# Patient Record
Sex: MALE | Race: WHITE | NOT HISPANIC OR LATINO | Employment: OTHER | URBAN - METROPOLITAN AREA
[De-identification: names, ages, dates, MRNs, and addresses within clinical notes are randomized per-mention and may not be internally consistent; named-entity substitution may affect disease eponyms.]

---

## 2019-11-10 ENCOUNTER — HOSPITAL ENCOUNTER (EMERGENCY)
Facility: MEDICAL CENTER | Age: 84
End: 2019-11-10
Attending: EMERGENCY MEDICINE

## 2019-11-10 VITALS
SYSTOLIC BLOOD PRESSURE: 154 MMHG | WEIGHT: 153.66 LBS | TEMPERATURE: 97.5 F | OXYGEN SATURATION: 98 % | HEART RATE: 88 BPM | RESPIRATION RATE: 16 BRPM | DIASTOLIC BLOOD PRESSURE: 89 MMHG | HEIGHT: 66 IN | BODY MASS INDEX: 24.7 KG/M2

## 2019-11-10 DIAGNOSIS — H66.3X1 CHRONIC SUPPURATIVE OTITIS MEDIA OF RIGHT EAR, UNSPECIFIED OTITIS MEDIA LOCATION: ICD-10-CM

## 2019-11-10 DIAGNOSIS — H92.01 RIGHT EAR PAIN: ICD-10-CM

## 2019-11-10 PROCEDURE — 700102 HCHG RX REV CODE 250 W/ 637 OVERRIDE(OP): Performed by: EMERGENCY MEDICINE

## 2019-11-10 PROCEDURE — 99284 EMERGENCY DEPT VISIT MOD MDM: CPT

## 2019-11-10 PROCEDURE — A9270 NON-COVERED ITEM OR SERVICE: HCPCS | Performed by: EMERGENCY MEDICINE

## 2019-11-10 RX ORDER — IBUPROFEN 600 MG/1
600 TABLET ORAL EVERY 8 HOURS PRN
Qty: 30 TAB | Refills: 0 | Status: SHIPPED | OUTPATIENT
Start: 2019-11-10 | End: 2020-04-04

## 2019-11-10 RX ORDER — AMOXICILLIN AND CLAVULANATE POTASSIUM 875; 125 MG/1; MG/1
1 TABLET, FILM COATED ORAL 2 TIMES DAILY
Qty: 10 TAB | Refills: 0 | Status: SHIPPED | OUTPATIENT
Start: 2019-11-10 | End: 2019-11-15

## 2019-11-10 RX ORDER — HYDROCODONE BITARTRATE AND ACETAMINOPHEN 5; 325 MG/1; MG/1
1 TABLET ORAL ONCE
Status: COMPLETED | OUTPATIENT
Start: 2019-11-10 | End: 2019-11-10

## 2019-11-10 RX ORDER — AMOXICILLIN AND CLAVULANATE POTASSIUM 875; 125 MG/1; MG/1
1 TABLET, FILM COATED ORAL ONCE
Status: COMPLETED | OUTPATIENT
Start: 2019-11-10 | End: 2019-11-10

## 2019-11-10 RX ADMIN — HYDROCODONE BITARTRATE AND ACETAMINOPHEN 1 TABLET: 5; 325 TABLET ORAL at 06:04

## 2019-11-10 RX ADMIN — AMOXICILLIN AND CLAVULANATE POTASSIUM 1 TABLET: 875; 125 TABLET, FILM COATED ORAL at 06:04

## 2019-11-10 SDOH — HEALTH STABILITY: MENTAL HEALTH: HOW OFTEN DO YOU HAVE A DRINK CONTAINING ALCOHOL?: NEVER

## 2019-11-10 ASSESSMENT — LIFESTYLE VARIABLES
EVER HAD A DRINK FIRST THING IN THE MORNING TO STEADY YOUR NERVES TO GET RID OF A HANGOVER: NO
TOTAL SCORE: 0
DO YOU DRINK ALCOHOL: NO
TOTAL SCORE: 0
DOES PATIENT WANT TO STOP DRINKING: NO
HAVE PEOPLE ANNOYED YOU BY CRITICIZING YOUR DRINKING: NO
HAVE YOU EVER FELT YOU SHOULD CUT DOWN ON YOUR DRINKING: NO
EVER FELT BAD OR GUILTY ABOUT YOUR DRINKING: NO
TOTAL SCORE: 0
CONSUMPTION TOTAL: INCOMPLETE

## 2019-11-10 NOTE — ED PROVIDER NOTES
ED Provider Note    Scribed for Migel Navarro M.D. by Eldon Acevedo. 11/10/2019  5:45 AM    CHIEF COMPLAINT  Chief Complaint   Patient presents with   • Sore Throat     x3 weeks, says he has a recurrent throat pain issue that is usually resolved with ABX, no swelling or oral redness noted.        HPI  Emigdio Jiménez is a 83 y.o. male who presents for evaluation of right sided ear pain, right sided dental pain, and a sore throat, all of which onset three weeks ago. He states that this is a recurring problem and was previously able to improve his symptoms with antibiotics. He is also endorsing associated nausea due to pain, This problem has been recurring for the last few years, however he was in halfway when these recurrences first started and was unable to get this treated. He recently had a tooth extraction performed as well as he suspected that was the cause of his pain, however this did not improve his symptoms, and thus he has come here for evaluation. He denies any fevers or chills.    REVIEW OF SYSTEMS  Constitutional: Negative: Fever, chills  ENT: Positive: Right ear pain, sore throat, dental pain  GI: Positive: Nausea  See HPI for further details.     PAST MEDICAL HISTORY  Recurrent sore throat    SOCIAL HISTORY  Social History     Tobacco Use   • Smoking status: Never Smoker   • Smokeless tobacco: Never Used   Substance and Sexual Activity   • Alcohol use: Not Currently     Frequency: Never   • Drug use: Never   • Sexual activity: Not on file       SURGICAL HISTORY  patient denies any surgical history    CURRENT MEDICATIONS  Home Medications     Reviewed by Dc Teresa R.N. (Registered Nurse) on 11/10/19 at 0459  Med List Status: <None>   Medication Last Dose Status        Patient Gil Taking any Medications                       ALLERGIES  No Known Allergies    PHYSICAL EXAM  VITAL SIGNS: BP (!) 163/86   Pulse 89   Temp  "36.4 °C (97.5 °F) (Temporal)   Resp 18   Ht 1.676 m (5' 6\")   Wt 69.7 kg (153 lb 10.6 oz)   SpO2 97%   BMI 24.80 kg/m²  @WAGNER[[id number]::@   Pulse ox interpretation: I interpret this pulse ox as normal.  Genl: Resting comfortably, speaking clearly, appears in no acute distress  Head: NC/AT, Dullness with out light reflex behind right TM, no inflammation of auditory canal, no mastoid tenderness, no facial tenderness, multiple dental extractions, no papito-gingival changes, FROM  ENT: Mucous membranes moist, posterior pharynx clear, uvula midline, nares patent bilaterally  Eyes: Normal sclera, pupils equal round reactive to light  Neck: Supple, FROM, no LAD appreciated  Pulmonary: Lungs are clear to auscultation bilaterally  Chest: No TTP  CV:  RRR, no murmur appreciated, pulses 2+ in both upper and lower extremities,  Abdomen: soft, NT/ND; no rebound/guarding, no masses palpated,  Musculoskeletal: Pain free ROM of the neck. Moving upper and lower extremities and spontaneous in coordinated fashion  Neuro: A&Ox4 (person, place, time, situation), speech fluent, gait steady, no focal deficits appreciated  Skin: No rash or lesions.  No pallor or jaundice.  No cyanosis.  Warm and dry.    COURSE & MEDICAL DECISION MAKING  Pertinent Labs & Imaging studies reviewed. (See chart for details)    Differential diagnoses include but not limited to: Otitis media, TMJ, dental infection, peritonsillar abscess, cervical strain, atypical infection.    5:45 AM - Patient seen and examined at bedside. Patient will be treated with Augmentin 875-125 mg, Norco 5-325 mg. Discussed with the patient that on examination he has a right sided ear infection which is likely the cause of his symptoms. Given this he will be started on a regimen of antibiotics, and receive his first dose here. He understands and agrees to the plan of care.    Medical Decision Making:   Patient presents emergency room for symptoms as described above.  He reports a history " of recurrent right-sided ear infections and chronic eustachian tube dysfunction that has caused him intermittent pain and discomfort.  He is recently discharged from residential, is not established with a PCP in this area, and has the better part of 3 to 5 days of worsening pain and discomfort with intermittent chills and no recorded fevers.  Clinical exam demonstrated no abnormalities of the posterior oropharynx however he does have evidence of supportive otitis media on the right side.  While he is afebrile the clinical evidence indicates need for acute anti-diagnosis and he is given first dose of this here in the emergency department.  As he does not have insurance currently he is provided with a prescription for Augmentin along with coupon for this treatment at a local pharmacy.  He was given initial pain medications here in the emergency department and was counseled regarding the need for anti-inflammatories as scheduled to help improve eustachian tube drainage and to help with overall pain complaints.  He is given information regarding establishing a primary care doctor here in [facility] and strict return precautions should he develop fevers, chills, neck pain or difficulty swallowing or any altered mental status    HTN/IDDM FOLLOW UP:  The patient is referred to a primary physician for blood pressure management, diabetic screening, and for all other preventive health concerns    The patient will return for new or worsening symptoms and is stable at the time of discharge.    The patient is referred to a primary physician for blood pressure management, diabetic screening, and for all other preventative health concerns.    DISPOSITION:  Patient will be discharged home in stable condition.    FOLLOW UP:  No follow-up provider specified.    OUTPATIENT MEDICATIONS:  New Prescriptions    AMOXICILLIN-CLAVULANATE (AUGMENTIN) 875-125 MG TAB    Take 1 Tab by mouth 2 times a day for 5 days.    IBUPROFEN (MOTRIN) 600 MG TAB    Take 1  Tab by mouth every 8 hours as needed.       FINAL IMPRESSION  Visit Diagnoses     ICD-10-CM   1. Chronic suppurative otitis media of right ear, unspecified otitis media location H66.3X1   2. Right ear pain H92.01          Eldon DISLA (Scribe), am scribing for, and in the presence of, Migel Navarro M.D..    Electronically signed by: Eldon Acevedo (Scribe), 11/10/2019    Migel DISLA M.D. personally performed the services described in this documentation, as scribed by Eldon Acevedo in my presence, and it is both accurate and complete. E.    The note accurately reflects work and decisions made by me.  Migel Navarro  11/10/2019  8:11 AM

## 2019-11-10 NOTE — ED TRIAGE NOTES
"Emigdio Jiménez   83 y.o. male   Chief Complaint   Patient presents with   • Sore Throat     x3 weeks, says he has a recurrent throat pain issue that is usually resolved with ABX, no swelling or oral redness noted.       Pt amb to triage with steady gait for above complaint.      Pt is alert and oriented, speaking in full sentences, follows commands and responds appropriately to questions. NAD. Resp are even and unlabored.   Pt placed in lobby. Pt educated on triage process. Pt encouraged to alert staff for any changes.    BP (!) 163/86   Pulse 89   Temp 36.4 °C (97.5 °F) (Temporal)   Resp 18   Ht 1.676 m (5' 6\")   Wt 69.7 kg (153 lb 10.6 oz)   SpO2 97%   BMI 24.80 kg/m²     "

## 2019-11-10 NOTE — ED NOTES
Patient verbalized understanding of discharge instructions, provided with discharge paperwork, gait steady, ambulated independently to TIFFANIE ro.

## 2019-11-18 ENCOUNTER — HOSPITAL ENCOUNTER (OUTPATIENT)
Facility: MEDICAL CENTER | Age: 84
End: 2019-11-19
Attending: EMERGENCY MEDICINE | Admitting: INTERNAL MEDICINE

## 2019-11-18 ENCOUNTER — APPOINTMENT (OUTPATIENT)
Dept: RADIOLOGY | Facility: MEDICAL CENTER | Age: 84
End: 2019-11-18
Attending: EMERGENCY MEDICINE

## 2019-11-18 DIAGNOSIS — R07.89 OTHER CHEST PAIN: ICD-10-CM

## 2019-11-18 DIAGNOSIS — R94.31 ABNORMAL EKG: ICD-10-CM

## 2019-11-18 LAB
ALBUMIN SERPL BCP-MCNC: 4.1 G/DL (ref 3.2–4.9)
ALBUMIN/GLOB SERPL: 1.9 G/DL
ALP SERPL-CCNC: 77 U/L (ref 30–99)
ALT SERPL-CCNC: 11 U/L (ref 2–50)
ANION GAP SERPL CALC-SCNC: 7 MMOL/L (ref 0–11.9)
APTT PPP: 31.9 SEC (ref 24.7–36)
AST SERPL-CCNC: 15 U/L (ref 12–45)
BASOPHILS # BLD AUTO: 1.3 % (ref 0–1.8)
BASOPHILS # BLD: 0.07 K/UL (ref 0–0.12)
BILIRUB SERPL-MCNC: 0.4 MG/DL (ref 0.1–1.5)
BUN SERPL-MCNC: 20 MG/DL (ref 8–22)
CALCIUM SERPL-MCNC: 8.8 MG/DL (ref 8.5–10.5)
CHLORIDE SERPL-SCNC: 103 MMOL/L (ref 96–112)
CO2 SERPL-SCNC: 25 MMOL/L (ref 20–33)
CREAT SERPL-MCNC: 0.97 MG/DL (ref 0.5–1.4)
EKG IMPRESSION: NORMAL
EOSINOPHIL # BLD AUTO: 0.17 K/UL (ref 0–0.51)
EOSINOPHIL NFR BLD: 3.1 % (ref 0–6.9)
ERYTHROCYTE [DISTWIDTH] IN BLOOD BY AUTOMATED COUNT: 44.7 FL (ref 35.9–50)
GLOBULIN SER CALC-MCNC: 2.2 G/DL (ref 1.9–3.5)
GLUCOSE SERPL-MCNC: 93 MG/DL (ref 65–99)
HCT VFR BLD AUTO: 34.4 % (ref 42–52)
HGB BLD-MCNC: 10.9 G/DL (ref 14–18)
IMM GRANULOCYTES # BLD AUTO: 0.02 K/UL (ref 0–0.11)
IMM GRANULOCYTES NFR BLD AUTO: 0.4 % (ref 0–0.9)
INR PPP: 1 (ref 0.87–1.13)
LIPASE SERPL-CCNC: 22 U/L (ref 11–82)
LYMPHOCYTES # BLD AUTO: 1.73 K/UL (ref 1–4.8)
LYMPHOCYTES NFR BLD: 31.3 % (ref 22–41)
MCH RBC QN AUTO: 28.3 PG (ref 27–33)
MCHC RBC AUTO-ENTMCNC: 31.7 G/DL (ref 33.7–35.3)
MCV RBC AUTO: 89.4 FL (ref 81.4–97.8)
MONOCYTES # BLD AUTO: 0.59 K/UL (ref 0–0.85)
MONOCYTES NFR BLD AUTO: 10.7 % (ref 0–13.4)
NEUTROPHILS # BLD AUTO: 2.94 K/UL (ref 1.82–7.42)
NEUTROPHILS NFR BLD: 53.2 % (ref 44–72)
NRBC # BLD AUTO: 0 K/UL
NRBC BLD-RTO: 0 /100 WBC
PLATELET # BLD AUTO: 199 K/UL (ref 164–446)
PMV BLD AUTO: 11.7 FL (ref 9–12.9)
POTASSIUM SERPL-SCNC: 4.6 MMOL/L (ref 3.6–5.5)
PROT SERPL-MCNC: 6.3 G/DL (ref 6–8.2)
PROTHROMBIN TIME: 13.3 SEC (ref 12–14.6)
RBC # BLD AUTO: 3.85 M/UL (ref 4.7–6.1)
SODIUM SERPL-SCNC: 135 MMOL/L (ref 135–145)
TROPONIN T SERPL-MCNC: 20 NG/L (ref 6–19)
WBC # BLD AUTO: 5.5 K/UL (ref 4.8–10.8)

## 2019-11-18 PROCEDURE — 80053 COMPREHEN METABOLIC PANEL: CPT

## 2019-11-18 PROCEDURE — 99285 EMERGENCY DEPT VISIT HI MDM: CPT

## 2019-11-18 PROCEDURE — 99219 PR INITIAL OBSERVATION CARE,LEVL II: CPT | Performed by: INTERNAL MEDICINE

## 2019-11-18 PROCEDURE — 83690 ASSAY OF LIPASE: CPT

## 2019-11-18 PROCEDURE — 36415 COLL VENOUS BLD VENIPUNCTURE: CPT

## 2019-11-18 PROCEDURE — 71046 X-RAY EXAM CHEST 2 VIEWS: CPT

## 2019-11-18 PROCEDURE — 84484 ASSAY OF TROPONIN QUANT: CPT

## 2019-11-18 PROCEDURE — 85730 THROMBOPLASTIN TIME PARTIAL: CPT

## 2019-11-18 PROCEDURE — 700102 HCHG RX REV CODE 250 W/ 637 OVERRIDE(OP): Performed by: EMERGENCY MEDICINE

## 2019-11-18 PROCEDURE — G0378 HOSPITAL OBSERVATION PER HR: HCPCS

## 2019-11-18 PROCEDURE — 93005 ELECTROCARDIOGRAM TRACING: CPT | Performed by: EMERGENCY MEDICINE

## 2019-11-18 PROCEDURE — A9270 NON-COVERED ITEM OR SERVICE: HCPCS | Performed by: INTERNAL MEDICINE

## 2019-11-18 PROCEDURE — A9270 NON-COVERED ITEM OR SERVICE: HCPCS | Performed by: EMERGENCY MEDICINE

## 2019-11-18 PROCEDURE — 85610 PROTHROMBIN TIME: CPT

## 2019-11-18 PROCEDURE — 85025 COMPLETE CBC W/AUTO DIFF WBC: CPT

## 2019-11-18 PROCEDURE — 700102 HCHG RX REV CODE 250 W/ 637 OVERRIDE(OP): Performed by: INTERNAL MEDICINE

## 2019-11-18 RX ORDER — ONDANSETRON 2 MG/ML
4 INJECTION INTRAMUSCULAR; INTRAVENOUS EVERY 4 HOURS PRN
Status: DISCONTINUED | OUTPATIENT
Start: 2019-11-18 | End: 2019-11-19 | Stop reason: HOSPADM

## 2019-11-18 RX ORDER — AMOXICILLIN 250 MG
2 CAPSULE ORAL 2 TIMES DAILY
Status: DISCONTINUED | OUTPATIENT
Start: 2019-11-19 | End: 2019-11-19 | Stop reason: HOSPADM

## 2019-11-18 RX ORDER — REGADENOSON 0.08 MG/ML
0.4 INJECTION, SOLUTION INTRAVENOUS
Status: COMPLETED | OUTPATIENT
Start: 2019-11-18 | End: 2019-11-19

## 2019-11-18 RX ORDER — LEVOTHYROXINE SODIUM 0.15 MG/1
150 TABLET ORAL
COMMUNITY
End: 2019-12-21 | Stop reason: SDUPTHER

## 2019-11-18 RX ORDER — BISACODYL 10 MG
10 SUPPOSITORY, RECTAL RECTAL
Status: DISCONTINUED | OUTPATIENT
Start: 2019-11-18 | End: 2019-11-19 | Stop reason: HOSPADM

## 2019-11-18 RX ORDER — TAMSULOSIN HYDROCHLORIDE 0.4 MG/1
0.4 CAPSULE ORAL DAILY
Status: DISCONTINUED | OUTPATIENT
Start: 2019-11-19 | End: 2019-11-19 | Stop reason: HOSPADM

## 2019-11-18 RX ORDER — ASPIRIN 81 MG/1
324 TABLET, CHEWABLE ORAL DAILY
Status: DISCONTINUED | OUTPATIENT
Start: 2019-11-18 | End: 2019-11-19 | Stop reason: HOSPADM

## 2019-11-18 RX ORDER — ONDANSETRON 4 MG/1
4 TABLET, ORALLY DISINTEGRATING ORAL EVERY 4 HOURS PRN
Status: DISCONTINUED | OUTPATIENT
Start: 2019-11-18 | End: 2019-11-19 | Stop reason: HOSPADM

## 2019-11-18 RX ORDER — ASPIRIN 300 MG/1
300 SUPPOSITORY RECTAL DAILY
Status: DISCONTINUED | OUTPATIENT
Start: 2019-11-18 | End: 2019-11-19 | Stop reason: HOSPADM

## 2019-11-18 RX ORDER — BUTALBITAL, ACETAMINOPHEN AND CAFFEINE 50; 325; 40 MG/1; MG/1; MG/1
1 TABLET ORAL EVERY 6 HOURS PRN
Status: DISCONTINUED | OUTPATIENT
Start: 2019-11-18 | End: 2019-11-19 | Stop reason: HOSPADM

## 2019-11-18 RX ORDER — LEVOTHYROXINE SODIUM 0.07 MG/1
150 TABLET ORAL
Status: DISCONTINUED | OUTPATIENT
Start: 2019-11-19 | End: 2019-11-19 | Stop reason: HOSPADM

## 2019-11-18 RX ORDER — ASPIRIN 325 MG
325 TABLET ORAL DAILY
Status: DISCONTINUED | OUTPATIENT
Start: 2019-11-18 | End: 2019-11-19 | Stop reason: HOSPADM

## 2019-11-18 RX ORDER — AMINOPHYLLINE 25 MG/ML
100 INJECTION, SOLUTION INTRAVENOUS
Status: DISCONTINUED | OUTPATIENT
Start: 2019-11-18 | End: 2019-11-19 | Stop reason: HOSPADM

## 2019-11-18 RX ORDER — ENALAPRILAT 1.25 MG/ML
1.25 INJECTION INTRAVENOUS EVERY 6 HOURS PRN
Status: DISCONTINUED | OUTPATIENT
Start: 2019-11-18 | End: 2019-11-19 | Stop reason: HOSPADM

## 2019-11-18 RX ORDER — AMOXICILLIN AND CLAVULANATE POTASSIUM 875; 125 MG/1; MG/1
1 TABLET, FILM COATED ORAL 2 TIMES DAILY
Status: ON HOLD | COMMUNITY
End: 2019-11-19

## 2019-11-18 RX ORDER — IBUPROFEN 600 MG/1
600 TABLET ORAL EVERY 8 HOURS PRN
Status: DISCONTINUED | OUTPATIENT
Start: 2019-11-18 | End: 2019-11-19

## 2019-11-18 RX ORDER — ACETAMINOPHEN 325 MG/1
650 TABLET ORAL EVERY 6 HOURS PRN
Status: DISCONTINUED | OUTPATIENT
Start: 2019-11-18 | End: 2019-11-19 | Stop reason: HOSPADM

## 2019-11-18 RX ORDER — POLYETHYLENE GLYCOL 3350 17 G/17G
1 POWDER, FOR SOLUTION ORAL
Status: DISCONTINUED | OUTPATIENT
Start: 2019-11-18 | End: 2019-11-19 | Stop reason: HOSPADM

## 2019-11-18 RX ORDER — TAMSULOSIN HYDROCHLORIDE 0.4 MG/1
0.4 CAPSULE ORAL DAILY
COMMUNITY
End: 2019-12-21 | Stop reason: SDUPTHER

## 2019-11-18 RX ORDER — ASPIRIN 325 MG
325 TABLET ORAL ONCE
Status: COMPLETED | OUTPATIENT
Start: 2019-11-18 | End: 2019-11-18

## 2019-11-18 RX ADMIN — ACETAMINOPHEN 650 MG: 325 TABLET, FILM COATED ORAL at 21:52

## 2019-11-18 RX ADMIN — ASPIRIN 325 MG: 325 TABLET, FILM COATED ORAL at 20:09

## 2019-11-18 ASSESSMENT — ENCOUNTER SYMPTOMS
CHILLS: 0
SORE THROAT: 1
WEAKNESS: 0
FALLS: 0
MYALGIAS: 0
DEPRESSION: 0
DIZZINESS: 0
DIARRHEA: 0
STRIDOR: 0
COUGH: 0
VOMITING: 0
TINGLING: 0
FEVER: 0
LOSS OF CONSCIOUSNESS: 0
SPUTUM PRODUCTION: 0
HEADACHES: 1
SHORTNESS OF BREATH: 0
PALPITATIONS: 0
NAUSEA: 0
ABDOMINAL PAIN: 0
CONSTIPATION: 0

## 2019-11-18 ASSESSMENT — LIFESTYLE VARIABLES
DO YOU DRINK ALCOHOL: NO
TOTAL SCORE: 0
EVER_SMOKED: YES
HAVE YOU EVER FELT YOU SHOULD CUT DOWN ON YOUR DRINKING: NO
HAVE PEOPLE ANNOYED YOU BY CRITICIZING YOUR DRINKING: NO
HOW MANY TIMES IN THE PAST YEAR HAVE YOU HAD 5 OR MORE DRINKS IN A DAY: 0
ON A TYPICAL DAY WHEN YOU DRINK ALCOHOL HOW MANY DRINKS DO YOU HAVE: 0
EVER FELT BAD OR GUILTY ABOUT YOUR DRINKING: NO
CONSUMPTION TOTAL: NEGATIVE
AVERAGE NUMBER OF DAYS PER WEEK YOU HAVE A DRINK CONTAINING ALCOHOL: 0
ALCOHOL_USE: NO
EVER HAD A DRINK FIRST THING IN THE MORNING TO STEADY YOUR NERVES TO GET RID OF A HANGOVER: NO
TOTAL SCORE: 0
TOTAL SCORE: 0
DOES PATIENT WANT TO STOP DRINKING: NO

## 2019-11-18 ASSESSMENT — COPD QUESTIONNAIRES
DURING THE PAST 4 WEEKS HOW MUCH DID YOU FEEL SHORT OF BREATH: NONE/LITTLE OF THE TIME
DO YOU EVER COUGH UP ANY MUCUS OR PHLEGM?: NO/ONLY WITH OCCASIONAL COLDS OR INFECTIONS
COPD SCREENING SCORE: 2
IN THE PAST 12 MONTHS DO YOU DO LESS THAN YOU USED TO BECAUSE OF YOUR BREATHING PROBLEMS: DISAGREE/UNSURE
HAVE YOU SMOKED AT LEAST 100 CIGARETTES IN YOUR ENTIRE LIFE: NO/DON'T KNOW

## 2019-11-18 ASSESSMENT — PATIENT HEALTH QUESTIONNAIRE - PHQ9
2. FEELING DOWN, DEPRESSED, IRRITABLE, OR HOPELESS: NOT AT ALL
SUM OF ALL RESPONSES TO PHQ9 QUESTIONS 1 AND 2: 0
1. LITTLE INTEREST OR PLEASURE IN DOING THINGS: NOT AT ALL

## 2019-11-18 ASSESSMENT — COGNITIVE AND FUNCTIONAL STATUS - GENERAL
MOBILITY SCORE: 24
SUGGESTED CMS G CODE MODIFIER DAILY ACTIVITY: CH
DAILY ACTIVITIY SCORE: 24
SUGGESTED CMS G CODE MODIFIER MOBILITY: CH

## 2019-11-19 ENCOUNTER — PATIENT OUTREACH (OUTPATIENT)
Dept: HEALTH INFORMATION MANAGEMENT | Facility: OTHER | Age: 84
End: 2019-11-19

## 2019-11-19 ENCOUNTER — APPOINTMENT (OUTPATIENT)
Dept: RADIOLOGY | Facility: MEDICAL CENTER | Age: 84
End: 2019-11-19
Attending: INTERNAL MEDICINE

## 2019-11-19 VITALS
WEIGHT: 156.09 LBS | TEMPERATURE: 96.9 F | BODY MASS INDEX: 25.09 KG/M2 | OXYGEN SATURATION: 98 % | HEART RATE: 72 BPM | DIASTOLIC BLOOD PRESSURE: 77 MMHG | RESPIRATION RATE: 18 BRPM | SYSTOLIC BLOOD PRESSURE: 130 MMHG | HEIGHT: 66 IN

## 2019-11-19 PROBLEM — R07.9 CHEST PAIN: Status: RESOLVED | Noted: 2019-11-19 | Resolved: 2019-11-19

## 2019-11-19 PROBLEM — E03.9 HYPOTHYROIDISM: Status: ACTIVE | Noted: 2019-11-19

## 2019-11-19 PROBLEM — E87.1 HYPONATREMIA: Status: ACTIVE | Noted: 2019-11-19

## 2019-11-19 PROBLEM — H92.09 EAR ACHE: Status: ACTIVE | Noted: 2019-11-19

## 2019-11-19 PROBLEM — D64.9 NORMOCYTIC ANEMIA: Status: ACTIVE | Noted: 2019-11-19

## 2019-11-19 PROBLEM — R07.9 CHEST PAIN: Status: ACTIVE | Noted: 2019-11-19

## 2019-11-19 PROBLEM — R51.9 HEADACHE: Status: ACTIVE | Noted: 2019-11-19

## 2019-11-19 PROBLEM — R51.9 HEADACHE: Status: RESOLVED | Noted: 2019-11-19 | Resolved: 2019-11-19

## 2019-11-19 PROBLEM — N40.0 BPH (BENIGN PROSTATIC HYPERPLASIA): Status: ACTIVE | Noted: 2019-11-19

## 2019-11-19 LAB
ANION GAP SERPL CALC-SCNC: 4 MMOL/L (ref 0–11.9)
BUN SERPL-MCNC: 19 MG/DL (ref 8–22)
CALCIUM SERPL-MCNC: 8.3 MG/DL (ref 8.5–10.5)
CHLORIDE SERPL-SCNC: 103 MMOL/L (ref 96–112)
CO2 SERPL-SCNC: 26 MMOL/L (ref 20–33)
CREAT SERPL-MCNC: 1.03 MG/DL (ref 0.5–1.4)
EKG IMPRESSION: NORMAL
ERYTHROCYTE [DISTWIDTH] IN BLOOD BY AUTOMATED COUNT: 44.2 FL (ref 35.9–50)
GLUCOSE SERPL-MCNC: 97 MG/DL (ref 65–99)
HCT VFR BLD AUTO: 29.7 % (ref 42–52)
HGB BLD-MCNC: 9.7 G/DL (ref 14–18)
MCH RBC QN AUTO: 28.9 PG (ref 27–33)
MCHC RBC AUTO-ENTMCNC: 32.7 G/DL (ref 33.7–35.3)
MCV RBC AUTO: 88.4 FL (ref 81.4–97.8)
PLATELET # BLD AUTO: 171 K/UL (ref 164–446)
PMV BLD AUTO: 11.5 FL (ref 9–12.9)
POTASSIUM SERPL-SCNC: 4.4 MMOL/L (ref 3.6–5.5)
RBC # BLD AUTO: 3.36 M/UL (ref 4.7–6.1)
SODIUM SERPL-SCNC: 133 MMOL/L (ref 135–145)
TROPONIN T SERPL-MCNC: 20 NG/L (ref 6–19)
WBC # BLD AUTO: 5.5 K/UL (ref 4.8–10.8)

## 2019-11-19 PROCEDURE — 96374 THER/PROPH/DIAG INJ IV PUSH: CPT

## 2019-11-19 PROCEDURE — G0378 HOSPITAL OBSERVATION PER HR: HCPCS

## 2019-11-19 PROCEDURE — 36415 COLL VENOUS BLD VENIPUNCTURE: CPT

## 2019-11-19 PROCEDURE — A9502 TC99M TETROFOSMIN: HCPCS

## 2019-11-19 PROCEDURE — A9270 NON-COVERED ITEM OR SERVICE: HCPCS | Performed by: INTERNAL MEDICINE

## 2019-11-19 PROCEDURE — 80048 BASIC METABOLIC PNL TOTAL CA: CPT

## 2019-11-19 PROCEDURE — 700102 HCHG RX REV CODE 250 W/ 637 OVERRIDE(OP): Performed by: INTERNAL MEDICINE

## 2019-11-19 PROCEDURE — 99217 PR OBSERVATION CARE DISCHARGE: CPT | Performed by: INTERNAL MEDICINE

## 2019-11-19 PROCEDURE — 700111 HCHG RX REV CODE 636 W/ 250 OVERRIDE (IP)

## 2019-11-19 PROCEDURE — 96375 TX/PRO/DX INJ NEW DRUG ADDON: CPT

## 2019-11-19 PROCEDURE — 93010 ELECTROCARDIOGRAM REPORT: CPT | Performed by: INTERNAL MEDICINE

## 2019-11-19 PROCEDURE — 700111 HCHG RX REV CODE 636 W/ 250 OVERRIDE (IP): Performed by: INTERNAL MEDICINE

## 2019-11-19 PROCEDURE — 85027 COMPLETE CBC AUTOMATED: CPT

## 2019-11-19 PROCEDURE — 93005 ELECTROCARDIOGRAM TRACING: CPT | Performed by: INTERNAL MEDICINE

## 2019-11-19 PROCEDURE — 84484 ASSAY OF TROPONIN QUANT: CPT

## 2019-11-19 RX ORDER — FLUTICASONE PROPIONATE 50 MCG
2 SPRAY, SUSPENSION (ML) NASAL 2 TIMES DAILY
Status: DISCONTINUED | OUTPATIENT
Start: 2019-11-19 | End: 2019-11-19 | Stop reason: HOSPADM

## 2019-11-19 RX ORDER — REGADENOSON 0.08 MG/ML
INJECTION, SOLUTION INTRAVENOUS
Status: COMPLETED
Start: 2019-11-19 | End: 2019-11-19

## 2019-11-19 RX ORDER — FLUTICASONE PROPIONATE 50 MCG
2 SPRAY, SUSPENSION (ML) NASAL 2 TIMES DAILY
Qty: 16 G | Refills: 0 | Status: SHIPPED | OUTPATIENT
Start: 2019-11-19 | End: 2019-11-26

## 2019-11-19 RX ORDER — KETOROLAC TROMETHAMINE 30 MG/ML
15 INJECTION, SOLUTION INTRAMUSCULAR; INTRAVENOUS EVERY 6 HOURS PRN
Status: DISCONTINUED | OUTPATIENT
Start: 2019-11-19 | End: 2019-11-19 | Stop reason: HOSPADM

## 2019-11-19 RX ADMIN — TAMSULOSIN HYDROCHLORIDE 0.4 MG: 0.4 CAPSULE ORAL at 04:55

## 2019-11-19 RX ADMIN — LEVOTHYROXINE SODIUM 150 MCG: 75 TABLET ORAL at 04:55

## 2019-11-19 RX ADMIN — FLUTICASONE PROPIONATE 100 MCG: 50 SPRAY, METERED NASAL at 13:16

## 2019-11-19 RX ADMIN — REGADENOSON 0.4 MG: 0.08 INJECTION, SOLUTION INTRAVENOUS at 10:23

## 2019-11-19 RX ADMIN — ACETAMINOPHEN 650 MG: 325 TABLET, FILM COATED ORAL at 08:08

## 2019-11-19 RX ADMIN — SENNOSIDES AND DOCUSATE SODIUM 2 TABLET: 8.6; 5 TABLET ORAL at 04:55

## 2019-11-19 RX ADMIN — KETOROLAC TROMETHAMINE 15 MG: 30 INJECTION, SOLUTION INTRAMUSCULAR at 12:05

## 2019-11-19 NOTE — CARE PLAN
Problem: Communication  Goal: The ability to communicate needs accurately and effectively will improve  Outcome: PROGRESSING AS EXPECTED  Note:   Communication board updated. All pt questions answered. No further questions at this time. Pt encouraged to voice feelings and ask questions as they arise.      Problem: Safety  Goal: Will remain free from injury  Outcome: PROGRESSING AS EXPECTED  Note:   Bed locked and in lowest position. Treaded socks on. Call light and belongings within reach. Patient educated to call for assistance. Patient verbalized understanding. Hourly rounding in place.

## 2019-11-19 NOTE — PROGRESS NOTES
Pt discharged to home by self. Pt escorted down by LUMA [place]. Discharge teaching performed. Questions answered as needed. Discharge paperwork, prescriptions, and belongings with pt.

## 2019-11-19 NOTE — DISCHARGE INSTRUCTIONS
Discharge Instructions per Alice Hope M.D.    Your stress test of your heart was negative.    Continue using Flonase for your ear pain.    Return to ER if you developed chest pain that does not go away, fever, shortness of breath        Discharge Instructions    Discharged to home by taxi with self. Discharged via walking, hospital escort: Yes.  Special equipment needed: Not Applicable    Be sure to schedule a follow-up appointment with your primary care doctor or any specialists as instructed.     Discharge Plan:   Diet Plan: Discussed  Activity Level: Discussed  Confirmed Follow up Appointment: Patient to Call and Schedule Appointment  Confirmed Symptoms Management: Discussed  Medication Reconciliation Updated: Yes  Influenza Vaccine Indication: Not indicated: Previously immunized this influenza season and > 8 years of age    I understand that a diet low in cholesterol, fat, and sodium is recommended for good health. Unless I have been given specific instructions below for another diet, I accept this instruction as my diet prescription.   Other diet: cardiac    Special Instructions: None    · Is patient discharged on Warfarin / Coumadin?   No     Depression / Suicide Risk    As you are discharged from this [facility] facility, it is important to learn how to keep safe from harming yourself.    Recognize the warning signs:  · Abrupt changes in personality, positive or negative- including increase in energy   · Giving away possessions  · Change in eating patterns- significant weight changes-  positive or negative  · Change in sleeping patterns- unable to sleep or sleeping all the time   · Unwillingness or inability to communicate  · Depression  · Unusual sadness, discouragement and loneliness  · Talk of wanting to die  · Neglect of personal appearance   · Rebelliousness- reckless behavior  · Withdrawal from people/activities they love  · Confusion- inability to concentrate     If you or a loved one observes  any of these behaviors or has concerns about self-harm, here's what you can do:  · Talk about it- your feelings and reasons for harming yourself  · Remove any means that you might use to hurt yourself (examples: pills, rope, extension cords, firearm)  · Get professional help from the community (Mental Health, Substance Abuse, psychological counseling)  · Do not be alone:Call your Safe Contact- someone whom you trust who will be there for you.  · Call your local CRISIS HOTLINE 788-5042 or 150-058-3202  · Call your local Children's Mobile Crisis Response Team [place] (775) 953-3540 or www.trivago  · Call the toll free National Suicide Prevention Hotlines   · National Suicide Prevention Lifeline 634-086-USCS (4070)  · [employer] Network 800-SUICIDE (533-9628)    Chest Pain, Nonspecific  It is often hard to give a specific diagnosis for the cause of chest pain. There is always a chance that your pain could be related to something serious, like a heart attack or a blood clot in the lungs. You need to follow up with your caregiver for further evaluation. More lab tests or other studies such as X-rays, electrocardiography, stress testing, or cardiac imaging may be needed to find the cause of your pain.  Most of the time, nonspecific chest pain improves within 2 to 3 days with rest and mild pain medicine. For the next few days, avoid physical exertion or activities that bring on pain. Do not smoke. Avoid drinking alcohol. Call your caregiver for routine follow-up as advised.   SEEK IMMEDIATE MEDICAL CARE IF:  · You develop increased chest pain or pain that radiates to the arm, neck, jaw, back, or abdomen.   · You develop shortness of breath, increased coughing, or you start coughing up blood.   · You have severe back or abdominal pain, nausea, or vomiting.   · You develop severe weakness, fainting, fever, or chills.   Document Released: 12/18/2006 Document Revised: 03/11/2013 Document Reviewed:  06/06/2008  ExitCare® Patient Information ©2013 [employer].

## 2019-11-19 NOTE — CARE PLAN
Problem: Communication  Goal: The ability to communicate needs accurately and effectively will improve  Outcome: PROGRESSING AS EXPECTED  Intervention: [place] patient and significant other/support system to call light to alert staff of needs  Flowsheets (Taken 11/18/2019 2210)  Oriented to:: All of the Following : Location of Bathroom, Visiting Policy, Unit Routine, Call Light and Bedside Controls, Bedside Rail Policy, Smoking Policy, Rights and Responsibilities, Bedside Report, and Patient Education Notebook  Intervention: Educate patient and significant other/support system about the plan of care, procedures, treatments, medications and allow for questions  Note:   Pt educated on POC, including cardiac stress test in AM.     Problem: Infection  Goal: Will remain free from infection  Outcome: PROGRESSING AS EXPECTED  Intervention: Implement standard precautions and perform hand washing before and after patient contact  Note:   Standard precautions and hand washing performed before and after pt contact

## 2019-11-19 NOTE — ED NOTES
Report given to receiving RN. All questions answered. All belongings with pt. Pt transferred via gurney, monitored with RN.

## 2019-11-19 NOTE — H&P
Hospital Medicine History & Physical Note    Date of Service  11/18/2019    Primary Care Physician  None     Consultants  None    Code Status  Full    Chief Complaint  Headache    History of Presenting Illness  83 y.o. male who presented 11/18/2019 with headache and ear pain.  He states he just got out of longterm, had an earache and a been placed on Augmentin.  He has had multiple normal exams.  He states he currently does have a headache which is located on the right side, around his ear and goes to his neck.  He also complains of a mild sore throat.  While in the emergency department, he was noted to have some EKG changes.  Because of this, I did ask him about chest pain.  He does state he has had chest pain for a couple of years.  He describes it as a burning sensation, worse with exertion, resolves with rest.  He states it substernal and mild.  I did discuss the case including labs and imaging with the ER physician.    Review of Systems  Review of Systems   Constitutional: Negative for chills, fever and malaise/fatigue.   HENT: Positive for ear pain and sore throat. Negative for congestion.    Respiratory: Negative for cough, sputum production, shortness of breath and stridor.    Cardiovascular: Positive for chest pain. Negative for palpitations and leg swelling.   Gastrointestinal: Negative for abdominal pain, constipation, diarrhea, nausea and vomiting.   Genitourinary: Negative for dysuria and urgency.   Musculoskeletal: Negative for falls and myalgias.   Neurological: Positive for headaches. Negative for dizziness, tingling, loss of consciousness and weakness.   Psychiatric/Behavioral: Negative for depression and suicidal ideas.   All other systems reviewed and are negative.      Past Medical History  Hypothyroidism, BPH    Surgical History  None    Family History  Coronary artery disease    Social History   reports that he has never smoked. He has never used smokeless tobacco. He reports previous alcohol  use. He reports that he does not use drugs.    Allergies  No Known Allergies    Medications  Prior to Admission Medications   Prescriptions Last Dose Informant Patient Reported? Taking?   amoxicillin-clavulanate (AUGMENTIN) 875-125 MG Tab complete at 11/15/19  Yes Yes   Sig: Take 1 Tab by mouth 2 times a day. Completed 5 days on 11/15/19   ibuprofen (MOTRIN) 600 MG Tab unknown at Unknown time  No No   Sig: Take 1 Tab by mouth every 8 hours as needed.   levothyroxine (SYNTHROID) 150 MCG Tab 11/17/2019 at Unknown time  Yes Yes   Sig: Take 150 mcg by mouth Every morning on an empty stomach.   tamsulosin (FLOMAX) 0.4 MG capsule 11/17/2019 at Unknown time  Yes Yes   Sig: Take 0.4 mg by mouth every day.      Facility-Administered Medications: None       Physical Exam  Temp:  [36.8 °C (98.2 °F)] 36.8 °C (98.2 °F)  Pulse:  [64-73] 64  Resp:  [14] 14  BP: (160-163)/(69-98) 163/69  SpO2:  [98 %-99 %] 99 %    Physical Exam  Vitals signs and nursing note reviewed.   Constitutional:       General: He is not in acute distress.     Appearance: He is well-developed. He is not ill-appearing, toxic-appearing or diaphoretic.   HENT:      Head: Normocephalic and atraumatic.      Right Ear: External ear normal.      Left Ear: External ear normal.      Nose: Nose normal. No congestion or rhinorrhea.      Mouth/Throat:      Mouth: Mucous membranes are moist.      Pharynx: No oropharyngeal exudate.   Eyes:      General: No scleral icterus.        Right eye: No discharge.         Left eye: No discharge.      Extraocular Movements: Extraocular movements intact.   Neck:      Musculoskeletal: Normal range of motion and neck supple. No edema or erythema.      Trachea: No tracheal deviation.   Cardiovascular:      Rate and Rhythm: Normal rate and regular rhythm.      Heart sounds: No murmur. No friction rub. No gallop.    Pulmonary:      Effort: Pulmonary effort is normal. No respiratory distress.      Breath sounds: Normal breath sounds. No  stridor. No wheezing or rales.   Chest:      Chest wall: No tenderness.   Abdominal:      General: Bowel sounds are normal. There is no distension.      Palpations: Abdomen is soft.      Tenderness: There is no tenderness. There is no guarding or rebound.   Musculoskeletal: Normal range of motion.         General: No tenderness.      Right lower leg: No edema.      Left lower leg: No edema.   Lymphadenopathy:      Cervical: No cervical adenopathy.   Skin:     General: Skin is warm and dry.      Coloration: Skin is not jaundiced.      Findings: No erythema or rash.   Neurological:      General: No focal deficit present.      Mental Status: He is alert and oriented to person, place, and time.      Cranial Nerves: No cranial nerve deficit.   Psychiatric:         Mood and Affect: Mood normal.         Behavior: Behavior normal.         Thought Content: Thought content normal.         Judgment: Judgment normal.         Laboratory:  Recent Labs     11/18/19  1837   WBC 5.5   RBC 3.85*   HEMOGLOBIN 10.9*   HEMATOCRIT 34.4*   MCV 89.4   MCH 28.3   MCHC 31.7*   RDW 44.7   PLATELETCT 199   MPV 11.7     Recent Labs     11/18/19  1837   SODIUM 135   POTASSIUM 4.6   CHLORIDE 103   CO2 25   GLUCOSE 93   BUN 20   CREATININE 0.97   CALCIUM 8.8     Recent Labs     11/18/19  1837   ALTSGPT 11   ASTSGOT 15   ALKPHOSPHAT 77   TBILIRUBIN 0.4   LIPASE 22   GLUCOSE 93     Recent Labs     11/18/19  1837   APTT 31.9   INR 1.00     No results for input(s): NTPROBNP in the last 72 hours.      Recent Labs     11/18/19  1837   TROPONINT 20*       Urinalysis:    No results found     Imaging:  DX-CHEST-2 VIEWS   Final Result      1.  Nonspecific mild interstitial prominence which could represent chronic interstitial changes/fibrosis versus nonvascular congestion.   2.  Atherosclerosis.   3.  No evidence of pneumonia.      NM-CARDIAC STRESS TEST    (Results Pending)         Assessment/Plan:  I anticipate this patient is appropriate for observation  status at this time.    * Headache- (present on admission)  Assessment & Plan  -Ongoing for approximately 1 week, has not improved with Augmentin  -Exam has been negative for infection  -Symptomatic care with Fioricet    Chest pain- (present on admission)  Assessment & Plan  -Worse with exertion, improves with rest  -I did personally review his EKG, noted sinus rhythm as well T wave changes  -Trend troponins, repeat EKG and monitor patient on telemetry  -If no worsening, obtain a stress test  -Symptomatic care    Normocytic anemia- (present on admission)  Assessment & Plan  -No sign of gross bleeding  -Repeat CBC in the morning    BPH (benign prostatic hyperplasia)- (present on admission)  Assessment & Plan  -Continue home Flomax    Hypothyroidism- (present on admission)  Assessment & Plan  -Continue home Synthroid at 150 mcg daily  -No recent TSH, patient does not appear hypo-or hyperthyroid      VTE prophylaxis: Lovenox

## 2019-11-19 NOTE — DIETARY
Nutrition Services - Poor po and/or weight loss reported on admission. Malnutrition Screening Tool score <2. Nutrition assessment not indicated at this time. RD will monitor per department guidelines. Please consult RD for nutrition concerns.

## 2019-11-19 NOTE — PROGRESS NOTES
Assumed care at 0700, bedside report received from night shift RN. Pt is SR on the telemetry monitor. Patient is AO x 4 and is resting in bed with even respirations. Initial assessment completed and orders reviewed. POC addressed with patient. Call light within reach and hourly rounding in place. No further questions at this time. Patient medicated per mar for ear pain.

## 2019-11-19 NOTE — ED PROVIDER NOTES
ED Provider Note    CHIEF COMPLAINT  Chief Complaint   Patient presents with   • Ear Pain     right ear pain recently dx with OM but continues to have pain and would like to be evaluated        HPI  Emigdio Jiménez is a 83 y.o. male who presents complaining of continued right ear pain after a diagnosis of otitis media on the 10th.  Patient states he was placed on Augmentin for 5 days but is still having pain.  He states that the pain is rating down his throat and into his lungs.  He is worried he has pneumonia.  He states [facility] and is no primary care physician.  He states he wishes to be admitted to the hospital for few days to make sure that he is medically okay.  He denies any chest pains.  He does have occasional shortness of breath and pain with swallowing.  He has not had any neck stiffness fevers or chills no headache or vision changes.  He denies any history of asthma or lung problems.  He has no history of heart attack or stroke.  He states he has had some tooth pain as well as right ear pain as well as pain down the right side of his neck.  He denies any abdominal pain or diarrhea.  He has not had dizziness or lightheadedness.    REVIEW OF SYSTEMS  HEENT:  congestion right sided sore throat ositive right-sided earache  EYES: no discharge redness or vision changes  CARDIAC: Positive anterior chest discomfort  PULMONARY: no dyspnea, cough or congestion   GI: no vomiting diarrhea or abdominal pain   : no dysuria, back pain or hematuria   Neuro: no weakness, numbness aphasia or headache  Musculoskeletal: no swelling deformity or pain no joint swelling  Endocrine: no fevers, sweating, weight loss   SKIN: no rash, erythema or contusions     See history of present illness all other systems are negative      PAST MEDICAL HISTORY  No past medical history on file.    FAMILY HISTORY  History reviewed. No pertinent family history.    SOCIAL HISTORY  Social History     Socioeconomic History   •  Marital status:      Spouse name: Not on file   • Number of children: Not on file   • Years of education: Not on file   • Highest education level: Not on file   Occupational History   • Not on file   Social Needs   • Financial resource strain: Not on file   • Food insecurity:     Worry: Not on file     Inability: Not on file   • Transportation needs:     Medical: Not on file     Non-medical: Not on file   Tobacco Use   • Smoking status: Never Smoker   • Smokeless tobacco: Never Used   Substance and Sexual Activity   • Alcohol use: Not Currently     Frequency: Never   • Drug use: Never   • Sexual activity: Not on file   Lifestyle   • Physical activity:     Days per week: Not on file     Minutes per session: Not on file   • Stress: Not on file   Relationships   • Social connections:     Talks on phone: Not on file     Gets together: Not on file     Attends Muslim service: Not on file     Active member of club or organization: Not on file     Attends meetings of clubs or organizations: Not on file     Relationship status: Not on file   • Intimate partner violence:     Fear of current or ex partner: Not on file     Emotionally abused: Not on file     Physically abused: Not on file     Forced sexual activity: Not on file   Other Topics Concern   • Not on file   Social History Narrative   • Not on file       SURGICAL HISTORY  No past surgical history on file.    CURRENT MEDICATIONS  Home Medications     Reviewed by Briseyda Alex (Pharmacy Tech) on 11/18/19 at 1924  Med List Status: Complete   Medication Last Dose Status   amoxicillin-clavulanate (AUGMENTIN) 875-125 MG Tab complete Active   ibuprofen (MOTRIN) 600 MG Tab unknown Active   levothyroxine (SYNTHROID) 150 MCG Tab 11/17/2019 Active   tamsulosin (FLOMAX) 0.4 MG capsule 11/17/2019 Active                ALLERGIES  No Known Allergies    PHYSICAL EXAM  VITAL SIGNS: BP (!) 163/69   Pulse 64   Temp 36.8 °C (98.2 °F) (Temporal)   Resp 14   Ht 1.676 m  "(5' 6\")   Wt 72 kg (158 lb 11.7 oz)   SpO2 99%   BMI 25.62 kg/m²       Constitutional: Well developed, Well nourished, No acute distress, Non-toxic appearance.   HENT: Normocephalic, Atraumatic, Bilateral external ears normal, Oropharynx moist, No oral exudates, Nose normal.  TMs are both clear without erythema bulging or loss of landmarks pharynx is pink without exudate.  Eyes: PERRLA, EOMI, Conjunctiva normal, No discharge.   Neck: Normal range of motion, No tenderness, Supple, No stridor.   Cardiovascular: Regular rate and rhythm no murmurs rubs or gallops  Thorax & Lungs: Clear to auscultation bilaterally no wheezes rales or rhonchi  Abdomen: Bowel sounds normal, Soft, No tenderness, No masses, No pulsatile masses.   Skin: Warm, Dry, No erythema, No rash.   Back: No tenderness, No CVA tenderness.   Extremities: Intact distal pulses, No tenderness, No cyanosis, No clubbing.  Negative edema, negative cording negative Homans sign  Neurologic: Alert & oriented x 3, Normal motor function, Normal sensory function, No focal deficits noted.         RADIOLOGY/PROCEDURES  DX-CHEST-2 VIEWS   Final Result      1.  Nonspecific mild interstitial prominence which could represent chronic interstitial changes/fibrosis versus nonvascular congestion.   2.  Atherosclerosis.   3.  No evidence of pneumonia.            COURSE & MEDICAL DECISION MAKING  Pertinent Labs & Imaging studies reviewed. (See chart for details)  Differential diagnosis: Sean media, sinusitis, anginal equivalent,    Results for orders placed or performed during the hospital encounter of 11/18/19   CBC with Differential   Result Value Ref Range    WBC 5.5 4.8 - 10.8 K/uL    RBC 3.85 (L) 4.70 - 6.10 M/uL    Hemoglobin 10.9 (L) 14.0 - 18.0 g/dL    Hematocrit 34.4 (L) 42.0 - 52.0 %    MCV 89.4 81.4 - 97.8 fL    MCH 28.3 27.0 - 33.0 pg    MCHC 31.7 (L) 33.7 - 35.3 g/dL    RDW 44.7 35.9 - 50.0 fL    Platelet Count 199 164 - 446 K/uL    MPV 11.7 9.0 - 12.9 fL    " Neutrophils-Polys 53.20 44.00 - 72.00 %    Lymphocytes 31.30 22.00 - 41.00 %    Monocytes 10.70 0.00 - 13.40 %    Eosinophils 3.10 0.00 - 6.90 %    Basophils 1.30 0.00 - 1.80 %    Immature Granulocytes 0.40 0.00 - 0.90 %    Nucleated RBC 0.00 /100 WBC    Neutrophils (Absolute) 2.94 1.82 - 7.42 K/uL    Lymphs (Absolute) 1.73 1.00 - 4.80 K/uL    Monos (Absolute) 0.59 0.00 - 0.85 K/uL    Eos (Absolute) 0.17 0.00 - 0.51 K/uL    Baso (Absolute) 0.07 0.00 - 0.12 K/uL    Immature Granulocytes (abs) 0.02 0.00 - 0.11 K/uL    NRBC (Absolute) 0.00 K/uL   Complete Metabolic Panel (CMP)   Result Value Ref Range    Sodium 135 135 - 145 mmol/L    Potassium 4.6 3.6 - 5.5 mmol/L    Chloride 103 96 - 112 mmol/L    Co2 25 20 - 33 mmol/L    Anion Gap 7.0 0.0 - 11.9    Glucose 93 65 - 99 mg/dL    Bun 20 8 - 22 mg/dL    Creatinine 0.97 0.50 - 1.40 mg/dL    Calcium 8.8 8.5 - 10.5 mg/dL    AST(SGOT) 15 12 - 45 U/L    ALT(SGPT) 11 2 - 50 U/L    Alkaline Phosphatase 77 30 - 99 U/L    Total Bilirubin 0.4 0.1 - 1.5 mg/dL    Albumin 4.1 3.2 - 4.9 g/dL    Total Protein 6.3 6.0 - 8.2 g/dL    Globulin 2.2 1.9 - 3.5 g/dL    A-G Ratio 1.9 g/dL   Troponin STAT   Result Value Ref Range    Troponin T 20 (H) 6 - 19 ng/L   APTT   Result Value Ref Range    APTT 31.9 24.7 - 36.0 sec   Prothrombin Time (PT/INR)   Result Value Ref Range    PT 13.3 12.0 - 14.6 sec    INR 1.00 0.87 - 1.13   Lipase   Result Value Ref Range    Lipase 22 11 - 82 U/L   ESTIMATED GFR   Result Value Ref Range    GFR If African American >60 >60 mL/min/1.73 m 2    GFR If Non African American >60 >60 mL/min/1.73 m 2   EKG   Result Value Ref Range    Report       [facility] Emergency Dept.    Test Date:  2019  Pt Name:    OLAMIDE DAI                Department: ER  MRN:        [id number]                      Room:        09  Gender:     Male                         Technician: [id number]  :        [id number]                   Requested By:ANANYA ASHER  Order #:     [id number]                    Reading MD: ANANYA ASHER MD    Measurements  Intervals                                Axis  Rate:       66                           P:          26  AL:         180                          QRS:        9  QRSD:       64                           T:          -8  QT:         424  QTc:        445    Interpretive Statements  SINUS RHYTHM  MULTIPLE ATRIAL PREMATURE COMPLEXES  PROBABLE INFERIOR INFARCT, AGE INDETERMINATE  BASELINE WANDER IN LEAD(S) II,aVR  No previous ECG available for comparison  Electronically Signed On 11- 18:48:09 PST by ANAYNA ASHER MD          Because the patient is complaining of ear and jaw pain but has no evidence of infection on exam I did order an EKG.  The results are shown above but it was abnormal with inferior ST changes concerning for ischemia versus old infarct.  An IV was started and labs were drawn.  The patient was sent over from [facility] to room 9 to receive a cardiac work-up and will be admitted to the hospital overnight for chest pain rule out.    7:38 PM I spoke with Dr. Montgomery and he will admit the patient to the hospital for chest pain rule out work-up.  Patient understands that he needs to stay.      FINAL IMPRESSION  1. Abnormal EKG     2. Other chest pain             Electronically signed by: Ananya Asher, 11/18/2019 6:15 PM

## 2019-11-19 NOTE — ASSESSMENT & PLAN NOTE
-Worse with exertion, improves with rest  -I did personally review his EKG, noted sinus rhythm as well T wave changes  -Trend troponins, repeat EKG and monitor patient on telemetry  -If no worsening, obtain a stress test  -Symptomatic care

## 2019-11-19 NOTE — ASSESSMENT & PLAN NOTE
-Continue home Synthroid at 150 mcg daily  -No recent TSH, patient does not appear hypo-or hyperthyroid

## 2019-11-19 NOTE — ED NOTES
Received report from Mitch MAZARIEGOS. Refusing BP cuff. Requesting 800mg of ibuprofen and dinner.

## 2019-11-19 NOTE — PROGRESS NOTES
Rapid team up to floor due to ST elevation in new EKG.  Rapid team expresses no concerns at this time. Pt asymptomatic.

## 2019-11-19 NOTE — ED TRIAGE NOTES
Pt to triage .  Chief Complaint   Patient presents with   • Ear Pain     right ear pain recently dx with OM but continues to have pain and would like to be evaluated

## 2019-11-19 NOTE — PROGRESS NOTES
Pt up to floor with all belongings. Pt placed on tele box. Monitor room notified. Pt complaining of mild pain in face/ear. Tylenol given.

## 2019-11-19 NOTE — ASSESSMENT & PLAN NOTE
-Mild, patient does not appear fluid overloaded or dehydrated  -No IV fluids needed at this time  -Repeat BMP in the morning

## 2019-11-19 NOTE — PROGRESS NOTES
· 2 RN skin check completed with DELILAH Morrow  · Devices in place: N/A  · Confirmed pressure ulcers found on N/A  · New potential pressure ulcers noted on N/A  · The following interventions in place: pillows for support     Bilateral elbows: dry, blanching   Sacrum: discolored, blanching   Bilateral heels: dry cracking, flaking

## 2019-11-19 NOTE — ASSESSMENT & PLAN NOTE
-Ongoing for approximately 1 week, has not improved with Augmentin  -Exam has been negative for infection  -Symptomatic care with Fioricet

## 2019-11-20 NOTE — DISCHARGE SUMMARY
Discharge Summary    CHIEF COMPLAINT ON ADMISSION  Chief Complaint   Patient presents with   • Ear Pain     right ear pain recently dx with OM but continues to have pain and would like to be evaluated        Reason for Admission  FOLLOW UP     Admission Date  11/18/2019    CODE STATUS  Prior    HPI & HOSPITAL COURSE  This is a 83 y.o. male here with PMH BPH, hypothyroidism who recently left [facility] and had presented to the ED with earache. He was prescribed augmentin but continued to have earache and headache and returned to the ED. Patient also mention he's had intermittent chest pain for a long time. EKG showed abnormal ST segment changes and he was admitted to telemetry. He had serial troponins that were borderline elevated in indeterminant range. CXR showed nonspecific interstitial changes. Nuclear stress test was negative for infarct. Patient did not have any chest pain while hospitalized. His earache and headache were given supportive treatment and improved with toradol and flonase and continue similar treatment on discharge.       Therefore, he is discharged in good and stable condition to home with close outpatient follow-up.      Discharge Date  11/19/2019    FOLLOW UP ITEMS POST DISCHARGE  F/u with PCP 1-2 weeks to ensure improvement of earache and furhter chest pain management    DISCHARGE DIAGNOSES  Principal Problem (Resolved):    Headache POA: Yes  Active Problems:    Hypothyroidism POA: Yes    BPH (benign prostatic hyperplasia) POA: Yes    Normocytic anemia POA: Yes    Ear ache POA: Yes  Resolved Problems:    Chest pain POA: Yes      FOLLOW UP  No future appointments.  [address] [address] [phone]    Please walk-in or call to schedule your hospital follow up and to establish with a Primary Care Physician. Thank you.       MEDICATIONS ON DISCHARGE     Medication List      START taking these medications      Instructions   fluticasone 50 MCG/ACT  nasal spray  Commonly known as:  FLONASE   Spray 2 Sprays in nose 2 times a day for 7 days.  Dose:  2 Spray        CONTINUE taking these medications      Instructions   ibuprofen 600 MG Tabs  Commonly known as:  MOTRIN   Take 1 Tab by mouth every 8 hours as needed.  Dose:  600 mg     levothyroxine 150 MCG Tabs  Commonly known as:  SYNTHROID   Take 150 mcg by mouth Every morning on an empty stomach.  Dose:  150 mcg     tamsulosin 0.4 MG capsule  Commonly known as:  FLOMAX   Take 0.4 mg by mouth every day.  Dose:  0.4 mg        STOP taking these medications    amoxicillin-clavulanate 875-125 MG Tabs  Commonly known as:  AUGMENTIN            Allergies  No Known Allergies    DIET  No orders of the defined types were placed in this encounter.      ACTIVITY  As tolerated.  Weight bearing as tolerated    CONSULTATIONS  None    PROCEDURES  NM-CARDIAC STRESS TEST   Final Result      DX-CHEST-2 VIEWS   Final Result      1.  Nonspecific mild interstitial prominence which could represent chronic interstitial changes/fibrosis versus nonvascular congestion.   2.  Atherosclerosis.   3.  No evidence of pneumonia.            LABORATORY  Lab Results   Component Value Date    SODIUM 133 (L) 11/19/2019    POTASSIUM 4.4 11/19/2019    CHLORIDE 103 11/19/2019    CO2 26 11/19/2019    GLUCOSE 97 11/19/2019    BUN 19 11/19/2019    CREATININE 1.03 11/19/2019        Lab Results   Component Value Date    WBC 5.5 11/19/2019    HEMOGLOBIN 9.7 (L) 11/19/2019    HEMATOCRIT 29.7 (L) 11/19/2019    PLATELETCT 171 11/19/2019        Total time of the discharge process exceeds 32 minutes.

## 2019-12-21 ENCOUNTER — HOSPITAL ENCOUNTER (EMERGENCY)
Facility: MEDICAL CENTER | Age: 84
End: 2019-12-21
Attending: EMERGENCY MEDICINE

## 2019-12-21 ENCOUNTER — APPOINTMENT (OUTPATIENT)
Dept: RADIOLOGY | Facility: MEDICAL CENTER | Age: 84
End: 2019-12-21
Attending: EMERGENCY MEDICINE

## 2019-12-21 VITALS
WEIGHT: 165.34 LBS | OXYGEN SATURATION: 100 % | RESPIRATION RATE: 18 BRPM | HEART RATE: 72 BPM | SYSTOLIC BLOOD PRESSURE: 163 MMHG | DIASTOLIC BLOOD PRESSURE: 68 MMHG | HEIGHT: 66 IN | BODY MASS INDEX: 26.57 KG/M2 | TEMPERATURE: 97.5 F

## 2019-12-21 DIAGNOSIS — E87.1 HYPONATREMIA: ICD-10-CM

## 2019-12-21 DIAGNOSIS — E03.9 HYPOTHYROIDISM, UNSPECIFIED TYPE: ICD-10-CM

## 2019-12-21 DIAGNOSIS — I10 HYPERTENSION, UNSPECIFIED TYPE: ICD-10-CM

## 2019-12-21 LAB
ALBUMIN SERPL BCP-MCNC: 4 G/DL (ref 3.2–4.9)
ALBUMIN/GLOB SERPL: 1.6 G/DL
ALP SERPL-CCNC: 71 U/L (ref 30–99)
ALT SERPL-CCNC: 11 U/L (ref 2–50)
ANION GAP SERPL CALC-SCNC: 3 MMOL/L (ref 0–11.9)
AST SERPL-CCNC: 14 U/L (ref 12–45)
BASOPHILS # BLD AUTO: 2 % (ref 0–1.8)
BASOPHILS # BLD: 0.08 K/UL (ref 0–0.12)
BILIRUB SERPL-MCNC: 0.4 MG/DL (ref 0.1–1.5)
BUN SERPL-MCNC: 13 MG/DL (ref 8–22)
CALCIUM SERPL-MCNC: 9.1 MG/DL (ref 8.5–10.5)
CHLORIDE SERPL-SCNC: 100 MMOL/L (ref 96–112)
CO2 SERPL-SCNC: 26 MMOL/L (ref 20–33)
CREAT SERPL-MCNC: 0.97 MG/DL (ref 0.5–1.4)
EOSINOPHIL # BLD AUTO: 0.1 K/UL (ref 0–0.51)
EOSINOPHIL NFR BLD: 2.5 % (ref 0–6.9)
ERYTHROCYTE [DISTWIDTH] IN BLOOD BY AUTOMATED COUNT: 42.4 FL (ref 35.9–50)
GLOBULIN SER CALC-MCNC: 2.5 G/DL (ref 1.9–3.5)
GLUCOSE SERPL-MCNC: 98 MG/DL (ref 65–99)
HCT VFR BLD AUTO: 35.9 % (ref 42–52)
HGB BLD-MCNC: 11.3 G/DL (ref 14–18)
IMM GRANULOCYTES # BLD AUTO: 0.01 K/UL (ref 0–0.11)
IMM GRANULOCYTES NFR BLD AUTO: 0.3 % (ref 0–0.9)
LYMPHOCYTES # BLD AUTO: 1.54 K/UL (ref 1–4.8)
LYMPHOCYTES NFR BLD: 38.6 % (ref 22–41)
MCH RBC QN AUTO: 26.5 PG (ref 27–33)
MCHC RBC AUTO-ENTMCNC: 31.5 G/DL (ref 33.7–35.3)
MCV RBC AUTO: 84.3 FL (ref 81.4–97.8)
MONOCYTES # BLD AUTO: 0.53 K/UL (ref 0–0.85)
MONOCYTES NFR BLD AUTO: 13.3 % (ref 0–13.4)
NEUTROPHILS # BLD AUTO: 1.73 K/UL (ref 1.82–7.42)
NEUTROPHILS NFR BLD: 43.3 % (ref 44–72)
NRBC # BLD AUTO: 0 K/UL
NRBC BLD-RTO: 0 /100 WBC
PLATELET # BLD AUTO: 192 K/UL (ref 164–446)
PMV BLD AUTO: 11.4 FL (ref 9–12.9)
POTASSIUM SERPL-SCNC: 4.5 MMOL/L (ref 3.6–5.5)
PROT SERPL-MCNC: 6.5 G/DL (ref 6–8.2)
RBC # BLD AUTO: 4.26 M/UL (ref 4.7–6.1)
SODIUM SERPL-SCNC: 129 MMOL/L (ref 135–145)
TSH SERPL DL<=0.005 MIU/L-ACNC: 12.88 UIU/ML (ref 0.38–5.33)
WBC # BLD AUTO: 4 K/UL (ref 4.8–10.8)

## 2019-12-21 PROCEDURE — 85025 COMPLETE CBC W/AUTO DIFF WBC: CPT

## 2019-12-21 PROCEDURE — 700102 HCHG RX REV CODE 250 W/ 637 OVERRIDE(OP): Performed by: EMERGENCY MEDICINE

## 2019-12-21 PROCEDURE — 70450 CT HEAD/BRAIN W/O DYE: CPT

## 2019-12-21 PROCEDURE — 99284 EMERGENCY DEPT VISIT MOD MDM: CPT

## 2019-12-21 PROCEDURE — A9270 NON-COVERED ITEM OR SERVICE: HCPCS | Performed by: EMERGENCY MEDICINE

## 2019-12-21 PROCEDURE — 84443 ASSAY THYROID STIM HORMONE: CPT

## 2019-12-21 PROCEDURE — 80053 COMPREHEN METABOLIC PANEL: CPT

## 2019-12-21 PROCEDURE — 36415 COLL VENOUS BLD VENIPUNCTURE: CPT

## 2019-12-21 RX ORDER — IBUPROFEN 600 MG/1
600 TABLET ORAL ONCE
Status: COMPLETED | OUTPATIENT
Start: 2019-12-21 | End: 2019-12-21

## 2019-12-21 RX ORDER — LEVOTHYROXINE SODIUM 0.15 MG/1
150 TABLET ORAL
Qty: 30 TAB | Refills: 0 | Status: SHIPPED | OUTPATIENT
Start: 2019-12-21 | End: 2020-01-20

## 2019-12-21 RX ORDER — TAMSULOSIN HYDROCHLORIDE 0.4 MG/1
0.4 CAPSULE ORAL DAILY
Qty: 30 CAP | Refills: 0 | Status: SHIPPED | OUTPATIENT
Start: 2019-12-21 | End: 2020-01-20

## 2019-12-21 RX ORDER — LISINOPRIL 20 MG/1
20 TABLET ORAL DAILY
Qty: 30 TAB | Refills: 0 | Status: SHIPPED | OUTPATIENT
Start: 2019-12-21 | End: 2020-02-08 | Stop reason: SDUPTHER

## 2019-12-21 RX ADMIN — IBUPROFEN 600 MG: 600 TABLET ORAL at 11:30

## 2019-12-21 NOTE — ED NOTES
Pt reeducated pt to not take Motrin for pain and to take acetaminophen as directed and not to exceed the recommended daily dose. Pt verbalized understanding and was walked out of ED in stable condition.

## 2019-12-21 NOTE — DISCHARGE INSTRUCTIONS
You need to make sure you are taking your medications as prescribed.  Make sure you are eating a healthy regular diet.  Return here if you experience new or worsening symptoms.

## 2019-12-21 NOTE — ED TRIAGE NOTES
Chief Complaint   Patient presents with   • Hypertension     Pt reports to have [facility] and has no hypertension medication. pt denies dizziness/lightheadedness/headache, but states he just does not feel right.    Explained to pt triage process, made pt aware to tell this RN/staff of any changes/concerns, pt verbalized understanding of process and instructions given. Pt to TIFFANIE ro.

## 2019-12-21 NOTE — ED PROVIDER NOTES
ED Provider Note    CHIEF COMPLAINT  Chief Complaint   Patient presents with   • Hypertension     Pt reports to have been [facility] ago and has no hypertension medication. pt denies dizziness/lightheadedness/headache, but states he just does not feel right.        HPI  Emigdio Jiménez is a 84 y.o. male who presents to the emergency department stating that he was just [facility] 2 months ago and he is running out of his medications.  The patient says that he was given prescriptions for Flomax and a blood pressure medicine that he does not remember the name of and he took the blood pressure medicine when he felt like he needed it and he never followed up with the primary care doctor to get any refills.  In addition he says that he only has a few Synthroid tablets left, he initially told me he had been taking his medicines as prescribed but it turns out he is also only taking his Synthroid occasionally when he feels like he needs to.  Today he comes in concerned about his blood pressure and says that he has a mild headache he has had daily headaches every day for 2 or 3 weeks now.  He does not recognize any specific exacerbating relieving factors or precipitating events    REVIEW OF SYSTEMS no fever no chest pain no hemoptysis or shortness of breath.  All other systems negative    PAST MEDICAL HISTORY  No past medical history on file.    FAMILY HISTORY  No family history on file.    SOCIAL HISTORY  Social History     Socioeconomic History   • Marital status:      Spouse name: Not on file   • Number of children: Not on file   • Years of education: Not on file   • Highest education level: Not on file   Occupational History   • Not on file   Social Needs   • Financial resource strain: Not on file   • Food insecurity:     Worry: Not on file     Inability: Not on file   • Transportation needs:     Medical: Not on file     Non-medical: Not on file   Tobacco Use   • Smoking status: Never  "Smoker   • Smokeless tobacco: Never Used   Substance and Sexual Activity   • Alcohol use: Not Currently     Frequency: Never   • Drug use: Never   • Sexual activity: Not on file   Lifestyle   • Physical activity:     Days per week: Not on file     Minutes per session: Not on file   • Stress: Not on file   Relationships   • Social connections:     Talks on phone: Not on file     Gets together: Not on file     Attends Druze service: Not on file     Active member of club or organization: Not on file     Attends meetings of clubs or organizations: Not on file     Relationship status: Not on file   • Intimate partner violence:     Fear of current or ex partner: Not on file     Emotionally abused: Not on file     Physically abused: Not on file     Forced sexual activity: Not on file   Other Topics Concern   • Not on file   Social History Narrative   • Not on file       SURGICAL HISTORY  No past surgical history on file.    CURRENT MEDICATIONS  Home Medications    **Home medications have not yet been reviewed for this encounter**         ALLERGIES  No Known Allergies    PHYSICAL EXAM  VITAL SIGNS: BP (!) 163/68   Pulse 72   Temp 36.4 °C (97.5 °F) (Temporal) Comment: Simultaneous filing. User may not have seen previous data. Comment (Src): Simultaneous filing. User may not have seen previous data.  Resp 18   Ht 1.676 m (5' 6\")   Wt 75 kg (165 lb 5.5 oz)   SpO2 100%   BMI 26.69 kg/m²    Oxygen saturation is interpreted as adequate  Constitutional: Awake verbal talkative but extremely short of breath and he does not use hearing aids  HENT: No sign of acute trauma to the head mucous membranes are moist  Eyes: No erythema discharge or jaundice  Neck: Trachea midline no JVD  Cardiovascular: Regular rate and rhythm  Lungs: Clear and equal bilaterally with no apparent difficulty breathing  Abdomen/Back: Soft nondistended nontender no rebound guarding or peritoneal findings  Skin: Warm and dry  Musculoskeletal: No leg " edema or calf tenderness  Neurologic: Awake verbal ambulatory without difficulty    Laboratory  CBC shows white blood cell count of 4.0 hemoglobin is adequate at 11.3 basic metabolic panel shows a slightly low sodium of 129, TSH is elevated at 12.888 indicating hypothyroidism.    Radiology  CT-HEAD W/O   Final Result      No acute intracranial findings.           MEDICAL DECISION MAKING and DISPOSITION  In the emergency department the patient clinically looks well he is minimally symptomatic with his only actual symptomatic complaint being of daily headaches for 2 weeks.  I have reviewed all the findings with the patient and I have written prescriptions for his regular dose of Synthroid and I have encouraged him to take the medication as prescribed on the bottle.  I written a refill prescription for Flomax and I have written a prescription for lisinopril 20 mg daily.  I have also strongly advised the patient he needs to go to the hospitals clinic and establish a primary care doctor for further refills and further evaluation and treatment in the future.  The patient is discharging good stable condition and he appears to understand his discharge instructions    IMPRESSION  1.  Hypertension  2.  Hypothyroidism  3.  Hyponatremia  4.  Noncompliance with medication therapy         Electronically signed by: Alfonso Bishop, 12/21/2019 2:01 PM

## 2020-02-08 ENCOUNTER — HOSPITAL ENCOUNTER (EMERGENCY)
Facility: MEDICAL CENTER | Age: 85
End: 2020-02-08
Attending: EMERGENCY MEDICINE

## 2020-02-08 VITALS
TEMPERATURE: 97.9 F | BODY MASS INDEX: 26.22 KG/M2 | WEIGHT: 163.14 LBS | HEIGHT: 66 IN | RESPIRATION RATE: 16 BRPM | SYSTOLIC BLOOD PRESSURE: 172 MMHG | HEART RATE: 65 BPM | DIASTOLIC BLOOD PRESSURE: 89 MMHG | OXYGEN SATURATION: 96 %

## 2020-02-08 DIAGNOSIS — J06.9 ACUTE URI: ICD-10-CM

## 2020-02-08 DIAGNOSIS — Z76.0 MEDICATION REFILL: ICD-10-CM

## 2020-02-08 PROCEDURE — A9270 NON-COVERED ITEM OR SERVICE: HCPCS | Performed by: EMERGENCY MEDICINE

## 2020-02-08 PROCEDURE — 700102 HCHG RX REV CODE 250 W/ 637 OVERRIDE(OP): Performed by: EMERGENCY MEDICINE

## 2020-02-08 PROCEDURE — 99284 EMERGENCY DEPT VISIT MOD MDM: CPT

## 2020-02-08 RX ORDER — LEVOTHYROXINE SODIUM 0.07 MG/1
75 TABLET ORAL
Qty: 30 TAB | Refills: 0 | Status: SHIPPED | OUTPATIENT
Start: 2020-02-08 | End: 2020-03-14 | Stop reason: SDUPTHER

## 2020-02-08 RX ORDER — BUTALBITAL, ACETAMINOPHEN AND CAFFEINE 50; 325; 40 MG/1; MG/1; MG/1
1 TABLET ORAL EVERY 4 HOURS PRN
Qty: 10 TAB | Refills: 0 | Status: SHIPPED | OUTPATIENT
Start: 2020-02-08 | End: 2020-02-11

## 2020-02-08 RX ORDER — LISINOPRIL 20 MG/1
20 TABLET ORAL DAILY
Qty: 30 TAB | Refills: 0 | Status: SHIPPED | OUTPATIENT
Start: 2020-02-08 | End: 2020-03-14 | Stop reason: SDUPTHER

## 2020-02-08 RX ORDER — BUTALBITAL, ACETAMINOPHEN AND CAFFEINE 50; 325; 40 MG/1; MG/1; MG/1
1 TABLET ORAL ONCE
Status: COMPLETED | OUTPATIENT
Start: 2020-02-08 | End: 2020-02-08

## 2020-02-08 RX ADMIN — BUTALBITAL, ACETAMINOPHEN, AND CAFFEINE 1 TABLET: 50; 325; 40 TABLET ORAL at 10:12

## 2020-02-08 SDOH — HEALTH STABILITY: MENTAL HEALTH: HOW OFTEN DO YOU HAVE A DRINK CONTAINING ALCOHOL?: 2-3 TIMES A WEEK

## 2020-02-08 NOTE — ED PROVIDER NOTES
"ED Provider Note    ED Provider Note    Primary care provider: Pcp Pt States None  Means of arrival: Walk-in  History obtained from: Patient    CHIEF COMPLAINT  Chief Complaint   Patient presents with   • Cough     x 10 days with congestion and mucus   • Congestion   • Medication Refill     Seen at 9:36 AM.   HPI  Emigdio Jiménez is a 84 y.o. male who presents to the Emergency Department with 10 days of a cough that is not worsening and not improving.  He notes the cough is worse at night.  Denies any fevers or chills or difficulty breathing.  He is also asking to get a refill of his Synthroid.  The patient notes a moderate frontal headache from coughing.  He would like something for his headache as well.  He denies any neck pain, chest pain, nausea, vomiting, numbness or weakness.  No history of impaired immunity.  He does not smoke cigarettes.    REVIEW OF SYSTEMS  See HPI,   Remainder of ROS negative.     PAST MEDICAL HISTORY  Hypothyroidism    SURGICAL HISTORY  patient denies any surgical history    SOCIAL HISTORY  Social History     Tobacco Use   • Smoking status: Never Smoker   • Smokeless tobacco: Never Used   Substance Use Topics   • Alcohol use: Not Currently     Frequency: 2-3 times a week   • Drug use: Never      Social History     Substance and Sexual Activity   Drug Use Never       FAMILY HISTORY  History reviewed. No pertinent family history.    CURRENT MEDICATIONS  Reviewed.  See Encounter Summary.     ALLERGIES  No Known Allergies    PHYSICAL EXAM  VITAL SIGNS: BP (!) 172/89   Pulse 65   Temp 36.6 °C (97.9 °F) (Temporal)   Resp 16   Ht 1.676 m (5' 6\")   Wt 74 kg (163 lb 2.3 oz)   SpO2 96%   BMI 26.33 kg/m²   Constitutional: Awake, alert in no apparent distress.  Wet sounding cough.  HENT: Normocephalic, Bilateral external ears normal. Nose normal.   Eyes: Conjunctiva normal, non-icteric, EOMI.    Thorax & Lungs: Easy unlabored respirations, Clear to ascultation bilaterally.  Initially coarse " respirations, cleared with coughing.  Cardiovascular: Regular rate, Regular rhythm, No murmurs, rubs or gallops.  Abdomen:  Soft, nontender, nondistended, normal active bowel sounds.   :    Skin: Visualized skin is  Dry, No erythema, No rash.   Musculoskeletal:   No cyanosis, clubbing or edema.  Neurologic: Alert, Grossly non-focal.   Psychiatric: Normal affect, Normal mood  Lymphatic:  No cervical LAD      RADIOLOGY  No orders to display         COURSE & MEDICAL DECISION MAKING  Pertinent Labs & Imaging studies reviewed. (See chart for details)        9:36 AM - Medical record reviewed, patient seen and examined at bedside.    Decision Making:  This is a 84 y.o. year old male who presents with a cough the past 7 to 10 days.  The patient specific requesting antibiotics.  He has clear breath sounds, afebrile, oxygenation 96 200% on room air.  The duration of the cough is still consistent with a URI I do not feel that antibiotics are indicated.  Supportive care is recommended, I did refill his antihypertensives and Synthroid medication.    Discharge Medications:  Discharge Medication List as of 2/8/2020 10:09 AM      START taking these medications    Details   acetaminophen/caffeine/butalbital 325-40-50 mg (FIORICET) -40 MG Tab Take 1 Tab by mouth every four hours as needed for Headache for up to 3 days., Disp-10 Tab, R-0, Print Rx Paper             The patient was discharged home (see d/c instructions) and parent was told to return immediately for any signs or symptoms listed, or any worsening at all.  The patient's parent verbally agreed to the discharge precautions and follow-up plan which is documented in EPIC.    The patient's blood pressure is elevated today. >120/80. I have referred them to primary care for follow up.       FINAL IMPRESSION  1. Acute URI    2. Medication refill

## 2020-02-08 NOTE — ED TRIAGE NOTES
.  Chief Complaint   Patient presents with   • Cough     x 10 days with congestion and mucus   • Congestion   • Medication Refill     Ambulated to triage with c/o cough congestion, no fever. Mask applied. Pt also reports no pcp or insurance until July. Has since ran out of blood pressure and thyroid medication.

## 2020-03-14 ENCOUNTER — HOSPITAL ENCOUNTER (EMERGENCY)
Facility: MEDICAL CENTER | Age: 85
End: 2020-03-14
Attending: EMERGENCY MEDICINE

## 2020-03-14 ENCOUNTER — APPOINTMENT (OUTPATIENT)
Dept: RADIOLOGY | Facility: MEDICAL CENTER | Age: 85
End: 2020-03-14
Attending: EMERGENCY MEDICINE

## 2020-03-14 VITALS
OXYGEN SATURATION: 97 % | WEIGHT: 160 LBS | HEART RATE: 78 BPM | RESPIRATION RATE: 18 BRPM | BODY MASS INDEX: 25.11 KG/M2 | TEMPERATURE: 97.4 F | SYSTOLIC BLOOD PRESSURE: 170 MMHG | HEIGHT: 67 IN | DIASTOLIC BLOOD PRESSURE: 120 MMHG

## 2020-03-14 DIAGNOSIS — R05.9 COUGH: ICD-10-CM

## 2020-03-14 DIAGNOSIS — Z76.0 MEDICATION REFILL: ICD-10-CM

## 2020-03-14 DIAGNOSIS — I10 ESSENTIAL HYPERTENSION: ICD-10-CM

## 2020-03-14 DIAGNOSIS — R09.89 CHEST CONGESTION: ICD-10-CM

## 2020-03-14 PROCEDURE — A9270 NON-COVERED ITEM OR SERVICE: HCPCS | Performed by: EMERGENCY MEDICINE

## 2020-03-14 PROCEDURE — 700102 HCHG RX REV CODE 250 W/ 637 OVERRIDE(OP): Performed by: EMERGENCY MEDICINE

## 2020-03-14 PROCEDURE — 99284 EMERGENCY DEPT VISIT MOD MDM: CPT

## 2020-03-14 PROCEDURE — 71045 X-RAY EXAM CHEST 1 VIEW: CPT

## 2020-03-14 RX ORDER — LISINOPRIL 20 MG/1
20 TABLET ORAL DAILY
Qty: 30 TAB | Refills: 3 | Status: ON HOLD | OUTPATIENT
Start: 2020-03-14 | End: 2020-04-06

## 2020-03-14 RX ORDER — ALBUTEROL SULFATE 90 UG/1
2 AEROSOL, METERED RESPIRATORY (INHALATION) EVERY 4 HOURS PRN
Qty: 1 INHALER | Refills: 0 | Status: SHIPPED | OUTPATIENT
Start: 2020-03-14 | End: 2021-08-17

## 2020-03-14 RX ORDER — ALBUTEROL SULFATE 90 UG/1
2 AEROSOL, METERED RESPIRATORY (INHALATION) ONCE
Status: COMPLETED | OUTPATIENT
Start: 2020-03-14 | End: 2020-03-14

## 2020-03-14 RX ORDER — LEVOTHYROXINE SODIUM 0.07 MG/1
75 TABLET ORAL
Qty: 30 TAB | Refills: 3 | Status: SHIPPED | OUTPATIENT
Start: 2020-03-14 | End: 2021-08-17

## 2020-03-14 RX ADMIN — ALBUTEROL SULFATE 2 PUFF: 90 AEROSOL, METERED RESPIRATORY (INHALATION) at 11:27

## 2020-03-14 ASSESSMENT — FIBROSIS 4 INDEX: FIB4 SCORE: 1.82

## 2020-03-14 ASSESSMENT — LIFESTYLE VARIABLES: DO YOU DRINK ALCOHOL: NO

## 2020-03-14 NOTE — ED PROVIDER NOTES
ED Provider Note    Scribed for Neo Valle M.D. by Shanelle Mcfarland. 3/14/2020, 11:07 AM.    Primary care provider: Pcp Pt States None  Means of arrival: Personal  History obtained from: Patient  History limited by: None    CHIEF COMPLAINT  Chief Complaint   Patient presents with   • Cough     1 months clear sputum   • Congestion     head anc chest congestion   • Nausea   • Chills       HPI  Emigdio Jiménez is a 84 y.o. male who presents to the Emergency Department complaining of continued cough congestion nausea chills.  The patient has a longstanding history of hypertension hypothyroidism.  The patient was seen on 8 February diagnosed with an upper respiratory infection.  The patient does not have a primary care physician or other means for primary care so he stated that he over the last week he started having some increasing cough.  Patient has no COVID-19 risk factors.  Patient denies any overt fevers chills does describe increasing cough describes muscle aches.  The patient presents emerge department for evaluation.    REVIEW OF SYSTEMS  See HPI above otherwise all other systems are negative    PAST MEDICAL HISTORY       SURGICAL HISTORY  patient denies any surgical history    SOCIAL HISTORY  Social History     Tobacco Use   • Smoking status: Never Smoker   • Smokeless tobacco: Never Used   Substance Use Topics   • Alcohol use: Not Currently     Frequency: 2-3 times a week   • Drug use: Never      Social History     Substance and Sexual Activity   Drug Use Never       FAMILY HISTORY  No family history on file.    CURRENT MEDICATIONS  Home Medications     Reviewed by Kina Stewart R.N. (Registered Nurse) on 03/14/20 at 1032  Med List Status: Complete   Medication Last Dose Status   ibuprofen (MOTRIN) 600 MG Tab 3/13/2020 Active   levothyroxine (SYNTHROID) 75 MCG Tab 3/13/2020 Active   lisinopril (PRINIVIL) 20 MG Tab 3/14/2020 Active                ALLERGIES  No Known Allergies    PHYSICAL EXAM  VITAL  "SIGNS: BP (!) 170/120   Pulse 78   Temp 36.3 °C (97.4 °F) (Temporal)   Resp 18   Ht 1.702 m (5' 7\")   Wt 72.6 kg (160 lb)   SpO2 97%   BMI 25.06 kg/m²     Constitutional: Well developed, Well nourished, No acute distress, Non-toxic appearance.   HENT: Normocephalic, Atraumatic, Bilateral external ears normal.  Eyes: conjunctiva is normal. There are no signs of exudate.  Neck: Supple.   Cardiovascular: Regular rate and rhythm without murmurs gallops or rubs.   Thorax & Lungs: No respiratory distress. Breathing comfortably.  Rhonchi in the right base, there are no wheezes no rales. Chest wall is nontender.  Abdomen: Soft, nontender nondistended.  Skin: Warm, Dry, No erythema,   Back: No tenderness, No CVA tenderness.  Musculoskeletal: Good range of motion in all major joints. No tenderness to palpation or major deformities noted. Intact distal pulses, no clubbing, no cyanosis, no edema,   Neurologic: Alert & oriented x 3, Normal motor function, Normal sensory function, No focal deficits noted.   Psychiatric: Affect normal, Judgment normal, Mood normal.     LABS    All labs reviewed by me.    RADIOLOGY  DX-CHEST-PORTABLE (1 VIEW)   Final Result      No acute cardiac or pulmonary abnormalities are identified.        The radiologist's interpretation of all radiological studies have been reviewed by me.    COURSE & MEDICAL DECISION MAKING  Nursing notes, VS, PMSFHx reviewed in chart.    11:07 AM - Patient seen and examined at bedside.symptoms.     Decision Making:  Patient was seen and evaluated.  That he does have some mild rhonchi at the right base.  Based on symptomatology and slightly worsening symptoms as well as his advanced age and risk factors of smoking I will start the patient on Zithromax.  The patient did request a refill of his medications I will give him a prescription for his ACE inhibitor as well as his thyroid.  Patient states that his insurance will kick in until June so we will give him 3 refills " of this.  I recommend that he follow-up with his primary care physician for further outpatient treatment care return as needed.  Patient has no contact or risk factors for COVID-19 exposure     The patient will return for new or worsening symptoms and is stable at the time of discharge.    The patient is referred to a primary physician for blood pressure management, diabetic screening, and for all other preventative health concerns.        DISPOSITION:  Patient will be discharged home in stable condition.    FOLLOW UP:  [address] [id number] [phone]        [address] [address]  [phone]          OUTPATIENT MEDICATIONS:  Discharge Medication List as of 3/14/2020 11:54 AM      START taking these medications    Details   albuterol 108 (90 Base) MCG/ACT Aero Soln inhalation aerosol Inhale 2 Puffs by mouth every four hours as needed for Shortness of Breath., Disp-1 Inhaler, R-0, Print Rx Paper               FINAL IMPRESSION  1. Cough    2. Chest congestion    3. Essential hypertension    4. Medication refill          Shanelle DISLA (Irina), am scribing for, and in the presence of, Neo Valle M.D..    Electronically signed by: Shanelle May), 3/14/2020    INeo M.D. personally performed the services described in this documentation, as scribed by Shanelle Mcfarland in my presence, and it is both accurate and complete.    The note accurately reflects work and decisions made by me.  Neo Valle M.D.  3/14/2020  2:28 PM

## 2020-03-14 NOTE — ED TRIAGE NOTES
.  Chief Complaint   Patient presents with   • Cough     1 months clear sputum   • Congestion     head anc chest congestion   • Nausea   • Chills     .Mask applied to patient prior to triage. This RN in ppe prior to encounter.   Pt denies international travel or contact with anyone tested positive for covid 19.     Pt c/o cold symptoms x 1 month. Not resolving pt found to be hypertensive on arrival took lisinopril this am as prescribe.

## 2020-03-14 NOTE — ED NOTES
Pt discharged home daughter to come and . Pt understnads to follow up with [facility]. Pt given prescription x 3 with indication.

## 2020-04-03 ENCOUNTER — APPOINTMENT (OUTPATIENT)
Dept: RADIOLOGY | Facility: MEDICAL CENTER | Age: 85
End: 2020-04-03
Attending: STUDENT IN AN ORGANIZED HEALTH CARE EDUCATION/TRAINING PROGRAM
Payer: MEDICARE

## 2020-04-03 ENCOUNTER — HOSPITAL ENCOUNTER (EMERGENCY)
Facility: MEDICAL CENTER | Age: 85
End: 2020-04-03
Attending: EMERGENCY MEDICINE
Payer: MEDICARE

## 2020-04-03 VITALS
HEART RATE: 100 BPM | RESPIRATION RATE: 17 BRPM | DIASTOLIC BLOOD PRESSURE: 88 MMHG | SYSTOLIC BLOOD PRESSURE: 164 MMHG | WEIGHT: 160 LBS | TEMPERATURE: 98.5 F | BODY MASS INDEX: 25.11 KG/M2 | HEIGHT: 67 IN | OXYGEN SATURATION: 80 %

## 2020-04-03 DIAGNOSIS — R07.89 OTHER CHEST PAIN: ICD-10-CM

## 2020-04-03 DIAGNOSIS — I10 ESSENTIAL HYPERTENSION: ICD-10-CM

## 2020-04-03 LAB
ALBUMIN SERPL BCP-MCNC: 4.6 G/DL (ref 3.2–4.9)
ALBUMIN/GLOB SERPL: 1.6 G/DL
ALP SERPL-CCNC: 74 U/L (ref 30–99)
ALT SERPL-CCNC: 13 U/L (ref 2–50)
ANION GAP SERPL CALC-SCNC: 12 MMOL/L (ref 7–16)
AST SERPL-CCNC: 20 U/L (ref 12–45)
BASOPHILS # BLD AUTO: 1.3 % (ref 0–1.8)
BASOPHILS # BLD: 0.06 K/UL (ref 0–0.12)
BILIRUB SERPL-MCNC: 0.4 MG/DL (ref 0.1–1.5)
BUN SERPL-MCNC: 13 MG/DL (ref 8–22)
CALCIUM SERPL-MCNC: 9.2 MG/DL (ref 8.5–10.5)
CHLORIDE SERPL-SCNC: 94 MMOL/L (ref 96–112)
CO2 SERPL-SCNC: 23 MMOL/L (ref 20–33)
CREAT SERPL-MCNC: 1.04 MG/DL (ref 0.5–1.4)
EKG IMPRESSION: NORMAL
EOSINOPHIL # BLD AUTO: 0.17 K/UL (ref 0–0.51)
EOSINOPHIL NFR BLD: 3.6 % (ref 0–6.9)
ERYTHROCYTE [DISTWIDTH] IN BLOOD BY AUTOMATED COUNT: 45.9 FL (ref 35.9–50)
GLOBULIN SER CALC-MCNC: 2.8 G/DL (ref 1.9–3.5)
GLUCOSE SERPL-MCNC: 91 MG/DL (ref 65–99)
HCT VFR BLD AUTO: 39.7 % (ref 42–52)
HGB BLD-MCNC: 12.8 G/DL (ref 14–18)
IMM GRANULOCYTES # BLD AUTO: 0.02 K/UL (ref 0–0.11)
IMM GRANULOCYTES NFR BLD AUTO: 0.4 % (ref 0–0.9)
LYMPHOCYTES # BLD AUTO: 1.61 K/UL (ref 1–4.8)
LYMPHOCYTES NFR BLD: 33.8 % (ref 22–41)
MCH RBC QN AUTO: 25.1 PG (ref 27–33)
MCHC RBC AUTO-ENTMCNC: 32.2 G/DL (ref 33.7–35.3)
MCV RBC AUTO: 77.8 FL (ref 81.4–97.8)
MONOCYTES # BLD AUTO: 0.61 K/UL (ref 0–0.85)
MONOCYTES NFR BLD AUTO: 12.8 % (ref 0–13.4)
NEUTROPHILS # BLD AUTO: 2.3 K/UL (ref 1.82–7.42)
NEUTROPHILS NFR BLD: 48.1 % (ref 44–72)
NRBC # BLD AUTO: 0 K/UL
NRBC BLD-RTO: 0 /100 WBC
PLATELET # BLD AUTO: 229 K/UL (ref 164–446)
PMV BLD AUTO: 10.9 FL (ref 9–12.9)
POTASSIUM SERPL-SCNC: 4.8 MMOL/L (ref 3.6–5.5)
PROT SERPL-MCNC: 7.4 G/DL (ref 6–8.2)
RBC # BLD AUTO: 5.1 M/UL (ref 4.7–6.1)
SODIUM SERPL-SCNC: 129 MMOL/L (ref 135–145)
TROPONIN T SERPL-MCNC: 22 NG/L (ref 6–19)
TSH SERPL DL<=0.005 MIU/L-ACNC: 2.63 UIU/ML (ref 0.38–5.33)
WBC # BLD AUTO: 4.8 K/UL (ref 4.8–10.8)

## 2020-04-03 PROCEDURE — 700102 HCHG RX REV CODE 250 W/ 637 OVERRIDE(OP): Performed by: EMERGENCY MEDICINE

## 2020-04-03 PROCEDURE — 84443 ASSAY THYROID STIM HORMONE: CPT

## 2020-04-03 PROCEDURE — 99284 EMERGENCY DEPT VISIT MOD MDM: CPT

## 2020-04-03 PROCEDURE — 80053 COMPREHEN METABOLIC PANEL: CPT

## 2020-04-03 PROCEDURE — 84484 ASSAY OF TROPONIN QUANT: CPT

## 2020-04-03 PROCEDURE — 71045 X-RAY EXAM CHEST 1 VIEW: CPT

## 2020-04-03 PROCEDURE — 85025 COMPLETE CBC W/AUTO DIFF WBC: CPT

## 2020-04-03 PROCEDURE — 93005 ELECTROCARDIOGRAM TRACING: CPT | Performed by: EMERGENCY MEDICINE

## 2020-04-03 PROCEDURE — A9270 NON-COVERED ITEM OR SERVICE: HCPCS | Performed by: EMERGENCY MEDICINE

## 2020-04-03 RX ORDER — AMLODIPINE BESYLATE 10 MG/1
10 TABLET ORAL DAILY
Qty: 30 TAB | Refills: 0 | Status: SHIPPED | OUTPATIENT
Start: 2020-04-03 | End: 2021-08-17

## 2020-04-03 RX ORDER — ASPIRIN 81 MG/1
324 TABLET, CHEWABLE ORAL ONCE
Status: COMPLETED | OUTPATIENT
Start: 2020-04-03 | End: 2020-04-03

## 2020-04-03 RX ADMIN — ASPIRIN 81 MG 324 MG: 81 TABLET ORAL at 13:06

## 2020-04-03 ASSESSMENT — FIBROSIS 4 INDEX: FIB4 SCORE: 2.01

## 2020-04-03 NOTE — ED TRIAGE NOTES
Pt arrived from home for cp. Sob, cough x 2 months after relocating to [place] from [place]. Pt ambulates well to room,

## 2020-04-03 NOTE — ED NOTES
Patient left AMA. Paperwork signed by patient and physician. Patient aware to return for any further issues.

## 2020-04-03 NOTE — ED NOTES
Pt up to restroom, into gown. States he does not want to be triaged, explained that he must be in order to be seen by an ERP, pt now understands process and agrees to triage

## 2020-04-03 NOTE — ED PROVIDER NOTES
ED Provider Note    CHIEF COMPLAINT  Chief Complaint   Patient presents with   • Shortness of Breath   • Chest Pain   • Cough       HPI  Emigdio Jiménez is a 84 y.o. male who presents to emergency room today with complaints of chest pain, shortness of breath and cough.  Patient has a chest pain over the last 24 hours pain is described as pressure in the anterior chest wall he has a cardiac heart score of 5 risk factors of family history, hypertension.  Pain is worse with exertion and better at rest.  No fever chills denies any known Covid contacts or travel outside the area within the last several weeks.    REVIEW OF SYSTEMS  See HPI for further details. All other systems are negative.     PAST MEDICAL HISTORY  No past medical history on file.    FAMILY HISTORY  [unfilled]    SOCIAL HISTORY  Social History     Socioeconomic History   • Marital status:      Spouse name: Not on file   • Number of children: Not on file   • Years of education: Not on file   • Highest education level: Not on file   Occupational History   • Not on file   Social Needs   • Financial resource strain: Not on file   • Food insecurity     Worry: Not on file     Inability: Not on file   • Transportation needs     Medical: Not on file     Non-medical: Not on file   Tobacco Use   • Smoking status: Never Smoker   • Smokeless tobacco: Never Used   Substance and Sexual Activity   • Alcohol use: Not Currently     Frequency: 2-3 times a week   • Drug use: Never   • Sexual activity: Not on file   Lifestyle   • Physical activity     Days per week: Not on file     Minutes per session: Not on file   • Stress: Not on file   Relationships   • Social connections     Talks on phone: Not on file     Gets together: Not on file     Attends Ten[employer] service: Not on file     Active member of club or organization: Not on file     Attends meetings of clubs or organizations: Not on file     Relationship status: Not on file   • Intimate partner violence     Fear of  "current or ex partner: Not on file     Emotionally abused: Not on file     Physically abused: Not on file     Forced sexual activity: Not on file   Other Topics Concern   • Not on file   Social History Narrative   • Not on file       SURGICAL HISTORY  No past surgical history on file.    CURRENT MEDICATIONS  Home Medications     Reviewed by Robinson Devi R.N. (Registered Nurse) on 04/03/20 at 1100  Med List Status: <None>   Medication Last Dose Status   albuterol 108 (90 Base) MCG/ACT Aero Soln inhalation aerosol  Active   ibuprofen (MOTRIN) 600 MG Tab  Active   levothyroxine (SYNTHROID) 75 MCG Tab  Active   lisinopril (PRINIVIL) 20 MG Tab  Active                ALLERGIES  No Known Allergies    PHYSICAL EXAM  VITAL SIGNS: BP (!) 164/88   Pulse 100   Temp 36.9 °C (98.5 °F) (Temporal)   Resp 17   Ht 1.702 m (5' 7\")   Wt 72.6 kg (160 lb)   SpO2 (!) 80%   BMI 25.06 kg/m²       Constitutional: Well developed, Well nourished, No acute distress, Non-toxic appearance.   HENT: Normocephalic, Atraumatic, Bilateral external ears normal, Oropharynx moist, No oral exudates, Nose normal.   Eyes: PERRLA, EOMI, Conjunctiva normal, No discharge.   Neck: Normal range of motion, No tenderness, Supple, No stridor.   Lymphatic: No lymphadenopathy noted.   Cardiovascular: Normal heart rate, Normal rhythm, No murmurs, No rubs, No gallops.   Thorax & Lungs: Normal breath sounds, No respiratory distress, No wheezing, No chest tenderness.   Abdomen: Bowel sounds normal, Soft, No tenderness, No masses, No pulsatile masses.   Skin: Warm, Dry, No erythema, No rash.   Back: No tenderness, No CVA tenderness.   Extremities: Intact distal pulses, No edema, No tenderness, No cyanosis, No clubbing.   Musculoskeletal: Good range of motion in all major joints. No tenderness to palpation or major deformities noted.   Neurologic: Alert & oriented x 3, Normal motor function, Normal sensory function, No focal deficits noted.   Psychiatric: Affect " normal, Judgment normal, Mood normal.     EKG  Normal sinus rhythm 90 bpm there is ST elevation anteriorly which presents as repolarization, there is no widening of the QRS complex, poor R wave progression, MT interval's are normal, as compared to previous EKG performed 11/2019 there is no interval change.    RADIOLOGY/PROCEDURES  DX-CHEST-LIMITED (1 VIEW)   Final Result      No evidence of acute cardiopulmonary process.            COURSE & MEDICAL DECISION MAKING  Pertinent Labs & Imaging studies reviewed. (See chart for details)  X-ray shows no acute process troponin normal.  Patient does have a heart score of 5 discussed admitting patient to hospital for further testing patient declined he understands the risks involved which include but not limited to loss of life, loss of limb, deterioration of his health, nondiagnosis.  He was told if for any reason at any time he may return the hospital if he changes his mind he has alternative to follow-up given [facility].  He verbalizes understand and is capable of making decision this decision and under the influence of no alcohol or drugs.    FINAL IMPRESSION  1.  Acute chest pain  2.  Hypertension  3.         Electronically signed by: Erik Olson D.O., 4/3/2020 4:17 PM

## 2020-04-04 ENCOUNTER — APPOINTMENT (OUTPATIENT)
Dept: CARDIOLOGY | Facility: MEDICAL CENTER | Age: 85
End: 2020-04-04
Attending: HOSPITALIST

## 2020-04-04 ENCOUNTER — APPOINTMENT (OUTPATIENT)
Dept: RADIOLOGY | Facility: MEDICAL CENTER | Age: 85
End: 2020-04-04
Attending: EMERGENCY MEDICINE
Payer: MEDICARE

## 2020-04-04 ENCOUNTER — HOSPITAL ENCOUNTER (OUTPATIENT)
Facility: MEDICAL CENTER | Age: 85
End: 2020-04-06
Attending: EMERGENCY MEDICINE | Admitting: HOSPITALIST

## 2020-04-04 DIAGNOSIS — R07.9 CHEST PAIN, UNSPECIFIED TYPE: ICD-10-CM

## 2020-04-04 DIAGNOSIS — R79.89 ELEVATED TROPONIN: ICD-10-CM

## 2020-04-04 PROBLEM — E78.5 DYSLIPIDEMIA: Status: ACTIVE | Noted: 2020-04-04

## 2020-04-04 PROBLEM — R05.9 COUGH: Status: ACTIVE | Noted: 2020-04-04

## 2020-04-04 PROBLEM — I10 ESSENTIAL HYPERTENSION: Status: ACTIVE | Noted: 2020-04-04

## 2020-04-04 PROBLEM — R06.00 DYSPNEA: Status: ACTIVE | Noted: 2020-04-04

## 2020-04-04 LAB
ALBUMIN SERPL BCP-MCNC: 4.3 G/DL (ref 3.2–4.9)
ALBUMIN/GLOB SERPL: 1.6 G/DL
ALP SERPL-CCNC: 67 U/L (ref 30–99)
ALT SERPL-CCNC: 11 U/L (ref 2–50)
AMPHET UR QL SCN: NEGATIVE
ANION GAP SERPL CALC-SCNC: 13 MMOL/L (ref 7–16)
AST SERPL-CCNC: 14 U/L (ref 12–45)
BARBITURATES UR QL SCN: NEGATIVE
BASOPHILS # BLD AUTO: 1.2 % (ref 0–1.8)
BASOPHILS # BLD: 0.06 K/UL (ref 0–0.12)
BENZODIAZ UR QL SCN: NEGATIVE
BILIRUB SERPL-MCNC: 0.5 MG/DL (ref 0.1–1.5)
BUN SERPL-MCNC: 16 MG/DL (ref 8–22)
BZE UR QL SCN: NEGATIVE
CALCIUM SERPL-MCNC: 9.1 MG/DL (ref 8.5–10.5)
CANNABINOIDS UR QL SCN: NEGATIVE
CHLORIDE SERPL-SCNC: 95 MMOL/L (ref 96–112)
CO2 SERPL-SCNC: 20 MMOL/L (ref 20–33)
COVID ORDER STATUS COVID19: YES
CREAT SERPL-MCNC: 1.29 MG/DL (ref 0.5–1.4)
D DIMER PPP IA.FEU-MCNC: 0.37 UG/ML (FEU) (ref 0–0.5)
EKG IMPRESSION: NORMAL
EOSINOPHIL # BLD AUTO: 0.14 K/UL (ref 0–0.51)
EOSINOPHIL NFR BLD: 2.9 % (ref 0–6.9)
ERYTHROCYTE [DISTWIDTH] IN BLOOD BY AUTOMATED COUNT: 46.5 FL (ref 35.9–50)
FLUAV RNA SPEC QL NAA+PROBE: NEGATIVE
FLUBV RNA SPEC QL NAA+PROBE: NEGATIVE
GLOBULIN SER CALC-MCNC: 2.7 G/DL (ref 1.9–3.5)
GLUCOSE SERPL-MCNC: 132 MG/DL (ref 65–99)
HCT VFR BLD AUTO: 38.7 % (ref 42–52)
HGB BLD-MCNC: 12.3 G/DL (ref 14–18)
IMM GRANULOCYTES # BLD AUTO: 0.01 K/UL (ref 0–0.11)
IMM GRANULOCYTES NFR BLD AUTO: 0.2 % (ref 0–0.9)
LV EJECT FRACT  99904: 70
LV EJECT FRACT MOD 4C 99902: 77.37
LYMPHOCYTES # BLD AUTO: 1.41 K/UL (ref 1–4.8)
LYMPHOCYTES NFR BLD: 29.3 % (ref 22–41)
MCH RBC QN AUTO: 25.2 PG (ref 27–33)
MCHC RBC AUTO-ENTMCNC: 31.8 G/DL (ref 33.7–35.3)
MCV RBC AUTO: 79.3 FL (ref 81.4–97.8)
METHADONE UR QL SCN: NEGATIVE
MONOCYTES # BLD AUTO: 0.45 K/UL (ref 0–0.85)
MONOCYTES NFR BLD AUTO: 9.4 % (ref 0–13.4)
NEUTROPHILS # BLD AUTO: 2.74 K/UL (ref 1.82–7.42)
NEUTROPHILS NFR BLD: 57 % (ref 44–72)
NRBC # BLD AUTO: 0 K/UL
NRBC BLD-RTO: 0 /100 WBC
NT-PROBNP SERPL IA-MCNC: 103 PG/ML (ref 0–125)
OPIATES UR QL SCN: NEGATIVE
OXYCODONE UR QL SCN: NEGATIVE
PCP UR QL SCN: NEGATIVE
PLATELET # BLD AUTO: 222 K/UL (ref 164–446)
PMV BLD AUTO: 10.6 FL (ref 9–12.9)
POTASSIUM SERPL-SCNC: 4.6 MMOL/L (ref 3.6–5.5)
PROPOXYPH UR QL SCN: NEGATIVE
PROT SERPL-MCNC: 7 G/DL (ref 6–8.2)
RBC # BLD AUTO: 4.88 M/UL (ref 4.7–6.1)
SODIUM SERPL-SCNC: 128 MMOL/L (ref 135–145)
TROPONIN T SERPL-MCNC: 28 NG/L (ref 6–19)
TROPONIN T SERPL-MCNC: 32 NG/L (ref 6–19)
WBC # BLD AUTO: 4.8 K/UL (ref 4.8–10.8)

## 2020-04-04 PROCEDURE — 87502 INFLUENZA DNA AMP PROBE: CPT

## 2020-04-04 PROCEDURE — 36415 COLL VENOUS BLD VENIPUNCTURE: CPT

## 2020-04-04 PROCEDURE — 93306 TTE W/DOPPLER COMPLETE: CPT

## 2020-04-04 PROCEDURE — 93005 ELECTROCARDIOGRAM TRACING: CPT

## 2020-04-04 PROCEDURE — A9270 NON-COVERED ITEM OR SERVICE: HCPCS | Performed by: HOSPITALIST

## 2020-04-04 PROCEDURE — 99220 PR INITIAL OBSERVATION CARE,LEVL III: CPT | Performed by: HOSPITALIST

## 2020-04-04 PROCEDURE — 84484 ASSAY OF TROPONIN QUANT: CPT

## 2020-04-04 PROCEDURE — 82728 ASSAY OF FERRITIN: CPT

## 2020-04-04 PROCEDURE — 700102 HCHG RX REV CODE 250 W/ 637 OVERRIDE(OP): Performed by: HOSPITALIST

## 2020-04-04 PROCEDURE — 93306 TTE W/DOPPLER COMPLETE: CPT | Mod: 26 | Performed by: INTERNAL MEDICINE

## 2020-04-04 PROCEDURE — 71045 X-RAY EXAM CHEST 1 VIEW: CPT

## 2020-04-04 PROCEDURE — 99285 EMERGENCY DEPT VISIT HI MDM: CPT

## 2020-04-04 PROCEDURE — 700111 HCHG RX REV CODE 636 W/ 250 OVERRIDE (IP): Performed by: HOSPITALIST

## 2020-04-04 PROCEDURE — 85025 COMPLETE CBC W/AUTO DIFF WBC: CPT

## 2020-04-04 PROCEDURE — A9270 NON-COVERED ITEM OR SERVICE: HCPCS | Performed by: EMERGENCY MEDICINE

## 2020-04-04 PROCEDURE — 83880 ASSAY OF NATRIURETIC PEPTIDE: CPT

## 2020-04-04 PROCEDURE — 80053 COMPREHEN METABOLIC PANEL: CPT

## 2020-04-04 PROCEDURE — 80307 DRUG TEST PRSMV CHEM ANLYZR: CPT

## 2020-04-04 PROCEDURE — 93005 ELECTROCARDIOGRAM TRACING: CPT | Performed by: HOSPITALIST

## 2020-04-04 PROCEDURE — 700102 HCHG RX REV CODE 250 W/ 637 OVERRIDE(OP): Performed by: EMERGENCY MEDICINE

## 2020-04-04 PROCEDURE — G0378 HOSPITAL OBSERVATION PER HR: HCPCS

## 2020-04-04 PROCEDURE — 96372 THER/PROPH/DIAG INJ SC/IM: CPT

## 2020-04-04 PROCEDURE — 93005 ELECTROCARDIOGRAM TRACING: CPT | Performed by: EMERGENCY MEDICINE

## 2020-04-04 PROCEDURE — 85379 FIBRIN DEGRADATION QUANT: CPT

## 2020-04-04 PROCEDURE — 700117 HCHG RX CONTRAST REV CODE 255: Performed by: HOSPITALIST

## 2020-04-04 RX ORDER — ASPIRIN 81 MG/1
324 TABLET, CHEWABLE ORAL DAILY
Status: DISCONTINUED | OUTPATIENT
Start: 2020-04-05 | End: 2020-04-05

## 2020-04-04 RX ORDER — AMOXICILLIN 250 MG
2 CAPSULE ORAL 2 TIMES DAILY
Status: DISCONTINUED | OUTPATIENT
Start: 2020-04-04 | End: 2020-04-06 | Stop reason: HOSPADM

## 2020-04-04 RX ORDER — BISACODYL 10 MG
10 SUPPOSITORY, RECTAL RECTAL
Status: DISCONTINUED | OUTPATIENT
Start: 2020-04-04 | End: 2020-04-06 | Stop reason: HOSPADM

## 2020-04-04 RX ORDER — HYDROMORPHONE HYDROCHLORIDE 1 MG/ML
0.25 INJECTION, SOLUTION INTRAMUSCULAR; INTRAVENOUS; SUBCUTANEOUS EVERY 6 HOURS PRN
Status: DISCONTINUED | OUTPATIENT
Start: 2020-04-04 | End: 2020-04-06 | Stop reason: HOSPADM

## 2020-04-04 RX ORDER — ONDANSETRON 4 MG/1
4 TABLET, ORALLY DISINTEGRATING ORAL EVERY 4 HOURS PRN
Status: DISCONTINUED | OUTPATIENT
Start: 2020-04-04 | End: 2020-04-06 | Stop reason: HOSPADM

## 2020-04-04 RX ORDER — ONDANSETRON 2 MG/ML
4 INJECTION INTRAMUSCULAR; INTRAVENOUS EVERY 6 HOURS PRN
Status: DISCONTINUED | OUTPATIENT
Start: 2020-04-04 | End: 2020-04-04

## 2020-04-04 RX ORDER — ALPRAZOLAM 0.25 MG/1
0.25 TABLET ORAL 4 TIMES DAILY PRN
Status: DISCONTINUED | OUTPATIENT
Start: 2020-04-04 | End: 2020-04-06 | Stop reason: HOSPADM

## 2020-04-04 RX ORDER — ASPIRIN 325 MG
325 TABLET ORAL DAILY
Status: DISCONTINUED | OUTPATIENT
Start: 2020-04-05 | End: 2020-04-05

## 2020-04-04 RX ORDER — ACETAMINOPHEN 325 MG/1
650 TABLET ORAL EVERY 6 HOURS PRN
Status: DISCONTINUED | OUTPATIENT
Start: 2020-04-04 | End: 2020-04-06 | Stop reason: HOSPADM

## 2020-04-04 RX ORDER — ASPIRIN 300 MG/1
300 SUPPOSITORY RECTAL DAILY
Status: DISCONTINUED | OUTPATIENT
Start: 2020-04-05 | End: 2020-04-05

## 2020-04-04 RX ORDER — OXYCODONE HYDROCHLORIDE 5 MG/1
5 TABLET ORAL EVERY 6 HOURS PRN
Status: DISCONTINUED | OUTPATIENT
Start: 2020-04-04 | End: 2020-04-06 | Stop reason: HOSPADM

## 2020-04-04 RX ORDER — ONDANSETRON 2 MG/ML
4 INJECTION INTRAMUSCULAR; INTRAVENOUS EVERY 4 HOURS PRN
Status: DISCONTINUED | OUTPATIENT
Start: 2020-04-04 | End: 2020-04-06 | Stop reason: HOSPADM

## 2020-04-04 RX ORDER — AMLODIPINE BESYLATE 10 MG/1
10 TABLET ORAL DAILY
Status: DISCONTINUED | OUTPATIENT
Start: 2020-04-05 | End: 2020-04-06 | Stop reason: HOSPADM

## 2020-04-04 RX ORDER — LISINOPRIL 20 MG/1
20 TABLET ORAL DAILY
Status: DISCONTINUED | OUTPATIENT
Start: 2020-04-05 | End: 2020-04-05

## 2020-04-04 RX ORDER — ATORVASTATIN CALCIUM 40 MG/1
40 TABLET, FILM COATED ORAL EVERY EVENING
Status: DISCONTINUED | OUTPATIENT
Start: 2020-04-04 | End: 2020-04-05

## 2020-04-04 RX ORDER — AMOXICILLIN AND CLAVULANATE POTASSIUM 875; 125 MG/1; MG/1
1 TABLET, FILM COATED ORAL EVERY 12 HOURS
Status: DISCONTINUED | OUTPATIENT
Start: 2020-04-04 | End: 2020-04-06 | Stop reason: HOSPADM

## 2020-04-04 RX ORDER — ALBUTEROL SULFATE 90 UG/1
2 AEROSOL, METERED RESPIRATORY (INHALATION) EVERY 4 HOURS PRN
Status: DISCONTINUED | OUTPATIENT
Start: 2020-04-04 | End: 2020-04-04

## 2020-04-04 RX ORDER — OXYCODONE HYDROCHLORIDE 5 MG/1
2.5 TABLET ORAL EVERY 6 HOURS PRN
Status: DISCONTINUED | OUTPATIENT
Start: 2020-04-04 | End: 2020-04-06 | Stop reason: HOSPADM

## 2020-04-04 RX ORDER — LEVOTHYROXINE SODIUM 0.07 MG/1
75 TABLET ORAL
Status: DISCONTINUED | OUTPATIENT
Start: 2020-04-04 | End: 2020-04-06 | Stop reason: HOSPADM

## 2020-04-04 RX ORDER — GUAIFENESIN 600 MG/1
600 TABLET, EXTENDED RELEASE ORAL 2 TIMES DAILY PRN
Status: DISCONTINUED | OUTPATIENT
Start: 2020-04-04 | End: 2020-04-06 | Stop reason: HOSPADM

## 2020-04-04 RX ORDER — ONDANSETRON 4 MG/1
4 TABLET, ORALLY DISINTEGRATING ORAL EVERY 6 HOURS PRN
Status: DISCONTINUED | OUTPATIENT
Start: 2020-04-04 | End: 2020-04-04

## 2020-04-04 RX ORDER — POLYETHYLENE GLYCOL 3350 17 G/17G
1 POWDER, FOR SOLUTION ORAL
Status: DISCONTINUED | OUTPATIENT
Start: 2020-04-04 | End: 2020-04-06 | Stop reason: HOSPADM

## 2020-04-04 RX ORDER — ASPIRIN 81 MG/1
324 TABLET, CHEWABLE ORAL ONCE
Status: COMPLETED | OUTPATIENT
Start: 2020-04-04 | End: 2020-04-04

## 2020-04-04 RX ADMIN — ACETAMINOPHEN 650 MG: 325 TABLET, FILM COATED ORAL at 22:31

## 2020-04-04 RX ADMIN — ATORVASTATIN CALCIUM 40 MG: 40 TABLET, FILM COATED ORAL at 18:35

## 2020-04-04 RX ADMIN — SENNOSIDES AND DOCUSATE SODIUM 2 TABLET: 8.6; 5 TABLET ORAL at 18:36

## 2020-04-04 RX ADMIN — ASPIRIN 81 MG 324 MG: 81 TABLET ORAL at 12:00

## 2020-04-04 RX ADMIN — ENOXAPARIN SODIUM 40 MG: 100 INJECTION SUBCUTANEOUS at 15:31

## 2020-04-04 RX ADMIN — LEVOTHYROXINE SODIUM 75 MCG: 75 TABLET ORAL at 15:31

## 2020-04-04 RX ADMIN — AMOXICILLIN AND CLAVULANATE POTASSIUM 1 TABLET: 875; 125 TABLET, FILM COATED ORAL at 18:35

## 2020-04-04 RX ADMIN — NITROGLYCERIN 0.5 INCH: 20 OINTMENT TOPICAL at 12:00

## 2020-04-04 RX ADMIN — HUMAN ALBUMIN MICROSPHERES AND PERFLUTREN 3 ML: 10; .22 INJECTION, SOLUTION INTRAVENOUS at 15:42

## 2020-04-04 RX ADMIN — GUAIFENESIN 600 MG: 600 TABLET, EXTENDED RELEASE ORAL at 18:36

## 2020-04-04 ASSESSMENT — LIFESTYLE VARIABLES
CONSUMPTION TOTAL: NEGATIVE
TOTAL SCORE: 0
AVERAGE NUMBER OF DAYS PER WEEK YOU HAVE A DRINK CONTAINING ALCOHOL: 3
EVER FELT BAD OR GUILTY ABOUT YOUR DRINKING: NO
EVER_SMOKED: YES
HAVE PEOPLE ANNOYED YOU BY CRITICIZING YOUR DRINKING: NO
EVER HAD A DRINK FIRST THING IN THE MORNING TO STEADY YOUR NERVES TO GET RID OF A HANGOVER: NO
HOW MANY TIMES IN THE PAST YEAR HAVE YOU HAD 5 OR MORE DRINKS IN A DAY: 0
ON A TYPICAL DAY WHEN YOU DRINK ALCOHOL HOW MANY DRINKS DO YOU HAVE: 1
DO YOU DRINK ALCOHOL: YES
TOTAL SCORE: 0
TOTAL SCORE: 0
HAVE YOU EVER FELT YOU SHOULD CUT DOWN ON YOUR DRINKING: NO

## 2020-04-04 ASSESSMENT — ENCOUNTER SYMPTOMS
PALPITATIONS: 0
NEUROLOGICAL NEGATIVE: 1
CHILLS: 0
FEVER: 0
MUSCULOSKELETAL NEGATIVE: 1
GASTROINTESTINAL NEGATIVE: 1
NERVOUS/ANXIOUS: 1
COUGH: 1
EYES NEGATIVE: 1
SHORTNESS OF BREATH: 1
SPUTUM PRODUCTION: 1

## 2020-04-04 ASSESSMENT — COPD QUESTIONNAIRES
COPD SCREENING SCORE: 6
DURING THE PAST 4 WEEKS HOW MUCH DID YOU FEEL SHORT OF BREATH: SOME OF THE TIME
DO YOU EVER COUGH UP ANY MUCUS OR PHLEGM?: NO/ONLY WITH OCCASIONAL COLDS OR INFECTIONS
HAVE YOU SMOKED AT LEAST 100 CIGARETTES IN YOUR ENTIRE LIFE: YES

## 2020-04-04 ASSESSMENT — PATIENT HEALTH QUESTIONNAIRE - PHQ9
SUM OF ALL RESPONSES TO PHQ9 QUESTIONS 1 AND 2: 0
2. FEELING DOWN, DEPRESSED, IRRITABLE, OR HOPELESS: NOT AT ALL
1. LITTLE INTEREST OR PLEASURE IN DOING THINGS: NOT AT ALL

## 2020-04-04 ASSESSMENT — FIBROSIS 4 INDEX
FIB4 SCORE: 1.58
FIB4 SCORE: 2.01

## 2020-04-04 NOTE — ED PROVIDER NOTES
"ED Provider Note    Scribed for True Pena M.D. by Eldon Acevedo. 4/4/2020, 11:45 AM.    Primary care provider: Pcp Pt States None  Means of arrival: Walk In  History obtained from: Patient  History limited by: None    CHIEF COMPLAINT  Chief Complaint   Patient presents with   • Chest Pain   • Shortness of Breath       HPI  Emigdio Jiménez is a 84 y.o. male with a history of hypertension and dyslipidemia, who presents to the Emergency Department for evaluation of chest pain and shortness of breath. Patient was seen here yesterday for these same symptoms with plans to be admitted, however he reports having a \"claustrophobic fit\" and requested to be discharged. He has returned at this time as he continues to complain of chest pain described as though someone is \"standing on my chest\". This pain is said to have been present for the last month, is described as a dull pain, and is exacerbated with walking around and physical exertion. He also reports having shortness of breath with this pain and notes that he has been coughing a lot for the last few weeks with associated sputum production. He denies any leg swelling, nausea, vomiting, diarrhea, or abdominal pain. He denies any history of any heart attacks and has never had a blood clot. He is a former smoker of 40 years. Patient denies any recent travel or COVID-19 contacts.    Heart score is calculated at 5 by myself.    PPE Note: I personally donned full PPE for all patient encounters during this visit, including being clean-shaven with an N95 respirator mask, gloves, gown, and goggles.     Scribe remained outside the patient's room and did not have any contact with the patient for the duration of patient encounter.     REVIEW OF SYSTEMS  Pertinent positives include chest pain, shortness of breath, cough, sputum production. Pertinent negatives include no leg swelling, nausea, vomiting, diarrhea, abdominal pain.  All other systems reviewed and negative.    PAST MEDICAL " HISTORY  Hypertension, Dyslipidemia    SURGICAL HISTORY  patient denies any surgical history    SOCIAL HISTORY  Social History     Tobacco Use   • Smoking status: Never Smoker   • Smokeless tobacco: Never Used   Substance Use Topics   • Alcohol use: Not Currently     Frequency: 2-3 times a week   • Drug use: Never      Social History     Substance and Sexual Activity   Drug Use Never       FAMILY HISTORY  Family history reviewed. Family history not pertinent.      CURRENT MEDICATIONS  No current facility-administered medications on file prior to encounter.      Current Outpatient Medications on File Prior to Encounter   Medication Sig Dispense Refill   • amLODIPine (NORVASC) 10 MG Tab Take 1 Tab by mouth every day. 30 Tab 0   • lisinopril (PRINIVIL) 20 MG Tab Take 1 Tab by mouth every day. 30 Tab 3   • levothyroxine (SYNTHROID) 75 MCG Tab Take 1 Tab by mouth Every morning on an empty stomach. 30 Tab 3   • albuterol 108 (90 Base) MCG/ACT Aero Soln inhalation aerosol Inhale 2 Puffs by mouth every four hours as needed for Shortness of Breath. 1 Inhaler 0   • ibuprofen (MOTRIN) 600 MG Tab Take 1 Tab by mouth every 8 hours as needed. 30 Tab 0       ALLERGIES  No Known Allergies    PHYSICAL EXAM  VITAL SIGNS: /58   Pulse 88   Temp 36.3 °C (97.3 °F) (Temporal)   Resp 16   Wt 76.6 kg (168 lb 14 oz)   SpO2 96%   BMI 26.45 kg/m²     Constitutional: Well developed, Well nourished, No acute distress, Non-toxic appearance.   HENT: Normocephalic, Atraumatic, TMs normal, mucous membranes moist, no erythema, exudates, swelling, or masses, nares patent  Eyes: nonicteric  Neck: Supple, no meningismus  Lymphatic: No lymphadenopathy noted.   Cardiovascular: Regular rate and rhythm, no gallops rubs or murmurs  Lungs: Clear bilaterally   Abdomen: Bowel sounds normal, Soft, No tenderness  Skin: Warm, Dry, no rash  Back: No tenderness, No CVA tenderness.   Genitalia: Deferred  Rectal: Deferred  Extremities: No edema, radial  pulses 2+  Neurologic: Alert, appropriate, follows commands, moving all extremities, normal speech   Psychiatric: Affect normal    DIAGNOSTIC STUDIES / PROCEDURES    LABS  Results for orders placed or performed during the hospital encounter of 04/04/20   CBC WITH DIFFERENTIAL   Result Value Ref Range    WBC 4.8 4.8 - 10.8 K/uL    RBC 4.88 4.70 - 6.10 M/uL    Hemoglobin 12.3 (L) 14.0 - 18.0 g/dL    Hematocrit 38.7 (L) 42.0 - 52.0 %    MCV 79.3 (L) 81.4 - 97.8 fL    MCH 25.2 (L) 27.0 - 33.0 pg    MCHC 31.8 (L) 33.7 - 35.3 g/dL    RDW 46.5 35.9 - 50.0 fL    Platelet Count 222 164 - 446 K/uL    MPV 10.6 9.0 - 12.9 fL    Neutrophils-Polys 57.00 44.00 - 72.00 %    Lymphocytes 29.30 22.00 - 41.00 %    Monocytes 9.40 0.00 - 13.40 %    Eosinophils 2.90 0.00 - 6.90 %    Basophils 1.20 0.00 - 1.80 %    Immature Granulocytes 0.20 0.00 - 0.90 %    Nucleated RBC 0.00 /100 WBC    Neutrophils (Absolute) 2.74 1.82 - 7.42 K/uL    Lymphs (Absolute) 1.41 1.00 - 4.80 K/uL    Monos (Absolute) 0.45 0.00 - 0.85 K/uL    Eos (Absolute) 0.14 0.00 - 0.51 K/uL    Baso (Absolute) 0.06 0.00 - 0.12 K/uL    Immature Granulocytes (abs) 0.01 0.00 - 0.11 K/uL    NRBC (Absolute) 0.00 K/uL   COMP METABOLIC PANEL   Result Value Ref Range    Sodium 128 (L) 135 - 145 mmol/L    Potassium 4.6 3.6 - 5.5 mmol/L    Chloride 95 (L) 96 - 112 mmol/L    Co2 20 20 - 33 mmol/L    Anion Gap 13.0 7.0 - 16.0    Glucose 132 (H) 65 - 99 mg/dL    Bun 16 8 - 22 mg/dL    Creatinine 1.29 0.50 - 1.40 mg/dL    Calcium 9.1 8.5 - 10.5 mg/dL    AST(SGOT) 14 12 - 45 U/L    ALT(SGPT) 11 2 - 50 U/L    Alkaline Phosphatase 67 30 - 99 U/L    Total Bilirubin 0.5 0.1 - 1.5 mg/dL    Albumin 4.3 3.2 - 4.9 g/dL    Total Protein 7.0 6.0 - 8.2 g/dL    Globulin 2.7 1.9 - 3.5 g/dL    A-G Ratio 1.6 g/dL   TROPONIN   Result Value Ref Range    Troponin T 32 (H) 6 - 19 ng/L   ESTIMATED GFR   Result Value Ref Range    GFR If African American >60 >60 mL/min/1.73 m 2    GFR If Non  53  (A) >60 mL/min/1.73 m 2   EKG   Result Value Ref Range    Report       [facility] Emergency Dept.    Test Date:  2020  Pt Name:    OLAMIDE DAI                Department: ER  MRN:        [id number]                      Room:  Gender:     Male                         Technician: [id number]  :        [id number]                   Requested By:ER TRIAGE PROTOCOL  Order #:    [id number]                    Reading MD:    Measurements  Intervals                                Axis  Rate:       79                           P:          29  AZ:         178                          QRS:        8  QRSD:       67                           T:          90  QT:         357  QTc:        410    Interpretive Statements  Sinus rhythm  Minimal ST elevation, anterior leads  Compared to ECG 2020 11:14:27  No significant changes       All labs reviewed by me.    EKG  Obtained at 11:36 AM  Normal Sinus rhythm   Rate 79  Axis normal   Intervals normal   T wave inversion in aVL which is an old finding  Trace ST elevation anterior and laterally  No reciprocal changes   Impression: Does not meet STEMI criteria     RADIOLOGY  DX-CHEST-PORTABLE (1 VIEW)   Final Result      1.  Minimal ill-defined right lower lobe likely due to atelectasis.        The radiologist's interpretation of all radiological studies have been reviewed by me.    COURSE & MEDICAL DECISION MAKING  Nursing notes, VS, PMSFHx reviewed in chart.     11:45 AM Patient seen and examined at bedside, patients blood pressure is 129/58. Ordered for DX-Chest 1 view, CBC with differential, CMP, Troponin, EKG to evaluate. Discussed with the patient that I am going to re-check his blood work, chest X-Ray and EKG, and he will likely be admitted to the hospital. He understands and agrees.    12:40 PM- I spoke with Dr. Hipolito Sahu, Hospitalist, who agrees to evaluate the patient for hospitalization.    Decision Making:  This is a 84 y.o. year old male who presents  with exertional chest pain.  The patient was here yesterday and had a minimal elevation of his troponin.  The plan was for admission but the patient signed out AGAINST MEDICAL ADVICE.  He returns as he wants to be admitted.  Patient here today does report a what appears to be subacute cough and there is some ill-defined opacity in the right lower lobe which is likely atelectasis.  I will send a COVID-19 panel.  However the patient symptoms today suggest more of a cardiac cause in terms of his pressure that is exertional in combination with his borderline EKG abnormalities and troponin of 32.  Patient does not appear to have risk factors for pulmonary embolism.  I do not strongly suspect dissection given the historical description of symptoms as well as his normal neurovascular exam.  The patient will be admitted for further work-up and treatment of possible acute coronary syndrome.    DISPOSITION:  Admitted to the hospitalist in stable condition.    FINAL IMPRESSION  1. Elevated troponin    2. Chest pain, unspecified type          I, Eldon Acevedo (Scribe), am scribing for, and in the presence of, True Pena M.D..    Electronically signed by: Eldon Acevedo (Scribe), 4/4/2020    ITrue M.D. personally performed the services described in this documentation, as scribed by Eldon Acevedo in my presence, and it is both accurate and complete. C.    The note accurately reflects work and decisions made by me.  True Pena M.D.  4/4/2020  12:32 PM

## 2020-04-04 NOTE — ED TRIAGE NOTES
Pt reporting he was just here yesterday and there ERP wanted to admit the pt. Pt stating he left AMA but has decided he does want to admitted today. Pt now complaining of chest pain and SOB worsening.

## 2020-04-04 NOTE — ASSESSMENT & PLAN NOTE
With some pulmonary complaints of cough and chest x-ray infiltrate  At this time will treat as possible pneumonia, the patient will be also reevaluated for COVID-19

## 2020-04-04 NOTE — H&P
Hospital Medicine History & Physical Note    Date of Service  4/4/2020    Primary Care Physician  Pcp Pt States None    Consultants  None    Code Status  Full code    Chief Complaint  Chest pain, cough, dyspnea    History of Presenting Illness  84 y.o. male who presented 4/4/2020 with repeat visits to the emergency room with cough, chest pain, he was here yesterday and left ER AGAINST MEDICAL ADVICE because of a alleged panic attack.  The patient returns today with ongoing cough, chest pain in the upper lung areas more so on the left, shortness of breath.  The patient was in the emergency room mid-March and was treated with antibiotics for presumed bronchitis/early pneumonia.  The patient has history of hypertension, dyslipidemia, hypothyroidism, he on evaluation today he has a slight ill-defined infiltrate on his chest x-ray in the right lower lobe, he does not have significant leukocytosis or lymphopenia, his EKG shows some subtle ST changes in the lateral leads he did have a negative stress test in November 2019, his troponin is in the indeterminate range.  The patient states that he has some difficulty with medication prescription coverage because he is for some reason not eligible for Medicaid, he did relocated recently per his report from [place], was previously incarcerated.  He now lives with his daughter locally.  He denies pertinent COVID-19 risk factors other than cough and fever, no travel out of state he states.  The patient's chest pain is located over his left chest, more so in the back, he is not describing any radiation or associated symptoms, he does have some dyspnea on exertion    Review of Systems  Review of Systems   Constitutional: Positive for malaise/fatigue. Negative for chills and fever.   HENT: Negative.    Eyes: Negative.    Respiratory: Positive for cough, sputum production and shortness of breath.    Cardiovascular: Positive for chest pain. Negative for palpitations.    Gastrointestinal: Negative.    Genitourinary: Negative.    Musculoskeletal: Negative.    Skin: Negative.    Neurological: Negative.    Endo/Heme/Allergies: Negative.    Psychiatric/Behavioral: The patient is nervous/anxious.    All other systems reviewed and are negative.      Past Medical History  Hypothyroidism, hypertension, dyslipidemia,    Surgical History   has no past surgical history on file.   He denies prior surgical history  Family History  family history is not on file.   He denies pertinent family history  Social History   reports that he has never smoked. He has never used smokeless tobacco. He reports previous alcohol use. He reports that he does not use drugs.  He has a history of tobacco use, quit many years ago  Allergies  No Known Allergies    Medications  Prior to Admission Medications   Prescriptions Last Dose Informant Patient Reported? Taking?   albuterol 108 (90 Base) MCG/ACT Aero Soln inhalation aerosol   No No   Sig: Inhale 2 Puffs by mouth every four hours as needed for Shortness of Breath.   amLODIPine (NORVASC) 10 MG Tab   No No   Sig: Take 1 Tab by mouth every day.   ibuprofen (MOTRIN) 600 MG Tab   No No   Sig: Take 1 Tab by mouth every 8 hours as needed.   levothyroxine (SYNTHROID) 75 MCG Tab   No No   Sig: Take 1 Tab by mouth Every morning on an empty stomach.   lisinopril (PRINIVIL) 20 MG Tab   No No   Sig: Take 1 Tab by mouth every day.      Facility-Administered Medications: None       Physical Exam  Temp:  [36.3 °C (97.3 °F)] 36.3 °C (97.3 °F)  Pulse:  [] 90  Resp:  [10-18] 14  BP: ()/() 111/54  SpO2:  [80 %-99 %] 96 %    Physical Exam  Vitals signs and nursing note reviewed.   Constitutional:       Appearance: He is well-developed.   HENT:      Head: Normocephalic.   Eyes:      Pupils: Pupils are equal, round, and reactive to light.   Neck:      Musculoskeletal: Normal range of motion.   Cardiovascular:      Rate and Rhythm: Normal rate and regular rhythm.       Heart sounds: Normal heart sounds.   Pulmonary:      Effort: Pulmonary effort is normal.      Breath sounds: Rhonchi present.   Abdominal:      General: Bowel sounds are normal.      Palpations: Abdomen is soft.   Genitourinary:     Penis: Normal.       Rectum: Normal.   Musculoskeletal: Normal range of motion.   Skin:     General: Skin is warm.   Neurological:      Mental Status: He is alert and oriented to person, place, and time.         Laboratory:  Recent Labs     04/03/20  1108 04/04/20  1152   WBC 4.8 4.8   RBC 5.10 4.88   HEMOGLOBIN 12.8* 12.3*   HEMATOCRIT 39.7* 38.7*   MCV 77.8* 79.3*   MCH 25.1* 25.2*   MCHC 32.2* 31.8*   RDW 45.9 46.5   PLATELETCT 229 222   MPV 10.9 10.6     Recent Labs     04/03/20  1108 04/04/20  1152   SODIUM 129* 128*   POTASSIUM 4.8 4.6   CHLORIDE 94* 95*   CO2 23 20   GLUCOSE 91 132*   BUN 13 16   CREATININE 1.04 1.29   CALCIUM 9.2 9.1     Recent Labs     04/03/20  1108 04/04/20  1152   ALTSGPT 13 11   ASTSGOT 20 14   ALKPHOSPHAT 74 67   TBILIRUBIN 0.4 0.5   GLUCOSE 91 132*         No results for input(s): NTPROBNP in the last 72 hours.      Recent Labs     04/03/20  1108 04/04/20  1152   TROPONINT 22* 32*       Urinalysis:    No results found     Imaging:  DX-CHEST-PORTABLE (1 VIEW)   Final Result      1.  Minimal ill-defined right lower lobe likely due to atelectasis.      EC-ECHOCARDIOGRAM COMPLETE W/O CONT    (Results Pending)         Assessment/Plan:  I anticipate this patient is appropriate for observation status at this time.    Cough  Assessment & Plan  Productive, had a history of azithromycin treatment in mid March  Rule out COVID-19, empiric antibiotics,    Dyspnea  Assessment & Plan  With some pulmonary complaints of cough and chest x-ray infiltrate  At this time will treat as possible pneumonia, the patient will be also reevaluated for COVID-19      Chest pain- (present on admission)  Assessment & Plan  This appears to be to some degree at least exertional, the  patient with a history of negative stress test in 11 of 2019, this time we will get additional troponin level and echocardiogram, consideration would be to have a different modality of stress testing if he has further concern  The certainly could be also pleuritic  Telemonitoring, symptomatic care for now, EKG is not showing any definitive ST changes    Dyslipidemia  Assessment & Plan  Statin    Essential hypertension  Assessment & Plan  Currently controlled, continue his medication regimen as is    BPH (benign prostatic hyperplasia)- (present on admission)  Assessment & Plan  History of, monitor    Hypothyroidism- (present on admission)  Assessment & Plan  History of, continue medication regimen    Plan  Patient presents with a combination of chest pain, cough, sputum production  Patient will be evaluated for possible cardiac ischemia, rule out COVID-19  See orders  Patient is medically complex  VTE prophylaxis: Lovenox

## 2020-04-04 NOTE — ED NOTES
Isolations precautions initiated and iso cart placed outside room. Swab collected and sent to lab.

## 2020-04-04 NOTE — ED NOTES
Pharmacy Medication Reconciliation      Medication reconciliation updated and complete per pt home pharmacy  Allergies have been verified  No oral ABX within the last 14 days  [facility]

## 2020-04-05 PROBLEM — J40 BRONCHITIS: Status: ACTIVE | Noted: 2020-04-04

## 2020-04-05 PROBLEM — I35.0 NONRHEUMATIC AORTIC VALVE STENOSIS: Status: ACTIVE | Noted: 2020-04-05

## 2020-04-05 LAB
ALBUMIN SERPL BCP-MCNC: 4 G/DL (ref 3.2–4.9)
ALBUMIN/GLOB SERPL: 1.7 G/DL
ALP SERPL-CCNC: 62 U/L (ref 30–99)
ALT SERPL-CCNC: 11 U/L (ref 2–50)
ANION GAP SERPL CALC-SCNC: 11 MMOL/L (ref 7–16)
AST SERPL-CCNC: 14 U/L (ref 12–45)
BASOPHILS # BLD AUTO: 1.5 % (ref 0–1.8)
BASOPHILS # BLD: 0.08 K/UL (ref 0–0.12)
BILIRUB SERPL-MCNC: 0.4 MG/DL (ref 0.1–1.5)
BUN SERPL-MCNC: 15 MG/DL (ref 8–22)
CALCIUM SERPL-MCNC: 8.9 MG/DL (ref 8.5–10.5)
CHLORIDE SERPL-SCNC: 98 MMOL/L (ref 96–112)
CHOLEST SERPL-MCNC: 189 MG/DL (ref 100–199)
CO2 SERPL-SCNC: 20 MMOL/L (ref 20–33)
CREAT SERPL-MCNC: 1 MG/DL (ref 0.5–1.4)
CRP SERPL HS-MCNC: 0.07 MG/DL (ref 0–0.75)
EKG IMPRESSION: NORMAL
EOSINOPHIL # BLD AUTO: 0.22 K/UL (ref 0–0.51)
EOSINOPHIL NFR BLD: 4.2 % (ref 0–6.9)
ERYTHROCYTE [DISTWIDTH] IN BLOOD BY AUTOMATED COUNT: 45.5 FL (ref 35.9–50)
FERRITIN SERPL-MCNC: 13 NG/ML (ref 22–322)
GLOBULIN SER CALC-MCNC: 2.4 G/DL (ref 1.9–3.5)
GLUCOSE SERPL-MCNC: 99 MG/DL (ref 65–99)
HCT VFR BLD AUTO: 35.7 % (ref 42–52)
HDLC SERPL-MCNC: 60 MG/DL
HGB BLD-MCNC: 11.6 G/DL (ref 14–18)
IMM GRANULOCYTES # BLD AUTO: 0.01 K/UL (ref 0–0.11)
IMM GRANULOCYTES NFR BLD AUTO: 0.2 % (ref 0–0.9)
LDH SERPL L TO P-CCNC: 90 U/L (ref 107–266)
LDLC SERPL CALC-MCNC: 115 MG/DL
LYMPHOCYTES # BLD AUTO: 1.7 K/UL (ref 1–4.8)
LYMPHOCYTES NFR BLD: 32.2 % (ref 22–41)
MAGNESIUM SERPL-MCNC: 2.1 MG/DL (ref 1.5–2.5)
MCH RBC QN AUTO: 25.4 PG (ref 27–33)
MCHC RBC AUTO-ENTMCNC: 32.5 G/DL (ref 33.7–35.3)
MCV RBC AUTO: 78.1 FL (ref 81.4–97.8)
MONOCYTES # BLD AUTO: 0.6 K/UL (ref 0–0.85)
MONOCYTES NFR BLD AUTO: 11.4 % (ref 0–13.4)
NEUTROPHILS # BLD AUTO: 2.67 K/UL (ref 1.82–7.42)
NEUTROPHILS NFR BLD: 50.5 % (ref 44–72)
NRBC # BLD AUTO: 0 K/UL
NRBC BLD-RTO: 0 /100 WBC
PLATELET # BLD AUTO: 191 K/UL (ref 164–446)
PMV BLD AUTO: 11.1 FL (ref 9–12.9)
POTASSIUM SERPL-SCNC: 4.7 MMOL/L (ref 3.6–5.5)
PROCALCITONIN SERPL-MCNC: <0.05 NG/ML
PROT SERPL-MCNC: 6.4 G/DL (ref 6–8.2)
RBC # BLD AUTO: 4.57 M/UL (ref 4.7–6.1)
SODIUM SERPL-SCNC: 129 MMOL/L (ref 135–145)
TRIGL SERPL-MCNC: 69 MG/DL (ref 0–149)
WBC # BLD AUTO: 5.3 K/UL (ref 4.8–10.8)

## 2020-04-05 PROCEDURE — 86140 C-REACTIVE PROTEIN: CPT

## 2020-04-05 PROCEDURE — 93010 ELECTROCARDIOGRAM REPORT: CPT | Performed by: INTERNAL MEDICINE

## 2020-04-05 PROCEDURE — 700102 HCHG RX REV CODE 250 W/ 637 OVERRIDE(OP): Performed by: HOSPITALIST

## 2020-04-05 PROCEDURE — 700111 HCHG RX REV CODE 636 W/ 250 OVERRIDE (IP): Performed by: HOSPITALIST

## 2020-04-05 PROCEDURE — G0378 HOSPITAL OBSERVATION PER HR: HCPCS

## 2020-04-05 PROCEDURE — 96372 THER/PROPH/DIAG INJ SC/IM: CPT

## 2020-04-05 PROCEDURE — 83735 ASSAY OF MAGNESIUM: CPT

## 2020-04-05 PROCEDURE — 80061 LIPID PANEL: CPT

## 2020-04-05 PROCEDURE — 84145 PROCALCITONIN (PCT): CPT

## 2020-04-05 PROCEDURE — 85025 COMPLETE CBC W/AUTO DIFF WBC: CPT

## 2020-04-05 PROCEDURE — 80053 COMPREHEN METABOLIC PANEL: CPT

## 2020-04-05 PROCEDURE — 83615 LACTATE (LD) (LDH) ENZYME: CPT

## 2020-04-05 PROCEDURE — 99226 PR SUBSEQUENT OBSERVATION CARE,LEVEL III: CPT | Performed by: FAMILY MEDICINE

## 2020-04-05 PROCEDURE — A9270 NON-COVERED ITEM OR SERVICE: HCPCS | Performed by: HOSPITALIST

## 2020-04-05 RX ORDER — HYDRALAZINE HYDROCHLORIDE 20 MG/ML
10 INJECTION INTRAMUSCULAR; INTRAVENOUS EVERY 6 HOURS PRN
Status: DISCONTINUED | OUTPATIENT
Start: 2020-04-05 | End: 2020-04-06 | Stop reason: HOSPADM

## 2020-04-05 RX ADMIN — LISINOPRIL 20 MG: 20 TABLET ORAL at 06:10

## 2020-04-05 RX ADMIN — AMLODIPINE BESYLATE 10 MG: 10 TABLET ORAL at 06:10

## 2020-04-05 RX ADMIN — AMOXICILLIN AND CLAVULANATE POTASSIUM 1 TABLET: 875; 125 TABLET, FILM COATED ORAL at 17:32

## 2020-04-05 RX ADMIN — AMOXICILLIN AND CLAVULANATE POTASSIUM 1 TABLET: 875; 125 TABLET, FILM COATED ORAL at 06:13

## 2020-04-05 RX ADMIN — ASPIRIN 81 MG 324 MG: 81 TABLET ORAL at 06:10

## 2020-04-05 RX ADMIN — SENNOSIDES AND DOCUSATE SODIUM 2 TABLET: 8.6; 5 TABLET ORAL at 17:32

## 2020-04-05 RX ADMIN — LEVOTHYROXINE SODIUM 75 MCG: 75 TABLET ORAL at 06:10

## 2020-04-05 RX ADMIN — ENOXAPARIN SODIUM 40 MG: 100 INJECTION SUBCUTANEOUS at 06:12

## 2020-04-05 ASSESSMENT — ENCOUNTER SYMPTOMS
HEADACHES: 0
HEARTBURN: 0
NAUSEA: 0
DIAPHORESIS: 0
WEAKNESS: 0
NERVOUS/ANXIOUS: 0
SENSORY CHANGE: 0
VOMITING: 0
BACK PAIN: 0
NECK PAIN: 0
MYALGIAS: 0
SPEECH CHANGE: 0
SORE THROAT: 1
FLANK PAIN: 0
WHEEZING: 0
BLURRED VISION: 0
FOCAL WEAKNESS: 0
CHILLS: 0
COUGH: 0
FEVER: 0
DIZZINESS: 0
DIARRHEA: 0
ABDOMINAL PAIN: 0
PALPITATIONS: 0
SHORTNESS OF BREATH: 0

## 2020-04-05 ASSESSMENT — PATIENT HEALTH QUESTIONNAIRE - PHQ9
2. FEELING DOWN, DEPRESSED, IRRITABLE, OR HOPELESS: NOT AT ALL
1. LITTLE INTEREST OR PLEASURE IN DOING THINGS: NOT AT ALL
SUM OF ALL RESPONSES TO PHQ9 QUESTIONS 1 AND 2: 0

## 2020-04-05 NOTE — PROGRESS NOTES
[facility] Medicine Daily Progress Note    Date of Service  4/5/2020    Chief Complaint  84 y.o. male admitted 4/4/2020 with Chest pain.    Hospital Course  Admitted with Chest pain, Bronchitis, noted to have Severe AS.    Interval Problem Update  Chest pain - none currently  AS - severe, noted on Echo  Bronchitis - less cough, COVID-19 pending  HTN - sbp 110-150     Consultants/Specialty  None    Code Status  Full    Disposition  TBD    Review of Systems  Review of Systems   Constitutional: Positive for malaise/fatigue. Negative for chills, diaphoresis and fever.   HENT: Positive for congestion and sore throat. Negative for hearing loss.    Eyes: Negative for blurred vision.   Respiratory: Negative for cough, shortness of breath and wheezing.    Cardiovascular: Negative for chest pain, palpitations and leg swelling.   Gastrointestinal: Negative for abdominal pain, diarrhea, heartburn, nausea and vomiting.   Genitourinary: Negative for dysuria, flank pain and hematuria.   Musculoskeletal: Negative for back pain, joint pain, myalgias and neck pain.   Skin: Negative for rash.   Neurological: Negative for dizziness, sensory change, speech change, focal weakness, weakness and headaches.   Psychiatric/Behavioral: The patient is not nervous/anxious.         Physical Exam  Temp:  [36.3 °C (97.3 °F)-36.8 °C (98.3 °F)] 36.4 °C (97.6 °F)  Pulse:  [68-96] 72  Resp:  [10-35] 18  BP: ()/(54-76) 148/67  SpO2:  [96 %-100 %] 99 %    Physical Exam  Vitals signs and nursing note reviewed.   HENT:      Head: Normocephalic and atraumatic.      Nose: No congestion.      Mouth/Throat:      Mouth: Mucous membranes are moist.   Eyes:      Conjunctiva/sclera: Conjunctivae normal.      Pupils: Pupils are equal, round, and reactive to light.   Neck:      Musculoskeletal: No muscular tenderness.   Cardiovascular:      Rate and Rhythm: Normal rate and regular rhythm.      Heart sounds: Murmur present.   Pulmonary:      Effort: Pulmonary  effort is normal.      Comments: rhonchi  Abdominal:      General: Bowel sounds are normal. There is no distension.      Palpations: Abdomen is soft.      Tenderness: There is no abdominal tenderness. There is no guarding or rebound.   Musculoskeletal:      Right lower leg: No edema.      Left lower leg: No edema.   Lymphadenopathy:      Cervical: No cervical adenopathy.   Skin:     General: Skin is warm and dry.   Neurological:      General: No focal deficit present.      Mental Status: He is alert and oriented to person, place, and time.      Cranial Nerves: No cranial nerve deficit.         Fluids  No intake or output data in the 24 hours ending 04/05/20 1053    Laboratory  Recent Labs     04/03/20  1108 04/04/20  1152 04/05/20  0230   WBC 4.8 4.8 5.3   RBC 5.10 4.88 4.57*   HEMOGLOBIN 12.8* 12.3* 11.6*   HEMATOCRIT 39.7* 38.7* 35.7*   MCV 77.8* 79.3* 78.1*   MCH 25.1* 25.2* 25.4*   MCHC 32.2* 31.8* 32.5*   RDW 45.9 46.5 45.5   PLATELETCT 229 222 191   MPV 10.9 10.6 11.1     Recent Labs     04/03/20  1108 04/04/20  1152 04/05/20  0230   SODIUM 129* 128* 129*   POTASSIUM 4.8 4.6 4.7   CHLORIDE 94* 95* 98   CO2 23 20 20   GLUCOSE 91 132* 99   BUN 13 16 15   CREATININE 1.04 1.29 1.00   CALCIUM 9.2 9.1 8.9             Recent Labs     04/05/20  0230   TRIGLYCERIDE 69   HDL 60   *       Imaging  EC-ECHOCARDIOGRAM COMPLETE W/ CONT   Final Result      EC-ECHOCARDIOGRAM COMPLETE W/O CONT         DX-CHEST-PORTABLE (1 VIEW)   Final Result      1.  Minimal ill-defined right lower lobe likely due to atelectasis.           Assessment/Plan  * Nonrheumatic aortic valve stenosis- (present on admission)  Assessment & Plan  Watch fluid status  Will need Cardiology follow up    Bronchitis- (present on admission)  Assessment & Plan  Augmentin  COVID-19 rule out  Follow CBC, CMP, LDH, CRP, Ferritin    Essential hypertension- (present on admission)  Assessment & Plan  Norvasc  Hold Lisinopril  Start IV Hydralazine as need with  parameters    Chest pain- (present on admission)  Assessment & Plan  secondary to Severe AS?    Hypothyroidism- (present on admission)  Assessment & Plan  Synthroid         VTE prophylaxis: Lovenox

## 2020-04-05 NOTE — CARE PLAN
Problem: Safety  Goal: Free from accidental injury  Outcome: PROGRESSING AS EXPECTED     Problem: Knowledge Deficit  Goal: Patient/Significant other demonstrates understanding of disease process, treatment plan, medications and discharge instructions  Outcome: PROGRESSING AS EXPECTED     Problem: Psychosocial needs  Goal: Anxiety reduction  Outcome: PROGRESSING AS EXPECTED

## 2020-04-05 NOTE — PROGRESS NOTES
Pt brought from ED,on arrival pt a&ox4,no c/o pain,no sob noticed,pt having cough,on tele with SR,no c/o chest pain now.POC explained.

## 2020-04-06 ENCOUNTER — PATIENT OUTREACH (OUTPATIENT)
Dept: HEALTH INFORMATION MANAGEMENT | Facility: OTHER | Age: 85
End: 2020-04-06

## 2020-04-06 VITALS
RESPIRATION RATE: 18 BRPM | OXYGEN SATURATION: 97 % | HEIGHT: 66 IN | TEMPERATURE: 96.8 F | HEART RATE: 74 BPM | SYSTOLIC BLOOD PRESSURE: 142 MMHG | BODY MASS INDEX: 26.82 KG/M2 | DIASTOLIC BLOOD PRESSURE: 74 MMHG | WEIGHT: 166.89 LBS

## 2020-04-06 LAB
ALBUMIN SERPL BCP-MCNC: 4.1 G/DL (ref 3.2–4.9)
ALBUMIN/GLOB SERPL: 1.6 G/DL
ALP SERPL-CCNC: 63 U/L (ref 30–99)
ALT SERPL-CCNC: 12 U/L (ref 2–50)
ANION GAP SERPL CALC-SCNC: 11 MMOL/L (ref 7–16)
AST SERPL-CCNC: 13 U/L (ref 12–45)
BASOPHILS # BLD AUTO: 1.2 % (ref 0–1.8)
BASOPHILS # BLD: 0.07 K/UL (ref 0–0.12)
BILIRUB SERPL-MCNC: 0.3 MG/DL (ref 0.1–1.5)
BUN SERPL-MCNC: 13 MG/DL (ref 8–22)
CALCIUM SERPL-MCNC: 8.9 MG/DL (ref 8.5–10.5)
CHLORIDE SERPL-SCNC: 97 MMOL/L (ref 96–112)
CO2 SERPL-SCNC: 22 MMOL/L (ref 20–33)
CREAT SERPL-MCNC: 0.96 MG/DL (ref 0.5–1.4)
CRP SERPL HS-MCNC: 0.07 MG/DL (ref 0–0.75)
EOSINOPHIL # BLD AUTO: 0.19 K/UL (ref 0–0.51)
EOSINOPHIL NFR BLD: 3.3 % (ref 0–6.9)
ERYTHROCYTE [DISTWIDTH] IN BLOOD BY AUTOMATED COUNT: 46.2 FL (ref 35.9–50)
FERRITIN SERPL-MCNC: 14.8 NG/ML (ref 22–322)
GLOBULIN SER CALC-MCNC: 2.6 G/DL (ref 1.9–3.5)
GLUCOSE SERPL-MCNC: 105 MG/DL (ref 65–99)
HCT VFR BLD AUTO: 39.4 % (ref 42–52)
HGB BLD-MCNC: 12.4 G/DL (ref 14–18)
IMM GRANULOCYTES # BLD AUTO: 0.02 K/UL (ref 0–0.11)
IMM GRANULOCYTES NFR BLD AUTO: 0.3 % (ref 0–0.9)
LDH SERPL L TO P-CCNC: 86 U/L (ref 107–266)
LYMPHOCYTES # BLD AUTO: 1.53 K/UL (ref 1–4.8)
LYMPHOCYTES NFR BLD: 26.3 % (ref 22–41)
MCH RBC QN AUTO: 25 PG (ref 27–33)
MCHC RBC AUTO-ENTMCNC: 31.5 G/DL (ref 33.7–35.3)
MCV RBC AUTO: 79.4 FL (ref 81.4–97.8)
MONOCYTES # BLD AUTO: 0.67 K/UL (ref 0–0.85)
MONOCYTES NFR BLD AUTO: 11.5 % (ref 0–13.4)
NEUTROPHILS # BLD AUTO: 3.33 K/UL (ref 1.82–7.42)
NEUTROPHILS NFR BLD: 57.4 % (ref 44–72)
NRBC # BLD AUTO: 0 K/UL
NRBC BLD-RTO: 0 /100 WBC
PLATELET # BLD AUTO: 216 K/UL (ref 164–446)
PMV BLD AUTO: 11.1 FL (ref 9–12.9)
POTASSIUM SERPL-SCNC: 4.4 MMOL/L (ref 3.6–5.5)
PROT SERPL-MCNC: 6.7 G/DL (ref 6–8.2)
RBC # BLD AUTO: 4.96 M/UL (ref 4.7–6.1)
SARS-COV-2 RNA RESP QL NAA+PROBE: NOT DETECTED
SODIUM SERPL-SCNC: 130 MMOL/L (ref 135–145)
SPECIMEN SOURCE: NORMAL
WBC # BLD AUTO: 5.8 K/UL (ref 4.8–10.8)

## 2020-04-06 PROCEDURE — 86140 C-REACTIVE PROTEIN: CPT

## 2020-04-06 PROCEDURE — 80053 COMPREHEN METABOLIC PANEL: CPT

## 2020-04-06 PROCEDURE — 99217 PR OBSERVATION CARE DISCHARGE: CPT | Performed by: FAMILY MEDICINE

## 2020-04-06 PROCEDURE — G0378 HOSPITAL OBSERVATION PER HR: HCPCS

## 2020-04-06 PROCEDURE — 700102 HCHG RX REV CODE 250 W/ 637 OVERRIDE(OP): Performed by: FAMILY MEDICINE

## 2020-04-06 PROCEDURE — 700111 HCHG RX REV CODE 636 W/ 250 OVERRIDE (IP): Performed by: HOSPITALIST

## 2020-04-06 PROCEDURE — 82728 ASSAY OF FERRITIN: CPT

## 2020-04-06 PROCEDURE — 85025 COMPLETE CBC W/AUTO DIFF WBC: CPT

## 2020-04-06 PROCEDURE — A9270 NON-COVERED ITEM OR SERVICE: HCPCS | Performed by: HOSPITALIST

## 2020-04-06 PROCEDURE — A9270 NON-COVERED ITEM OR SERVICE: HCPCS | Performed by: FAMILY MEDICINE

## 2020-04-06 PROCEDURE — 83615 LACTATE (LD) (LDH) ENZYME: CPT

## 2020-04-06 PROCEDURE — 700102 HCHG RX REV CODE 250 W/ 637 OVERRIDE(OP): Performed by: HOSPITALIST

## 2020-04-06 PROCEDURE — 96372 THER/PROPH/DIAG INJ SC/IM: CPT

## 2020-04-06 RX ORDER — AMOXICILLIN AND CLAVULANATE POTASSIUM 875; 125 MG/1; MG/1
1 TABLET, FILM COATED ORAL EVERY 12 HOURS
Qty: 6 TAB | Refills: 0 | Status: SHIPPED | OUTPATIENT
Start: 2020-04-06 | End: 2020-04-09

## 2020-04-06 RX ADMIN — AMLODIPINE BESYLATE 10 MG: 10 TABLET ORAL at 06:23

## 2020-04-06 RX ADMIN — AMOXICILLIN AND CLAVULANATE POTASSIUM 1 TABLET: 875; 125 TABLET, FILM COATED ORAL at 06:23

## 2020-04-06 RX ADMIN — ENOXAPARIN SODIUM 40 MG: 100 INJECTION SUBCUTANEOUS at 06:23

## 2020-04-06 RX ADMIN — ONDANSETRON 4 MG: 2 INJECTION INTRAMUSCULAR; INTRAVENOUS at 02:25

## 2020-04-06 RX ADMIN — LEVOTHYROXINE SODIUM 75 MCG: 75 TABLET ORAL at 06:23

## 2020-04-06 ASSESSMENT — PATIENT HEALTH QUESTIONNAIRE - PHQ9
8. MOVING OR SPEAKING SO SLOWLY THAT OTHER PEOPLE COULD HAVE NOTICED. OR THE OPPOSITE, BEING SO FIGETY OR RESTLESS THAT YOU HAVE BEEN MOVING AROUND A LOT MORE THAN USUAL: NOT AT ALL
6. FEELING BAD ABOUT YOURSELF - OR THAT YOU ARE A FAILURE OR HAVE LET YOURSELF OR YOUR FAMILY DOWN: NOT AL ALL
3. TROUBLE FALLING OR STAYING ASLEEP OR SLEEPING TOO MUCH: NOT AT ALL
9. THOUGHTS THAT YOU WOULD BE BETTER OFF DEAD, OR OF HURTING YOURSELF: NOT AT ALL
7. TROUBLE CONCENTRATING ON THINGS, SUCH AS READING THE NEWSPAPER OR WATCHING TELEVISION: NOT AT ALL
SUM OF ALL RESPONSES TO PHQ9 QUESTIONS 1 AND 2: 1
5. POOR APPETITE OR OVEREATING: NOT AT ALL
SUM OF ALL RESPONSES TO PHQ QUESTIONS 1-9: 1
4. FEELING TIRED OR HAVING LITTLE ENERGY: NOT AT ALL
1. LITTLE INTEREST OR PLEASURE IN DOING THINGS: NOT AT ALL
2. FEELING DOWN, DEPRESSED, IRRITABLE, OR HOPELESS: SEVERAL DAYS

## 2020-04-06 NOTE — DISCHARGE INSTRUCTIONS
Discharge Instructions    Discharged to home by car with self. Discharged via walking, hospital escort: Refused.  Special equipment needed: Not Applicable    Be sure to schedule a follow-up appointment with your primary care doctor or any specialists as instructed.     Discharge Plan:   Diet Plan: Discussed  Activity Level: Discussed  Confirmed Follow up Appointment: Appointment Scheduled  Confirmed Symptoms Management: Discussed  Medication Reconciliation Updated: Yes  Influenza Vaccine Indication: Patient Refuses    I understand that a diet low in cholesterol, fat, and sodium is recommended for good health. Unless I have been given specific instructions below for another diet, I accept this instruction as my diet prescription.   Other diet: Heart healthy     Special Instructions: None    · Is patient discharged on Warfarin / Coumadin?   no    Depression / Suicide Risk    As you are discharged from this [facility] facility, it is important to learn how to keep safe from harming yourself.    Recognize the warning signs:  · Abrupt changes in personality, positive or negative- including increase in energy   · Giving away possessions  · Change in eating patterns- significant weight changes-  positive or negative  · Change in sleeping patterns- unable to sleep or sleeping all the time   · Unwillingness or inability to communicate  · Depression  · Unusual sadness, discouragement and loneliness  · Talk of wanting to die  · Neglect of personal appearance   · Rebelliousness- reckless behavior  · Withdrawal from people/activities they love  · Confusion- inability to concentrate     If you or a loved one observes any of these behaviors or has concerns about self-harm, here's what you can do:  · Talk about it- your feelings and reasons for harming yourself  · Remove any means that you might use to hurt yourself (examples: pills, rope, extension cords, firearm)  · Get professional help from the community (Mental Health,  Substance Abuse, psychological counseling)  · Do not be alone:Call your Safe Contact- someone whom you trust who will be there for you.  · Call your local CRISIS HOTLINE 401-5457 or 158-277-8117  · Call your local Children's Mobile Crisis Response Team [place] (433) 956-4369 or www.KAL  · Call the toll free National Suicide Prevention Hotlines   · National Suicide Prevention Lifeline 903-819-VYTO (5437)  · National Hope Line Network 800-SUICIDE (120-4476)      Self-Isolating at Home  If it is determined that you do not need to be hospitalized and can be isolated at home please follow the follow the prevention steps below as based on CDC guidelines.  Stay home except to get medical care  People who are mildly ill with unconfirmed COVID-19 or have any other infectious respiratory illness are able to isolate at home during their illness. You should restrict activities outside your home, except for getting medical care. Do not go to work, school, or public areas. Avoid using public transportation, ride-sharing, or taxis.  Call ahead before visiting your doctor  If you have a medical appointment, call the healthcare provider and tell them that you have or may have unconfirmed COVID-19 or another possibly contagious respiratory illness. This will help the healthcare provider’s office take steps to keep other people from getting infected or exposed.  Separate yourself from other people and animals in your home  As much as possible, you should stay in a specific room and away from other people in your home. Also, you should use a separate bathroom, if available.  You should restrict contact with pets and other animals while you are sick, just like you would around other people. When possible, have another member of your household care for your animals while you are sick. If you must care for your pet or be around animals while you are sick, wash your hands before and after you interact with pets.  Wear a  facemask  You should wear a facemask when you are around other people (e.g., sharing a room or vehicle) or pets and before you enter a healthcare provider’s office. If you are not able to wear a facemask (for example, because it causes trouble breathing), then people who live with you should not stay in the same room with you, or they should wear a facemask if they enter your room.  Cover your coughs and sneezes  Cover your mouth and nose with a tissue when you cough or sneeze. Throw used tissues in a lined trash can. Immediately wash your hands with soap and water for at least 20 seconds or, if soap and water are not available, clean your hands with an alcohol-based hand  that contains at least 60% alcohol.  Clean your hands often  Wash your hands often with soap and water for at least 20 seconds, especially after blowing your nose, coughing, or sneezing; going to the bathroom; and before eating or preparing food. If soap and water are not readily available, use an alcohol-based hand  with at least 60% alcohol, covering all surfaces of your hands and rubbing them together until they feel dry.  Soap and water are the best option if hands are visibly dirty. Avoid touching your eyes, nose, and mouth with unwashed hands.  Avoid sharing personal household items  You should not share dishes, drinking glasses, cups, eating utensils, towels, or bedding with other people or pets in your home. After using these items, they should be washed thoroughly with soap and water.  Clean all “high-touch” surfaces everyday  High touch surfaces include counters, tabletops, doorknobs, bathroom fixtures, toilets, phones, keyboards, tablets, and bedside tables. Also, clean any surfaces that may have blood, stool, or body fluids on them. Use a household cleaning spray or wipe, according to the label instructions. Labels contain instructions for safe and effective use of the cleaning product including precautions you should  take when applying the product, such as wearing gloves and making sure you have good ventilation during use of the product.  Monitor your symptoms  Seek prompt medical attention if your illness is worsening (e.g., difficulty breathing). Before seeking care, call your healthcare provider and tell them that you have, or are being evaluated for, unconfirmed COVID-19 or another infectious respiratory illness. Put on a facemask before you enter the facility. These steps will help the healthcare provider’s office to keep other people in the office or waiting room from getting infected or exposed. Ask your healthcare provider to call the local or Pending sale to [facility] health department. Persons who are placed under active monitoring or facilitated self-monitoring should follow instructions provided by their local health department or occupational health professionals, as appropriate. When working with your local health department check their available hours.  If you have a medical emergency and need to call 911, notify the dispatch personnel that you have, or are being evaluated for unconfirmed COVID-19 or another infectious respiratory illness. If possible, put on a facemask before emergency medical services arrive.  Discontinuing home isolation  Patients with unconfirmed COVID-19 or other infectious respiratory illnesses should remain under home isolation precautions until the risk of secondary transmission to others is thought to be low. In general that means 72 hours after fever resolves without the use of fever reducing medications, AND symptoms have improved AND at least 7 days since symptoms first appeared. If you have questions or concerns consult your healthcare providers or your local health department.  Per CDC guidelines, you are not required to provide a healthcare provider’s note to validate your illness or to return to work, as healthcare provider offices and medical facilities may be extremely busy and not able to provide  such documentation in a timely way.

## 2020-04-06 NOTE — DISCHARGE SUMMARY
Discharge Summary    CHIEF COMPLAINT ON ADMISSION  Chief Complaint   Patient presents with   • Chest Pain   • Shortness of Breath       Reason for Admission  Other     Admission Date  4/4/2020    CODE STATUS  Full Code    HPI & HOSPITAL COURSE  This is a 84 y.o. male here with chest pain.  He had a stress test done on 11/2019 which was negative.  On physical examination he appeared to have a loud murmur.  Echocardiogram showed severe aortic stenosis.  His blood pressure medications were adjusted.  He was ruled out for COVID-19 due to symptoms of bronchitis. He responded well to treatment.  COVID-19 remains pending.  He has not required any oxygen supplementation, he also has no symptoms of fever chills or shortness of breath. He is advised Home Isolation until notification of a negative test OR call from [facility] reporting positive result. If negative stay home until you no longer have a fever and symptoms have improved. If positive follow directives from [facility].    Therefore, he is discharged in good and stable condition to home with close outpatient follow-up.    The patient recovered much more quickly than anticipated on admission.    Discharge Date  4/6/20     FOLLOW UP ITEMS POST DISCHARGE  [facility]    DISCHARGE DIAGNOSES  Principal Problem:    Nonrheumatic aortic valve stenosis POA: Yes  Active Problems:    Bronchitis POA: Yes    Chest pain POA: Yes    Essential hypertension POA: Yes    Hypothyroidism POA: Yes  Resolved Problems:    * No resolved hospital problems. *      FOLLOW UP  Future Appointments   Date Time Provider Department Center   4/10/2020  9:15 AM Valentina Tristan M.D. [facility] None     [facility]  [address] [place] [address]  [phone]  Schedule an appointment as soon as possible for a visit  Please call to schedule a hospital follow up with your provider. Thank you!       MEDICATIONS ON DISCHARGE     Medication List      START taking these medications       Instructions   amoxicillin-clavulanate 875-125 MG Tabs  Commonly known as:  AUGMENTIN   Take 1 Tab by mouth every 12 hours for 3 days.  Dose:  1 Tab        CONTINUE taking these medications      Instructions   albuterol 108 (90 Base) MCG/ACT Aers inhalation aerosol   Inhale 2 Puffs by mouth every four hours as needed for Shortness of Breath.  Dose:  2 Puff     amLODIPine 10 MG Tabs  Commonly known as:  NORVASC   Take 1 Tab by mouth every day.  Dose:  10 mg     levothyroxine 75 MCG Tabs  Commonly known as:  SYNTHROID   Take 1 Tab by mouth Every morning on an empty stomach.  Dose:  75 mcg        STOP taking these medications    lisinopril 20 MG Tabs  Commonly known as:  PRINIVIL            Allergies  No Known Allergies    DIET  Orders Placed This Encounter   Procedures   • Diet Order Regular     Standing Status:   Standing     Number of Occurrences:   1     Order Specific Question:   Diet:     Answer:   Regular [1]       ACTIVITY  As tolerated.  Weight bearing as tolerated    CONSULTATIONS  None    PROCEDURES  None    LABORATORY  Lab Results   Component Value Date    SODIUM 130 (L) 04/06/2020    POTASSIUM 4.4 04/06/2020    CHLORIDE 97 04/06/2020    CO2 22 04/06/2020    GLUCOSE 105 (H) 04/06/2020    BUN 13 04/06/2020    CREATININE 0.96 04/06/2020        Lab Results   Component Value Date    WBC 5.8 04/06/2020    HEMOGLOBIN 12.4 (L) 04/06/2020    HEMATOCRIT 39.4 (L) 04/06/2020    PLATELETCT 216 04/06/2020        Total time of the discharge process exceeds 30 minutes.

## 2020-04-06 NOTE — PROGRESS NOTES
Patient assessed in the pm. No pain complaints. Alert and oriented. BP slightly elevated. No symptoms. PRN hydralyzine if > 170

## 2020-04-06 NOTE — PROGRESS NOTES
Discharging patient home. Verbalized understanding of discharge instructions, follow up appointments, and home care. All questions answered.  Belongings with patient at time of discharge.  Vitals signs WNL. Pt given discharge information. Discharge assessment completed. Pt educated on self isolation

## 2020-04-08 ENCOUNTER — TELEPHONE (OUTPATIENT)
Dept: CARDIOLOGY | Facility: MEDICAL CENTER | Age: 85
End: 2020-04-08

## 2020-04-08 NOTE — TELEPHONE ENCOUNTER
Left message with patient's wifes (patient was not around) in regards to records including most recent lab results, ekg, any cardiac testing or if patient has been treated by a cardiologist before.Wife will have patient call back.

## 2020-04-21 ENCOUNTER — HOSPITAL ENCOUNTER (EMERGENCY)
Facility: MEDICAL CENTER | Age: 85
End: 2020-04-21
Attending: EMERGENCY MEDICINE
Payer: MEDICARE

## 2020-04-21 VITALS
HEART RATE: 89 BPM | RESPIRATION RATE: 18 BRPM | BODY MASS INDEX: 27.42 KG/M2 | HEIGHT: 66 IN | DIASTOLIC BLOOD PRESSURE: 99 MMHG | WEIGHT: 170.64 LBS | OXYGEN SATURATION: 98 % | TEMPERATURE: 97.5 F | SYSTOLIC BLOOD PRESSURE: 150 MMHG

## 2020-04-21 DIAGNOSIS — L29.9 PRURITUS: Primary | ICD-10-CM

## 2020-04-21 PROCEDURE — 700102 HCHG RX REV CODE 250 W/ 637 OVERRIDE(OP): Performed by: EMERGENCY MEDICINE

## 2020-04-21 PROCEDURE — 99284 EMERGENCY DEPT VISIT MOD MDM: CPT

## 2020-04-21 PROCEDURE — A9270 NON-COVERED ITEM OR SERVICE: HCPCS | Performed by: EMERGENCY MEDICINE

## 2020-04-21 RX ORDER — HYDROXYZINE HYDROCHLORIDE 10 MG/1
10 TABLET, FILM COATED ORAL ONCE
Status: COMPLETED | OUTPATIENT
Start: 2020-04-21 | End: 2020-04-21

## 2020-04-21 RX ORDER — HYDROXYZINE HYDROCHLORIDE 25 MG/1
12.5 TABLET, FILM COATED ORAL 2 TIMES DAILY PRN
Qty: 10 TAB | Refills: 0 | Status: SHIPPED | OUTPATIENT
Start: 2020-04-21 | End: 2021-08-17

## 2020-04-21 RX ADMIN — HYDROXYZINE HYDROCHLORIDE 10 MG: 10 TABLET, FILM COATED ORAL at 05:12

## 2020-04-21 ASSESSMENT — FIBROSIS 4 INDEX: FIB4 SCORE: 1.44

## 2020-04-21 NOTE — DISCHARGE INSTRUCTIONS
Follow-up with primary care this week for reevaluation, to establish care, for medication management and close blood pressure monitoring.  Follow-up with dermatology as scheduled next month for reevaluation and continued work-up if itching persist.    Continue home medications as previously indicated.  Hydroxyzine, half a tablet, twice daily as needed for itching.    Return to the emergency department for intractable itching, rash, redness, weeping, fever or other new concerns.

## 2020-04-21 NOTE — ED PROVIDER NOTES
"ED Provider Note    CHIEF COMPLAINT  Chief Complaint   Patient presents with   • Rash     PT reports rash x 4 weeks and not improved with otc meds.       HPI  Emigdio Jiménez is a 84 y.o. male who presents to the emergency department through triage for itching.  Patient describes near total body itching for 4 weeks.  Constant, intractable despite OTC allergy medications and topical creams.  Specific names of these medications cannot be provided.  Patient denies history of the same.  Denies rash, just itching. denies new medications, supplements, foods or fluids.  Denies new living situation.  Denies similar symptoms in household contacts.  Denies recent travel.    Denies fever, facial oral swelling, difficulty breathing or wheezing.  Denies change in skin color.     REVIEW OF SYSTEMS  See HPI for further details. All other systems are negative.     PAST MEDICAL HISTORY   Hypertension    SOCIAL HISTORY  Social History     Tobacco Use   • Smoking status: Never Smoker   • Smokeless tobacco: Never Used   Substance and Sexual Activity   • Alcohol use: Not Currently     Frequency: 2-3 times a week   • Drug use: Never   • Sexual activity: Not on file       SURGICAL HISTORY  patient denies any surgical history    CURRENT MEDICATIONS  Home Medications     Reviewed by Saad Cardozo R.N. (Registered Nurse) on 04/21/20 at 0357  Med List Status: <None>   Medication Last Dose Status   albuterol 108 (90 Base) MCG/ACT Aero Soln inhalation aerosol  Active   amLODIPine (NORVASC) 10 MG Tab  Active   levothyroxine (SYNTHROID) 75 MCG Tab  Active                ALLERGIES  No Known Allergies    PHYSICAL EXAM  VITAL SIGNS: /117 Comment: Hx of high BP; forgot to take meds this AM  Pulse 89   Temp 36.4 °C (97.5 °F) (Temporal)   Resp 18   Ht 1.676 m (5' 6\")   Wt 77.4 kg (170 lb 10.2 oz)   SpO2 98%   BMI 27.54 kg/m²   Pulse ox interpretation: I interpret this pulse ox as normal.  Constitutional: Alert in no apparent " distress.  HENT: Normocephalic, atraumatic. Bilateral external ears normal, Nose normal. Moist mucous membranes.    Eyes: Pupils are equal and reactive, Conjunctiva normal.   Neck: Normal range of motion  Lymphatic: No lymphadenopathy noted.   Cardiovascular: Regular rate and rhythm, no murmurs. Distal pulses intact.    Thorax & Lungs: Normal breath sounds.  No wheezing/rales/ronchi. No increased work of breathing  Skin: Warm, Dry.  No rash, flaking or cellulitis.  No excoriations.  Musculoskeletal: Good range of motion in all major joints.   Neurologic: Alert and orient x4.  Ambulates independently.  Psychiatric: Odd affect.  Mood normal.  Cooperative.    COURSE & MEDICAL DECISION MAKING  ED evaluation for pruritus is unrevealing.  There is no evidence for rash.  No cellulitis.  No eczema.  No evidence for scabies or bedbugs, no history for the same or similar household contacts..  Denies any new medications or exposures.    Lab work considered for blood count, liver, renal function, thyroid however patient was at this facility earlier this month with complete blood panel, normal.  No indication for further evaluation at this time.  Patient will be treated with low-dose Atarax as he describes intractable symptoms, keeping him awake, otherwise unrelieved with other OTC medications.  He states he does have an appointment with a dermatologist in 1 month, will try to follow-up with primary care as well.    Patient is stable for discharge at this time, anticipatory guidance provided, small quantity and low-dose Atarax twice daily as needed for pruritus, close follow-up is encouraged, and strict ED return instructions have been detailed. Patient is agreeable to the disposition and plan.    Patient's blood pressure was elevated in the emergency department, and has been referred to primary care for close monitoring.    FINAL IMPRESSION  (L29.9) Pruritus  (primary encounter diagnosis)      Electronically signed by: Zaida  KATHLEEN Purdy D.O., 4/21/2020 4:46 AM      This dictation was created using voice recognition software. The accuracy of the dictation is limited to the abilities of the software. I expect there may be some errors of grammar and possibly content. The nursing notes were reviewed and certain aspects of this information were incorporated into this note.

## 2020-04-21 NOTE — ED TRIAGE NOTES
"Chief Complaint   Patient presents with   • Rash     PT reports rash x 4 weeks and not improved with otc meds.     Blood Pressure 158/117 Comment: Hx of high BP; forgot to take meds this AM  Pulse 89   Temperature 36.4 °C (97.5 °F) (Temporal)   Respiration 18   Height 1.676 m (5' 6\")   Weight 77.4 kg (170 lb 10.2 oz)   Oxygen Saturation 98%   Body Mass Index 27.54 kg/m²     "

## 2020-04-21 NOTE — ED NOTES
Discharge paperwork and prescription instruction provided. Pt verbalized understanding, dressed self, then ambulated out of ER with steady gait, alert and oriented.

## 2020-08-15 NOTE — PROGRESS NOTES
REFERRING PHYSICIAN: Ugo Ojeda MD.         CONSULTING PHYSICIAN: Emmanuel Crane DO.    CHIEF COMPLAINT: Shortness of breath, increasing fatigue    HISTORY OF PRESENT ILLNESS: The patient is a 84 y.o. male with history significant for HTN and hypothyroidism.  He recently moved to [place] from [place] to be closer to his daughter.  He presented to [facility] ER in April for worsening shortness of breath and chest pain.  MPI was negative and no elevated troponins.  He had a loud sytolic murmur appreciated and echocardiogram showed severe AS.  He was referred to Cardiology.  He was somewhat lost to follow up until recently.  He recently got health insurance, which influence the length between diagnosis and referral.  He states that his exercise capacity has decreased and he is more fatigued over the past few months.        PAST MEDICAL HISTORY:   Past Medical History:   Diagnosis Date   • Heart murmur    • Hypertension        PAST SURGICAL HISTORY:   Past Surgical History:   Procedure Laterality Date   • HERNIA REPAIR         FAMILY HISTORY:   Family History   Problem Relation Age of Onset   • Hyperlipidemia Mother    • Heart Disease Mother         SOCIAL HISTORY:   Social History     Socioeconomic History   • Marital status:      Spouse name: Not on file   • Number of children: Not on file   • Years of education: Not on file   • Highest education level: Not on file   Occupational History   • Not on file   Social Needs   • Financial resource strain: Not on file   • Food insecurity     Worry: Not on file     Inability: Not on file   • Transportation needs     Medical: Not on file     Non-medical: Not on file   Tobacco Use   • Smoking status: Former Smoker     Packs/day: 0.00   • Smokeless tobacco: Never Used   Substance and Sexual Activity   • Alcohol use: Not Currently     Frequency: 2-3 times a week   • Drug use: Never   • Sexual activity: Not on file   Lifestyle   • Physical activity     Days per week: Not  on file     Minutes per session: Not on file   • Stress: Not on file   Relationships   • Social connections     Talks on phone: Not on file     Gets together: Not on file     Attends Hinduism service: Not on file     Active member of club or organization: Not on file     Attends meetings of clubs or organizations: Not on file     Relationship status: Not on file   • Intimate partner violence     Fear of current or ex partner: Not on file     Emotionally abused: Not on file     Physically abused: Not on file     Forced sexual activity: Not on file   Other Topics Concern   • Not on file   Social History Narrative   • Not on file       ALLERGIES:   No Known Allergies     CURRENT MEDICATIONS:     Current Outpatient Medications:   •  traZODone (DESYREL) 50 MG Tab, Take 50 mg by mouth., Disp: , Rfl:   •  hydrOXYzine HCl (ATARAX) 25 MG Tab, Take 0.5 Tabs by mouth 2 times a day as needed for Itching., Disp: 10 Tab, Rfl: 0  •  amLODIPine (NORVASC) 10 MG Tab, Take 1 Tab by mouth every day., Disp: 30 Tab, Rfl: 0  •  levothyroxine (SYNTHROID) 75 MCG Tab, Take 1 Tab by mouth Every morning on an empty stomach., Disp: 30 Tab, Rfl: 3  •  albuterol 108 (90 Base) MCG/ACT Aero Soln inhalation aerosol, Inhale 2 Puffs by mouth every four hours as needed for Shortness of Breath., Disp: 1 Inhaler, Rfl: 0     LABS REVIEWED:  Lab Results   Component Value Date/Time    SODIUM 130 (L) 04/06/2020 02:34 AM    POTASSIUM 4.4 04/06/2020 02:34 AM    CHLORIDE 97 04/06/2020 02:34 AM    CO2 22 04/06/2020 02:34 AM    GLUCOSE 105 (H) 04/06/2020 02:34 AM    BUN 13 04/06/2020 02:34 AM    CREATININE 0.96 04/06/2020 02:34 AM      Lab Results   Component Value Date/Time    PROTHROMBTM 13.3 11/18/2019 06:37 PM    INR 1.00 11/18/2019 06:37 PM      Lab Results   Component Value Date/Time    WBC 5.8 04/06/2020 02:34 AM    RBC 4.96 04/06/2020 02:34 AM    HEMOGLOBIN 12.4 (L) 04/06/2020 02:34 AM    HEMATOCRIT 39.4 (L) 04/06/2020 02:34 AM    MCV 79.4 (L) 04/06/2020  02:34 AM    MCH 25.0 (L) 04/06/2020 02:34 AM    MCHC 31.5 (L) 04/06/2020 02:34 AM    RDW 46.2 04/06/2020 02:34 AM    PLATELETCT 216 04/06/2020 02:34 AM    MPV 11.1 04/06/2020 02:34 AM    NEUTSPOLYS 57.40 04/06/2020 02:34 AM    LYMPHOCYTES 26.30 04/06/2020 02:34 AM    MONOCYTES 11.50 04/06/2020 02:34 AM    EOSINOPHILS 3.30 04/06/2020 02:34 AM    BASOPHILS 1.20 04/06/2020 02:34 AM        IMAGING REVIEWED AND INTERPRETED:    [place] [facility] [place] prior study is available for comparison.   Contrast study.  Left ventricular ejection fraction is visually estimated to be 70%.  Calcified aortic valve leaflets.  Severe aortic stenosis: Vmax is 4.2  m/s, MG 43 mmHg.  Normal estimated RAP, unable to estimate RVSP.  Ascending aorta not well visualized.    ANGIOGRAM: none available.     REVIEW OF SYSTEMS:   Review of Systems   Constitutional: Positive for malaise/fatigue. Negative for chills, diaphoresis, fever and weight loss.   HENT: Positive for hearing loss. Negative for congestion, ear pain, nosebleeds, sore throat and tinnitus.    Eyes: Positive for blurred vision. Negative for double vision, pain and discharge.   Respiratory: Positive for cough and shortness of breath. Negative for hemoptysis, sputum production, wheezing and stridor.    Cardiovascular: Positive for chest pain. Negative for palpitations, orthopnea and leg swelling.   Gastrointestinal: Negative for abdominal pain, constipation, heartburn, melena, nausea and vomiting.   Genitourinary: Negative for dysuria, flank pain and hematuria.   Musculoskeletal: Positive for joint pain. Negative for myalgias and neck pain.   Skin: Negative for itching and rash.   Neurological: Negative for dizziness, speech change, focal weakness, seizures, weakness and headaches.   Endo/Heme/Allergies: Negative for environmental allergies and polydipsia. Does not bruise/bleed easily.   Psychiatric/Behavioral: Negative for depression, hallucinations, substance abuse and  "suicidal ideas.       PHYSICAL EXAMINATION:   Physical Exam   Constitutional: He is oriented to person, place, and time and well-developed, well-nourished, and in no distress. No distress.   HENT:   Head: Normocephalic and atraumatic.   Nose: Nose normal.   Mouth/Throat: Oropharynx is clear and moist.   Eyes: Pupils are equal, round, and reactive to light. Conjunctivae and EOM are normal.   Neck: Normal range of motion. Neck supple. No JVD present. No tracheal deviation present.   Cardiovascular: Normal rate and regular rhythm.   Murmur heard.  Pulmonary/Chest: Effort normal. No stridor. No respiratory distress.   Diminished in the bases bilaterally.   Abdominal: Soft. Bowel sounds are normal. He exhibits no distension. There is no abdominal tenderness.   Musculoskeletal: Normal range of motion.         General: Edema present. No tenderness.   Neurological: He is alert and oriented to person, place, and time. Gait normal. Coordination normal. GCS score is 15.   Skin: Skin is warm and dry.   Psychiatric: Mood, memory and affect normal.     CONSTITUTIONAL:  /86 (BP Location: Right arm, Patient Position: Sitting, BP Cuff Size: Adult)   Pulse 63   Temp 36.8 °C (98.2 °F) (Temporal)   Ht 1.676 m (5' 6\")   Wt 72.6 kg (160 lb)   SpO2 96%         IMPRESSION:  Severe symptomatic aortic stenosis, hypertension, hypothyroidism, bronchitis, presbycusis      PLAN:  I recommend continuing the work-up for transcatheter aortic valve replacement.  He is scheduled for coronary angiogram tomorrow.  Once we have those results, will be able to determine if he needs surgical intervention versus transcatheter intervention.  I discussed that in detail with the patient.  Given his age, and comorbidities, I believe he would be appropriate for transcatheter aortic valve replacement.  We will await his results of his angiogram.  The procedure, its risks, benefits, potential complications and alternative treatments were discussed with " the patient in detail including the risks should he decide not to undergo my recommended treatment. All of his questions were answered to his satisfaction and he is willing to proceed with the operation. The risks include death, stroke,  infection: to include a rare bacterial infection related to the use of the heart/lung machine, papito-operative myocardial infarction, dysrhythmias, diaphragmatic paralysis, chest wall paresthesia, tracheostomy, kidney or other organ failure, possible return to the operating room for bleeding, bleeding requiring transfusion with its attendant risks including AIDS or hepatitis, dehiscence of surgical incisions, respiratory complications including the need for prolonged ventilator support, Protamine or other drug reaction, peripheral neuropathy, loss of limb, and miscount of surgical items. The operative mortality risk is approximately 3%. The STS mortality risk score is 1.8% and the morbidity and mortality risk score is 10%. The scores were discussed with patient.    Sincerely,       Emmanuel Crane DO.

## 2020-08-18 ENCOUNTER — HOSPITAL ENCOUNTER (OUTPATIENT)
Dept: HOSPITAL 8 - RAD | Age: 85
Discharge: HOME | End: 2020-08-18
Attending: INTERNAL MEDICINE
Payer: MEDICARE

## 2020-08-18 DIAGNOSIS — R91.1: ICD-10-CM

## 2020-08-18 DIAGNOSIS — I70.0: ICD-10-CM

## 2020-08-18 DIAGNOSIS — I65.23: Primary | ICD-10-CM

## 2020-08-18 DIAGNOSIS — K40.90: ICD-10-CM

## 2020-08-18 DIAGNOSIS — I25.10: ICD-10-CM

## 2020-08-18 DIAGNOSIS — Z01.810: ICD-10-CM

## 2020-08-18 DIAGNOSIS — I35.0: ICD-10-CM

## 2020-08-18 LAB — CREAT SERPL-MCNC: 1.16 MG/DL (ref 0.7–1.3)

## 2020-08-18 PROCEDURE — 94729 DIFFUSING CAPACITY: CPT

## 2020-08-18 PROCEDURE — 93880 EXTRACRANIAL BILAT STUDY: CPT

## 2020-08-18 PROCEDURE — 36415 COLL VENOUS BLD VENIPUNCTURE: CPT

## 2020-08-18 PROCEDURE — 94726 PLETHYSMOGRAPHY LUNG VOLUMES: CPT

## 2020-08-18 PROCEDURE — 74174 CTA ABD&PLVS W/CONTRAST: CPT

## 2020-08-18 PROCEDURE — 82565 ASSAY OF CREATININE: CPT

## 2020-08-18 PROCEDURE — 71275 CT ANGIOGRAPHY CHEST: CPT

## 2020-08-18 PROCEDURE — 94010 BREATHING CAPACITY TEST: CPT

## 2020-08-19 ENCOUNTER — OFFICE VISIT (OUTPATIENT)
Dept: CARDIOTHORACIC SURGERY | Facility: MEDICAL CENTER | Age: 85
End: 2020-08-19
Payer: MEDICARE

## 2020-08-19 VITALS
HEIGHT: 66 IN | TEMPERATURE: 98.2 F | SYSTOLIC BLOOD PRESSURE: 138 MMHG | HEART RATE: 63 BPM | BODY MASS INDEX: 25.71 KG/M2 | OXYGEN SATURATION: 96 % | DIASTOLIC BLOOD PRESSURE: 86 MMHG | WEIGHT: 160 LBS

## 2020-08-19 DIAGNOSIS — I35.0 SEVERE AORTIC STENOSIS: ICD-10-CM

## 2020-08-19 PROCEDURE — 99205 OFFICE O/P NEW HI 60 MIN: CPT | Performed by: THORACIC SURGERY (CARDIOTHORACIC VASCULAR SURGERY)

## 2020-08-19 RX ORDER — TRAZODONE HYDROCHLORIDE 50 MG/1
50 TABLET ORAL
COMMUNITY
Start: 2020-08-05 | End: 2021-03-12

## 2020-08-19 ASSESSMENT — ENCOUNTER SYMPTOMS
COUGH: 1
SEIZURES: 0
HEARTBURN: 0
PALPITATIONS: 0
EYE DISCHARGE: 0
DOUBLE VISION: 0
POLYDIPSIA: 0
DIAPHORESIS: 0
WEIGHT LOSS: 0
DEPRESSION: 0
FLANK PAIN: 0
HEADACHES: 0
WHEEZING: 0
ORTHOPNEA: 0
DIZZINESS: 0
BRUISES/BLEEDS EASILY: 0
BLURRED VISION: 1
ABDOMINAL PAIN: 0
NECK PAIN: 0
SORE THROAT: 0
MYALGIAS: 0
SPUTUM PRODUCTION: 0
SPEECH CHANGE: 0
WEAKNESS: 0
HALLUCINATIONS: 0
CHILLS: 0
NAUSEA: 0
EYE PAIN: 0
FEVER: 0
HEMOPTYSIS: 0
STRIDOR: 0
SHORTNESS OF BREATH: 1
CONSTIPATION: 0
VOMITING: 0
FOCAL WEAKNESS: 0

## 2020-08-19 ASSESSMENT — FIBROSIS 4 INDEX: FIB4 SCORE: 1.44

## 2020-08-19 ASSESSMENT — LIFESTYLE VARIABLES: SUBSTANCE_ABUSE: 0

## 2020-08-20 ENCOUNTER — HOSPITAL ENCOUNTER (OUTPATIENT)
Dept: HOSPITAL 8 - CACL | Age: 85
Discharge: HOME | End: 2020-08-20
Attending: INTERNAL MEDICINE
Payer: MEDICARE

## 2020-08-20 VITALS — WEIGHT: 160.32 LBS | HEIGHT: 66 IN | BODY MASS INDEX: 25.77 KG/M2

## 2020-08-20 VITALS — DIASTOLIC BLOOD PRESSURE: 97 MMHG | SYSTOLIC BLOOD PRESSURE: 189 MMHG

## 2020-08-20 DIAGNOSIS — F12.10: ICD-10-CM

## 2020-08-20 DIAGNOSIS — Z79.890: ICD-10-CM

## 2020-08-20 DIAGNOSIS — Z83.42: ICD-10-CM

## 2020-08-20 DIAGNOSIS — I35.0: Primary | ICD-10-CM

## 2020-08-20 DIAGNOSIS — Z79.899: ICD-10-CM

## 2020-08-20 DIAGNOSIS — Z98.890: ICD-10-CM

## 2020-08-20 DIAGNOSIS — Z82.49: ICD-10-CM

## 2020-08-20 LAB
ANION GAP SERPL CALC-SCNC: 6 MMOL/L (ref 5–15)
BASOPHILS # BLD AUTO: 0.02 X10^3/UL (ref 0–0.1)
BASOPHILS NFR BLD AUTO: 1 % (ref 0–1)
CALCIUM SERPL-MCNC: 8.8 MG/DL (ref 8.5–10.1)
CHLORIDE SERPL-SCNC: 105 MMOL/L (ref 98–107)
CREAT SERPL-MCNC: 1.01 MG/DL (ref 0.7–1.3)
EOSINOPHIL # BLD AUTO: 0.17 X10^3/UL (ref 0–0.4)
EOSINOPHIL NFR BLD AUTO: 5 % (ref 1–7)
ERYTHROCYTE [DISTWIDTH] IN BLOOD BY AUTOMATED COUNT: 15.4 % (ref 9.4–14.8)
LYMPHOCYTES # BLD AUTO: 0.94 X10^3/UL (ref 1–3.4)
LYMPHOCYTES NFR BLD AUTO: 25 % (ref 22–44)
MCH RBC QN AUTO: 28 PG (ref 27.5–34.5)
MCHC RBC AUTO-ENTMCNC: 32.1 G/DL (ref 33.2–36.2)
MCV RBC AUTO: 87 FL (ref 81–97)
MD: NO
MONOCYTES # BLD AUTO: 0.42 X10^3/UL (ref 0.2–0.8)
MONOCYTES NFR BLD AUTO: 11 % (ref 2–9)
NEUTROPHILS # BLD AUTO: 2.23 X10^3/UL (ref 1.8–6.8)
NEUTROPHILS NFR BLD AUTO: 59 % (ref 42–75)
PLATELET # BLD AUTO: 164 X10^3/UL (ref 130–400)
PMV BLD AUTO: 9.5 FL (ref 7.4–10.4)
RBC # BLD AUTO: 4.57 X10^6/UL (ref 4.38–5.82)

## 2020-08-20 PROCEDURE — C1769 GUIDE WIRE: HCPCS

## 2020-08-20 PROCEDURE — 93454 CORONARY ARTERY ANGIO S&I: CPT

## 2020-08-20 PROCEDURE — C1894 INTRO/SHEATH, NON-LASER: HCPCS

## 2020-08-20 PROCEDURE — 99156 MOD SED OTH PHYS/QHP 5/>YRS: CPT

## 2020-08-20 PROCEDURE — 85025 COMPLETE CBC W/AUTO DIFF WBC: CPT

## 2020-08-20 PROCEDURE — 36415 COLL VENOUS BLD VENIPUNCTURE: CPT

## 2020-08-20 PROCEDURE — 80048 BASIC METABOLIC PNL TOTAL CA: CPT

## 2020-09-07 ENCOUNTER — HOSPITAL ENCOUNTER (OUTPATIENT)
Dept: HOSPITAL 8 - ED | Age: 85
Setting detail: OBSERVATION
LOS: 1 days | Discharge: HOME | End: 2020-09-08
Attending: HOSPITALIST | Admitting: HOSPITALIST
Payer: MEDICARE

## 2020-09-07 VITALS — DIASTOLIC BLOOD PRESSURE: 89 MMHG | SYSTOLIC BLOOD PRESSURE: 149 MMHG

## 2020-09-07 VITALS — HEIGHT: 66 IN | BODY MASS INDEX: 26.43 KG/M2 | WEIGHT: 164.46 LBS

## 2020-09-07 VITALS — DIASTOLIC BLOOD PRESSURE: 72 MMHG | SYSTOLIC BLOOD PRESSURE: 147 MMHG

## 2020-09-07 DIAGNOSIS — H91.90: ICD-10-CM

## 2020-09-07 DIAGNOSIS — Z87.891: ICD-10-CM

## 2020-09-07 DIAGNOSIS — I35.0: ICD-10-CM

## 2020-09-07 DIAGNOSIS — I50.30: ICD-10-CM

## 2020-09-07 DIAGNOSIS — E03.9: ICD-10-CM

## 2020-09-07 DIAGNOSIS — F12.90: ICD-10-CM

## 2020-09-07 DIAGNOSIS — M54.12: ICD-10-CM

## 2020-09-07 DIAGNOSIS — I16.0: ICD-10-CM

## 2020-09-07 DIAGNOSIS — Z79.82: ICD-10-CM

## 2020-09-07 DIAGNOSIS — M25.78: ICD-10-CM

## 2020-09-07 DIAGNOSIS — Z79.899: ICD-10-CM

## 2020-09-07 DIAGNOSIS — I25.10: ICD-10-CM

## 2020-09-07 DIAGNOSIS — R91.1: ICD-10-CM

## 2020-09-07 DIAGNOSIS — Z95.2: ICD-10-CM

## 2020-09-07 DIAGNOSIS — N40.0: ICD-10-CM

## 2020-09-07 DIAGNOSIS — M79.601: Primary | ICD-10-CM

## 2020-09-07 LAB
ALBUMIN SERPL-MCNC: 3.7 G/DL (ref 3.4–5)
ALP SERPL-CCNC: 70 U/L (ref 45–117)
ALT SERPL-CCNC: 18 U/L (ref 12–78)
ANION GAP SERPL CALC-SCNC: 8 MMOL/L (ref 5–15)
BASOPHILS # BLD AUTO: 0.03 X10^3/UL (ref 0–0.1)
BASOPHILS NFR BLD AUTO: 1 % (ref 0–1)
BILIRUB SERPL-MCNC: 0.5 MG/DL (ref 0.2–1)
CALCIUM SERPL-MCNC: 9 MG/DL (ref 8.5–10.1)
CHLORIDE SERPL-SCNC: 107 MMOL/L (ref 98–107)
CREAT SERPL-MCNC: 1.02 MG/DL (ref 0.7–1.3)
EOSINOPHIL # BLD AUTO: 0.08 X10^3/UL (ref 0–0.4)
EOSINOPHIL NFR BLD AUTO: 2 % (ref 1–7)
ERYTHROCYTE [DISTWIDTH] IN BLOOD BY AUTOMATED COUNT: 15.3 % (ref 9.4–14.8)
LYMPHOCYTES # BLD AUTO: 0.95 X10^3/UL (ref 1–3.4)
LYMPHOCYTES NFR BLD AUTO: 19 % (ref 22–44)
MCH RBC QN AUTO: 28.3 PG (ref 27.5–34.5)
MCHC RBC AUTO-ENTMCNC: 32.4 G/DL (ref 33.2–36.2)
MCV RBC AUTO: 87.3 FL (ref 81–97)
MD: NO
MONOCYTES # BLD AUTO: 0.55 X10^3/UL (ref 0.2–0.8)
MONOCYTES NFR BLD AUTO: 11 % (ref 2–9)
NEUTROPHILS # BLD AUTO: 3.53 X10^3/UL (ref 1.8–6.8)
NEUTROPHILS NFR BLD AUTO: 69 % (ref 42–75)
PLATELET # BLD AUTO: 192 X10^3/UL (ref 130–400)
PMV BLD AUTO: 9.4 FL (ref 7.4–10.4)
PROT SERPL-MCNC: 7.1 G/DL (ref 6.4–8.2)
RBC # BLD AUTO: 4.4 X10^6/UL (ref 4.38–5.82)
T4 FREE SERPL-MCNC: 2.06 NG/DL (ref 0.76–1.46)
TROPONIN I SERPL-MCNC: < 0.015 NG/ML (ref 0–0.04)

## 2020-09-07 PROCEDURE — 99285 EMERGENCY DEPT VISIT HI MDM: CPT

## 2020-09-07 PROCEDURE — 84484 ASSAY OF TROPONIN QUANT: CPT

## 2020-09-07 PROCEDURE — 71275 CT ANGIOGRAPHY CHEST: CPT

## 2020-09-07 PROCEDURE — 93306 TTE W/DOPPLER COMPLETE: CPT

## 2020-09-07 PROCEDURE — 93005 ELECTROCARDIOGRAM TRACING: CPT

## 2020-09-07 PROCEDURE — 84443 ASSAY THYROID STIM HORMONE: CPT

## 2020-09-07 PROCEDURE — 96375 TX/PRO/DX INJ NEW DRUG ADDON: CPT

## 2020-09-07 PROCEDURE — A9575 INJ GADOTERATE MEGLUMI 0.1ML: HCPCS

## 2020-09-07 PROCEDURE — 85025 COMPLETE CBC W/AUTO DIFF WBC: CPT

## 2020-09-07 PROCEDURE — 96376 TX/PRO/DX INJ SAME DRUG ADON: CPT

## 2020-09-07 PROCEDURE — 96374 THER/PROPH/DIAG INJ IV PUSH: CPT

## 2020-09-07 PROCEDURE — 84481 FREE ASSAY (FT-3): CPT

## 2020-09-07 PROCEDURE — 96361 HYDRATE IV INFUSION ADD-ON: CPT

## 2020-09-07 PROCEDURE — 93931 UPPER EXTREMITY STUDY: CPT

## 2020-09-07 PROCEDURE — 36415 COLL VENOUS BLD VENIPUNCTURE: CPT

## 2020-09-07 PROCEDURE — 80053 COMPREHEN METABOLIC PANEL: CPT

## 2020-09-07 PROCEDURE — 93971 EXTREMITY STUDY: CPT

## 2020-09-07 PROCEDURE — G0378 HOSPITAL OBSERVATION PER HR: HCPCS

## 2020-09-07 PROCEDURE — 72156 MRI NECK SPINE W/O & W/DYE: CPT

## 2020-09-07 PROCEDURE — 71045 X-RAY EXAM CHEST 1 VIEW: CPT

## 2020-09-07 PROCEDURE — 84439 ASSAY OF FREE THYROXINE: CPT

## 2020-09-07 PROCEDURE — 85379 FIBRIN DEGRADATION QUANT: CPT

## 2020-09-07 RX ADMIN — HEPARIN SODIUM SCH UNITS: 5000 INJECTION, SOLUTION INTRAVENOUS; SUBCUTANEOUS at 17:36

## 2020-09-07 RX ADMIN — MORPHINE SULFATE PRN MG: 4 INJECTION INTRAVENOUS at 11:18

## 2020-09-07 RX ADMIN — HEPARIN SODIUM SCH UNITS: 5000 INJECTION, SOLUTION INTRAVENOUS; SUBCUTANEOUS at 23:30

## 2020-09-07 RX ADMIN — SODIUM CHLORIDE SCH MLS/HR: 0.9 INJECTION, SOLUTION INTRAVENOUS at 17:36

## 2020-09-07 RX ADMIN — MORPHINE SULFATE PRN MG: 4 INJECTION INTRAVENOUS at 13:26

## 2020-09-07 NOTE — NUR
CHARGE RN NOTE: CODE CARDIAC CANCELLED PER EDMD MORGAN, CARDIOLOGY PAGE REMAINS 
ACTIVE PER MD INSTRUCTIONS.

## 2020-09-08 VITALS — DIASTOLIC BLOOD PRESSURE: 90 MMHG | SYSTOLIC BLOOD PRESSURE: 156 MMHG

## 2020-09-08 VITALS — DIASTOLIC BLOOD PRESSURE: 82 MMHG | SYSTOLIC BLOOD PRESSURE: 183 MMHG

## 2020-09-08 VITALS — SYSTOLIC BLOOD PRESSURE: 183 MMHG | DIASTOLIC BLOOD PRESSURE: 82 MMHG

## 2020-09-08 LAB
ALBUMIN SERPL-MCNC: 3.1 G/DL (ref 3.4–5)
ALP SERPL-CCNC: 56 U/L (ref 45–117)
ALT SERPL-CCNC: 16 U/L (ref 12–78)
ANION GAP SERPL CALC-SCNC: 4 MMOL/L (ref 5–15)
BASOPHILS # BLD AUTO: 0.04 X10^3/UL (ref 0–0.1)
BASOPHILS NFR BLD AUTO: 1 % (ref 0–1)
BILIRUB SERPL-MCNC: 0.4 MG/DL (ref 0.2–1)
CALCIUM SERPL-MCNC: 8.8 MG/DL (ref 8.5–10.1)
CHLORIDE SERPL-SCNC: 107 MMOL/L (ref 98–107)
CREAT SERPL-MCNC: 1.1 MG/DL (ref 0.7–1.3)
EOSINOPHIL # BLD AUTO: 0.16 X10^3/UL (ref 0–0.4)
EOSINOPHIL NFR BLD AUTO: 4 % (ref 1–7)
ERYTHROCYTE [DISTWIDTH] IN BLOOD BY AUTOMATED COUNT: 15 % (ref 9.4–14.8)
LYMPHOCYTES # BLD AUTO: 0.97 X10^3/UL (ref 1–3.4)
LYMPHOCYTES NFR BLD AUTO: 23 % (ref 22–44)
MCH RBC QN AUTO: 28.5 PG (ref 27.5–34.5)
MCHC RBC AUTO-ENTMCNC: 32.8 G/DL (ref 33.2–36.2)
MCV RBC AUTO: 87 FL (ref 81–97)
MD: NO
MONOCYTES # BLD AUTO: 0.57 X10^3/UL (ref 0.2–0.8)
MONOCYTES NFR BLD AUTO: 14 % (ref 2–9)
NEUTROPHILS # BLD AUTO: 2.45 X10^3/UL (ref 1.8–6.8)
NEUTROPHILS NFR BLD AUTO: 59 % (ref 42–75)
PLATELET # BLD AUTO: 169 X10^3/UL (ref 130–400)
PMV BLD AUTO: 9.5 FL (ref 7.4–10.4)
PROT SERPL-MCNC: 6 G/DL (ref 6.4–8.2)
RBC # BLD AUTO: 4.06 X10^6/UL (ref 4.38–5.82)

## 2020-09-08 RX ADMIN — HEPARIN SODIUM SCH UNITS: 5000 INJECTION, SOLUTION INTRAVENOUS; SUBCUTANEOUS at 15:20

## 2020-09-08 RX ADMIN — SODIUM CHLORIDE SCH MLS/HR: 0.9 INJECTION, SOLUTION INTRAVENOUS at 08:41

## 2020-09-08 RX ADMIN — OXYCODONE HYDROCHLORIDE AND ACETAMINOPHEN PRN TAB: 5; 325 TABLET ORAL at 08:50

## 2020-09-08 RX ADMIN — OXYCODONE HYDROCHLORIDE AND ACETAMINOPHEN PRN TAB: 5; 325 TABLET ORAL at 00:47

## 2020-09-08 RX ADMIN — HEPARIN SODIUM SCH UNITS: 5000 INJECTION, SOLUTION INTRAVENOUS; SUBCUTANEOUS at 07:12

## 2020-09-23 ENCOUNTER — HOSPITAL ENCOUNTER (OUTPATIENT)
Dept: HOSPITAL 8 - CVU | Age: 85
Discharge: HOME | End: 2020-09-23
Attending: INTERNAL MEDICINE
Payer: MEDICARE

## 2020-09-23 DIAGNOSIS — I65.29: ICD-10-CM

## 2020-09-23 DIAGNOSIS — R06.02: ICD-10-CM

## 2020-09-23 DIAGNOSIS — I34.8: Primary | ICD-10-CM

## 2020-09-23 PROCEDURE — 93306 TTE W/DOPPLER COMPLETE: CPT

## 2021-01-14 DIAGNOSIS — Z23 NEED FOR VACCINATION: ICD-10-CM

## 2021-03-12 ENCOUNTER — APPOINTMENT (OUTPATIENT)
Dept: CARDIOLOGY | Facility: MEDICAL CENTER | Age: 86
End: 2021-03-12
Attending: INTERNAL MEDICINE
Payer: COMMERCIAL

## 2021-03-12 ENCOUNTER — APPOINTMENT (OUTPATIENT)
Dept: RADIOLOGY | Facility: MEDICAL CENTER | Age: 86
End: 2021-03-12
Attending: EMERGENCY MEDICINE
Payer: COMMERCIAL

## 2021-03-12 ENCOUNTER — HOSPITAL ENCOUNTER (OUTPATIENT)
Facility: MEDICAL CENTER | Age: 86
End: 2021-03-13
Attending: EMERGENCY MEDICINE | Admitting: INTERNAL MEDICINE
Payer: COMMERCIAL

## 2021-03-12 PROBLEM — E87.1 HYPONATREMIA: Status: ACTIVE | Noted: 2021-03-12

## 2021-03-12 PROBLEM — Z95.2 HISTORY OF TRANSCATHETER AORTIC VALVE REPLACEMENT (TAVR): Status: ACTIVE | Noted: 2021-03-12

## 2021-03-12 PROBLEM — R55 SYNCOPE: Status: ACTIVE | Noted: 2021-03-12

## 2021-03-12 LAB
ALBUMIN SERPL BCP-MCNC: 3.6 G/DL (ref 3.2–4.9)
ALBUMIN/GLOB SERPL: 1.5 G/DL
ALP SERPL-CCNC: 65 U/L (ref 30–99)
ALT SERPL-CCNC: 10 U/L (ref 2–50)
ANION GAP SERPL CALC-SCNC: 13 MMOL/L (ref 7–16)
APTT PPP: 28.8 SEC (ref 24.7–36)
AST SERPL-CCNC: 18 U/L (ref 12–45)
BASOPHILS # BLD AUTO: 1.3 % (ref 0–1.8)
BASOPHILS # BLD: 0.06 K/UL (ref 0–0.12)
BILIRUB SERPL-MCNC: 0.3 MG/DL (ref 0.1–1.5)
BUN SERPL-MCNC: 19 MG/DL (ref 8–22)
CALCIUM SERPL-MCNC: 8.8 MG/DL (ref 8.5–10.5)
CHLORIDE SERPL-SCNC: 97 MMOL/L (ref 96–112)
CO2 SERPL-SCNC: 20 MMOL/L (ref 20–33)
CREAT SERPL-MCNC: 1.09 MG/DL (ref 0.5–1.4)
EKG IMPRESSION: NORMAL
EOSINOPHIL # BLD AUTO: 0.17 K/UL (ref 0–0.51)
EOSINOPHIL NFR BLD: 3.7 % (ref 0–6.9)
ERYTHROCYTE [DISTWIDTH] IN BLOOD BY AUTOMATED COUNT: 45.1 FL (ref 35.9–50)
ETHANOL BLD-MCNC: <10.1 MG/DL (ref 0–10)
GLOBULIN SER CALC-MCNC: 2.4 G/DL (ref 1.9–3.5)
GLUCOSE SERPL-MCNC: 120 MG/DL (ref 65–99)
HCT VFR BLD AUTO: 36.9 % (ref 42–52)
HGB BLD-MCNC: 12.1 G/DL (ref 14–18)
IMM GRANULOCYTES # BLD AUTO: 0.03 K/UL (ref 0–0.11)
IMM GRANULOCYTES NFR BLD AUTO: 0.6 % (ref 0–0.9)
INR PPP: 1.02 (ref 0.87–1.13)
LYMPHOCYTES # BLD AUTO: 0.91 K/UL (ref 1–4.8)
LYMPHOCYTES NFR BLD: 19.7 % (ref 22–41)
MCH RBC QN AUTO: 28.7 PG (ref 27–33)
MCHC RBC AUTO-ENTMCNC: 32.8 G/DL (ref 33.7–35.3)
MCV RBC AUTO: 87.4 FL (ref 81.4–97.8)
MONOCYTES # BLD AUTO: 0.52 K/UL (ref 0–0.85)
MONOCYTES NFR BLD AUTO: 11.3 % (ref 0–13.4)
NEUTROPHILS # BLD AUTO: 2.93 K/UL (ref 1.82–7.42)
NEUTROPHILS NFR BLD: 63.4 % (ref 44–72)
NRBC # BLD AUTO: 0 K/UL
NRBC BLD-RTO: 0 /100 WBC
PLATELET # BLD AUTO: 152 K/UL (ref 164–446)
PMV BLD AUTO: 11.8 FL (ref 9–12.9)
POTASSIUM SERPL-SCNC: 5 MMOL/L (ref 3.6–5.5)
PROT SERPL-MCNC: 6 G/DL (ref 6–8.2)
PROTHROMBIN TIME: 13.7 SEC (ref 12–14.6)
RBC # BLD AUTO: 4.22 M/UL (ref 4.7–6.1)
SARS-COV-2 RNA RESP QL NAA+PROBE: NOTDETECTED
SODIUM SERPL-SCNC: 130 MMOL/L (ref 135–145)
SPECIMEN SOURCE: NORMAL
TROPONIN T SERPL-MCNC: 18 NG/L (ref 6–19)
WBC # BLD AUTO: 4.6 K/UL (ref 4.8–10.8)

## 2021-03-12 PROCEDURE — 84484 ASSAY OF TROPONIN QUANT: CPT

## 2021-03-12 PROCEDURE — 85730 THROMBOPLASTIN TIME PARTIAL: CPT

## 2021-03-12 PROCEDURE — 99220 PR INITIAL OBSERVATION CARE,LEVL III: CPT | Performed by: INTERNAL MEDICINE

## 2021-03-12 PROCEDURE — 84443 ASSAY THYROID STIM HORMONE: CPT

## 2021-03-12 PROCEDURE — 82077 ASSAY SPEC XCP UR&BREATH IA: CPT

## 2021-03-12 PROCEDURE — 85610 PROTHROMBIN TIME: CPT

## 2021-03-12 PROCEDURE — 85025 COMPLETE CBC W/AUTO DIFF WBC: CPT

## 2021-03-12 PROCEDURE — 93005 ELECTROCARDIOGRAM TRACING: CPT

## 2021-03-12 PROCEDURE — 36415 COLL VENOUS BLD VENIPUNCTURE: CPT

## 2021-03-12 PROCEDURE — 71045 X-RAY EXAM CHEST 1 VIEW: CPT

## 2021-03-12 PROCEDURE — G0378 HOSPITAL OBSERVATION PER HR: HCPCS

## 2021-03-12 PROCEDURE — 99285 EMERGENCY DEPT VISIT HI MDM: CPT

## 2021-03-12 PROCEDURE — 93306 TTE W/DOPPLER COMPLETE: CPT

## 2021-03-12 PROCEDURE — 93005 ELECTROCARDIOGRAM TRACING: CPT | Performed by: EMERGENCY MEDICINE

## 2021-03-12 PROCEDURE — U0003 INFECTIOUS AGENT DETECTION BY NUCLEIC ACID (DNA OR RNA); SEVERE ACUTE RESPIRATORY SYNDROME CORONAVIRUS 2 (SARS-COV-2) (CORONAVIRUS DISEASE [COVID-19]), AMPLIFIED PROBE TECHNIQUE, MAKING USE OF HIGH THROUGHPUT TECHNOLOGIES AS DESCRIBED BY CMS-2020-01-R: HCPCS

## 2021-03-12 PROCEDURE — 93017 CV STRESS TEST TRACING ONLY: CPT | Performed by: INTERNAL MEDICINE

## 2021-03-12 PROCEDURE — U0005 INFEC AGEN DETEC AMPLI PROBE: HCPCS

## 2021-03-12 PROCEDURE — 700105 HCHG RX REV CODE 258: Performed by: INTERNAL MEDICINE

## 2021-03-12 PROCEDURE — 80053 COMPREHEN METABOLIC PANEL: CPT

## 2021-03-12 RX ORDER — POLYETHYLENE GLYCOL 3350 17 G/17G
1 POWDER, FOR SOLUTION ORAL
Status: DISCONTINUED | OUTPATIENT
Start: 2021-03-12 | End: 2021-03-13 | Stop reason: HOSPADM

## 2021-03-12 RX ORDER — LEVOTHYROXINE SODIUM 0.15 MG/1
150 TABLET ORAL
Status: ON HOLD | COMMUNITY
End: 2022-01-27

## 2021-03-12 RX ORDER — AMLODIPINE BESYLATE 5 MG/1
5 TABLET ORAL DAILY
Status: SHIPPED | COMMUNITY
End: 2022-01-18

## 2021-03-12 RX ORDER — ONDANSETRON 2 MG/ML
4 INJECTION INTRAMUSCULAR; INTRAVENOUS EVERY 4 HOURS PRN
Status: DISCONTINUED | OUTPATIENT
Start: 2021-03-12 | End: 2021-03-13 | Stop reason: HOSPADM

## 2021-03-12 RX ORDER — LEVOTHYROXINE SODIUM 0.07 MG/1
75 TABLET ORAL
Status: DISCONTINUED | OUTPATIENT
Start: 2021-03-13 | End: 2021-03-13 | Stop reason: HOSPADM

## 2021-03-12 RX ORDER — TAMSULOSIN HYDROCHLORIDE 0.4 MG/1
0.8 CAPSULE ORAL DAILY
COMMUNITY

## 2021-03-12 RX ORDER — ALBUTEROL SULFATE 90 UG/1
2 AEROSOL, METERED RESPIRATORY (INHALATION) EVERY 4 HOURS PRN
Status: DISCONTINUED | OUTPATIENT
Start: 2021-03-12 | End: 2021-03-12

## 2021-03-12 RX ORDER — SODIUM CHLORIDE, SODIUM LACTATE, POTASSIUM CHLORIDE, CALCIUM CHLORIDE 600; 310; 30; 20 MG/100ML; MG/100ML; MG/100ML; MG/100ML
1000 INJECTION, SOLUTION INTRAVENOUS CONTINUOUS
Status: DISCONTINUED | OUTPATIENT
Start: 2021-03-12 | End: 2021-03-13 | Stop reason: HOSPADM

## 2021-03-12 RX ORDER — HYDROXYZINE HYDROCHLORIDE 25 MG/1
12.5 TABLET, FILM COATED ORAL 2 TIMES DAILY PRN
Status: DISCONTINUED | OUTPATIENT
Start: 2021-03-12 | End: 2021-03-12

## 2021-03-12 RX ORDER — ONDANSETRON 4 MG/1
4 TABLET, ORALLY DISINTEGRATING ORAL EVERY 4 HOURS PRN
Status: DISCONTINUED | OUTPATIENT
Start: 2021-03-12 | End: 2021-03-13 | Stop reason: HOSPADM

## 2021-03-12 RX ORDER — AMLODIPINE BESYLATE 5 MG/1
5 TABLET ORAL DAILY
Status: DISCONTINUED | OUTPATIENT
Start: 2021-03-13 | End: 2021-03-13 | Stop reason: HOSPADM

## 2021-03-12 RX ORDER — BISACODYL 10 MG
10 SUPPOSITORY, RECTAL RECTAL
Status: DISCONTINUED | OUTPATIENT
Start: 2021-03-12 | End: 2021-03-13 | Stop reason: HOSPADM

## 2021-03-12 RX ORDER — AMOXICILLIN 250 MG
2 CAPSULE ORAL 2 TIMES DAILY
Status: DISCONTINUED | OUTPATIENT
Start: 2021-03-13 | End: 2021-03-13 | Stop reason: HOSPADM

## 2021-03-12 RX ORDER — LISINOPRIL 20 MG/1
20 TABLET ORAL DAILY
COMMUNITY

## 2021-03-12 RX ORDER — DOXEPIN 3 MG/1
TABLET, FILM COATED ORAL
COMMUNITY
End: 2021-03-12

## 2021-03-12 RX ORDER — CITALOPRAM 20 MG/1
TABLET ORAL
COMMUNITY
End: 2021-03-12

## 2021-03-12 RX ORDER — FINASTERIDE 5 MG/1
5 TABLET, FILM COATED ORAL DAILY
Status: SHIPPED | COMMUNITY
End: 2022-01-18

## 2021-03-12 RX ORDER — LABETALOL HYDROCHLORIDE 5 MG/ML
10 INJECTION, SOLUTION INTRAVENOUS EVERY 4 HOURS PRN
Status: DISCONTINUED | OUTPATIENT
Start: 2021-03-12 | End: 2021-03-13 | Stop reason: HOSPADM

## 2021-03-12 RX ORDER — SILDENAFIL CITRATE 20 MG/1
TABLET ORAL
COMMUNITY
End: 2021-03-12

## 2021-03-12 RX ORDER — TRAZODONE HYDROCHLORIDE 50 MG/1
50 TABLET ORAL
Status: DISCONTINUED | OUTPATIENT
Start: 2021-03-12 | End: 2021-03-12

## 2021-03-12 RX ORDER — ACETAMINOPHEN 325 MG/1
650 TABLET ORAL EVERY 6 HOURS PRN
Status: DISCONTINUED | OUTPATIENT
Start: 2021-03-12 | End: 2021-03-13 | Stop reason: HOSPADM

## 2021-03-12 RX ADMIN — SODIUM CHLORIDE, POTASSIUM CHLORIDE, SODIUM LACTATE AND CALCIUM CHLORIDE 1000 ML: 600; 310; 30; 20 INJECTION, SOLUTION INTRAVENOUS at 20:22

## 2021-03-12 ASSESSMENT — FIBROSIS 4 INDEX
FIB4 SCORE: 3.15
FIB4 SCORE: 1.46

## 2021-03-12 NOTE — ED PROVIDER NOTES
"ED Provider Note    CHIEF COMPLAINT  Chief Complaint   Patient presents with   • Syncope     Pt was at [facility] when he had a \"syncopal event\". Pt was found on the floor vomiting upon EMS arrival. Pt states he remembers feeling \"euphoric and dizzy\". Pt then tried to leave AMA from [facility] and patient ended up being positive for orthostatics. Per [facility] staff, pt was on his first drink (vodka tonic) and didn't finish it. Pt denies drinking everyday.        HPI  Emigdio Jiménez is a 85 y.o. male who presents for evaluation of a syncopal event.  The patient apparently was at a local [facility].  He had just recently went on a jog went to a [facility] and had a vodka tonic.  Barely finished his drink and then found himself on the floor.  He apparently underwent a syncopal event.  There is no reported head injury.  Paramedics found the patient initially slightly confused but then he was alert and oriented.  Patient has a notable history of cardiopulmonary disease.  He was diagnosed with severe aortic stenosis and apparently underwent TAVR approximately 6 months ago at [facility] with Dr. Morales.  The patient denies any strokelike symptoms such as headache numbness weakness tingling to the arms legs or face.  He is not on any blood thinners.  He reports this was only 1 alcohol drink and he does not drink regularly.  No report of headache head injury neck pain or injury to the upper or lower extremities chest abdomen or pelvis    REVIEW OF SYSTEMS  See HPI for further details.  No numbness tingling weakness fevers chills all other systems are negative.     PAST MEDICAL HISTORY  Past Medical History:   Diagnosis Date   • Heart murmur    • Hypertension        FAMILY HISTORY  Noncontributory    SOCIAL HISTORY  Social History     Socioeconomic History   • Marital status:      Spouse name: Not on file   • Number of children: Not on file   • Years of education: Not on file   • Highest education level: Not on file "   Occupational History   • Not on file   Tobacco Use   • Smoking status: Former Smoker     Packs/day: 0.00   • Smokeless tobacco: Never Used   Substance and Sexual Activity   • Alcohol use: Not Currently   • Drug use: Never   • Sexual activity: Not on file   Other Topics Concern   • Not on file   Social History Narrative   • Not on file     Social Determinants of Health     Financial Resource Strain:    • Difficulty of Paying Living Expenses:    Food Insecurity:    • Worried About Running Out of Food in the Last Year:    • Ran Out of Food in the Last Year:    Transportation Needs:    • Lack of Transportation (Medical):    • Lack of Transportation (Non-Medical):    Physical Activity:    • Days of Exercise per Week:    • Minutes of Exercise per Session:    Stress:    • Feeling of Stress :    Social Connections:    • Frequency of Communication with Friends and Family:    • Frequency of Social Gatherings with Friends and Family:    • Attends Buddhist Services:    • Active Member of Clubs or Organizations:    • Attends Club or Organization Meetings:    • Marital Status:    Intimate Partner Violence:    • Fear of Current or Ex-Partner:    • Emotionally Abused:    • Physically Abused:    • Sexually Abused:      Denies IV drugs  SURGICAL HISTORY  Past Surgical History:   Procedure Laterality Date   • HERNIA REPAIR       TAVR  CURRENT MEDICATIONS  No current facility-administered medications for this encounter.    Current Outpatient Medications:   •  traZODone (DESYREL) 50 MG Tab, Take 50 mg by mouth., Disp: , Rfl:   •  hydrOXYzine HCl (ATARAX) 25 MG Tab, Take 0.5 Tabs by mouth 2 times a day as needed for Itching., Disp: 10 Tab, Rfl: 0  •  amLODIPine (NORVASC) 10 MG Tab, Take 1 Tab by mouth every day., Disp: 30 Tab, Rfl: 0  •  levothyroxine (SYNTHROID) 75 MCG Tab, Take 1 Tab by mouth Every morning on an empty stomach., Disp: 30 Tab, Rfl: 3  •  albuterol 108 (90 Base) MCG/ACT Aero Soln inhalation aerosol, Inhale 2 Puffs by  "mouth every four hours as needed for Shortness of Breath., Disp: 1 Inhaler, Rfl: 0    ALLERGIES  No Known Allergies    PHYSICAL EXAM  VITAL SIGNS: /65   Pulse 69   Temp 36.6 °C (97.8 °F) (Temporal)   Resp 15   Ht 1.676 m (5' 6\")   Wt 74.8 kg (165 lb)   SpO2 97%   BMI 26.63 kg/m²       Constitutional: Well developed, Well nourished, No acute distress, Non-toxic appearance.   HENT: Normocephalic, Atraumatic, Bilateral external ears normal, Oropharynx moist, No oral exudates, Nose normal.   Eyes: PERRLA, EOMI, Conjunctiva normal, No discharge.   Neck: Normal range of motion, No midline bony tenderness, Supple, No stridor.   Cardiovascular: Normal heart rate, Normal rhythm, No murmurs, No rubs, No gallops.   Thorax & Lungs: Normal breath sounds, No respiratory distress, No wheezing, No chest tenderness.   Abdomen: Bowel sounds normal, Soft, No tenderness, No masses, No pulsatile masses.   Skin: Warm, Dry, No erythema, No rash.   Back: No bony tenderness, No CVA tenderness.   Extremities: Intact distal pulses, No edema, No tenderness, No cyanosis, No clubbing.   Neurologic: Alert & oriented x 3, Normal motor function, Normal sensory function, No focal deficits noted.   Psychiatric: Anxious    COURSE & MEDICAL DECISION MAKING  Pertinent Labs & Imaging studies reviewed. (See chart for details)  DX-CHEST-PORTABLE (1 VIEW)   Final Result      Hypoinflation without other evidence for acute cardiopulmonary disease.        Results for orders placed or performed during the hospital encounter of 03/12/21   CBC WITH DIFFERENTIAL   Result Value Ref Range    WBC 4.6 (L) 4.8 - 10.8 K/uL    RBC 4.22 (L) 4.70 - 6.10 M/uL    Hemoglobin 12.1 (L) 14.0 - 18.0 g/dL    Hematocrit 36.9 (L) 42.0 - 52.0 %    MCV 87.4 81.4 - 97.8 fL    MCH 28.7 27.0 - 33.0 pg    MCHC 32.8 (L) 33.7 - 35.3 g/dL    RDW 45.1 35.9 - 50.0 fL    Platelet Count 152 (L) 164 - 446 K/uL    MPV 11.8 9.0 - 12.9 fL    Neutrophils-Polys 63.40 44.00 - 72.00 %    " Lymphocytes 19.70 (L) 22.00 - 41.00 %    Monocytes 11.30 0.00 - 13.40 %    Eosinophils 3.70 0.00 - 6.90 %    Basophils 1.30 0.00 - 1.80 %    Immature Granulocytes 0.60 0.00 - 0.90 %    Nucleated RBC 0.00 /100 WBC    Neutrophils (Absolute) 2.93 1.82 - 7.42 K/uL    Lymphs (Absolute) 0.91 (L) 1.00 - 4.80 K/uL    Monos (Absolute) 0.52 0.00 - 0.85 K/uL    Eos (Absolute) 0.17 0.00 - 0.51 K/uL    Baso (Absolute) 0.06 0.00 - 0.12 K/uL    Immature Granulocytes (abs) 0.03 0.00 - 0.11 K/uL    NRBC (Absolute) 0.00 K/uL   Comp Metabolic Panel   Result Value Ref Range    Sodium 130 (L) 135 - 145 mmol/L    Potassium 5.0 3.6 - 5.5 mmol/L    Chloride 97 96 - 112 mmol/L    Co2 20 20 - 33 mmol/L    Anion Gap 13.0 7.0 - 16.0    Glucose 120 (H) 65 - 99 mg/dL    Bun 19 8 - 22 mg/dL    Creatinine 1.09 0.50 - 1.40 mg/dL    Calcium 8.8 8.5 - 10.5 mg/dL    AST(SGOT) 18 12 - 45 U/L    ALT(SGPT) 10 2 - 50 U/L    Alkaline Phosphatase 65 30 - 99 U/L    Total Bilirubin 0.3 0.1 - 1.5 mg/dL    Albumin 3.6 3.2 - 4.9 g/dL    Total Protein 6.0 6.0 - 8.2 g/dL    Globulin 2.4 1.9 - 3.5 g/dL    A-G Ratio 1.5 g/dL   TROPONIN   Result Value Ref Range    Troponin T 18 6 - 19 ng/L   Prothrombin Time   Result Value Ref Range    PT 13.7 12.0 - 14.6 sec    INR 1.02 0.87 - 1.13   APTT   Result Value Ref Range    APTT 28.8 24.7 - 36.0 sec   DIAGNOSTIC ALCOHOL   Result Value Ref Range    Diagnostic Alcohol <10.1 0.0 - 10.0 mg/dL   ESTIMATED GFR   Result Value Ref Range    GFR If African American >60 >60 mL/min/1.73 m 2    GFR If Non African American >60 >60 mL/min/1.73 m 2   EKG (NOW)   Result Value Ref Range    Report       [facility] Emergency Dept.    Test Date:  2021  Pt Name:    OLAMIDE DAI                Department: ER  MRN:        [id number]                      Room:       [facility]  Gender:     Male                         Technician: [id number]  :        [id number]                   Requested By:ER TRIAGE PROTOCOL  Order #:     [id number]                    Reading MD:    Measurements  Intervals                                Axis  Rate:       68                           P:          -6  CO:         184                          QRS:        25  QRSD:       68                           T:          60  QT:         390  QTc:        415    Interpretive Statements  Sinus rhythm  Abnormal R-wave progression, early transition  Borderline ST elevation, lateral leads  Compared to ECG 04/04/2020 17:57:49  Early repolarization no longer present  ST (T wave) deviation still present        EKG interpretation by me rate 68 poor R wave progression borderline ST changes in the lateral leads no classic or acute ST segment elevation to suggest STEMI    An IV is established.  The patient presents here after what is likely a syncopal event.  Given his age and underlying heart disease including severe aortic stenosis status post TAVR the possibility that this is cardiogenic is significantly high.  He did not have any evidence of ischemia on his EKG or an elevated troponin.  I have recommended admission for telemetry, echocardiogram and cardiac consultation as needed    FINAL IMPRESSION  1.  Syncope  2.  History of aortic stenosis      Electronically signed by: Robinson Cabral M.D., 3/12/2021 2:46 PM

## 2021-03-12 NOTE — ED TRIAGE NOTES
"Chief Complaint   Patient presents with   • Syncope     Pt was at [facility] when he had a \"syncopal event\". Pt was found on the floor vomiting upon EMS arrival. Pt states he remembers feeling \"euphoric and dizzy\". Pt then tried to leave AMA from [facility] and patient ended up being positive for orthostatics. Per [facility] staff, pt was on his first drink (vodka tonic) and didn't finish it. Pt denies drinking everyday.      /57   Pulse 67   Temp 36.6 °C (97.8 °F) (Temporal)   Resp 18   Ht 1.676 m (5' 6\")   Wt 74.8 kg (165 lb)   SpO2 96%   BMI 26.63 kg/m²     Patient was BIB EMS for the above complaint. EKG complete upon arrival. Patient placed on monitor and chart up for ERP.   "

## 2021-03-13 ENCOUNTER — APPOINTMENT (OUTPATIENT)
Dept: RADIOLOGY | Facility: MEDICAL CENTER | Age: 86
End: 2021-03-13
Attending: INTERNAL MEDICINE
Payer: COMMERCIAL

## 2021-03-13 VITALS
OXYGEN SATURATION: 97 % | TEMPERATURE: 98.1 F | HEIGHT: 66 IN | WEIGHT: 166.45 LBS | BODY MASS INDEX: 26.75 KG/M2 | RESPIRATION RATE: 18 BRPM | DIASTOLIC BLOOD PRESSURE: 72 MMHG | HEART RATE: 81 BPM | SYSTOLIC BLOOD PRESSURE: 155 MMHG

## 2021-03-13 LAB
ANION GAP SERPL CALC-SCNC: 6 MMOL/L (ref 7–16)
BASOPHILS # BLD AUTO: 0.8 % (ref 0–1.8)
BASOPHILS # BLD: 0.05 K/UL (ref 0–0.12)
BUN SERPL-MCNC: 18 MG/DL (ref 8–22)
CALCIUM SERPL-MCNC: 8.8 MG/DL (ref 8.5–10.5)
CHLORIDE SERPL-SCNC: 102 MMOL/L (ref 96–112)
CO2 SERPL-SCNC: 23 MMOL/L (ref 20–33)
CREAT SERPL-MCNC: 1.09 MG/DL (ref 0.5–1.4)
D DIMER PPP IA.FEU-MCNC: 0.85 UG/ML (FEU) (ref 0–0.5)
EOSINOPHIL # BLD AUTO: 0.2 K/UL (ref 0–0.51)
EOSINOPHIL NFR BLD: 3.3 % (ref 0–6.9)
ERYTHROCYTE [DISTWIDTH] IN BLOOD BY AUTOMATED COUNT: 45.3 FL (ref 35.9–50)
GLUCOSE SERPL-MCNC: 89 MG/DL (ref 65–99)
HCT VFR BLD AUTO: 35.9 % (ref 42–52)
HGB BLD-MCNC: 11.7 G/DL (ref 14–18)
IMM GRANULOCYTES # BLD AUTO: 0.03 K/UL (ref 0–0.11)
IMM GRANULOCYTES NFR BLD AUTO: 0.5 % (ref 0–0.9)
LV EJECT FRACT  99904: 75
LV EJECT FRACT MOD 2C 99903: 66.01
LV EJECT FRACT MOD 4C 99902: 72.11
LV EJECT FRACT MOD BP 99901: 68.59
LYMPHOCYTES # BLD AUTO: 1.32 K/UL (ref 1–4.8)
LYMPHOCYTES NFR BLD: 22 % (ref 22–41)
MAGNESIUM SERPL-MCNC: 1.8 MG/DL (ref 1.5–2.5)
MAGNESIUM SERPL-MCNC: 1.9 MG/DL (ref 1.5–2.5)
MCH RBC QN AUTO: 28.5 PG (ref 27–33)
MCHC RBC AUTO-ENTMCNC: 32.6 G/DL (ref 33.7–35.3)
MCV RBC AUTO: 87.3 FL (ref 81.4–97.8)
MONOCYTES # BLD AUTO: 0.74 K/UL (ref 0–0.85)
MONOCYTES NFR BLD AUTO: 12.4 % (ref 0–13.4)
NEUTROPHILS # BLD AUTO: 3.65 K/UL (ref 1.82–7.42)
NEUTROPHILS NFR BLD: 61 % (ref 44–72)
NRBC # BLD AUTO: 0 K/UL
NRBC BLD-RTO: 0 /100 WBC
PLATELET # BLD AUTO: 149 K/UL (ref 164–446)
PMV BLD AUTO: 11.3 FL (ref 9–12.9)
POTASSIUM SERPL-SCNC: 4.8 MMOL/L (ref 3.6–5.5)
RBC # BLD AUTO: 4.11 M/UL (ref 4.7–6.1)
SODIUM SERPL-SCNC: 131 MMOL/L (ref 135–145)
TROPONIN T SERPL-MCNC: 23 NG/L (ref 6–19)
TROPONIN T SERPL-MCNC: 24 NG/L (ref 6–19)
TSH SERPL DL<=0.005 MIU/L-ACNC: 0.01 UIU/ML (ref 0.38–5.33)
WBC # BLD AUTO: 6 K/UL (ref 4.8–10.8)

## 2021-03-13 PROCEDURE — A9270 NON-COVERED ITEM OR SERVICE: HCPCS | Performed by: STUDENT IN AN ORGANIZED HEALTH CARE EDUCATION/TRAINING PROGRAM

## 2021-03-13 PROCEDURE — 85025 COMPLETE CBC W/AUTO DIFF WBC: CPT

## 2021-03-13 PROCEDURE — 85379 FIBRIN DEGRADATION QUANT: CPT

## 2021-03-13 PROCEDURE — 80048 BASIC METABOLIC PNL TOTAL CA: CPT

## 2021-03-13 PROCEDURE — 700102 HCHG RX REV CODE 250 W/ 637 OVERRIDE(OP): Performed by: STUDENT IN AN ORGANIZED HEALTH CARE EDUCATION/TRAINING PROGRAM

## 2021-03-13 PROCEDURE — 93306 TTE W/DOPPLER COMPLETE: CPT | Mod: 26 | Performed by: INTERNAL MEDICINE

## 2021-03-13 PROCEDURE — 84484 ASSAY OF TROPONIN QUANT: CPT

## 2021-03-13 PROCEDURE — G0378 HOSPITAL OBSERVATION PER HR: HCPCS

## 2021-03-13 PROCEDURE — A9270 NON-COVERED ITEM OR SERVICE: HCPCS | Performed by: INTERNAL MEDICINE

## 2021-03-13 PROCEDURE — 83735 ASSAY OF MAGNESIUM: CPT

## 2021-03-13 PROCEDURE — 700102 HCHG RX REV CODE 250 W/ 637 OVERRIDE(OP): Performed by: INTERNAL MEDICINE

## 2021-03-13 PROCEDURE — 93880 EXTRACRANIAL BILAT STUDY: CPT | Mod: 26 | Performed by: INTERNAL MEDICINE

## 2021-03-13 PROCEDURE — 93018 CV STRESS TEST I&R ONLY: CPT | Performed by: INTERNAL MEDICINE

## 2021-03-13 PROCEDURE — 36415 COLL VENOUS BLD VENIPUNCTURE: CPT

## 2021-03-13 PROCEDURE — 93880 EXTRACRANIAL BILAT STUDY: CPT

## 2021-03-13 PROCEDURE — 99217 PR OBSERVATION CARE DISCHARGE: CPT | Performed by: INTERNAL MEDICINE

## 2021-03-13 RX ORDER — FINASTERIDE 5 MG/1
5 TABLET, FILM COATED ORAL DAILY
Status: DISCONTINUED | OUTPATIENT
Start: 2021-03-13 | End: 2021-03-13 | Stop reason: HOSPADM

## 2021-03-13 RX ORDER — LISINOPRIL 20 MG/1
20 TABLET ORAL DAILY
Status: DISCONTINUED | OUTPATIENT
Start: 2021-03-13 | End: 2021-03-13 | Stop reason: HOSPADM

## 2021-03-13 RX ORDER — CHOLECALCIFEROL (VITAMIN D3) 125 MCG
5 CAPSULE ORAL NIGHTLY
Status: DISCONTINUED | OUTPATIENT
Start: 2021-03-13 | End: 2021-03-13 | Stop reason: HOSPADM

## 2021-03-13 RX ADMIN — FINASTERIDE 5 MG: 5 TABLET, FILM COATED ORAL at 10:10

## 2021-03-13 RX ADMIN — AMLODIPINE BESYLATE 5 MG: 5 TABLET ORAL at 05:47

## 2021-03-13 RX ADMIN — LISINOPRIL 20 MG: 20 TABLET ORAL at 10:10

## 2021-03-13 RX ADMIN — LEVOTHYROXINE SODIUM 75 MCG: 0.07 TABLET ORAL at 05:47

## 2021-03-13 RX ADMIN — Medication 5 MG: at 01:45

## 2021-03-13 NOTE — DISCHARGE PLANNING
"Met with pt who is independent with ADLs and IADLs. Lives with daughter in a one level apartment . Pt said he is \"a runner\" and this CM observed pt ambulating with a steady gait. He is [place] but he said that he will be picking up his hearing aids on Tuesday. Pt has no dc concerns.       Care Transition Team Assessment    Information Source  Orientation : Oriented x 4  Information Given By: Patient  Informant's Name: Emigdio  Who is responsible for making decisions for patient? : Patient    Readmission Evaluation  Is this a readmission?: No    Elopement Risk  Legal Hold: No    Interdisciplinary Discharge Planning  Does Admitting Nurse Feel This Could be a Complex Discharge?: No  Primary Care Physician: Dr. Tammy Hawthorne  Lives with - Patient's Self Care Capacity: Adult Children  Support Systems: Children  Housing / Facility: 1 Story Apartment / Condo  Do You Take your Prescribed Medications Regularly: Yes([facility] [address])  Able to Return to Previous ADL's: Yes  Mobility Issues: No  Prior Services: None, Home-Independent  Patient Prefers to be Discharged to:: Home  Assistance Needed: Yes  Durable Medical Equipment: Not Applicable    Discharge Preparedness  What is your plan after discharge?: Home with help  What are your discharge supports?: Child  Prior Functional Level: Ambulatory, Drives Self, Independent with Activities of Daily Living, Independent with Medication Management  Difficulity with ADLs: None  Difficulity with IADLs: None    Functional Assesment  Prior Functional Level: Ambulatory, Drives Self, Independent with Activities of Daily Living, Independent with Medication Management    Finances  Financial Barriers to Discharge: No  Prescription Coverage: Yes    Vision / Hearing Impairment  Vision Impairment : No  Hearing Impairment : Yes  Hearing Impairment: Both Ears  Does Pt Need Special Equipment for the Hearing Impaired?: Yes-But Does not Need for Facility to Arrange Equipment(pt said he will be picking " up his hearing aide. )    Advance Directive  Advance Directive?: None    Domestic Abuse  Have you ever been the victim of abuse or violence?: No    Discharge Risks or Barriers  Discharge risks or barriers?: No    Anticipated Discharge Information  Discharge Disposition: Still a Patient (30)

## 2021-03-13 NOTE — ED NOTES
Med rec complete per interview with pt at bedside and phone call with pt home pharmacy. Pt was unsure of some of the medications he is currently taking but states that he takes them all in the morning. Clarified medicaitons with home pharmacy. Allergies reviewed. Pt denies abx use in the past 14 days.

## 2021-03-13 NOTE — DISCHARGE INSTRUCTIONS
Syncope    Syncope    Syncope refers to a condition in which a person temporarily loses consciousness. Syncope may also be called fainting or passing out. It is caused by a sudden decrease in blood flow to the brain. Even though most causes of syncope are not dangerous, syncope can be a sign of a serious medical problem. Your health care provider may do tests to find the reason why you are having syncope.  Signs that you may be about to faint include:  · Feeling dizzy or light-headed.  · Feeling nauseous.  · Seeing all white or all black in your field of vision.  · Having cold, clammy skin.  If you faint, get medical help right away. Call your local emergency services (911 in the [place] Do not drive yourself to the hospital.  Follow these instructions at home:  Pay attention to any changes in your symptoms. Take these actions to stay safe and to help relieve your symptoms:  Lifestyle  · Do not drive, use machinery, or play sports until your health care provider says it is okay.  · Do not drink alcohol.  · Do not use any products that contain nicotine or tobacco, such as cigarettes and e-cigarettes. If you need help quitting, ask your health care provider.  · Drink enough fluid to keep your urine pale yellow.  General instructions  · Take over-the-counter and prescription medicines only as told by your health care provider.  · If you are taking blood pressure or heart medicine, get up slowly and take several minutes to sit and then stand. This can reduce dizziness or light-headedness.  · Have someone stay with you until you feel stable.  · If you start to feel like you might faint, lie down right away and raise (elevate) your feet above the level of your heart. Breathe deeply and steadily. Wait until all the symptoms have passed.  · Keep all follow-up visits as told by your health care provider. This is important.  Get help right away if you:  · Have a severe headache.  · Faint once or repeatedly.  · Have pain in  your chest, abdomen, or back.  · Have a very fast or irregular heartbeat (palpitations).  · Have pain when you breathe.  · Are bleeding from your mouth or rectum, or you have black or tarry stool.  · Have a seizure.  · Are confused.  · Have trouble walking.  · Have severe weakness.  · Have vision problems.  These symptoms may represent a serious problem that is an emergency. Do not wait to see if your symptoms will go away. Get medical help right away. Call your local emergency services (911 in the [place] Do not drive yourself to the hospital.  Summary  · Syncope refers to a condition in which a person temporarily loses consciousness. It is caused by a sudden decrease in blood flow to the brain.  · Signs that you may be about to faint include dizziness, feeling light-headed, feeling nauseous, sudden vision changes, or cold, clammy skin.  · Although most causes of syncope are not dangerous, syncope can be a sign of a serious medical problem. If you faint, get medical help right away.  This information is not intended to replace advice given to you by your health care provider. Make sure you discuss any questions you have with your health care provider.  Document Released: 12/18/2006 Document Revised: 11/30/2018 Document Reviewed: 11/26/2018  [employer] Patient Education © 2020 [employer]  Discharge Instructions    Discharged to home by car with self. Discharged via walking, hospital escort: Yes.  Special equipment needed: Not Applicable    Be sure to schedule a follow-up appointment with your primary care doctor or any specialists as instructed.     Discharge Plan:   Activity Level: Discussed  Confirmed Follow up Appointment: Patient to Call and Schedule Appointment  Confirmed Symptoms Management: Discussed  Medication Reconciliation Updated: Yes  Influenza Vaccine Indication: Not indicated: Previously immunized this influenza season and > 8 years of age    I understand that a diet low in cholesterol, fat, and  sodium is recommended for good health. Unless I have been given specific instructions below for another diet, I accept this instruction as my diet prescription.   Other diet:     Special Instructions: None    · Is patient discharged on Warfarin / Coumadin?   No     Depression / Suicide Risk    As you are discharged from this [facility] facility, it is important to learn how to keep safe from harming yourself.    Recognize the warning signs:  · Abrupt changes in personality, positive or negative- including increase in energy   · Giving away possessions  · Change in eating patterns- significant weight changes-  positive or negative  · Change in sleeping patterns- unable to sleep or sleeping all the time   · Unwillingness or inability to communicate  · Depression  · Unusual sadness, discouragement and loneliness  · Talk of wanting to die  · Neglect of personal appearance   · Rebelliousness- reckless behavior  · Withdrawal from people/activities they love  · Confusion- inability to concentrate     If you or a loved one observes any of these behaviors or has concerns about self-harm, here's what you can do:  · Talk about it- your feelings and reasons for harming yourself  · Remove any means that you might use to hurt yourself (examples: pills, rope, extension cords, firearm)  · Get professional help from the community (Mental Health, Substance Abuse, psychological counseling)  · Do not be alone:Call your Safe Contact- someone whom you trust who will be there for you.  · Call your local CRISIS HOTLINE 246-2989 or 180-564-8216  · Call your local Children's Mobile Crisis Response Team [place] (888) 504-0360 or www.[employer]  · Call the toll free National Suicide Prevention Hotlines   · National Suicide Prevention Lifeline 391-200-RMNB (2539)  · National Hope Line Network 800-SUICIDE (372-7771)

## 2021-03-13 NOTE — DISCHARGE SUMMARY
"Discharge Summary    CHIEF COMPLAINT ON ADMISSION  Chief Complaint   Patient presents with   • Syncope     Pt was at [facility] when he had a \"syncopal event\". Pt was found on the floor vomiting upon EMS arrival. Pt states he remembers feeling \"euphoric and dizzy\". [facility] and patient ended up being positive for orthostatics. Per [facility] staff, pt was on his first drink (vodka tonic) and didn't finish it. Pt denies drinking everyday.        Reason for Admission  EMS     Admission Date  3/12/2021    CODE STATUS  Full Code    HPI & HOSPITAL COURSE  This is a 85 y.o. male here with syncope.  Please see the dictated H&P for complete details.  In short the patient drinks alcohol fairly regularly but is vague re: the volume and frequency who recently passed out at a [email or web address] bar.  There was an episode of vomiting.  He had an exhaustive work-up including echocardiogram, carotid ultrasound, EKG, lab work, treadmill stress test, chest x-ray, COVID-19 swab, all of which were negative for acute pathology.  His TSH was slightly low here and this may need to be repeated as an outpatient, with thyroid dose reduced if appropriate.  His pulse has been normal.  He had no arrhythmias on telemetry.  His blood pressures been more or less around goal.  He has mild hyponatremia 131. This has been stable since 2019. Orthostatics are normal. He is independent with ambulation around the unit.    He has a follow-up appointment in 2 weeks with an outpatient APRN per his report. Therefore, he is discharged in good and stable condition to home with close outpatient follow-up.    The patient recovered much more quickly than anticipated on admission.    Discharge Date  3/13/2021    FOLLOW UP ITEMS POST DISCHARGE  Routine PCP follow-up    DISCHARGE DIAGNOSES  Principal Problem:    Syncope POA: Yes  Active Problems:    History of transcatheter aortic valve replacement (TAVR) POA: Yes    Hyponatremia POA: Yes    " Hypothyroidism POA: Yes    Essential hypertension POA: Yes  Resolved Problems:    * No resolved hospital problems. *      FOLLOW UP  No follow-up provider specified.    MEDICATIONS ON DISCHARGE     Medication List      Continue taking these medications      Instructions   albuterol 108 (90 Base) MCG/ACT Aers inhalation aerosol   Inhale 2 Puffs by mouth every four hours as needed for Shortness of Breath.  Dose: 2 Puff     * amLODIPine 5 MG Tabs  Commonly known as: NORVASC   Take 5 mg by mouth every day.  Dose: 5 mg     * amLODIPine 10 MG Tabs  Commonly known as: NORVASC   Take 1 Tab by mouth every day.  Dose: 10 mg     finasteride 5 MG Tabs  Commonly known as: PROSCAR   Take 5 mg by mouth every day.  Dose: 5 mg     hydrOXYzine HCl 25 MG Tabs  Commonly known as: ATARAX   Take 0.5 Tabs by mouth 2 times a day as needed for Itching.  Dose: 12.5 mg     * levothyroxine 175 MCG Tabs  Commonly known as: SYNTHROID   Take 175 mcg by mouth Every morning on an empty stomach.  Dose: 175 mcg     * levothyroxine 75 MCG Tabs  Commonly known as: SYNTHROID   Take 1 Tab by mouth Every morning on an empty stomach.  Dose: 75 mcg     lisinopril 20 MG Tabs  Commonly known as: PRINIVIL   Take 20 mg by mouth every day.  Dose: 20 mg     tamsulosin 0.4 MG capsule  Commonly known as: FLOMAX   Take 0.4 mg by mouth every day.  Dose: 0.4 mg      * This list has 4 medication(s) that are the same as other medications prescribed for you. Read the directions carefully, and ask your doctor or other care provider to review them with you.         Allergies  No Known Allergies    DIET  Orders Placed This Encounter   Procedures   • Diet Order Diet: Cardiac; Miscellaneous modifications: (optional): No Decaf, No Caffeine(for test)     Standing Status:   Standing     Number of Occurrences:   1     Order Specific Question:   Diet:     Answer:   Cardiac [6]     Order Specific Question:   Miscellaneous modifications: (optional)     Answer:   No Decaf, No  Caffeine(for test) [11]       ACTIVITY  As tolerated.  Weight bearing as tolerated    LABORATORY  Lab Results   Component Value Date    SODIUM 131 (L) 03/13/2021    POTASSIUM 4.8 03/13/2021    CHLORIDE 102 03/13/2021    CO2 23 03/13/2021    GLUCOSE 89 03/13/2021    BUN 18 03/13/2021    CREATININE 1.09 03/13/2021        Lab Results   Component Value Date    WBC 6.0 03/13/2021    HEMOGLOBIN 11.7 (L) 03/13/2021    HEMATOCRIT 35.9 (L) 03/13/2021    PLATELETCT 149 (L) 03/13/2021        Total time of the discharge process exceeds 40 minutes.

## 2021-03-13 NOTE — ASSESSMENT & PLAN NOTE
-Suspect this is due to hypovolemia, or vasovagal physiology as patient just ran and then drank alcohol immediately after.  However, he does have recent TAVR which will need to be checked.  - I will further work him up for syncope with echocardiogram to evaluate his TAVR, and carotid ultrasound.  Check d-dimer, and if positive proceed to rule out PE.  Check urine drug screen.    - Monitor on telemetry to rule out any other arrhythmias.  Will trend troponins. If all work-up negative, he may need an event monitor or ziopatch placed for extended monitoring.  - I will start him on IV fluid rehydration with LR at 100 cc/hr x 1L. Check orthostatic vitals.

## 2021-03-13 NOTE — ASSESSMENT & PLAN NOTE
-Resume home Norvasc.  Monitor blood pressure trend closely with as needed IV labetalol for significant hypertension.

## 2021-03-13 NOTE — H&P
"Hospital Medicine History & Physical Note    Date of Service  3/12/2021    Primary Care Physician  Pcp Pt States None    Consultants  none    Code Status  Full Code    Chief Complaint  Chief Complaint   Patient presents with   • Syncope     Pt was at [facility] when he had a \"syncopal event\". Pt was found on the floor vomiting upon EMS arrival. Pt states he remembers feeling \"euphoric and dizzy\". Pt then tried to leave AMA from [facility] and patient ended up being positive for orthostatics. Per [facility] staff, pt was on his first drink (vodka tonic) and didn't finish it. Pt denies drinking everyday.        History of Presenting Illness  85 y.o. male with history of hypertension, hypothyroidism, history of severe aortic stenosis status post TAVR 6 months ago at [place], who was doing well until this morning after running about a quarter of a mile, went to a local [facility] and drank 1 vodka tonic at the bar.  He did not felt slightly lightheaded, and then suddenly found himself on the floor and apparently syncopized.  He was out for about 30 seconds, and when he woke up he realized that he was vomiting on the floor.  He denied any preceding symptoms such as chest pain, palpitations, visual field changes, focal weakness or numbness.  He had no incontinence, and there were no reports of seizure activities.  Other than that, he had no other complaints such as fevers, chills, abdominal pain, bowel movement changes, dysuria.  He was then brought to the ED.    ED course:  The patient was initially evaluated.  Vital signs were stable.  Initial blood work-up was unremarkable.  He had no leukocytosis.  Sodium was 130 which is chronic and within his baseline.  Blood alcohol level is less than 10.  Troponin was 18.  Chest x-ray with (personally viewed) showed hyperinflation with no infiltrates or consolidation.  EKG (personally reviewed) showed normal sinus rhythm, with nonspecific ST-T wave changes.  Patient was subsequently " admitted to the hospitalist service for further syncope work-up.    Review of Systems  ROS     Pertinent positives/negatives as mentioned above.     A complete review of systems was personally done by me. All other systems were negative.       Past Medical History   has a past medical history of Heart murmur and Hypertension.    Surgical History   has a past surgical history that includes hernia repair.     Family History  family history includes Heart Disease in his mother; Hyperlipidemia in his mother.     Social History   reports that he has quit smoking. He smoked 0.00 packs per day. He has never used smokeless tobacco. He reports previous alcohol use. He reports that he does not use drugs.    Allergies  No Known Allergies    Medications  Prior to Admission Medications   Prescriptions Last Dose Informant Patient Reported? Taking?   Doxepin HCl 3 MG Tab   Yes No   Sig: doxepin 3 mg tablet   albuterol 108 (90 Base) MCG/ACT Aero Soln inhalation aerosol  Patient's Home Pharmacy No No   Sig: Inhale 2 Puffs by mouth every four hours as needed for Shortness of Breath.   amLODIPine (NORVASC) 10 MG Tab  Patient's Home Pharmacy No No   Sig: Take 1 Tab by mouth every day.   citalopram (CELEXA) 20 MG Tab   Yes No   Sig: citalopram 20 mg tablet   TAKE 1 TABLET BY MOUTH ONCE DAILY   finasteride (PROSCAR) 5 MG Tab   Yes No   Sig: finasteride 5 mg tablet   TAKE 1 TABLET BY MOUTH ONCE DAILY FOR 90 DAYS   hydrOXYzine HCl (ATARAX) 25 MG Tab   No No   Sig: Take 0.5 Tabs by mouth 2 times a day as needed for Itching.   levothyroxine (SYNTHROID) 75 MCG Tab  Patient's Home Pharmacy No No   Sig: Take 1 Tab by mouth Every morning on an empty stomach.   lisinopril (PRINIVIL) 20 MG Tab   Yes No   Sig: lisinopril 20 mg tablet   TAKE 1 TABLET BY MOUTH ONCE DAILY   sildenafil (REVATIO) 20 MG tablet   Yes No   Sig: sildenafil (pulmonary hypertension) 20 mg tablet   TAKE 1 TABLET BY MOUTH ONCE DAILY AS NEEDED   tamsulosin (FLOMAX) 0.4 MG  capsule   Yes No   Sig: tamsulosin 0.4 mg capsule   TAKE 1 CAPSULE BY MOUTH ONCE DAILY 30 MINUTES FOLLOWING THE SAME MEAL EACH DAY   traZODone (DESYREL) 50 MG Tab   Yes No   Sig: Take 50 mg by mouth.      Facility-Administered Medications: None       Physical Exam  Temp:  [36.6 °C (97.8 °F)] 36.6 °C (97.8 °F)  Pulse:  [62-69] 69  Resp:  [13-18] 13  BP: (107-141)/(53-65) 141/63  SpO2:  [94 %-99 %] 99 %    Physical Exam  Vitals reviewed.   Constitutional:       General: He is not in acute distress.     Appearance: Normal appearance. He is not toxic-appearing or diaphoretic.   HENT:      Head: Normocephalic and atraumatic.      Right Ear: External ear normal.      Left Ear: External ear normal.      Mouth/Throat:      Mouth: Mucous membranes are dry.      Pharynx: No oropharyngeal exudate.   Eyes:      General: No scleral icterus.     Extraocular Movements: Extraocular movements intact.      Conjunctiva/sclera: Conjunctivae normal.      Pupils: Pupils are equal, round, and reactive to light.   Cardiovascular:      Rate and Rhythm: Normal rate and regular rhythm.      Heart sounds: Normal heart sounds. No murmur. No gallop.    Pulmonary:      Effort: Pulmonary effort is normal. No respiratory distress.      Breath sounds: Normal breath sounds. No stridor. No wheezing, rhonchi or rales.   Chest:      Chest wall: No tenderness.   Abdominal:      General: Bowel sounds are normal. There is no distension.      Palpations: Abdomen is soft. There is no mass.      Tenderness: There is no abdominal tenderness. There is no guarding or rebound.   Musculoskeletal:         General: No swelling. Normal range of motion.      Cervical back: Normal range of motion and neck supple.      Right lower leg: No edema.      Left lower leg: No edema.   Lymphadenopathy:      Cervical: No cervical adenopathy.   Skin:     General: Skin is warm and dry.      Coloration: Skin is not jaundiced.      Findings: No rash.   Neurological:      General: No  focal deficit present.      Mental Status: He is alert and oriented to person, place, and time.      Cranial Nerves: No cranial nerve deficit.   Psychiatric:         Mood and Affect: Mood normal.         Behavior: Behavior normal.         Thought Content: Thought content normal.         Judgment: Judgment normal.         Laboratory:  Recent Labs     03/12/21  1411   WBC 4.6*   RBC 4.22*   HEMOGLOBIN 12.1*   HEMATOCRIT 36.9*   MCV 87.4   MCH 28.7   MCHC 32.8*   RDW 45.1   PLATELETCT 152*   MPV 11.8     Recent Labs     03/12/21  1411   SODIUM 130*   POTASSIUM 5.0   CHLORIDE 97   CO2 20   GLUCOSE 120*   BUN 19   CREATININE 1.09   CALCIUM 8.8     Recent Labs     03/12/21  1411   ALTSGPT 10   ASTSGOT 18   ALKPHOSPHAT 65   TBILIRUBIN 0.3   GLUCOSE 120*     Recent Labs     03/12/21  1411   APTT 28.8   INR 1.02     No results for input(s): NTPROBNP in the last 72 hours.      Recent Labs     03/12/21  1411   TROPONINT 18       Imaging:  DX-CHEST-PORTABLE (1 VIEW)   Final Result      Hypoinflation without other evidence for acute cardiopulmonary disease.      US-CAROTID DOPPLER BILAT    (Results Pending)   EC-ECHOCARDIOGRAM COMPLETE W/O CONT    (Results Pending)   Cardiac Stress Test Treadmill Only    (Results Pending)         Assessment/Plan:  I anticipate this patient is appropriate for observation status at this time.    * Syncope- (present on admission)  Assessment & Plan  -Suspect this is due to hypovolemia, or vasovagal physiology as patient just ran and then drank alcohol immediately after.  However, he does have recent TAVR which will need to be checked.  - I will further work him up for syncope with echocardiogram to evaluate his TAVR, and carotid ultrasound.  Check d-dimer, and if positive proceed to rule out PE.  Check urine drug screen.    - Monitor on telemetry to rule out any other arrhythmias.  Will trend troponins. If all work-up negative, he may need an event monitor or ziopatch placed for extended  monitoring.  - I will start him on IV fluid rehydration with LR at 100 cc/hr x 1L. Check orthostatic vitals.     History of transcatheter aortic valve replacement (TAVR)- (present on admission)  Assessment & Plan  -Check echocardiogram to evaluate his TAVR.    Essential hypertension- (present on admission)  Assessment & Plan  -Resume home Norvasc.  Monitor blood pressure trend closely with as needed IV labetalol for significant hypertension.    Hypothyroidism- (present on admission)  Assessment & Plan  -Resume Synthroid.  Check TSH.        DVT prophylaxis: SCD

## 2021-06-28 ENCOUNTER — HOSPITAL ENCOUNTER (EMERGENCY)
Facility: MEDICAL CENTER | Age: 86
End: 2021-06-28
Attending: EMERGENCY MEDICINE
Payer: COMMERCIAL

## 2021-06-28 VITALS
RESPIRATION RATE: 16 BRPM | DIASTOLIC BLOOD PRESSURE: 90 MMHG | OXYGEN SATURATION: 98 % | WEIGHT: 169.53 LBS | HEART RATE: 80 BPM | TEMPERATURE: 98 F | SYSTOLIC BLOOD PRESSURE: 140 MMHG | BODY MASS INDEX: 27.36 KG/M2

## 2021-06-28 DIAGNOSIS — K04.7 DENTAL INFECTION: ICD-10-CM

## 2021-06-28 PROCEDURE — A9270 NON-COVERED ITEM OR SERVICE: HCPCS | Performed by: EMERGENCY MEDICINE

## 2021-06-28 PROCEDURE — 99283 EMERGENCY DEPT VISIT LOW MDM: CPT

## 2021-06-28 PROCEDURE — 700102 HCHG RX REV CODE 250 W/ 637 OVERRIDE(OP): Performed by: EMERGENCY MEDICINE

## 2021-06-28 RX ORDER — CLINDAMYCIN HYDROCHLORIDE 300 MG/1
300 CAPSULE ORAL 4 TIMES DAILY
Qty: 28 CAPSULE | Refills: 0 | Status: SHIPPED | OUTPATIENT
Start: 2021-06-28 | End: 2021-07-05

## 2021-06-28 RX ORDER — OXYCODONE HYDROCHLORIDE AND ACETAMINOPHEN 5; 325 MG/1; MG/1
1 TABLET ORAL EVERY 6 HOURS PRN
Qty: 10 TABLET | Refills: 0 | Status: SHIPPED | OUTPATIENT
Start: 2021-06-28 | End: 2021-07-02

## 2021-06-28 RX ORDER — OXYCODONE HYDROCHLORIDE AND ACETAMINOPHEN 5; 325 MG/1; MG/1
1 TABLET ORAL ONCE
Status: COMPLETED | OUTPATIENT
Start: 2021-06-28 | End: 2021-06-28

## 2021-06-28 RX ADMIN — OXYCODONE HYDROCHLORIDE AND ACETAMINOPHEN 1 TABLET: 5; 325 TABLET ORAL at 19:00

## 2021-06-28 ASSESSMENT — FIBROSIS 4 INDEX: FIB4 SCORE: 3.21

## 2021-06-29 NOTE — ED NOTES
Pt discharged, reviewed all discharge instructions including medications and follow up, pt verbalizes understanding, and denies questions. Electronic prescription discussed with pt. Narcotic risks discussed with pt, consent signed pt walking home.  Escorted to lobby.

## 2021-06-29 NOTE — ED PROVIDER NOTES
ED Provider Note    CHIEF COMPLAINT  Chief Complaint   Patient presents with   • Dental Pain       HPI  Emigdio Jiménez is a 85 y.o. male who presents for evaluation of dental pain.  Patient presents with 2 days progressive pain to his left lower dentition area.  The patient denies: Fever, chills, URI symptoms, cardiorespiratory symptoms, gastrointestinal symptoms, difficulty opening or closing his mouth or swallowing.  No other acute symptomatology or complaints.    REVIEW OF SYSTEMS  See HPI for further details. All other systems negative.    PAST MEDICAL HISTORY  Past Medical History:   Diagnosis Date   • Heart murmur    • Hypertension    Hypothyroidism  BPH  COPD    FAMILY HISTORY  Family History   Problem Relation Age of Onset   • Hyperlipidemia Mother    • Heart Disease Mother        SOCIAL HISTORY  Resides locally;    SURGICAL HISTORY  Past Surgical History:   Procedure Laterality Date   • HERNIA REPAIR         CURRENT MEDICATIONS  Home Medications     Reviewed by Javier Beaver R.N. (Registered Nurse) on 06/28/21 at 1750  Med List Status: Partial   Medication Last Dose Status   albuterol 108 (90 Base) MCG/ACT Aero Soln inhalation aerosol  Active   amLODIPine (NORVASC) 10 MG Tab  Active   amLODIPine (NORVASC) 5 MG Tab  Active   finasteride (PROSCAR) 5 MG Tab  Active   hydrOXYzine HCl (ATARAX) 25 MG Tab  Active   levothyroxine (SYNTHROID) 175 MCG Tab  Active   levothyroxine (SYNTHROID) 75 MCG Tab  Active   lisinopril (PRINIVIL) 20 MG Tab  Active   tamsulosin (FLOMAX) 0.4 MG capsule  Active                ALLERGIES  No Known Allergies    PHYSICAL EXAM  VITAL SIGNS: /96   Pulse 95   Temp 36.9 °C (98.4 °F) (Temporal)   Resp 14   Wt 76.9 kg (169 lb 8.5 oz)   SpO2 98%   BMI 27.36 kg/m²    Constitutional: 85-year-old male, awake, oriented x3, phonation is normal  HENT: Normocephalic, Atraumatic, Nares:Clear, Oropharynx: moist, well hydrated, posterior pharynx:clear; patient is missing most of his teeth.   The patient has some Teeth centrally over the lower central incisors, tooth #22 shows some surrounding gingival edema and tenderness but no fluctuant areas identified; I placed a needle and aspirated with no pus identified; no sublingual edema identified; no trismus; no facial swelling identified;  Eyes: PERRL, EOMI, Conjunctiva normal, No discharge.   Neck: Normal range of motion, No tenderness, Supple, No stridor.   Lymphatic: No lymphadenopathy noted.   Cardiovascular: Regular rate and rhythm without mumurs, gallups, rubs   Thorax & Lungs: Normal Equal breath sounds, No respiratory distress, No wheezing, no stridor, no rales. No chest tenderness.   Abdomen: Soft, nontender, nondistended, no organomegaly, positive bowel sounds normal in quality. No guarding or rebound.  Skin: Good skin turgor, pink, warm, dry. No rashes, petechiae, purpura. Normal capillary refill.   Extremities: Intact distal pulses, No edema, No tenderness, No cyanosis,  Vascular: Pulses are 2+, symmetric in the upper and lower extremities.  Neurologic: Alert & oriented x 3,  No gross focal deficits noted.   Psychiatric: Affect normal, Judgment normal, Mood normal.     COURSE & MEDICAL DECISION MAKING  Pertinent Labs & Imaging studies reviewed. (See chart for details)  Discussion: At this time, the patient presents for evaluation of dental pain.  Patient has findings consistent with dental infection but no clear abscess that I can drain at this time.  In addition, he has no evidence of sublingular infection or deep space infection.  Patient indicates he has an appointment on Thursday with a dentist for definitive treatment.  The patient will be placed on oral antibiotics along with limited oral analgesics.  I discussed the findings and treatment plan with the patient.  He indicates he is comfortable with this explanation disposition.    FINAL IMPRESSION  1. Dental infection         PLAN  1.  Appropriate discharge instructions given  2.   Clindamycin 300 mg every 6 hours #28  3.  Percocet 5 mg #10;  databank reviewed; informed consent obtained;  4.  Follow-up with dental services for further evaluation    Electronically signed by: Guy G Gansert, M.D., 6/28/2021 6:28 PM

## 2021-06-29 NOTE — ED TRIAGE NOTES
84 y/o male ambulatory to triage with c/o left sided lower dental pain. Pt states the pain began 2 days ago, he was unable to sleep last night due to the pain, using Oragel and excedrin at home without relief of the pain.

## 2021-08-16 ENCOUNTER — HOSPITAL ENCOUNTER (EMERGENCY)
Facility: MEDICAL CENTER | Age: 86
End: 2021-08-16
Payer: COMMERCIAL

## 2021-08-16 VITALS
HEIGHT: 66 IN | OXYGEN SATURATION: 95 % | TEMPERATURE: 97 F | HEART RATE: 75 BPM | RESPIRATION RATE: 20 BRPM | SYSTOLIC BLOOD PRESSURE: 142 MMHG | BODY MASS INDEX: 28.52 KG/M2 | WEIGHT: 177.47 LBS | DIASTOLIC BLOOD PRESSURE: 97 MMHG

## 2021-08-16 PROCEDURE — 302449 STATCHG TRIAGE ONLY (STATISTIC)

## 2021-08-16 ASSESSMENT — FIBROSIS 4 INDEX: FIB4 SCORE: 3.21

## 2021-08-17 ENCOUNTER — APPOINTMENT (OUTPATIENT)
Dept: RADIOLOGY | Facility: MEDICAL CENTER | Age: 86
DRG: 641 | End: 2021-08-17
Attending: EMERGENCY MEDICINE
Payer: COMMERCIAL

## 2021-08-17 ENCOUNTER — HOSPITAL ENCOUNTER (INPATIENT)
Facility: MEDICAL CENTER | Age: 86
LOS: 2 days | DRG: 641 | End: 2021-08-19
Attending: EMERGENCY MEDICINE | Admitting: STUDENT IN AN ORGANIZED HEALTH CARE EDUCATION/TRAINING PROGRAM
Payer: COMMERCIAL

## 2021-08-17 DIAGNOSIS — E87.1 HYPONATREMIA: ICD-10-CM

## 2021-08-17 DIAGNOSIS — R55 SYNCOPE, UNSPECIFIED SYNCOPE TYPE: ICD-10-CM

## 2021-08-17 DIAGNOSIS — R51.9 NONINTRACTABLE HEADACHE, UNSPECIFIED CHRONICITY PATTERN, UNSPECIFIED HEADACHE TYPE: ICD-10-CM

## 2021-08-17 PROBLEM — E03.8 OTHER SPECIFIED HYPOTHYROIDISM: Status: ACTIVE | Noted: 2019-11-19

## 2021-08-17 LAB
ALBUMIN SERPL BCP-MCNC: 4.4 G/DL (ref 3.2–4.9)
ALBUMIN/GLOB SERPL: 1.6 G/DL
ALP SERPL-CCNC: 82 U/L (ref 30–99)
ALT SERPL-CCNC: 21 U/L (ref 2–50)
ANION GAP SERPL CALC-SCNC: 12 MMOL/L (ref 7–16)
APPEARANCE UR: CLEAR
AST SERPL-CCNC: 22 U/L (ref 12–45)
BASOPHILS # BLD AUTO: 0.9 % (ref 0–1.8)
BASOPHILS # BLD: 0.06 K/UL (ref 0–0.12)
BILIRUB SERPL-MCNC: 0.5 MG/DL (ref 0.1–1.5)
BILIRUB UR QL STRIP.AUTO: NEGATIVE
BUN SERPL-MCNC: 11 MG/DL (ref 8–22)
CALCIUM SERPL-MCNC: 8.9 MG/DL (ref 8.5–10.5)
CHLORIDE SERPL-SCNC: 87 MMOL/L (ref 96–112)
CO2 SERPL-SCNC: 21 MMOL/L (ref 20–33)
COLOR UR: YELLOW
CREAT SERPL-MCNC: 1.2 MG/DL (ref 0.5–1.4)
CREAT UR-MCNC: 48.17 MG/DL
EKG IMPRESSION: NORMAL
EOSINOPHIL # BLD AUTO: 0.09 K/UL (ref 0–0.51)
EOSINOPHIL NFR BLD: 1.4 % (ref 0–6.9)
ERYTHROCYTE [DISTWIDTH] IN BLOOD BY AUTOMATED COUNT: 42.2 FL (ref 35.9–50)
GLOBULIN SER CALC-MCNC: 2.7 G/DL (ref 1.9–3.5)
GLUCOSE SERPL-MCNC: 93 MG/DL (ref 65–99)
GLUCOSE UR STRIP.AUTO-MCNC: NEGATIVE MG/DL
HCT VFR BLD AUTO: 42.6 % (ref 42–52)
HGB BLD-MCNC: 14.5 G/DL (ref 14–18)
IMM GRANULOCYTES # BLD AUTO: 0.03 K/UL (ref 0–0.11)
IMM GRANULOCYTES NFR BLD AUTO: 0.5 % (ref 0–0.9)
KETONES UR STRIP.AUTO-MCNC: NEGATIVE MG/DL
LEUKOCYTE ESTERASE UR QL STRIP.AUTO: NEGATIVE
LYMPHOCYTES # BLD AUTO: 1.44 K/UL (ref 1–4.8)
LYMPHOCYTES NFR BLD: 21.8 % (ref 22–41)
MCH RBC QN AUTO: 29.7 PG (ref 27–33)
MCHC RBC AUTO-ENTMCNC: 34 G/DL (ref 33.7–35.3)
MCV RBC AUTO: 87.1 FL (ref 81.4–97.8)
MICRO URNS: NORMAL
MONOCYTES # BLD AUTO: 0.67 K/UL (ref 0–0.85)
MONOCYTES NFR BLD AUTO: 10.1 % (ref 0–13.4)
NEUTROPHILS # BLD AUTO: 4.33 K/UL (ref 1.82–7.42)
NEUTROPHILS NFR BLD: 65.3 % (ref 44–72)
NITRITE UR QL STRIP.AUTO: NEGATIVE
NRBC # BLD AUTO: 0 K/UL
NRBC BLD-RTO: 0 /100 WBC
OSMOLALITY SERPL: 255 MOSM/KG H2O (ref 278–298)
PH UR STRIP.AUTO: 7 [PH] (ref 5–8)
PLATELET # BLD AUTO: 210 K/UL (ref 164–446)
PMV BLD AUTO: 11.1 FL (ref 9–12.9)
POTASSIUM SERPL-SCNC: 4.7 MMOL/L (ref 3.6–5.5)
PROT SERPL-MCNC: 7.1 G/DL (ref 6–8.2)
PROT UR QL STRIP: NEGATIVE MG/DL
RBC # BLD AUTO: 4.89 M/UL (ref 4.7–6.1)
RBC UR QL AUTO: NEGATIVE
SODIUM SERPL-SCNC: 120 MMOL/L (ref 135–145)
SODIUM UR-SCNC: 40 MMOL/L
SP GR UR STRIP.AUTO: 1.01
TROPONIN T SERPL-MCNC: 18 NG/L (ref 6–19)
UROBILINOGEN UR STRIP.AUTO-MCNC: 0.2 MG/DL
WBC # BLD AUTO: 6.6 K/UL (ref 4.8–10.8)

## 2021-08-17 PROCEDURE — 700105 HCHG RX REV CODE 258: Performed by: STUDENT IN AN ORGANIZED HEALTH CARE EDUCATION/TRAINING PROGRAM

## 2021-08-17 PROCEDURE — 96374 THER/PROPH/DIAG INJ IV PUSH: CPT

## 2021-08-17 PROCEDURE — 99285 EMERGENCY DEPT VISIT HI MDM: CPT

## 2021-08-17 PROCEDURE — 99222 1ST HOSP IP/OBS MODERATE 55: CPT | Performed by: STUDENT IN AN ORGANIZED HEALTH CARE EDUCATION/TRAINING PROGRAM

## 2021-08-17 PROCEDURE — A9270 NON-COVERED ITEM OR SERVICE: HCPCS | Performed by: STUDENT IN AN ORGANIZED HEALTH CARE EDUCATION/TRAINING PROGRAM

## 2021-08-17 PROCEDURE — 84300 ASSAY OF URINE SODIUM: CPT

## 2021-08-17 PROCEDURE — 83930 ASSAY OF BLOOD OSMOLALITY: CPT

## 2021-08-17 PROCEDURE — 82570 ASSAY OF URINE CREATININE: CPT

## 2021-08-17 PROCEDURE — 81003 URINALYSIS AUTO W/O SCOPE: CPT

## 2021-08-17 PROCEDURE — 70450 CT HEAD/BRAIN W/O DYE: CPT | Mod: ME

## 2021-08-17 PROCEDURE — 93005 ELECTROCARDIOGRAM TRACING: CPT

## 2021-08-17 PROCEDURE — 700102 HCHG RX REV CODE 250 W/ 637 OVERRIDE(OP): Performed by: STUDENT IN AN ORGANIZED HEALTH CARE EDUCATION/TRAINING PROGRAM

## 2021-08-17 PROCEDURE — 770006 HCHG ROOM/CARE - MED/SURG/GYN SEMI*

## 2021-08-17 PROCEDURE — 84484 ASSAY OF TROPONIN QUANT: CPT

## 2021-08-17 PROCEDURE — 85025 COMPLETE CBC W/AUTO DIFF WBC: CPT

## 2021-08-17 PROCEDURE — 93005 ELECTROCARDIOGRAM TRACING: CPT | Performed by: EMERGENCY MEDICINE

## 2021-08-17 PROCEDURE — 80053 COMPREHEN METABOLIC PANEL: CPT

## 2021-08-17 PROCEDURE — 700111 HCHG RX REV CODE 636 W/ 250 OVERRIDE (IP): Performed by: EMERGENCY MEDICINE

## 2021-08-17 RX ORDER — ONDANSETRON 2 MG/ML
4 INJECTION INTRAMUSCULAR; INTRAVENOUS EVERY 4 HOURS PRN
Status: DISCONTINUED | OUTPATIENT
Start: 2021-08-17 | End: 2021-08-19 | Stop reason: HOSPADM

## 2021-08-17 RX ORDER — AMOXICILLIN 250 MG
2 CAPSULE ORAL 2 TIMES DAILY
Status: DISCONTINUED | OUTPATIENT
Start: 2021-08-18 | End: 2021-08-19 | Stop reason: HOSPADM

## 2021-08-17 RX ORDER — SODIUM CHLORIDE 9 MG/ML
INJECTION, SOLUTION INTRAVENOUS CONTINUOUS
Status: DISCONTINUED | OUTPATIENT
Start: 2021-08-17 | End: 2021-08-19

## 2021-08-17 RX ORDER — LISINOPRIL 20 MG/1
20 TABLET ORAL DAILY
Status: DISCONTINUED | OUTPATIENT
Start: 2021-08-18 | End: 2021-08-19 | Stop reason: HOSPADM

## 2021-08-17 RX ORDER — ONDANSETRON 2 MG/ML
4 INJECTION INTRAMUSCULAR; INTRAVENOUS ONCE
Status: COMPLETED | OUTPATIENT
Start: 2021-08-17 | End: 2021-08-17

## 2021-08-17 RX ORDER — LABETALOL HYDROCHLORIDE 5 MG/ML
10 INJECTION, SOLUTION INTRAVENOUS EVERY 4 HOURS PRN
Status: DISCONTINUED | OUTPATIENT
Start: 2021-08-17 | End: 2021-08-19 | Stop reason: HOSPADM

## 2021-08-17 RX ORDER — LORAZEPAM 1 MG/1
1 TABLET ORAL ONCE
Status: COMPLETED | OUTPATIENT
Start: 2021-08-17 | End: 2021-08-17

## 2021-08-17 RX ORDER — FINASTERIDE 5 MG/1
5 TABLET, FILM COATED ORAL DAILY
Status: DISCONTINUED | OUTPATIENT
Start: 2021-08-18 | End: 2021-08-19 | Stop reason: HOSPADM

## 2021-08-17 RX ORDER — ACETAMINOPHEN 500 MG
1000 TABLET ORAL ONCE
Status: ACTIVE | OUTPATIENT
Start: 2021-08-17 | End: 2021-08-18

## 2021-08-17 RX ORDER — POLYETHYLENE GLYCOL 3350 17 G/17G
1 POWDER, FOR SOLUTION ORAL
Status: DISCONTINUED | OUTPATIENT
Start: 2021-08-17 | End: 2021-08-19 | Stop reason: HOSPADM

## 2021-08-17 RX ORDER — ACETAMINOPHEN 325 MG/1
650 TABLET ORAL EVERY 6 HOURS PRN
Status: DISCONTINUED | OUTPATIENT
Start: 2021-08-17 | End: 2021-08-19 | Stop reason: HOSPADM

## 2021-08-17 RX ORDER — BISACODYL 10 MG
10 SUPPOSITORY, RECTAL RECTAL
Status: DISCONTINUED | OUTPATIENT
Start: 2021-08-17 | End: 2021-08-19 | Stop reason: HOSPADM

## 2021-08-17 RX ORDER — TAMSULOSIN HYDROCHLORIDE 0.4 MG/1
0.4 CAPSULE ORAL DAILY
Status: DISCONTINUED | OUTPATIENT
Start: 2021-08-18 | End: 2021-08-19 | Stop reason: HOSPADM

## 2021-08-17 RX ORDER — SODIUM CHLORIDE 9 MG/ML
500 INJECTION, SOLUTION INTRAVENOUS ONCE
Status: COMPLETED | OUTPATIENT
Start: 2021-08-17 | End: 2021-08-17

## 2021-08-17 RX ORDER — ENALAPRILAT 1.25 MG/ML
1.25 INJECTION INTRAVENOUS EVERY 6 HOURS PRN
Status: DISCONTINUED | OUTPATIENT
Start: 2021-08-17 | End: 2021-08-19 | Stop reason: HOSPADM

## 2021-08-17 RX ORDER — AMLODIPINE BESYLATE 10 MG/1
5 TABLET ORAL DAILY
Status: DISCONTINUED | OUTPATIENT
Start: 2021-08-18 | End: 2021-08-19 | Stop reason: HOSPADM

## 2021-08-17 RX ORDER — ONDANSETRON 4 MG/1
4 TABLET, ORALLY DISINTEGRATING ORAL EVERY 4 HOURS PRN
Status: DISCONTINUED | OUTPATIENT
Start: 2021-08-17 | End: 2021-08-19 | Stop reason: HOSPADM

## 2021-08-17 RX ADMIN — SODIUM CHLORIDE: 9 INJECTION, SOLUTION INTRAVENOUS at 23:05

## 2021-08-17 RX ADMIN — ONDANSETRON 4 MG: 2 INJECTION INTRAMUSCULAR; INTRAVENOUS at 21:08

## 2021-08-17 RX ADMIN — LORAZEPAM 1 MG: 1 TABLET ORAL at 23:04

## 2021-08-17 RX ADMIN — SODIUM CHLORIDE 500 ML: 9 INJECTION, SOLUTION INTRAVENOUS at 22:27

## 2021-08-17 ASSESSMENT — ENCOUNTER SYMPTOMS
SHORTNESS OF BREATH: 0
DIZZINESS: 0
BLOOD IN STOOL: 0
FEVER: 0
ABDOMINAL PAIN: 0
CONSTIPATION: 0
EYE PAIN: 0
SORE THROAT: 0
WEIGHT LOSS: 0
VOMITING: 0
WHEEZING: 0
MYALGIAS: 0
HEMOPTYSIS: 0
LOSS OF CONSCIOUSNESS: 0
BLURRED VISION: 0
PALPITATIONS: 0
DIARRHEA: 1
WEAKNESS: 0
HEADACHES: 1
COUGH: 0
CHILLS: 0
NAUSEA: 1
ORTHOPNEA: 0

## 2021-08-17 ASSESSMENT — PAIN DESCRIPTION - PAIN TYPE: TYPE: ACUTE PAIN

## 2021-08-17 ASSESSMENT — FIBROSIS 4 INDEX: FIB4 SCORE: 3.21

## 2021-08-17 NOTE — ED TRIAGE NOTES
"Chief Complaint   Patient presents with   • Food Stuck In Throat     reports he had some cheez-its a few days ago and it's stuck in his throat.      Pt to triage for above. Reports hx of same but it's been 2 years.   Speaking in full sentences. NAD noted. VSS.    Pt returned to [facility]. Educated on triage process and to inform staff of any changes.     /97   Pulse 75   Temp 36.1 °C (97 °F) (Temporal)   Resp 20   Ht 1.676 m (5' 6\")   Wt 80.5 kg (177 lb 7.5 oz)   SpO2 95%   BMI 28.64 kg/m²     "

## 2021-08-18 LAB
ANION GAP SERPL CALC-SCNC: 10 MMOL/L (ref 7–16)
ANION GAP SERPL CALC-SCNC: 7 MMOL/L (ref 7–16)
ANION GAP SERPL CALC-SCNC: 9 MMOL/L (ref 7–16)
BASOPHILS # BLD AUTO: 0.7 % (ref 0–1.8)
BASOPHILS # BLD: 0.05 K/UL (ref 0–0.12)
BUN SERPL-MCNC: 10 MG/DL (ref 8–22)
BUN SERPL-MCNC: 12 MG/DL (ref 8–22)
BUN SERPL-MCNC: 8 MG/DL (ref 8–22)
CALCIUM SERPL-MCNC: 8.3 MG/DL (ref 8.5–10.5)
CALCIUM SERPL-MCNC: 8.4 MG/DL (ref 8.5–10.5)
CALCIUM SERPL-MCNC: 8.5 MG/DL (ref 8.5–10.5)
CHLORIDE SERPL-SCNC: 94 MMOL/L (ref 96–112)
CHLORIDE SERPL-SCNC: 96 MMOL/L (ref 96–112)
CHLORIDE SERPL-SCNC: 98 MMOL/L (ref 96–112)
CO2 SERPL-SCNC: 21 MMOL/L (ref 20–33)
CO2 SERPL-SCNC: 21 MMOL/L (ref 20–33)
CO2 SERPL-SCNC: 22 MMOL/L (ref 20–33)
CREAT SERPL-MCNC: 1.1 MG/DL (ref 0.5–1.4)
CREAT SERPL-MCNC: 1.12 MG/DL (ref 0.5–1.4)
CREAT SERPL-MCNC: 1.15 MG/DL (ref 0.5–1.4)
EOSINOPHIL # BLD AUTO: 0.1 K/UL (ref 0–0.51)
EOSINOPHIL NFR BLD: 1.4 % (ref 0–6.9)
ERYTHROCYTE [DISTWIDTH] IN BLOOD BY AUTOMATED COUNT: 41.9 FL (ref 35.9–50)
GLUCOSE SERPL-MCNC: 80 MG/DL (ref 65–99)
GLUCOSE SERPL-MCNC: 87 MG/DL (ref 65–99)
GLUCOSE SERPL-MCNC: 98 MG/DL (ref 65–99)
HCT VFR BLD AUTO: 36.1 % (ref 42–52)
HGB BLD-MCNC: 12.6 G/DL (ref 14–18)
IMM GRANULOCYTES # BLD AUTO: 0.04 K/UL (ref 0–0.11)
IMM GRANULOCYTES NFR BLD AUTO: 0.6 % (ref 0–0.9)
LYMPHOCYTES # BLD AUTO: 1.09 K/UL (ref 1–4.8)
LYMPHOCYTES NFR BLD: 15.4 % (ref 22–41)
MAGNESIUM SERPL-MCNC: 2 MG/DL (ref 1.5–2.5)
MCH RBC QN AUTO: 30.1 PG (ref 27–33)
MCHC RBC AUTO-ENTMCNC: 34.9 G/DL (ref 33.7–35.3)
MCV RBC AUTO: 86.4 FL (ref 81.4–97.8)
MONOCYTES # BLD AUTO: 0.68 K/UL (ref 0–0.85)
MONOCYTES NFR BLD AUTO: 9.6 % (ref 0–13.4)
NEUTROPHILS # BLD AUTO: 5.12 K/UL (ref 1.82–7.42)
NEUTROPHILS NFR BLD: 72.3 % (ref 44–72)
NRBC # BLD AUTO: 0 K/UL
NRBC BLD-RTO: 0 /100 WBC
OSMOLALITY UR: 304 MOSM/KG H2O (ref 300–900)
PLATELET # BLD AUTO: 162 K/UL (ref 164–446)
PMV BLD AUTO: 10.8 FL (ref 9–12.9)
POTASSIUM SERPL-SCNC: 4.8 MMOL/L (ref 3.6–5.5)
POTASSIUM SERPL-SCNC: 4.9 MMOL/L (ref 3.6–5.5)
POTASSIUM SERPL-SCNC: 4.9 MMOL/L (ref 3.6–5.5)
RBC # BLD AUTO: 4.18 M/UL (ref 4.7–6.1)
SODIUM SERPL-SCNC: 125 MMOL/L (ref 135–145)
SODIUM SERPL-SCNC: 125 MMOL/L (ref 135–145)
SODIUM SERPL-SCNC: 128 MMOL/L (ref 135–145)
WBC # BLD AUTO: 7.1 K/UL (ref 4.8–10.8)

## 2021-08-18 PROCEDURE — 83935 ASSAY OF URINE OSMOLALITY: CPT

## 2021-08-18 PROCEDURE — 85025 COMPLETE CBC W/AUTO DIFF WBC: CPT

## 2021-08-18 PROCEDURE — A9270 NON-COVERED ITEM OR SERVICE: HCPCS | Performed by: STUDENT IN AN ORGANIZED HEALTH CARE EDUCATION/TRAINING PROGRAM

## 2021-08-18 PROCEDURE — 99232 SBSQ HOSP IP/OBS MODERATE 35: CPT | Performed by: INTERNAL MEDICINE

## 2021-08-18 PROCEDURE — 80048 BASIC METABOLIC PNL TOTAL CA: CPT | Mod: 91

## 2021-08-18 PROCEDURE — 770006 HCHG ROOM/CARE - MED/SURG/GYN SEMI*

## 2021-08-18 PROCEDURE — 700105 HCHG RX REV CODE 258: Performed by: STUDENT IN AN ORGANIZED HEALTH CARE EDUCATION/TRAINING PROGRAM

## 2021-08-18 PROCEDURE — 83735 ASSAY OF MAGNESIUM: CPT

## 2021-08-18 PROCEDURE — 700102 HCHG RX REV CODE 250 W/ 637 OVERRIDE(OP): Performed by: STUDENT IN AN ORGANIZED HEALTH CARE EDUCATION/TRAINING PROGRAM

## 2021-08-18 PROCEDURE — 36415 COLL VENOUS BLD VENIPUNCTURE: CPT

## 2021-08-18 RX ADMIN — AMLODIPINE BESYLATE 5 MG: 10 TABLET ORAL at 05:05

## 2021-08-18 RX ADMIN — FINASTERIDE 5 MG: 5 TABLET, FILM COATED ORAL at 06:41

## 2021-08-18 RX ADMIN — ACETAMINOPHEN 650 MG: 325 TABLET, FILM COATED ORAL at 21:51

## 2021-08-18 RX ADMIN — TAMSULOSIN HYDROCHLORIDE 0.4 MG: 0.4 CAPSULE ORAL at 06:41

## 2021-08-18 RX ADMIN — DOCUSATE SODIUM 50 MG AND SENNOSIDES 8.6 MG 2 TABLET: 8.6; 5 TABLET, FILM COATED ORAL at 19:03

## 2021-08-18 RX ADMIN — ACETAMINOPHEN 650 MG: 325 TABLET, FILM COATED ORAL at 05:05

## 2021-08-18 RX ADMIN — LISINOPRIL 20 MG: 20 TABLET ORAL at 05:06

## 2021-08-18 RX ADMIN — SODIUM CHLORIDE: 9 INJECTION, SOLUTION INTRAVENOUS at 06:03

## 2021-08-18 RX ADMIN — SODIUM CHLORIDE: 9 INJECTION, SOLUTION INTRAVENOUS at 19:05

## 2021-08-18 RX ADMIN — LEVOTHYROXINE SODIUM 175 MCG: 0.17 TABLET ORAL at 06:41

## 2021-08-18 ASSESSMENT — ENCOUNTER SYMPTOMS
DIARRHEA: 1
MYALGIAS: 0
LOSS OF CONSCIOUSNESS: 0
CHILLS: 0
FEVER: 0
CONSTIPATION: 0
ABDOMINAL PAIN: 0
EYE PAIN: 0
WEIGHT LOSS: 0
SORE THROAT: 0
DIZZINESS: 0
SHORTNESS OF BREATH: 0
WEAKNESS: 0
BLOOD IN STOOL: 0
ORTHOPNEA: 0
NAUSEA: 1
PALPITATIONS: 0
BLURRED VISION: 0
COUGH: 0
HEADACHES: 1
WHEEZING: 0
HEMOPTYSIS: 0
VOMITING: 0

## 2021-08-18 ASSESSMENT — LIFESTYLE VARIABLES
AVERAGE NUMBER OF DAYS PER WEEK YOU HAVE A DRINK CONTAINING ALCOHOL: 0
CONSUMPTION TOTAL: NEGATIVE
TOTAL SCORE: 0
ALCOHOL_USE: NO
HAVE YOU EVER FELT YOU SHOULD CUT DOWN ON YOUR DRINKING: NO
EVER HAD A DRINK FIRST THING IN THE MORNING TO STEADY YOUR NERVES TO GET RID OF A HANGOVER: NO
TOTAL SCORE: 0
EVER FELT BAD OR GUILTY ABOUT YOUR DRINKING: NO
TOTAL SCORE: 0
HOW MANY TIMES IN THE PAST YEAR HAVE YOU HAD 5 OR MORE DRINKS IN A DAY: 0
HAVE PEOPLE ANNOYED YOU BY CRITICIZING YOUR DRINKING: NO
ON A TYPICAL DAY WHEN YOU DRINK ALCOHOL HOW MANY DRINKS DO YOU HAVE: 0

## 2021-08-18 ASSESSMENT — COGNITIVE AND FUNCTIONAL STATUS - GENERAL
SUGGESTED CMS G CODE MODIFIER DAILY ACTIVITY: CJ
SUGGESTED CMS G CODE MODIFIER MOBILITY: CJ
DRESSING REGULAR LOWER BODY CLOTHING: A LITTLE
CLIMB 3 TO 5 STEPS WITH RAILING: A LITTLE
MOBILITY SCORE: 21
DAILY ACTIVITIY SCORE: 22
WALKING IN HOSPITAL ROOM: A LITTLE
HELP NEEDED FOR BATHING: A LITTLE
STANDING UP FROM CHAIR USING ARMS: A LITTLE

## 2021-08-18 ASSESSMENT — PATIENT HEALTH QUESTIONNAIRE - PHQ9
SUM OF ALL RESPONSES TO PHQ9 QUESTIONS 1 AND 2: 0
1. LITTLE INTEREST OR PLEASURE IN DOING THINGS: NOT AT ALL
2. FEELING DOWN, DEPRESSED, IRRITABLE, OR HOPELESS: NOT AT ALL

## 2021-08-18 ASSESSMENT — PAIN DESCRIPTION - PAIN TYPE: TYPE: ACUTE PAIN

## 2021-08-18 NOTE — ED NOTES
Attending Hospitalist is Dr KATELIN Mendoza starting at 0700. Please contact this physician for orders, updates or questions today.

## 2021-08-18 NOTE — ASSESSMENT & PLAN NOTE
Na 120  Preivously noted to have hyponatremia, but not normally this low  Reports taking laxatives x2 weeks for a cleansing regimen  - NS 500cc bolus ->100cc/hr  - f/u with repeat BMP q6h   - Correction goal 6-8 in 24hrs  - F/u with Serum Osm, Urine Osm, Urine Na

## 2021-08-18 NOTE — PROGRESS NOTES
[facility] Medicine Daily Progress Note    Date of Service  8/18/2021    Chief Complaint  Emigdio Jiménez is a 85 y.o. male admitted 8/17/2021 with headache, nausea.    Hospital Course  This is a 85 y.o. male with PMH of asthma and known heart murmur.  Who presented 8/17/2021 with complaints of headache and difficulty sleeping with mild nausea.  Patient reports that he is Covid vaccine yesterday and is unsure if his symptoms may be related.  However, he was reporting issues of insomnia for the past 2 days as well as headaches and nausea which may have started prior to his vaccine.    Here in ER he was found to have hyponatremia and was admitted for further treatment.      Interval Problem Update  Patient seen and examined, still complaining of some HA. On IVF for his hyponatremia checking q6 hr bmp.      Consultants/Specialty  None     Code Status  Full Code    Disposition  Patient is not medically cleared.   Anticipate discharge to tbd.  I have placed the appropriate orders for post-discharge needs.    Review of Systems  Review of Systems   Constitutional: Positive for malaise/fatigue. Negative for chills, fever and weight loss.   HENT: Negative for ear discharge, ear pain, hearing loss and sore throat.    Eyes: Negative for blurred vision and pain.   Respiratory: Negative for cough, hemoptysis, shortness of breath and wheezing.    Cardiovascular: Negative for chest pain, palpitations, orthopnea and leg swelling.   Gastrointestinal: Positive for diarrhea and nausea. Negative for abdominal pain, blood in stool, constipation and vomiting.   Genitourinary: Negative for dysuria and hematuria.   Musculoskeletal: Negative for joint pain and myalgias.   Skin: Negative for rash.   Neurological: Positive for headaches. Negative for dizziness, loss of consciousness and weakness.   All other systems reviewed and are negative.       Physical Exam  Temp:  [36.2 °C (97.1 °F)-36.8 °C (98.3 °F)] 36.2 °C (97.1 °F)  Pulse:  []  72  Resp:  [12-20] 18  BP: (104-188)/() 104/58  SpO2:  [92 %-97 %] 96 %    Physical Exam  Vitals and nursing note reviewed.   Constitutional:       General: He is not in acute distress.     Appearance: Normal appearance.   HENT:      Mouth/Throat:      Mouth: Mucous membranes are moist.   Eyes:      General: No scleral icterus.        Right eye: No discharge.         Left eye: No discharge.      Extraocular Movements: Extraocular movements intact.   Cardiovascular:      Rate and Rhythm: Normal rate and regular rhythm.      Heart sounds: Murmur heard.   No gallop.    Pulmonary:      Effort: Pulmonary effort is normal.      Breath sounds: Normal breath sounds. No wheezing, rhonchi or rales.   Abdominal:      General: Abdomen is flat. Bowel sounds are normal. There is no distension.      Palpations: Abdomen is soft.      Tenderness: There is no abdominal tenderness. There is no guarding.   Musculoskeletal:         General: No deformity. Normal range of motion.      Right lower leg: No edema.      Left lower leg: No edema.   Neurological:      General: No focal deficit present.      Mental Status: He is alert and oriented to person, place, and time.         Fluids    Intake/Output Summary (Last 24 hours) at 8/18/2021 1350  Last data filed at 8/17/2021 2307  Gross per 24 hour   Intake 500 ml   Output --   Net 500 ml       Laboratory  Recent Labs     08/17/21 2002 08/18/21  0605   WBC 6.6 7.1   RBC 4.89 4.18*   HEMOGLOBIN 14.5 12.6*   HEMATOCRIT 42.6 36.1*   MCV 87.1 86.4   MCH 29.7 30.1   MCHC 34.0 34.9   RDW 42.2 41.9   PLATELETCT 210 162*   MPV 11.1 10.8     Recent Labs     08/17/21 2002 08/18/21  0501 08/18/21  0955   SODIUM 120* 125* 125*   POTASSIUM 4.7 4.9 4.8   CHLORIDE 87* 94* 96   CO2 21 21 22   GLUCOSE 93 87 80   BUN 11 10 8   CREATININE 1.20 1.10 1.15   CALCIUM 8.9 8.3* 8.4*                   Imaging  CT-HEAD W/O   Final Result         1. No acute intracranial abnormality. No evidence of acute  intracranial hemorrhage or mass lesion.                    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 In review of yesterday's note (8/17/2021), there are no changes except as documented above.

## 2021-08-18 NOTE — H&P
Hospital Medicine History & Physical Note    Date of Service  8/17/2021    Primary Care Physician  Pcp Pt States None    Consultants  none    Specialist Names: none    Code Status  Full Code    Chief Complaint  Chief Complaint   Patient presents with   • Syncope     dizziness   • Head Ache     unable to sleep       History of Presenting Illness  Emigdio Jiménez is a 85 y.o. male with PMH of asthma and known heart murmur.  Who presented 8/17/2021 with complaints of headache and difficulty sleeping with mild nausea.  Patient reports that he is Covid vaccine yesterday and is unsure if his symptoms may be related.  However, he was reporting issues of insomnia for the past 2 days as well as headaches and nausea which may have started prior to his vaccine.  Describes as a frontal pressure sensation that is over the past headaches.  He has nausea without vomiting.  He also reports difficulty sleeping.  Patient is otherwise denying any further complaints.  On questioning, patient was found to have hyponatremia on today's lab work.  He reports that he did have history of hyponatremia in the past but he was never told there was an issue.  On questioning, patient does report he has been having issues of soft stool, but reports he has been taking laxatives for the past 2 weeks as he has been on a cleansing regimen.  He does report adequate free water intake and he does not endorse any change in appetite or decreased urinary output.    ED: Vitals unremarkable.  CBC unremarkable, , CL 87, troponin XVIII, UA unremarkable for bacterial infection.  CT head unremarkable.  EKG without ischemia.  Patient was given Zofran 4 mg.    I discussed the plan of care with patient.    Review of Systems  Review of Systems   Constitutional: Positive for malaise/fatigue. Negative for chills, fever and weight loss.   HENT: Negative for hearing loss and sore throat.    Eyes: Negative for blurred vision and pain.   Respiratory: Negative for cough,  hemoptysis, shortness of breath and wheezing.    Cardiovascular: Negative for chest pain, palpitations, orthopnea and leg swelling.   Gastrointestinal: Positive for diarrhea and nausea. Negative for abdominal pain, blood in stool, constipation and vomiting.   Genitourinary: Negative for dysuria and hematuria.   Musculoskeletal: Negative for joint pain and myalgias.   Skin: Negative for rash.   Neurological: Positive for headaches. Negative for dizziness, loss of consciousness and weakness.       Past Medical History   has a past medical history of Heart murmur and Hypertension.    Surgical History   has a past surgical history that includes hernia repair.     Family History  family history includes Heart Disease in his mother; Hyperlipidemia in his mother.   Family history reviewed with patient. There is no family history that is pertinent to the chief complaint.     Social History   reports that he has quit smoking. He smoked 0.00 packs per day. He has never used smokeless tobacco. He reports previous alcohol use. He reports that he does not use drugs.    Allergies  No Known Allergies    Medications  Prior to Admission Medications   Prescriptions Last Dose Informant Patient Reported? Taking?   albuterol 108 (90 Base) MCG/ACT Aero Soln inhalation aerosol  Patient's Home Pharmacy No No   Sig: Inhale 2 Puffs by mouth every four hours as needed for Shortness of Breath.   Patient not taking: Reported on 3/12/2021   amLODIPine (NORVASC) 10 MG Tab  Patient's Home Pharmacy No No   Sig: Take 1 Tab by mouth every day.   Patient not taking: Reported on 3/12/2021   amLODIPine (NORVASC) 5 MG Tab  Patient's Home Pharmacy Yes No   Sig: Take 5 mg by mouth every day.   finasteride (PROSCAR) 5 MG Tab  Patient's Home Pharmacy Yes No   Sig: Take 5 mg by mouth every day.   hydrOXYzine HCl (ATARAX) 25 MG Tab  Patient's Home Pharmacy No No   Sig: Take 0.5 Tabs by mouth 2 times a day as needed for Itching.   Patient not taking: Reported  on 3/12/2021   levothyroxine (SYNTHROID) 175 MCG Tab  Patient's Home Pharmacy Yes No   Sig: Take 175 mcg by mouth Every morning on an empty stomach.   levothyroxine (SYNTHROID) 75 MCG Tab  Patient's Home Pharmacy No No   Sig: Take 1 Tab by mouth Every morning on an empty stomach.   Patient not taking: Reported on 3/12/2021   lisinopril (PRINIVIL) 20 MG Tab  Patient's Home Pharmacy Yes No   Sig: Take 20 mg by mouth every day.   tamsulosin (FLOMAX) 0.4 MG capsule  Patient's Home Pharmacy Yes No   Sig: Take 0.4 mg by mouth every day.      Facility-Administered Medications: None       Physical Exam  Temp:  [36.8 °C (98.3 °F)] 36.8 °C (98.3 °F)  Pulse:  [73] 73  Resp:  [19] 19  BP: (146)/(86) 146/86  SpO2:  [97 %] 97 %    Physical Exam  Vitals and nursing note reviewed.   Constitutional:       General: He is not in acute distress.     Appearance: Normal appearance.   HENT:      Mouth/Throat:      Mouth: Mucous membranes are moist.   Eyes:      Extraocular Movements: Extraocular movements intact.   Cardiovascular:      Rate and Rhythm: Normal rate and regular rhythm.      Heart sounds: Murmur heard.   No gallop.    Pulmonary:      Effort: Pulmonary effort is normal.      Breath sounds: Normal breath sounds. No wheezing, rhonchi or rales.   Abdominal:      General: Abdomen is flat. Bowel sounds are normal. There is no distension.      Palpations: Abdomen is soft.      Tenderness: There is no abdominal tenderness.   Musculoskeletal:         General: No deformity. Normal range of motion.      Right lower leg: No edema.      Left lower leg: No edema.   Neurological:      General: No focal deficit present.      Mental Status: He is alert and oriented to person, place, and time.         Laboratory:  Recent Labs     08/17/21 2002   WBC 6.6   RBC 4.89   HEMOGLOBIN 14.5   HEMATOCRIT 42.6   MCV 87.1   MCH 29.7   MCHC 34.0   RDW 42.2   PLATELETCT 210   MPV 11.1     Recent Labs     08/17/21 2002   SODIUM 120*   POTASSIUM 4.7    CHLORIDE 87*   CO2 21   GLUCOSE 93   BUN 11   CREATININE 1.20   CALCIUM 8.9     Recent Labs     08/17/21 2002   ALTSGPT 21   ASTSGOT 22   ALKPHOSPHAT 82   TBILIRUBIN 0.5   GLUCOSE 93         No results for input(s): NTPROBNP in the last 72 hours.      Recent Labs     08/17/21 2002   TROPONINT 18       Imaging:  CT-HEAD W/O   Final Result         1. No acute intracranial abnormality. No evidence of acute intracranial hemorrhage or mass lesion.                   EKG:  I have personally reviewed the images and compared with prior images.    Assessment/Plan:  I anticipate this patient will require at least two midnights for appropriate medical management, necessitating inpatient admission.    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

## 2021-08-18 NOTE — ED PROVIDER NOTES
ED Provider Note    ER PROVIDER NOTE      CHIEF COMPLAINT  Chief Complaint   Patient presents with   • Syncope     dizziness   • Head Ache     unable to sleep       HPI  Emigdio Jiménez is a 85 y.o. male who presents to the emergency department complaining of headache and some difficulty sleeping as well as nausea.  Patient reports he had a Covid vaccination yesterday, but was having trouble sleeping the night before with the headache and nausea  He describes it is a frontal pressure sensation, similar to past headaches.  He has had no  Vomiting and reports he eats quite well with fruits and vegetables.  No fevers or chills.  No visual symptoms or focal weakness numbness or tingling.  He reports that then last night he also had difficulty sleeping as well.  Reports no chest pain or shortness of breath, he did feel lightheaded and thinks he may have passed out earlier today but is not sure.  He reports no leg pain or swelling.  No recent travel.    REVIEW OF SYSTEMS  Pertinent positives include headache, difficulty sleeping pertinent negatives include no fever or chest pain. See HPI for details. All other systems reviewed and are negative.    PAST MEDICAL HISTORY   has a past medical history of Heart murmur and Hypertension.    SURGICAL HISTORY   has a past surgical history that includes hernia repair.    FAMILY HISTORY  Family History   Problem Relation Age of Onset   • Hyperlipidemia Mother    • Heart Disease Mother        SOCIAL HISTORY  Social History     Socioeconomic History   • Marital status:      Spouse name: Not on file   • Number of children: Not on file   • Years of education: Not on file   • Highest education level: Not on file   Occupational History   • Not on file   Tobacco Use   • Smoking status: Former Smoker     Packs/day: 0.00   • Smokeless tobacco: Never Used   Vaping Use   • Vaping Use: Never used   Substance and Sexual Activity   • Alcohol use: Not Currently   • Drug use: Never   • Sexual  "activity: Not on file   Other Topics Concern   • Not on file   Social History Narrative   • Not on file     Social Determinants of Health     Financial Resource Strain:    • Difficulty of Paying Living Expenses:    Food Insecurity:    • Worried About Running Out of Food in the Last Year:    • Ran Out of Food in the Last Year:    Transportation Needs:    • Lack of Transportation (Medical):    • Lack of Transportation (Non-Medical):    Physical Activity:    • Days of Exercise per Week:    • Minutes of Exercise per Session:    Stress:    • Feeling of Stress :    Social Connections:    • Frequency of Communication with Friends and Family:    • Frequency of Social Gatherings with Friends and Family:    • Attends Congregation Services:    • Active Member of Clubs or Organizations:    • Attends Club or Organization Meetings:    • Marital Status:    Intimate Partner Violence:    • Fear of Current or Ex-Partner:    • Emotionally Abused:    • Physically Abused:    • Sexually Abused:       Social History     Substance and Sexual Activity   Drug Use Never       CURRENT MEDICATIONS  Home Medications     Reviewed by Lenka Mancilla R.N. (Registered Nurse) on 08/17/21 at 1808  Med List Status: Partial   Medication Last Dose Status   albuterol 108 (90 Base) MCG/ACT Aero Soln inhalation aerosol  Active   amLODIPine (NORVASC) 10 MG Tab  Active   amLODIPine (NORVASC) 5 MG Tab  Active   finasteride (PROSCAR) 5 MG Tab  Active   hydrOXYzine HCl (ATARAX) 25 MG Tab  Active   levothyroxine (SYNTHROID) 175 MCG Tab  Active   levothyroxine (SYNTHROID) 75 MCG Tab  Active   lisinopril (PRINIVIL) 20 MG Tab  Active   tamsulosin (FLOMAX) 0.4 MG capsule  Active                ALLERGIES  No Known Allergies    PHYSICAL EXAM  VITAL SIGNS: /86   Pulse 73   Temp 36.8 °C (98.3 °F) (Temporal)   Resp 19   Ht 1.676 m (5' 6\")   Wt 80.1 kg (176 lb 9.4 oz)   SpO2 97%   BMI 28.50 kg/m²   Pulse ox interpretation: I interpret this pulse ox as " normal.    Constitutional: Alert in no apparent distress.  HENT: No signs of trauma, Bilateral external ears normal, Nose normal.   Eyes: Pupils are equal and reactive, Conjunctiva normal, Non-icteric.   Neck: Normal range of motion, No tenderness, Supple, No stridor.   Lymphatic: No lymphadenopathy noted.   Cardiovascular: Regular rate and rhythm, no murmurs.   Thorax & Lungs: Normal breath sounds, No respiratory distress, No wheezing, No chest tenderness.   Abdomen: Bowel sounds normal, Soft, No tenderness, No masses, No pulsatile masses. No peritoneal signs.  Skin: Warm, Dry, No erythema, No rash.   Back: No bony tenderness, No CVA tenderness.   Extremities: Intact distal pulses, No edema, No tenderness, No cyanosis, Negative Carlitos's sign.  Musculoskeletal: Good range of motion in all major joints. No tenderness to palpation or major deformities noted.   Neurologic: Alert, cranial nerves intact, speech is appropriate or not slurred, upper extremities bilaterally exhibit no drift, no dysmetria, 5 out of 5 strength with bilateral bicep/tricep/, sensation intact to light touch throughout upper extremities. Lower extremities strength 5 out of 5 thigh extension/flexion/abduction/adduction, knee extension/flexion, dorsiflexion plantar flexion. No clonus.  2+ patella reflexes.  sensation intact to light touch.  No focal deficits noted.  Psychiatric: Affect normal, Judgment normal, Mood normal.       DIAGNOSTIC STUDIES / PROCEDURES    Results for orders placed or performed during the hospital encounter of 08/17/21   CBC WITH DIFFERENTIAL   Result Value Ref Range    WBC 6.6 4.8 - 10.8 K/uL    RBC 4.89 4.70 - 6.10 M/uL    Hemoglobin 14.5 14.0 - 18.0 g/dL    Hematocrit 42.6 42.0 - 52.0 %    MCV 87.1 81.4 - 97.8 fL    MCH 29.7 27.0 - 33.0 pg    MCHC 34.0 33.7 - 35.3 g/dL    RDW 42.2 35.9 - 50.0 fL    Platelet Count 210 164 - 446 K/uL    MPV 11.1 9.0 - 12.9 fL    Neutrophils-Polys 65.30 44.00 - 72.00 %    Lymphocytes  21.80 (L) 22.00 - 41.00 %    Monocytes 10.10 0.00 - 13.40 %    Eosinophils 1.40 0.00 - 6.90 %    Basophils 0.90 0.00 - 1.80 %    Immature Granulocytes 0.50 0.00 - 0.90 %    Nucleated RBC 0.00 /100 WBC    Neutrophils (Absolute) 4.33 1.82 - 7.42 K/uL    Lymphs (Absolute) 1.44 1.00 - 4.80 K/uL    Monos (Absolute) 0.67 0.00 - 0.85 K/uL    Eos (Absolute) 0.09 0.00 - 0.51 K/uL    Baso (Absolute) 0.06 0.00 - 0.12 K/uL    Immature Granulocytes (abs) 0.03 0.00 - 0.11 K/uL    NRBC (Absolute) 0.00 K/uL   COMP METABOLIC PANEL   Result Value Ref Range    Sodium 120 (L) 135 - 145 mmol/L    Potassium 4.7 3.6 - 5.5 mmol/L    Chloride 87 (L) 96 - 112 mmol/L    Co2 21 20 - 33 mmol/L    Anion Gap 12.0 7.0 - 16.0    Glucose 93 65 - 99 mg/dL    Bun 11 8 - 22 mg/dL    Creatinine 1.20 0.50 - 1.40 mg/dL    Calcium 8.9 8.5 - 10.5 mg/dL    AST(SGOT) 22 12 - 45 U/L    ALT(SGPT) 21 2 - 50 U/L    Alkaline Phosphatase 82 30 - 99 U/L    Total Bilirubin 0.5 0.1 - 1.5 mg/dL    Albumin 4.4 3.2 - 4.9 g/dL    Total Protein 7.1 6.0 - 8.2 g/dL    Globulin 2.7 1.9 - 3.5 g/dL    A-G Ratio 1.6 g/dL   TROPONIN   Result Value Ref Range    Troponin T 18 6 - 19 ng/L   ESTIMATED GFR   Result Value Ref Range    GFR If African American >60 >60 mL/min/1.73 m 2    GFR If Non African American 58 (A) >60 mL/min/1.73 m 2   URINALYSIS    Specimen: Urine   Result Value Ref Range    Color Yellow     Character Clear     Specific Gravity 1.010 <1.035    Ph 7.0 5.0 - 8.0    Glucose Negative Negative mg/dL    Ketones Negative Negative mg/dL    Protein Negative Negative mg/dL    Bilirubin Negative Negative    Urobilinogen, Urine 0.2 Negative    Nitrite Negative Negative    Leukocyte Esterase Negative Negative    Occult Blood Negative Negative    Micro Urine Req see below    EKG (NOW)   Result Value Ref Range    Report       [facility]    Test Date:  2021-08-17  Pt Name:    OLAMIDE DAI                Department: ER  MRN:        [id number]                       Room:  Gender:     Male                         Technician: [id number]  :        [id number]                   Requested By:ER TRIAGE PROTOCOL  Order #:    [id number]                    Reading MD: OLAMIDE MCKEON MD    Measurements  Intervals                                Axis  Rate:       67                           P:          23  IL:         200                          QRS:        12  QRSD:       70                           T:          64  QT:         404  QTc:        427    Interpretive Statements  SINUS RHYTHM  Compared to ECG 2021 14:33:36  ST (T wave) deviation no longer present  Electronically Signed On 2021 19:50:03 PDT by OLAMIDE MCKEON MD           RADIOLOGY  CT-HEAD W/O   Final Result         1. No acute intracranial abnormality. No evidence of acute intracranial hemorrhage or mass lesion.                 The radiologist's interpretation of all radiological studies have been reviewed and images independently viewed by me.    COURSE & MEDICAL DECISION MAKING  Nursing notes, VS, PMSFHx reviewed in chart.    7:39 PM Patient seen and examined at bedside. Patient will be treated with acetaminophen. Ordered for CBC, CMP, troponin, ECG, CT head to evaluate his symptoms.     9:00 PM  Patient is reevaluated, updated on all results and plan for hospitalization to which he is agreeable        Decision Making:  This is a 85 y.o. male presenting with headache and nausea, found to be severely hyponatremic, 120 down from 131 in March.  Unclear etiology for his hyponatremia, he does appear euvolemic and to have an appropriate diet and he will be admitted for further care and evaluation.  CT head shows no acute abnormalities.  No other significant electrolyte disturbance.  ECG without evidence of arrhythmia or ischemia    Patient is admitted in guarded condition    FINAL IMPRESSION  1. Hyponatremia    2. Syncope, unspecified syncope type    3. Nonintractable headache, unspecified  chronicity pattern, unspecified headache type          The note accurately reflects work and decisions made by me.  Emigdio Gasca M.D.  8/17/2021  10:00 PM

## 2021-08-18 NOTE — ED TRIAGE NOTES
".  Chief Complaint   Patient presents with   • Syncope     dizziness   • Head Ache     unable to sleep      Pt ambulate to triage with above complaint. Pt states headache with nausea x 2 days and is unbable to sleep. Pt also notes he \"passed out twice on the way here\", loss of apetite. Pt taking Tylenol for HA w/o relief. Denies other S/S. Pt notes he received his first COVID vaccination yesterday a.m.   Pt educated on triage process and returned to [facility].   "

## 2021-08-19 VITALS
TEMPERATURE: 97.7 F | HEART RATE: 95 BPM | BODY MASS INDEX: 28.38 KG/M2 | OXYGEN SATURATION: 96 % | RESPIRATION RATE: 18 BRPM | DIASTOLIC BLOOD PRESSURE: 68 MMHG | WEIGHT: 176.59 LBS | HEIGHT: 66 IN | SYSTOLIC BLOOD PRESSURE: 138 MMHG

## 2021-08-19 LAB
ANION GAP SERPL CALC-SCNC: 10 MMOL/L (ref 7–16)
ANION GAP SERPL CALC-SCNC: 7 MMOL/L (ref 7–16)
BUN SERPL-MCNC: 11 MG/DL (ref 8–22)
BUN SERPL-MCNC: 13 MG/DL (ref 8–22)
CALCIUM SERPL-MCNC: 8.5 MG/DL (ref 8.5–10.5)
CALCIUM SERPL-MCNC: 8.6 MG/DL (ref 8.5–10.5)
CHLORIDE SERPL-SCNC: 100 MMOL/L (ref 96–112)
CHLORIDE SERPL-SCNC: 98 MMOL/L (ref 96–112)
CO2 SERPL-SCNC: 20 MMOL/L (ref 20–33)
CO2 SERPL-SCNC: 21 MMOL/L (ref 20–33)
CREAT SERPL-MCNC: 1.04 MG/DL (ref 0.5–1.4)
CREAT SERPL-MCNC: 1.18 MG/DL (ref 0.5–1.4)
GLUCOSE SERPL-MCNC: 78 MG/DL (ref 65–99)
GLUCOSE SERPL-MCNC: 94 MG/DL (ref 65–99)
POTASSIUM SERPL-SCNC: 4.9 MMOL/L (ref 3.6–5.5)
POTASSIUM SERPL-SCNC: 4.9 MMOL/L (ref 3.6–5.5)
SODIUM SERPL-SCNC: 128 MMOL/L (ref 135–145)
SODIUM SERPL-SCNC: 128 MMOL/L (ref 135–145)

## 2021-08-19 PROCEDURE — A9270 NON-COVERED ITEM OR SERVICE: HCPCS | Performed by: STUDENT IN AN ORGANIZED HEALTH CARE EDUCATION/TRAINING PROGRAM

## 2021-08-19 PROCEDURE — 80048 BASIC METABOLIC PNL TOTAL CA: CPT

## 2021-08-19 PROCEDURE — 99239 HOSP IP/OBS DSCHRG MGMT >30: CPT | Performed by: STUDENT IN AN ORGANIZED HEALTH CARE EDUCATION/TRAINING PROGRAM

## 2021-08-19 PROCEDURE — 700105 HCHG RX REV CODE 258: Performed by: STUDENT IN AN ORGANIZED HEALTH CARE EDUCATION/TRAINING PROGRAM

## 2021-08-19 PROCEDURE — 36415 COLL VENOUS BLD VENIPUNCTURE: CPT

## 2021-08-19 PROCEDURE — 700102 HCHG RX REV CODE 250 W/ 637 OVERRIDE(OP): Performed by: STUDENT IN AN ORGANIZED HEALTH CARE EDUCATION/TRAINING PROGRAM

## 2021-08-19 PROCEDURE — 700111 HCHG RX REV CODE 636 W/ 250 OVERRIDE (IP): Performed by: STUDENT IN AN ORGANIZED HEALTH CARE EDUCATION/TRAINING PROGRAM

## 2021-08-19 RX ADMIN — FINASTERIDE 5 MG: 5 TABLET, FILM COATED ORAL at 06:23

## 2021-08-19 RX ADMIN — ENOXAPARIN SODIUM 40 MG: 40 INJECTION SUBCUTANEOUS at 06:23

## 2021-08-19 RX ADMIN — SODIUM CHLORIDE: 9 INJECTION, SOLUTION INTRAVENOUS at 06:25

## 2021-08-19 RX ADMIN — ONDANSETRON 4 MG: 2 INJECTION INTRAMUSCULAR; INTRAVENOUS at 00:18

## 2021-08-19 RX ADMIN — AMLODIPINE BESYLATE 5 MG: 10 TABLET ORAL at 06:23

## 2021-08-19 RX ADMIN — TAMSULOSIN HYDROCHLORIDE 0.4 MG: 0.4 CAPSULE ORAL at 06:23

## 2021-08-19 RX ADMIN — LEVOTHYROXINE SODIUM 175 MCG: 0.17 TABLET ORAL at 06:22

## 2021-08-19 RX ADMIN — LISINOPRIL 20 MG: 20 TABLET ORAL at 06:22

## 2021-08-19 NOTE — PROGRESS NOTES
Bedside report received. Assessment completed.  Pt is A&O x4. Pt on room air.   Medicating for pain PRN per MAR Pain 0/10  Denies nausea. - numbness, - tingling.  Skin intact   LDA CDI   Last BM PTA, +flatus, +void.  Regular diet. Tolerates well.   Pt up with stand by assist.  Call light and belongings within reach. All needs met at this time. Fall Precautions and hourly rounding in place.

## 2021-08-19 NOTE — CARE PLAN
Problem: Pain - Standard  Goal: Alleviation of pain or a reduction in pain to the patient’s comfort goal  Outcome: Progressing     Problem: Knowledge Deficit - Standard  Goal: Patient and family/care givers will demonstrate understanding of plan of care, disease process/condition, diagnostic tests and medications  Outcome: Progressing     Problem: Fall Risk  Goal: Patient will remain free from falls  Outcome: Progressing     The patient is Stable - Low risk of patient condition declining or worsening    Shift Goals  Clinical Goals: increase Na+  Patient Goals: rest

## 2021-08-19 NOTE — DISCHARGE SUMMARY
Discharge Summary    CHIEF COMPLAINT ON ADMISSION  Chief Complaint   Patient presents with   • Syncope     dizziness   • Head Ache     unable to sleep       Reason for Admission  Syncope      Admission Date  8/17/2021    CODE STATUS  Prior    HPI & HOSPITAL COURSE  This is a 85 y.o. male with past medical history of asthma and erectile dysfunction who presented to the hospital on 8/17/2021 with complaints of headache and nausea.  Patient recently had a Covid vaccine and thought maybe his symptoms were secondary to this however he was unsure so he presented for further evaluation.  In the ER patient was found to be hyponatremic with a sodium of 120 thus he was mended for further evaluation and treatment.  Patient was given slow rate IV fluids with gradual improvement in his sodium to 128 which was close to patients baseline, symptoms did improve and patient was eager for discharge.  Patient had been taking daily laxatives and had been out active suspect that his hyponatremia was secondary to dehydration.  He was educated on signs symptoms that should prompt him to seek medical attention.  On discharge patient was ambulatory tolerating regular diet and back to his baseline    Therefore, he is discharged in good and stable condition to home with close outpatient follow-up.    The patient met 2-midnight criteria for an inpatient stay at the time of discharge.    Discharge Date  8/20/2021    FOLLOW UP ITEMS POST DISCHARGE  Follow-up hyponatremia, repeat labs ordered in 1 week    DISCHARGE DIAGNOSES  Principal Problem:    Hyponatremia POA: Yes  Active Problems:    Other specified hypothyroidism POA: Yes    Essential hypertension POA: Yes  Resolved Problems:    * No resolved hospital problems. *      FOLLOW UP  No future appointments.        call you primary and schedule an appointment in the next 1-2 weeks     Pcp Pt States None            MEDICATIONS ON DISCHARGE     Medication List      CONTINUE taking these medications       Instructions   amLODIPine 5 MG Tabs  Commonly known as: NORVASC   Take 5 mg by mouth every day.  Dose: 5 mg     finasteride 5 MG Tabs  Commonly known as: PROSCAR   Take 5 mg by mouth every day.  Dose: 5 mg     levothyroxine 175 MCG Tabs  Commonly known as: SYNTHROID   Take 175 mcg by mouth Every morning on an empty stomach.  Dose: 175 mcg     lisinopril 20 MG Tabs  Commonly known as: PRINIVIL   Take 20 mg by mouth every day.  Dose: 20 mg     tamsulosin 0.4 MG capsule  Commonly known as: FLOMAX   Take 0.4 mg by mouth every day.  Dose: 0.4 mg            Allergies  No Known Allergies    DIET  No orders of the defined types were placed in this encounter.      ACTIVITY  As tolerated.      CONSULTATIONS  N/A    PROCEDURES  N/A    LABORATORY  Lab Results   Component Value Date    SODIUM 128 (L) 08/19/2021    POTASSIUM 4.9 08/19/2021    CHLORIDE 100 08/19/2021    CO2 21 08/19/2021    GLUCOSE 78 08/19/2021    BUN 11 08/19/2021    CREATININE 1.18 08/19/2021        Lab Results   Component Value Date    WBC 7.1 08/18/2021    HEMOGLOBIN 12.6 (L) 08/18/2021    HEMATOCRIT 36.1 (L) 08/18/2021    PLATELETCT 162 (L) 08/18/2021        Total time of the discharge process exceeds 33 minutes.

## 2021-08-19 NOTE — DISCHARGE INSTRUCTIONS
Discharge Instructions    Discharged to home by car with relative. Discharged via wheelchair, hospital escort: Yes.  Special equipment needed: Not Applicable    Be sure to schedule a follow-up appointment with your primary care doctor or any specialists as instructed.     Discharge Plan:   Diet Plan: Discussed  Activity Level: Discussed  Confirmed Follow up Appointment: Patient to Call and Schedule Appointment  Confirmed Symptoms Management: Discussed  Medication Reconciliation Updated: Yes    I understand that a diet low in cholesterol, fat, and sodium is recommended for good health. Unless I have been given specific instructions below for another diet, I accept this instruction as my diet prescription.   Other diet: Regular    Special Instructions: None    · Is patient discharged on Warfarin / Coumadin?   No     Depression / Suicide Risk    As you are discharged from this [facility] facility, it is important to learn how to keep safe from harming yourself.    Recognize the warning signs:  · Abrupt changes in personality, positive or negative- including increase in energy   · Giving away possessions  · Change in eating patterns- significant weight changes-  positive or negative  · Change in sleeping patterns- unable to sleep or sleeping all the time   · Unwillingness or inability to communicate  · Depression  · Unusual sadness, discouragement and loneliness  · Talk of wanting to die  · Neglect of personal appearance   · Rebelliousness- reckless behavior  · Withdrawal from people/activities they love  · Confusion- inability to concentrate     If you or a loved one observes any of these behaviors or has concerns about self-harm, here's what you can do:  · Talk about it- your feelings and reasons for harming yourself  · Remove any means that you might use to hurt yourself (examples: pills, rope, extension cords, firearm)  · Get professional help from the community (Mental Health, Substance Abuse, psychological  counseling)  · Do not be alone:Call your Safe Contact- someone whom you trust who will be there for you.  · Call your local CRISIS HOTLINE 661-5460 or 744-913-2603  · Call your local Children's Mobile Crisis Response Team [place] (264) 657-7521 or www.CricHQ  · Call the toll free National Suicide Prevention Hotlines   · National Suicide Prevention Lifeline 327-035-SFPA (7351)  · [employer] 800-SUICIDE (580-4297)        Repeat labs in 1 week, recommend you follow up with your primary care doctor in the next 1 -2 weeks to look over you blood work   Stop taking laxatives unless needed for constipation  Stay hydrated but do not over drink water, you should limit plain water consumption to less than 1.5L daily, if you need additional fluids recommend use of gatorade         Hyponatremia  Hyponatremia is when the amount of salt (sodium) in your blood is too low. When salt levels are low, your body may take in extra water. This can cause swelling throughout the body. The swelling often affects the brain.  What are the causes?  This condition may be caused by:  · Certain medical problems or conditions.  · Vomiting a lot.  · Having watery poop (diarrhea) often.  · Certain medicines or illegal drugs.  · Not having enough water in the body (dehydration).  · Drinking too much water.  · Eating a diet that is low in salt.  · Large burns on your body.  · Too much sweating.  What increases the risk?  You are more likely to get this condition if you:  · Have long-term (chronic) kidney disease.  · Have heart failure.  · Have a medical condition that causes you to have watery poop often.  · Do very hard exercises.  · Take medicines that affect the amount of salt is in your blood.  What are the signs or symptoms?  Symptoms of this condition include:  · Headache.  · Feeling like you may vomit (nausea).  · Vomiting.  · Being very tired (lethargic).  · Muscle weakness and cramps.  · Not wanting to eat as  much as normal (loss of appetite).  · Feeling weak or light-headed.  Severe symptoms of this condition include:  · Confusion.  · Feeling restless (agitation).  · Having a fast heart rate.  · Passing out (fainting).  · Seizures.  · Coma.  How is this treated?  Treatment for this condition depends on the cause. Treatment may include:  · Getting fluids through an IV tube that is put into one of your veins.  · Taking medicines to fix the salt levels in your blood. If medicines are causing the problem, your medicines will need to be changed.  · Limiting how much water or fluid you take in.  · Monitoring in the hospital to watch your symptoms.  Follow these instructions at home:    · Take over-the-counter and prescription medicines only as told by your doctor. Many medicines can make this condition worse. Talk with your doctor about any medicines that you are taking.  · Eat and drink exactly as you are told by your doctor.  ? Eat only the foods you are told to eat.  ? Limit how much fluid you take.  · Do not drink alcohol.  · Keep all follow-up visits as told by your doctor. This is important.  Contact a doctor if:  · You feel more like you may vomit.  · You feel more tired.  · Your headache gets worse.  · You feel more confused.  · You feel weaker.  · Your symptoms go away and then they come back.  · You have trouble following the diet instructions.  Get help right away if:  · You have a seizure.  · You pass out.  · You keep having watery poop.  · You keep vomiting.  Summary  · Hyponatremia is when the amount of salt in your blood is too low.  · When salt levels are low, you can have swelling throughout the body. The swelling mostly affects the brain.  · Treatment depends on the cause. Treatment may include getting IV fluids, medicines, or not drinking as much fluid.  This information is not intended to replace advice given to you by your health care provider. Make sure you discuss any questions you have with your health  care provider.  Document Released: 08/29/2012 Document Revised: 03/05/2020 Document Reviewed: 11/21/2019  Elsevier Patient Education © 2020 [employer]  r pedialyte

## 2021-08-19 NOTE — CARE PLAN
Problem: Fall Risk  Goal: Patient will remain free from falls  Outcome: Progressing     Problem: Knowledge Deficit - Standard  Goal: Patient and family/care givers will demonstrate understanding of plan of care, disease process/condition, diagnostic tests and medications  Outcome: Progressing     Problem: Pain - Standard  Goal: Alleviation of pain or a reduction in pain to the patient’s comfort goal  Outcome: Progressing     The patient is Stable - Low risk of patient condition declining or worsening    Shift Goals  Clinical Goals: increase Na+  Patient Goals: rest    Progress made toward(s) clinical / shift goals:  Last sodium = 128, up from 120    Patient is not progressing towards the following goals:

## 2021-08-19 NOTE — PROGRESS NOTES
Discharging Patient home per physician order.  Discharged with cab ride.  Demonstrated understanding of discharge instructions, follow up appointments, home medications, prescriptions, home care for surgical wound and nursing care instructions for hydration.  Ambulating with stand by assistance, voiding without difficulty, pain well controlled, tolerating oral medications, oxygen saturation greater than 90%, tolerating diet.  Educational handouts hyponatremia given and discussed.  Verbalized understanding of discharge instructions and educational handouts.  All questions answered.  Belongings with patient at time of discharge.

## 2021-10-06 ENCOUNTER — HOSPITAL ENCOUNTER (EMERGENCY)
Facility: MEDICAL CENTER | Age: 86
End: 2021-10-06
Payer: MEDICARE

## 2021-10-06 VITALS
RESPIRATION RATE: 18 BRPM | DIASTOLIC BLOOD PRESSURE: 63 MMHG | SYSTOLIC BLOOD PRESSURE: 112 MMHG | TEMPERATURE: 97.3 F | WEIGHT: 170.86 LBS | HEIGHT: 67 IN | BODY MASS INDEX: 26.82 KG/M2 | HEART RATE: 84 BPM | OXYGEN SATURATION: 96 %

## 2021-10-06 PROCEDURE — 302449 STATCHG TRIAGE ONLY (STATISTIC)

## 2021-10-06 ASSESSMENT — FIBROSIS 4 INDEX: FIB4 SCORE: 2.49

## 2021-10-06 NOTE — ED TRIAGE NOTES
Pt ambulated./ to triage with   Chief Complaint   Patient presents with   • Unable to Sleep     reports that he hasn't slept in 3 days, when he lays down his anxiety gets worse   • Anxiety     h/o    • Nausea     when laying .     Pt is [place]   Pt Informed regarding triage process and verbalized understanding to inform triage tech or RN for any changes in condition. Placed in lobby.

## 2021-10-06 NOTE — ED NOTES
Pt called for room from lobby, senior lounge, family room, bathroom, and outside. Unable to locate pt at this time. Will call again shortly.

## 2022-01-18 ENCOUNTER — APPOINTMENT (OUTPATIENT)
Dept: RADIOLOGY | Facility: MEDICAL CENTER | Age: 87
End: 2022-01-18
Attending: EMERGENCY MEDICINE
Payer: MEDICARE

## 2022-01-18 ENCOUNTER — HOSPITAL ENCOUNTER (OUTPATIENT)
Facility: MEDICAL CENTER | Age: 87
End: 2022-01-18
Attending: EMERGENCY MEDICINE | Admitting: FAMILY MEDICINE
Payer: MEDICARE

## 2022-01-18 VITALS
RESPIRATION RATE: 18 BRPM | HEART RATE: 90 BPM | SYSTOLIC BLOOD PRESSURE: 150 MMHG | WEIGHT: 170 LBS | TEMPERATURE: 97.8 F | DIASTOLIC BLOOD PRESSURE: 71 MMHG | OXYGEN SATURATION: 95 % | HEIGHT: 67 IN | BODY MASS INDEX: 26.68 KG/M2

## 2022-01-18 DIAGNOSIS — G47.00 INSOMNIA, UNSPECIFIED TYPE: ICD-10-CM

## 2022-01-18 DIAGNOSIS — M25.512 ACUTE PAIN OF BOTH SHOULDERS: ICD-10-CM

## 2022-01-18 DIAGNOSIS — R07.9 CHEST PAIN, UNSPECIFIED TYPE: ICD-10-CM

## 2022-01-18 DIAGNOSIS — M25.511 ACUTE PAIN OF BOTH SHOULDERS: ICD-10-CM

## 2022-01-18 DIAGNOSIS — R79.89 ELEVATED TROPONIN: ICD-10-CM

## 2022-01-18 DIAGNOSIS — E87.1 HYPONATREMIA: ICD-10-CM

## 2022-01-18 DIAGNOSIS — N28.9 RENAL INSUFFICIENCY: ICD-10-CM

## 2022-01-18 DIAGNOSIS — Z53.29 LEFT AGAINST MEDICAL ADVICE: ICD-10-CM

## 2022-01-18 LAB
ALBUMIN SERPL BCP-MCNC: 3.8 G/DL (ref 3.2–4.9)
ALBUMIN/GLOB SERPL: 1.2 G/DL
ALP SERPL-CCNC: 84 U/L (ref 30–99)
ALT SERPL-CCNC: 9 U/L (ref 2–50)
ANION GAP SERPL CALC-SCNC: 11 MMOL/L (ref 7–16)
AST SERPL-CCNC: 13 U/L (ref 12–45)
BASOPHILS # BLD AUTO: 0.5 % (ref 0–1.8)
BASOPHILS # BLD: 0.05 K/UL (ref 0–0.12)
BILIRUB SERPL-MCNC: 0.6 MG/DL (ref 0.1–1.5)
BUN SERPL-MCNC: 19 MG/DL (ref 8–22)
CALCIUM SERPL-MCNC: 9 MG/DL (ref 8.5–10.5)
CHLORIDE SERPL-SCNC: 94 MMOL/L (ref 96–112)
CO2 SERPL-SCNC: 21 MMOL/L (ref 20–33)
CREAT SERPL-MCNC: 1.51 MG/DL (ref 0.5–1.4)
EKG IMPRESSION: NORMAL
EOSINOPHIL # BLD AUTO: 0.07 K/UL (ref 0–0.51)
EOSINOPHIL NFR BLD: 0.7 % (ref 0–6.9)
ERYTHROCYTE [DISTWIDTH] IN BLOOD BY AUTOMATED COUNT: 42.7 FL (ref 35.9–50)
FLUAV RNA SPEC QL NAA+PROBE: NEGATIVE
FLUBV RNA SPEC QL NAA+PROBE: NEGATIVE
GLOBULIN SER CALC-MCNC: 3.1 G/DL (ref 1.9–3.5)
GLUCOSE SERPL-MCNC: 106 MG/DL (ref 65–99)
HCT VFR BLD AUTO: 37.4 % (ref 42–52)
HGB BLD-MCNC: 12.7 G/DL (ref 14–18)
IMM GRANULOCYTES # BLD AUTO: 0.06 K/UL (ref 0–0.11)
IMM GRANULOCYTES NFR BLD AUTO: 0.6 % (ref 0–0.9)
LYMPHOCYTES # BLD AUTO: 1.43 K/UL (ref 1–4.8)
LYMPHOCYTES NFR BLD: 13.7 % (ref 22–41)
MCH RBC QN AUTO: 29.5 PG (ref 27–33)
MCHC RBC AUTO-ENTMCNC: 34 G/DL (ref 33.7–35.3)
MCV RBC AUTO: 87 FL (ref 81.4–97.8)
MONOCYTES # BLD AUTO: 1.08 K/UL (ref 0–0.85)
MONOCYTES NFR BLD AUTO: 10.3 % (ref 0–13.4)
NEUTROPHILS # BLD AUTO: 7.76 K/UL (ref 1.82–7.42)
NEUTROPHILS NFR BLD: 74.2 % (ref 44–72)
NRBC # BLD AUTO: 0 K/UL
NRBC BLD-RTO: 0 /100 WBC
PLATELET # BLD AUTO: 176 K/UL (ref 164–446)
PMV BLD AUTO: 10.4 FL (ref 9–12.9)
POTASSIUM SERPL-SCNC: 4.8 MMOL/L (ref 3.6–5.5)
PROT SERPL-MCNC: 6.9 G/DL (ref 6–8.2)
RBC # BLD AUTO: 4.3 M/UL (ref 4.7–6.1)
RSV RNA SPEC QL NAA+PROBE: NEGATIVE
SARS-COV-2 RNA RESP QL NAA+PROBE: NOTDETECTED
SODIUM SERPL-SCNC: 126 MMOL/L (ref 135–145)
SPECIMEN SOURCE: NORMAL
TROPONIN T SERPL-MCNC: 32 NG/L (ref 6–19)
WBC # BLD AUTO: 10.5 K/UL (ref 4.8–10.8)

## 2022-01-18 PROCEDURE — 85025 COMPLETE CBC W/AUTO DIFF WBC: CPT

## 2022-01-18 PROCEDURE — 93005 ELECTROCARDIOGRAM TRACING: CPT

## 2022-01-18 PROCEDURE — 84484 ASSAY OF TROPONIN QUANT: CPT

## 2022-01-18 PROCEDURE — A9270 NON-COVERED ITEM OR SERVICE: HCPCS | Performed by: EMERGENCY MEDICINE

## 2022-01-18 PROCEDURE — C9803 HOPD COVID-19 SPEC COLLECT: HCPCS | Performed by: EMERGENCY MEDICINE

## 2022-01-18 PROCEDURE — 99285 EMERGENCY DEPT VISIT HI MDM: CPT

## 2022-01-18 PROCEDURE — 700102 HCHG RX REV CODE 250 W/ 637 OVERRIDE(OP): Performed by: EMERGENCY MEDICINE

## 2022-01-18 PROCEDURE — 71045 X-RAY EXAM CHEST 1 VIEW: CPT

## 2022-01-18 PROCEDURE — 0241U HCHG SARS-COV-2 COVID-19 NFCT DS RESP RNA 4 TRGT MIC: CPT

## 2022-01-18 PROCEDURE — 80053 COMPREHEN METABOLIC PANEL: CPT

## 2022-01-18 PROCEDURE — G0378 HOSPITAL OBSERVATION PER HR: HCPCS

## 2022-01-18 PROCEDURE — 93005 ELECTROCARDIOGRAM TRACING: CPT | Performed by: EMERGENCY MEDICINE

## 2022-01-18 RX ORDER — LORAZEPAM 1 MG/1
0.5 TABLET ORAL ONCE
Status: COMPLETED | OUTPATIENT
Start: 2022-01-18 | End: 2022-01-18

## 2022-01-18 RX ORDER — AMITRIPTYLINE HYDROCHLORIDE 25 MG/1
25 TABLET, FILM COATED ORAL NIGHTLY
Status: SHIPPED | COMMUNITY
End: 2022-06-10

## 2022-01-18 RX ORDER — LEVOTHYROXINE SODIUM 137 UG/1
137 TABLET ORAL
Status: SHIPPED | COMMUNITY
End: 2022-01-22

## 2022-01-18 RX ORDER — LORAZEPAM 0.5 MG/1
0.5 TABLET ORAL
Qty: 7 TABLET | Refills: 0 | Status: SHIPPED | OUTPATIENT
Start: 2022-01-18 | End: 2022-01-18 | Stop reason: SDUPTHER

## 2022-01-18 RX ORDER — LORAZEPAM 0.5 MG/1
0.5 TABLET ORAL
Qty: 7 TABLET | Refills: 0 | Status: ON HOLD | OUTPATIENT
Start: 2022-01-18 | End: 2022-01-27

## 2022-01-18 RX ORDER — ALBUTEROL SULFATE 90 UG/1
2 AEROSOL, METERED RESPIRATORY (INHALATION) EVERY 6 HOURS PRN
Status: SHIPPED | COMMUNITY
End: 2022-01-22

## 2022-01-18 RX ADMIN — LORAZEPAM 0.5 MG: 1 TABLET ORAL at 07:39

## 2022-01-18 ASSESSMENT — ENCOUNTER SYMPTOMS
FOCAL WEAKNESS: 0
FALLS: 0
NAUSEA: 0
CHILLS: 0
VOMITING: 0
BACK PAIN: 0
INSOMNIA: 1
FEVER: 0
HEADACHES: 1
DIARRHEA: 0
COUGH: 1
DIZZINESS: 0
PALPITATIONS: 0
ABDOMINAL PAIN: 0
SORE THROAT: 0

## 2022-01-18 ASSESSMENT — PAIN DESCRIPTION - DESCRIPTORS: DESCRIPTORS: ACHING

## 2022-01-18 ASSESSMENT — FIBROSIS 4 INDEX: FIB4 SCORE: 2.52

## 2022-01-18 NOTE — ED TRIAGE NOTES
"Chief Complaint   Patient presents with   • Chest Pain     c/o of chest pain unable to describe or answer questions directly. Pt reports it radiates to his R shoulder. x 4 days   • Cough     pt reports states it feels like \"you know it feels like a drip.\" x 4 days. pt reports he is vaccinated for COVID-19   • Dizziness     started today, pt lost his balance x 2 times in the triage room       85 yo M to triage for above complaint. GCS 15.    Hx of COPD    Patient is hard of hearing.     Pt is alert and oriented, speaking in full sentences, follows commands and responds appropriately to questions. NAD. Resp are even and unlabored.      Pt placed in lobby. Pt educated on triage process. Pt encouraged to alert staff for any changes.     Patient and staff wearing appropriate PPE    /66   Pulse 97   Temp 36.5 °C (97.7 °F) (Temporal)   Resp 18   Ht 1.702 m (5' 7\")   Wt 77.1 kg (170 lb)   SpO2 95%   BMI 26.63 kg/m²   "

## 2022-01-18 NOTE — ED PROVIDER NOTES
"ED Provider Note    ED Provider Note    Primary care provider: Pcp Pt States None  Means of arrival: POV  History obtained from: patient  History limited by: None    CHIEF COMPLAINT  Chief Complaint   Patient presents with   • Chest Pain     c/o of chest pain unable to describe or answer questions directly. Pt reports it radiates to his R shoulder. x 4 days   • Cough     pt reports states it feels like \"you know it feels like a drip.\" x 4 days. pt reports he is vaccinated for COVID-19   • Dizziness     started today, pt lost his balance x 2 times in the triage room       HPI  Emigdio Jiménez is a 86 y.o. male who presents to the Emergency Department with a chief complaint of insomnia.  Patient states I have not slept in 4 days.  He has multiple other complaints including feeling as though he has chest congestion, head congestion.  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  Frequently walking 3 to 4 miles a day.  He sometimes will run he does push-ups and sit ups.  However, today prior to arrival, he walked about a mile and it was difficult for him and well walking, he had bilateral shoulder pain.  He does not complain of diffuse myalgias.  He has chronic insomnia which he reports he has had since he was 19 years old and it has not worsened recently.  His primary care doctor has prescribed medications such as amitriptyline and Ambien in the past but he finds none of them helpful.  He states he simply cannot turn his mind off.  He denies any recent trauma or falls.  He was around family members about a week ago and is concerned about a possible COVID exposure. No fever.  He reports a cough that is productive of white sputum for 4 days.  He has a runny nose.  No sore throat.  He does report decreased appetite and lost of taste/smell.  No nausea or vomiting.  No diarrhea.  He does have chronic problems with urination, noting that it takes a long time to PE related to an enlarged prostate.  He has had this condition for 5 " years.  He denies chest pain.  No abdominal pain or back pain.    REVIEW OF SYSTEMS  Review of Systems   Constitutional: Negative for chills and fever.   HENT: Positive for congestion. Negative for sore throat.    Respiratory: Positive for cough.    Cardiovascular: Negative for palpitations and leg swelling.   Gastrointestinal: Negative for abdominal pain, diarrhea, nausea and vomiting.   Genitourinary: Negative for dysuria, frequency and urgency.   Musculoskeletal: Positive for joint pain. Negative for back pain and falls.        Bilat shoulders   Skin: Negative for rash.   Neurological: Positive for headaches. Negative for dizziness and focal weakness.   Psychiatric/Behavioral: The patient has insomnia.        PAST MEDICAL HISTORY   has a past medical history of Anxiety, Heart murmur, and Hypertension.    SURGICAL HISTORY   has a past surgical history that includes hernia repair.    SOCIAL HISTORY  Social History     Tobacco Use   • Smoking status: Former Smoker     Packs/day: 0.00   • Smokeless tobacco: Never Used   Vaping Use   • Vaping Use: Never used   Substance Use Topics   • Alcohol use: Not Currently   • Drug use: Never      Social History     Substance and Sexual Activity   Drug Use Never       FAMILY HISTORY  Family History   Problem Relation Age of Onset   • Hyperlipidemia Mother    • Heart Disease Mother        CURRENT MEDICATIONS  Home Medications     Reviewed by Briseyda Hayes (Pharmacy Tech) on 01/18/22 at 1138  Med List Status: Complete   Medication Last Dose Status   albuterol 108 (90 Base) MCG/ACT Aero Soln inhalation aerosol UNK Active   amitriptyline (ELAVIL) 25 MG Tab UNK Active   levothyroxine (SYNTHROID) 137 MCG Tab NOT YET STARTED Active   levothyroxine (SYNTHROID) 150 MCG Tab UNK Active   lisinopril (PRINIVIL) 20 MG Tab UNK Active   tamsulosin (FLOMAX) 0.4 MG capsule UNK Active                ALLERGIES  No Known Allergies    PHYSICAL EXAM  VITAL SIGNS: /71   Pulse 90   Temp 36.6  "°C (97.8 °F) (Temporal)   Resp 18   Ht 1.702 m (5' 7\")   Wt 77.1 kg (170 lb)   SpO2 95%   BMI 26.63 kg/m²   Vitals reviewed.  Constitutional: Patient is oriented to person, place, and time. Appears well-developed and well-nourished. No distress.    Head: Normocephalic and atraumatic.   Ears: Normal external ears bilaterally.   Mouth/Throat: Oropharynx is clear and moist, no exudates.   Eyes: Conjunctivae are normal. Pupils are equal, round, and reactive to light.   Neck: Normal range of motion. Neck supple.  Cardiovascular: Normal rate, regular rhythm and normal heart sounds. Normal peripheral pulses.  Pulmonary/Chest: Effort normal and breath sounds normal. No respiratory distress, no wheezes, rhonchi, or rales. No chest wall tenderness.  Abdominal: Soft. Bowel sounds are normal. There is no tenderness. No rebound or guarding, or peritoneal signs. No CVA tenderness.  Musculoskeletal: No edema and no tenderness.   Lymphadenopathy: No cervical adenopathy.   Neurological: No focal deficits.   Skin: Skin is warm and dry. No erythema. No pallor.   Psychiatric: Patient has a normal mood and affect.     LABS  Results for orders placed or performed during the hospital encounter of 01/18/22   CBC with Differential   Result Value Ref Range    WBC 10.5 4.8 - 10.8 K/uL    RBC 4.30 (L) 4.70 - 6.10 M/uL    Hemoglobin 12.7 (L) 14.0 - 18.0 g/dL    Hematocrit 37.4 (L) 42.0 - 52.0 %    MCV 87.0 81.4 - 97.8 fL    MCH 29.5 27.0 - 33.0 pg    MCHC 34.0 33.7 - 35.3 g/dL    RDW 42.7 35.9 - 50.0 fL    Platelet Count 176 164 - 446 K/uL    MPV 10.4 9.0 - 12.9 fL    Neutrophils-Polys 74.20 (H) 44.00 - 72.00 %    Lymphocytes 13.70 (L) 22.00 - 41.00 %    Monocytes 10.30 0.00 - 13.40 %    Eosinophils 0.70 0.00 - 6.90 %    Basophils 0.50 0.00 - 1.80 %    Immature Granulocytes 0.60 0.00 - 0.90 %    Nucleated RBC 0.00 /100 WBC    Neutrophils (Absolute) 7.76 (H) 1.82 - 7.42 K/uL    Lymphs (Absolute) 1.43 1.00 - 4.80 K/uL    Monos (Absolute) " 1.08 (H) 0.00 - 0.85 K/uL    Eos (Absolute) 0.07 0.00 - 0.51 K/uL    Baso (Absolute) 0.05 0.00 - 0.12 K/uL    Immature Granulocytes (abs) 0.06 0.00 - 0.11 K/uL    NRBC (Absolute) 0.00 K/uL   Complete Metabolic Panel (CMP)   Result Value Ref Range    Sodium 126 (L) 135 - 145 mmol/L    Potassium 4.8 3.6 - 5.5 mmol/L    Chloride 94 (L) 96 - 112 mmol/L    Co2 21 20 - 33 mmol/L    Anion Gap 11.0 7.0 - 16.0    Glucose 106 (H) 65 - 99 mg/dL    Bun 19 8 - 22 mg/dL    Creatinine 1.51 (H) 0.50 - 1.40 mg/dL    Calcium 9.0 8.5 - 10.5 mg/dL    AST(SGOT) 13 12 - 45 U/L    ALT(SGPT) 9 2 - 50 U/L    Alkaline Phosphatase 84 30 - 99 U/L    Total Bilirubin 0.6 0.1 - 1.5 mg/dL    Albumin 3.8 3.2 - 4.9 g/dL    Total Protein 6.9 6.0 - 8.2 g/dL    Globulin 3.1 1.9 - 3.5 g/dL    A-G Ratio 1.2 g/dL   Troponin   Result Value Ref Range    Troponin T 32 (H) 6 - 19 ng/L   ESTIMATED GFR   Result Value Ref Range    GFR If  53 (A) >60 mL/min/1.73 m 2    GFR If Non  44 (A) >60 mL/min/1.73 m 2   COV-2, FLU A/B, AND RSV BY PCR (2-4 HOURS CEPHEID): Collect NP swab in VTM    Specimen: Nasopharyngeal; Respirate   Result Value Ref Range    Influenza virus A RNA Negative Negative    Influenza virus B, PCR Negative Negative    RSV, PCR Negative Negative    SARS-CoV-2 by PCR NotDetected     SARS-CoV-2 Source NP Swab    EKG   Result Value Ref Range    Report       [facility] Emergency Dept.    Test Date:  2022  Pt Name:    OLAMIDE DAI                Department: ER  MRN:        [id number]                      Room:  Gender:     Male                         Technician: [id number]  :        [id number]                   Requested By:ER TRIAGE PROTOCOL  Order #:    [id number]                    Reading MD: ELEANOR BONNER, DO    Measurements  Intervals                                Axis  Rate:       88                           P:          29  WV:         172                          QRS:        41  QRSD:        76                           T:          70  QT:         339  QTc:        410    Interpretive Statements  Sinus rhythm  Abnormal R-wave progression, early transition  Compared to ECG 08/17/2021 17:26:50  No significant changes  Electronically Signed On 1- 7:00:33 PST by ELEANOR BONNER, DO         All labs reviewed by me.    EKG Interpretation  Interpreted by me    RADIOLOGY  DX-CHEST-PORTABLE (1 VIEW)   Final Result      No acute cardiopulmonary abnormality        The radiologist's interpretation of all radiological studies have been reviewed by me.    COURSE & MEDICAL DECISION MAKING  Pertinent Labs & Imaging studies reviewed. (See chart for details)    Obtained and reviewed past medical records.  Patient's last encounter was an admission to the hospital in August of last year who presented with syncope and headache.  He was noted to be hyponatremic at that time, sodium 120.    6:59 AM - Patient seen and examined at bedside.  This is a very pleasant functional, fit, 86-year-old male.  He was doing his normal exercises this morning when he was not able to tolerate them.  He experienced shoulder pain and chest pain.  He does have some symptoms concerning for COVID including chest congestion head congestion and loss of taste smell.  He has not had a fever.  I have ordered COVID testing.  In addition, per nursing protocols, labs including a CBC, chemistry, troponin, EKG and chest x-ray have also been undertaken.  Patient also complains of insomnia which has been an ongoing problem for him for decades.  Noting today, that its been multiple days since he has last slept.  He will be treated with p.o. Ativan to help him relax.    Patient was reevaluated at the bedside.  He still has not been able to sleep.  I discussed with him his lab results including elevated troponin.  His sodium is low 126.  Again this has been noted in the past.  His troponin was elevated at 32, given his symptoms of shoulder pain chest pain  "with exertion, this is concerning.  No acute EKG changes.  Of advised admission to the hospital for further evaluation and he is agreeable.  Interestingly, his COVID test was negative.  Chest x-ray is unrevealing.  He does have a bump in his creatinine, 1.51, previously 1.1.  This can be monitored.    1112AM D/W CDU hospitalist, who agrees to to admit the patient to their service.  I had previously spoken with the floor hospitalist and they thought this patient was appropriate for the CDU.  Patient is admitted in stable condition.    11:34 AM discussed with the hospitalist who saw the patient.  At this time, the patient is not agreeable to staying for further evaluation.  The hospitalist did advise him that there were abnormalities in his work-up that warrant evaluation.  Patient would like to leave AGAINST MEDICAL ADVICE.  I myself, saw the patient at the bedside.  He states that the Ativan, \"took all of his symptoms away\".  He is not having any pain.  He is delighted, that his COVID test was negative.  His biggest complaint is sleep and he would like to go home and rest.  He is not having any chest pain.  He does understand, his troponin, an enzyme from his heart was elevated.  He also understands, his sodium is low which he has had in the past.  He just saw his primary care doctor a few days ago and will follow-up with her.  Patient has decision-making capacity.  He understands the risks, including death and disability.  He signed himself out AGAINST MEDICAL ADVICE.  He understands, he can return at any time for further evaluation.    I discussed with the patient the risks of their decision to leave without receiving the appropriate medical care. I discussed with the patient the risks of their decision to refuse or withhold consent to receive appropriate medical care. The patient has the capacity to understand the risks and benefits described above. The patient is not intoxicated clinically, the patient's is " alert and oriented and able to make a good decision in my opinion. I discussed alternative treatments with the patient. The patient was given discharge instructions and a followup plan as documented in the medical record. I have asked the patient to return at any time to the emergency department for any reason.      FINAL IMPRESSION  1. Chest pain, unspecified type    2. Acute pain of both shoulders    3. Hyponatremia    4. Renal insufficiency    5. Elevated troponin    6. Insomnia, unspecified type    7. Left against medical advice

## 2022-01-18 NOTE — ED NOTES
Med Rec completed per patient and home pharmacy ([facility])  Allergies reviewed  No ORAL antibiotics in last 30 days    Unknown last doses of medications taken     Per pharmacy pt has a new prescription for Levothyroxine 137 mcg that has not been picked up from that pharmacy yet

## 2022-01-18 NOTE — ED NOTES
MD at bedside. Pt and MD discussed patient leaving AMA and all risks and concerns of not being admitted into the hospital. Pt states he feels better and insists on going home.

## 2022-01-18 NOTE — ED NOTES
Pt stating that he wants to go home and not be admitted to the hospital. Admit MD informed and is at bedside.

## 2022-01-18 NOTE — ED NOTES
Received orders for DC. PIV removed and dressing applied. AMA form filled out by patients and ERP. Aware risks of leaving against medical advice. VSS. Alert and oriented x 4. Ambulatory with a safe and steady gait. Cough noted. Pt asked to continue to wear his mask.

## 2022-01-18 NOTE — ED NOTES
Educated regarding prescription for ativan. Aware that this has been called in to preferred pharmacy. Ambulatory to lobby with steady gait.

## 2022-01-21 ENCOUNTER — HOSPITAL ENCOUNTER (INPATIENT)
Facility: MEDICAL CENTER | Age: 87
LOS: 4 days | DRG: 872 | End: 2022-01-28
Attending: EMERGENCY MEDICINE | Admitting: STUDENT IN AN ORGANIZED HEALTH CARE EDUCATION/TRAINING PROGRAM
Payer: MEDICARE

## 2022-01-21 ENCOUNTER — APPOINTMENT (OUTPATIENT)
Dept: RADIOLOGY | Facility: MEDICAL CENTER | Age: 87
DRG: 872 | End: 2022-01-21
Attending: EMERGENCY MEDICINE
Payer: MEDICARE

## 2022-01-21 DIAGNOSIS — R78.81 BACTEREMIA: ICD-10-CM

## 2022-01-21 DIAGNOSIS — R07.9 CHEST PAIN, UNSPECIFIED TYPE: ICD-10-CM

## 2022-01-21 DIAGNOSIS — R79.89 ELEVATED TROPONIN: ICD-10-CM

## 2022-01-21 DIAGNOSIS — R10.821 RIGHT UPPER QUADRANT ABDOMINAL TENDERNESS WITH REBOUND TENDERNESS: ICD-10-CM

## 2022-01-21 DIAGNOSIS — E03.2 HYPOTHYROIDISM DUE TO MEDICATION: ICD-10-CM

## 2022-01-21 PROBLEM — E78.5 HLD (HYPERLIPIDEMIA): Status: ACTIVE | Noted: 2022-01-21

## 2022-01-21 PROBLEM — I50.9 CHF (CONGESTIVE HEART FAILURE) (HCC): Status: ACTIVE | Noted: 2022-01-21

## 2022-01-21 PROBLEM — N17.9 AKI (ACUTE KIDNEY INJURY) (HCC): Status: ACTIVE | Noted: 2022-01-21

## 2022-01-21 LAB
ALBUMIN SERPL BCP-MCNC: 3.5 G/DL (ref 3.2–4.9)
ALBUMIN/GLOB SERPL: 1.1 G/DL
ALP SERPL-CCNC: 80 U/L (ref 30–99)
ALT SERPL-CCNC: 16 U/L (ref 2–50)
AMPHET UR QL SCN: NEGATIVE
ANION GAP SERPL CALC-SCNC: 12 MMOL/L (ref 7–16)
AST SERPL-CCNC: 29 U/L (ref 12–45)
BARBITURATES UR QL SCN: NEGATIVE
BASOPHILS # BLD AUTO: 0.3 % (ref 0–1.8)
BASOPHILS # BLD: 0.04 K/UL (ref 0–0.12)
BENZODIAZ UR QL SCN: NEGATIVE
BILIRUB SERPL-MCNC: 0.5 MG/DL (ref 0.1–1.5)
BUN SERPL-MCNC: 28 MG/DL (ref 8–22)
BZE UR QL SCN: NEGATIVE
CALCIUM SERPL-MCNC: 9.1 MG/DL (ref 8.5–10.5)
CANNABINOIDS UR QL SCN: NEGATIVE
CHLORIDE SERPL-SCNC: 98 MMOL/L (ref 96–112)
CO2 SERPL-SCNC: 18 MMOL/L (ref 20–33)
CREAT SERPL-MCNC: 2.08 MG/DL (ref 0.5–1.4)
D DIMER PPP IA.FEU-MCNC: 0.82 UG/ML (FEU) (ref 0–0.5)
EKG IMPRESSION: NORMAL
EOSINOPHIL # BLD AUTO: 0.01 K/UL (ref 0–0.51)
EOSINOPHIL NFR BLD: 0.1 % (ref 0–6.9)
ERYTHROCYTE [DISTWIDTH] IN BLOOD BY AUTOMATED COUNT: 45.2 FL (ref 35.9–50)
GLOBULIN SER CALC-MCNC: 3.2 G/DL (ref 1.9–3.5)
GLUCOSE SERPL-MCNC: 115 MG/DL (ref 65–99)
HCT VFR BLD AUTO: 35.7 % (ref 42–52)
HGB BLD-MCNC: 12.2 G/DL (ref 14–18)
IMM GRANULOCYTES # BLD AUTO: 0.11 K/UL (ref 0–0.11)
IMM GRANULOCYTES NFR BLD AUTO: 0.9 % (ref 0–0.9)
LYMPHOCYTES # BLD AUTO: 1.12 K/UL (ref 1–4.8)
LYMPHOCYTES NFR BLD: 9.5 % (ref 22–41)
MAGNESIUM SERPL-MCNC: 2.4 MG/DL (ref 1.5–2.5)
MCH RBC QN AUTO: 29.8 PG (ref 27–33)
MCHC RBC AUTO-ENTMCNC: 34.2 G/DL (ref 33.7–35.3)
MCV RBC AUTO: 87.3 FL (ref 81.4–97.8)
METHADONE UR QL SCN: NEGATIVE
MONOCYTES # BLD AUTO: 1.26 K/UL (ref 0–0.85)
MONOCYTES NFR BLD AUTO: 10.6 % (ref 0–13.4)
NEUTROPHILS # BLD AUTO: 9.31 K/UL (ref 1.82–7.42)
NEUTROPHILS NFR BLD: 78.6 % (ref 44–72)
NRBC # BLD AUTO: 0 K/UL
NRBC BLD-RTO: 0 /100 WBC
NT-PROBNP SERPL IA-MCNC: 2591 PG/ML (ref 0–125)
OPIATES UR QL SCN: POSITIVE
OXYCODONE UR QL SCN: NEGATIVE
PCP UR QL SCN: NEGATIVE
PLATELET # BLD AUTO: 156 K/UL (ref 164–446)
PMV BLD AUTO: 10.9 FL (ref 9–12.9)
POTASSIUM SERPL-SCNC: 4.9 MMOL/L (ref 3.6–5.5)
PROPOXYPH UR QL SCN: NEGATIVE
PROT SERPL-MCNC: 6.7 G/DL (ref 6–8.2)
RBC # BLD AUTO: 4.09 M/UL (ref 4.7–6.1)
SODIUM SERPL-SCNC: 128 MMOL/L (ref 135–145)
TROPONIN T SERPL-MCNC: 51 NG/L (ref 6–19)
TROPONIN T SERPL-MCNC: 61 NG/L (ref 6–19)
TSH SERPL DL<=0.005 MIU/L-ACNC: 0.14 UIU/ML (ref 0.38–5.33)
WBC # BLD AUTO: 11.9 K/UL (ref 4.8–10.8)

## 2022-01-21 PROCEDURE — 85379 FIBRIN DEGRADATION QUANT: CPT

## 2022-01-21 PROCEDURE — 71045 X-RAY EXAM CHEST 1 VIEW: CPT

## 2022-01-21 PROCEDURE — 80053 COMPREHEN METABOLIC PANEL: CPT

## 2022-01-21 PROCEDURE — G0378 HOSPITAL OBSERVATION PER HR: HCPCS

## 2022-01-21 PROCEDURE — 93005 ELECTROCARDIOGRAM TRACING: CPT

## 2022-01-21 PROCEDURE — 99220 PR INITIAL OBSERVATION CARE,LEVL III: CPT | Performed by: STUDENT IN AN ORGANIZED HEALTH CARE EDUCATION/TRAINING PROGRAM

## 2022-01-21 PROCEDURE — 700111 HCHG RX REV CODE 636 W/ 250 OVERRIDE (IP): Performed by: EMERGENCY MEDICINE

## 2022-01-21 PROCEDURE — 84443 ASSAY THYROID STIM HORMONE: CPT

## 2022-01-21 PROCEDURE — A9270 NON-COVERED ITEM OR SERVICE: HCPCS | Performed by: STUDENT IN AN ORGANIZED HEALTH CARE EDUCATION/TRAINING PROGRAM

## 2022-01-21 PROCEDURE — 99285 EMERGENCY DEPT VISIT HI MDM: CPT

## 2022-01-21 PROCEDURE — 84439 ASSAY OF FREE THYROXINE: CPT

## 2022-01-21 PROCEDURE — 80307 DRUG TEST PRSMV CHEM ANLYZR: CPT

## 2022-01-21 PROCEDURE — 96374 THER/PROPH/DIAG INJ IV PUSH: CPT

## 2022-01-21 PROCEDURE — 93005 ELECTROCARDIOGRAM TRACING: CPT | Performed by: EMERGENCY MEDICINE

## 2022-01-21 PROCEDURE — 700102 HCHG RX REV CODE 250 W/ 637 OVERRIDE(OP): Performed by: STUDENT IN AN ORGANIZED HEALTH CARE EDUCATION/TRAINING PROGRAM

## 2022-01-21 PROCEDURE — 71275 CT ANGIOGRAPHY CHEST: CPT | Mod: ME

## 2022-01-21 PROCEDURE — 83880 ASSAY OF NATRIURETIC PEPTIDE: CPT

## 2022-01-21 PROCEDURE — 96375 TX/PRO/DX INJ NEW DRUG ADDON: CPT

## 2022-01-21 PROCEDURE — 85025 COMPLETE CBC W/AUTO DIFF WBC: CPT

## 2022-01-21 PROCEDURE — 83735 ASSAY OF MAGNESIUM: CPT

## 2022-01-21 PROCEDURE — 84484 ASSAY OF TROPONIN QUANT: CPT

## 2022-01-21 PROCEDURE — 700111 HCHG RX REV CODE 636 W/ 250 OVERRIDE (IP): Performed by: STUDENT IN AN ORGANIZED HEALTH CARE EDUCATION/TRAINING PROGRAM

## 2022-01-21 PROCEDURE — 700117 HCHG RX CONTRAST REV CODE 255: Performed by: EMERGENCY MEDICINE

## 2022-01-21 RX ORDER — ASPIRIN 300 MG/1
300 SUPPOSITORY RECTAL EVERY EVENING
Status: DISCONTINUED | OUTPATIENT
Start: 2022-01-21 | End: 2022-01-23

## 2022-01-21 RX ORDER — AMOXICILLIN 250 MG
2 CAPSULE ORAL 2 TIMES DAILY
Status: DISCONTINUED | OUTPATIENT
Start: 2022-01-22 | End: 2022-01-28 | Stop reason: HOSPADM

## 2022-01-21 RX ORDER — ONDANSETRON 4 MG/1
4 TABLET, ORALLY DISINTEGRATING ORAL EVERY 4 HOURS PRN
Status: DISCONTINUED | OUTPATIENT
Start: 2022-01-21 | End: 2022-01-28 | Stop reason: HOSPADM

## 2022-01-21 RX ORDER — POLYETHYLENE GLYCOL 3350 17 G/17G
1 POWDER, FOR SOLUTION ORAL
Status: DISCONTINUED | OUTPATIENT
Start: 2022-01-21 | End: 2022-01-28 | Stop reason: HOSPADM

## 2022-01-21 RX ORDER — LEVOTHYROXINE SODIUM 137 UG/1
137 TABLET ORAL
Status: DISCONTINUED | OUTPATIENT
Start: 2022-01-22 | End: 2022-01-22

## 2022-01-21 RX ORDER — ASPIRIN 325 MG
325 TABLET ORAL EVERY EVENING
Status: DISCONTINUED | OUTPATIENT
Start: 2022-01-21 | End: 2022-01-23

## 2022-01-21 RX ORDER — ACETAMINOPHEN 325 MG/1
650 TABLET ORAL EVERY 6 HOURS PRN
Status: DISCONTINUED | OUTPATIENT
Start: 2022-01-21 | End: 2022-01-28 | Stop reason: HOSPADM

## 2022-01-21 RX ORDER — MORPHINE SULFATE 4 MG/ML
4 INJECTION INTRAVENOUS ONCE
Status: COMPLETED | OUTPATIENT
Start: 2022-01-21 | End: 2022-01-21

## 2022-01-21 RX ORDER — ONDANSETRON 2 MG/ML
4 INJECTION INTRAMUSCULAR; INTRAVENOUS EVERY 4 HOURS PRN
Status: DISCONTINUED | OUTPATIENT
Start: 2022-01-21 | End: 2022-01-28 | Stop reason: HOSPADM

## 2022-01-21 RX ORDER — MORPHINE SULFATE 4 MG/ML
1 INJECTION INTRAVENOUS
Status: DISCONTINUED | OUTPATIENT
Start: 2022-01-21 | End: 2022-01-23

## 2022-01-21 RX ORDER — ALUMINA, MAGNESIA, AND SIMETHICONE 2400; 2400; 240 MG/30ML; MG/30ML; MG/30ML
30 SUSPENSION ORAL EVERY 4 HOURS PRN
Status: DISCONTINUED | OUTPATIENT
Start: 2022-01-21 | End: 2022-01-28 | Stop reason: HOSPADM

## 2022-01-21 RX ORDER — HEPARIN SODIUM 5000 [USP'U]/ML
5000 INJECTION, SOLUTION INTRAVENOUS; SUBCUTANEOUS EVERY 8 HOURS
Status: DISCONTINUED | OUTPATIENT
Start: 2022-01-22 | End: 2022-01-28 | Stop reason: HOSPADM

## 2022-01-21 RX ORDER — LISINOPRIL 20 MG/1
20 TABLET ORAL DAILY
Status: DISCONTINUED | OUTPATIENT
Start: 2022-01-22 | End: 2022-01-21

## 2022-01-21 RX ORDER — ASPIRIN 81 MG/1
324 TABLET, CHEWABLE ORAL EVERY EVENING
Status: DISCONTINUED | OUTPATIENT
Start: 2022-01-21 | End: 2022-01-23

## 2022-01-21 RX ORDER — ATORVASTATIN CALCIUM 40 MG/1
40 TABLET, FILM COATED ORAL EVERY EVENING
Status: DISCONTINUED | OUTPATIENT
Start: 2022-01-21 | End: 2022-01-28 | Stop reason: HOSPADM

## 2022-01-21 RX ORDER — AMINOPHYLLINE 25 MG/ML
100 INJECTION, SOLUTION INTRAVENOUS
Status: DISCONTINUED | OUTPATIENT
Start: 2022-01-21 | End: 2022-01-28 | Stop reason: HOSPADM

## 2022-01-21 RX ORDER — BISACODYL 10 MG
10 SUPPOSITORY, RECTAL RECTAL
Status: DISCONTINUED | OUTPATIENT
Start: 2022-01-21 | End: 2022-01-28 | Stop reason: HOSPADM

## 2022-01-21 RX ORDER — REGADENOSON 0.08 MG/ML
0.4 INJECTION, SOLUTION INTRAVENOUS
Status: COMPLETED | OUTPATIENT
Start: 2022-01-21 | End: 2022-01-22

## 2022-01-21 RX ADMIN — ONDANSETRON 4 MG: 2 INJECTION INTRAMUSCULAR; INTRAVENOUS at 22:20

## 2022-01-21 RX ADMIN — ATORVASTATIN CALCIUM 40 MG: 40 TABLET, FILM COATED ORAL at 22:19

## 2022-01-21 RX ADMIN — ACETAMINOPHEN 650 MG: 325 TABLET, FILM COATED ORAL at 22:20

## 2022-01-21 RX ADMIN — IOHEXOL 65 ML: 350 INJECTION, SOLUTION INTRAVENOUS at 19:19

## 2022-01-21 RX ADMIN — MORPHINE SULFATE 4 MG: 4 INJECTION INTRAVENOUS at 17:40

## 2022-01-21 RX ADMIN — ASPIRIN 325 MG ORAL TABLET 325 MG: 325 PILL ORAL at 22:19

## 2022-01-21 ASSESSMENT — FIBROSIS 4 INDEX: FIB4 SCORE: 2.09

## 2022-01-21 ASSESSMENT — PAIN DESCRIPTION - PAIN TYPE: TYPE: ACUTE PAIN

## 2022-01-21 NOTE — ED TRIAGE NOTES
"Chief Complaint   Patient presents with   • Chest Pain     left sided chest pain x 1 week describes as pressure and constant worse today. was admitted few days ago for same w/postive troponin result. pt left AMA yesterday told RN \"i can't stay in the hospital\" but today i will stay im sick.     Rates pain as 6/10.   "

## 2022-01-22 ENCOUNTER — APPOINTMENT (OUTPATIENT)
Dept: RADIOLOGY | Facility: MEDICAL CENTER | Age: 87
DRG: 872 | End: 2022-01-22
Attending: STUDENT IN AN ORGANIZED HEALTH CARE EDUCATION/TRAINING PROGRAM
Payer: MEDICARE

## 2022-01-22 ENCOUNTER — APPOINTMENT (OUTPATIENT)
Dept: RADIOLOGY | Facility: MEDICAL CENTER | Age: 87
DRG: 872 | End: 2022-01-22
Attending: FAMILY MEDICINE
Payer: MEDICARE

## 2022-01-22 ENCOUNTER — APPOINTMENT (OUTPATIENT)
Dept: CARDIOLOGY | Facility: MEDICAL CENTER | Age: 87
DRG: 872 | End: 2022-01-22
Attending: STUDENT IN AN ORGANIZED HEALTH CARE EDUCATION/TRAINING PROGRAM
Payer: MEDICARE

## 2022-01-22 PROBLEM — R10.821 RIGHT UPPER QUADRANT ABDOMINAL TENDERNESS WITH REBOUND TENDERNESS: Status: ACTIVE | Noted: 2022-01-22

## 2022-01-22 PROBLEM — E87.20 METABOLIC ACIDOSIS: Status: ACTIVE | Noted: 2022-01-22

## 2022-01-22 PROBLEM — R65.10 SIRS (SYSTEMIC INFLAMMATORY RESPONSE SYNDROME) (HCC): Status: ACTIVE | Noted: 2022-01-22

## 2022-01-22 LAB
ALBUMIN SERPL BCP-MCNC: 3.1 G/DL (ref 3.2–4.9)
ALBUMIN/GLOB SERPL: 1 G/DL
ALP SERPL-CCNC: 84 U/L (ref 30–99)
ALT SERPL-CCNC: 19 U/L (ref 2–50)
ANION GAP SERPL CALC-SCNC: 13 MMOL/L (ref 7–16)
APPEARANCE UR: CLEAR
AST SERPL-CCNC: 36 U/L (ref 12–45)
BACTERIA #/AREA URNS HPF: NEGATIVE /HPF
BASOPHILS # BLD AUTO: 0.6 % (ref 0–1.8)
BASOPHILS # BLD: 0.06 K/UL (ref 0–0.12)
BILIRUB SERPL-MCNC: 0.5 MG/DL (ref 0.1–1.5)
BILIRUB UR QL STRIP.AUTO: NEGATIVE
BUN SERPL-MCNC: 37 MG/DL (ref 8–22)
CALCIUM SERPL-MCNC: 8.6 MG/DL (ref 8.5–10.5)
CHLORIDE SERPL-SCNC: 97 MMOL/L (ref 96–112)
CHOLEST SERPL-MCNC: 117 MG/DL (ref 100–199)
CO2 SERPL-SCNC: 15 MMOL/L (ref 20–33)
COLOR UR: YELLOW
CREAT SERPL-MCNC: 2.24 MG/DL (ref 0.5–1.4)
EKG IMPRESSION: NORMAL
EOSINOPHIL # BLD AUTO: 0.04 K/UL (ref 0–0.51)
EOSINOPHIL NFR BLD: 0.4 % (ref 0–6.9)
EPI CELLS #/AREA URNS HPF: NEGATIVE /HPF
ERYTHROCYTE [DISTWIDTH] IN BLOOD BY AUTOMATED COUNT: 44.2 FL (ref 35.9–50)
FLUAV AG SPEC QL IA: NEGATIVE
FLUBV AG SPEC QL IA: NEGATIVE
GLOBULIN SER CALC-MCNC: 3 G/DL (ref 1.9–3.5)
GLUCOSE SERPL-MCNC: 93 MG/DL (ref 65–99)
GLUCOSE UR STRIP.AUTO-MCNC: NEGATIVE MG/DL
HCT VFR BLD AUTO: 33.7 % (ref 42–52)
HDLC SERPL-MCNC: 14 MG/DL
HGB BLD-MCNC: 11.5 G/DL (ref 14–18)
HYALINE CASTS #/AREA URNS LPF: ABNORMAL /LPF
IMM GRANULOCYTES # BLD AUTO: 0.08 K/UL (ref 0–0.11)
IMM GRANULOCYTES NFR BLD AUTO: 0.7 % (ref 0–0.9)
KETONES UR STRIP.AUTO-MCNC: ABNORMAL MG/DL
LACTATE BLD-SCNC: 1.1 MMOL/L (ref 0.5–2)
LACTATE BLD-SCNC: 1.4 MMOL/L (ref 0.5–2)
LDLC SERPL CALC-MCNC: 65 MG/DL
LEUKOCYTE ESTERASE UR QL STRIP.AUTO: NEGATIVE
LIPASE SERPL-CCNC: 13 U/L (ref 11–82)
LV EJECT FRACT  99904: 75
LV EJECT FRACT MOD 2C 99903: 80.21
LV EJECT FRACT MOD 4C 99902: 57.75
LV EJECT FRACT MOD BP 99901: 63.58
LYMPHOCYTES # BLD AUTO: 0.25 K/UL (ref 1–4.8)
LYMPHOCYTES NFR BLD: 2.3 % (ref 22–41)
MCH RBC QN AUTO: 29 PG (ref 27–33)
MCHC RBC AUTO-ENTMCNC: 34.1 G/DL (ref 33.7–35.3)
MCV RBC AUTO: 85.1 FL (ref 81.4–97.8)
MICRO URNS: ABNORMAL
MONOCYTES # BLD AUTO: 0.93 K/UL (ref 0–0.85)
MONOCYTES NFR BLD AUTO: 8.7 % (ref 0–13.4)
NEUTROPHILS # BLD AUTO: 9.33 K/UL (ref 1.82–7.42)
NEUTROPHILS NFR BLD: 87.3 % (ref 44–72)
NITRITE UR QL STRIP.AUTO: NEGATIVE
NRBC # BLD AUTO: 0 K/UL
NRBC BLD-RTO: 0 /100 WBC
PH UR STRIP.AUTO: 5 [PH] (ref 5–8)
PLATELET # BLD AUTO: 152 K/UL (ref 164–446)
PMV BLD AUTO: 10.9 FL (ref 9–12.9)
POTASSIUM SERPL-SCNC: 4.8 MMOL/L (ref 3.6–5.5)
PROCALCITONIN SERPL-MCNC: 159.72 NG/ML
PROCALCITONIN SERPL-MCNC: 41.09 NG/ML
PROT SERPL-MCNC: 6.1 G/DL (ref 6–8.2)
PROT UR QL STRIP: 30 MG/DL
RBC # BLD AUTO: 3.96 M/UL (ref 4.7–6.1)
RBC # URNS HPF: ABNORMAL /HPF
RBC UR QL AUTO: NEGATIVE
SARS-COV+SARS-COV-2 AG RESP QL IA.RAPID: NOTDETECTED
SODIUM SERPL-SCNC: 125 MMOL/L (ref 135–145)
SP GR UR STRIP.AUTO: 1.04
SPECIMEN SOURCE: NORMAL
T4 FREE SERPL-MCNC: 2.04 NG/DL (ref 0.93–1.7)
TRIGL SERPL-MCNC: 189 MG/DL (ref 0–149)
TROPONIN T SERPL-MCNC: 72 NG/L (ref 6–19)
UROBILINOGEN UR STRIP.AUTO-MCNC: 1 MG/DL
WBC # BLD AUTO: 10.7 K/UL (ref 4.8–10.8)
WBC #/AREA URNS HPF: ABNORMAL /HPF

## 2022-01-22 PROCEDURE — A9502 TC99M TETROFOSMIN: HCPCS

## 2022-01-22 PROCEDURE — 87426 SARSCOV CORONAVIRUS AG IA: CPT

## 2022-01-22 PROCEDURE — 87400 INFLUENZA A/B EACH AG IA: CPT

## 2022-01-22 PROCEDURE — 700105 HCHG RX REV CODE 258: Performed by: FAMILY MEDICINE

## 2022-01-22 PROCEDURE — 93010 ELECTROCARDIOGRAM REPORT: CPT | Performed by: INTERNAL MEDICINE

## 2022-01-22 PROCEDURE — 80053 COMPREHEN METABOLIC PANEL: CPT

## 2022-01-22 PROCEDURE — 87040 BLOOD CULTURE FOR BACTERIA: CPT

## 2022-01-22 PROCEDURE — 96366 THER/PROPH/DIAG IV INF ADDON: CPT

## 2022-01-22 PROCEDURE — 700101 HCHG RX REV CODE 250: Performed by: STUDENT IN AN ORGANIZED HEALTH CARE EDUCATION/TRAINING PROGRAM

## 2022-01-22 PROCEDURE — A9270 NON-COVERED ITEM OR SERVICE: HCPCS | Performed by: FAMILY MEDICINE

## 2022-01-22 PROCEDURE — 87181 SC STD AGAR DILUTION PER AGT: CPT

## 2022-01-22 PROCEDURE — 700111 HCHG RX REV CODE 636 W/ 250 OVERRIDE (IP): Performed by: STUDENT IN AN ORGANIZED HEALTH CARE EDUCATION/TRAINING PROGRAM

## 2022-01-22 PROCEDURE — 96375 TX/PRO/DX INJ NEW DRUG ADDON: CPT

## 2022-01-22 PROCEDURE — 700102 HCHG RX REV CODE 250 W/ 637 OVERRIDE(OP): Performed by: STUDENT IN AN ORGANIZED HEALTH CARE EDUCATION/TRAINING PROGRAM

## 2022-01-22 PROCEDURE — 81001 URINALYSIS AUTO W/SCOPE: CPT

## 2022-01-22 PROCEDURE — 93306 TTE W/DOPPLER COMPLETE: CPT | Mod: 26 | Performed by: INTERNAL MEDICINE

## 2022-01-22 PROCEDURE — 93005 ELECTROCARDIOGRAM TRACING: CPT | Performed by: STUDENT IN AN ORGANIZED HEALTH CARE EDUCATION/TRAINING PROGRAM

## 2022-01-22 PROCEDURE — 96365 THER/PROPH/DIAG IV INF INIT: CPT

## 2022-01-22 PROCEDURE — A9270 NON-COVERED ITEM OR SERVICE: HCPCS | Performed by: STUDENT IN AN ORGANIZED HEALTH CARE EDUCATION/TRAINING PROGRAM

## 2022-01-22 PROCEDURE — 85025 COMPLETE CBC W/AUTO DIFF WBC: CPT

## 2022-01-22 PROCEDURE — 93306 TTE W/DOPPLER COMPLETE: CPT

## 2022-01-22 PROCEDURE — 700111 HCHG RX REV CODE 636 W/ 250 OVERRIDE (IP)

## 2022-01-22 PROCEDURE — 700105 HCHG RX REV CODE 258: Performed by: STUDENT IN AN ORGANIZED HEALTH CARE EDUCATION/TRAINING PROGRAM

## 2022-01-22 PROCEDURE — C9803 HOPD COVID-19 SPEC COLLECT: HCPCS | Performed by: FAMILY MEDICINE

## 2022-01-22 PROCEDURE — 700102 HCHG RX REV CODE 250 W/ 637 OVERRIDE(OP): Performed by: FAMILY MEDICINE

## 2022-01-22 PROCEDURE — G0378 HOSPITAL OBSERVATION PER HR: HCPCS

## 2022-01-22 PROCEDURE — 99291 CRITICAL CARE FIRST HOUR: CPT | Performed by: STUDENT IN AN ORGANIZED HEALTH CARE EDUCATION/TRAINING PROGRAM

## 2022-01-22 PROCEDURE — 83605 ASSAY OF LACTIC ACID: CPT

## 2022-01-22 PROCEDURE — 76705 ECHO EXAM OF ABDOMEN: CPT

## 2022-01-22 PROCEDURE — 84145 PROCALCITONIN (PCT): CPT

## 2022-01-22 PROCEDURE — 99226 PR SUBSEQUENT OBSERVATION CARE,LEVEL III: CPT | Performed by: FAMILY MEDICINE

## 2022-01-22 PROCEDURE — 80061 LIPID PANEL: CPT

## 2022-01-22 PROCEDURE — 83690 ASSAY OF LIPASE: CPT

## 2022-01-22 PROCEDURE — 84484 ASSAY OF TROPONIN QUANT: CPT

## 2022-01-22 RX ORDER — SODIUM CHLORIDE, SODIUM LACTATE, POTASSIUM CHLORIDE, CALCIUM CHLORIDE 600; 310; 30; 20 MG/100ML; MG/100ML; MG/100ML; MG/100ML
INJECTION, SOLUTION INTRAVENOUS CONTINUOUS
Status: DISCONTINUED | OUTPATIENT
Start: 2022-01-22 | End: 2022-01-25

## 2022-01-22 RX ORDER — SODIUM CHLORIDE, SODIUM LACTATE, POTASSIUM CHLORIDE, AND CALCIUM CHLORIDE .6; .31; .03; .02 G/100ML; G/100ML; G/100ML; G/100ML
2000 INJECTION, SOLUTION INTRAVENOUS ONCE
Status: COMPLETED | OUTPATIENT
Start: 2022-01-22 | End: 2022-01-22

## 2022-01-22 RX ORDER — REGADENOSON 0.08 MG/ML
INJECTION, SOLUTION INTRAVENOUS
Status: COMPLETED
Start: 2022-01-22 | End: 2022-01-22

## 2022-01-22 RX ORDER — SODIUM BICARBONATE 650 MG/1
650 TABLET ORAL 3 TIMES DAILY
Status: DISCONTINUED | OUTPATIENT
Start: 2022-01-22 | End: 2022-01-28 | Stop reason: HOSPADM

## 2022-01-22 RX ORDER — LEVOTHYROXINE SODIUM 112 UG/1
112 TABLET ORAL
Status: DISCONTINUED | OUTPATIENT
Start: 2022-01-23 | End: 2022-01-28 | Stop reason: HOSPADM

## 2022-01-22 RX ADMIN — LEVOTHYROXINE SODIUM 137 MCG: 137 TABLET ORAL at 05:12

## 2022-01-22 RX ADMIN — ASPIRIN 325 MG ORAL TABLET 325 MG: 325 PILL ORAL at 17:24

## 2022-01-22 RX ADMIN — REGADENOSON 0.4 MG: 0.08 INJECTION, SOLUTION INTRAVENOUS at 15:15

## 2022-01-22 RX ADMIN — SODIUM CHLORIDE, POTASSIUM CHLORIDE, SODIUM LACTATE AND CALCIUM CHLORIDE 2000 ML: 600; 310; 30; 20 INJECTION, SOLUTION INTRAVENOUS at 04:44

## 2022-01-22 RX ADMIN — CEFTRIAXONE SODIUM 2 G: 10 INJECTION, POWDER, FOR SOLUTION INTRAVENOUS at 23:05

## 2022-01-22 RX ADMIN — ALUMINUM HYDROXIDE, MAGNESIUM HYDROXIDE, AND DIMETHICONE 30 ML: 400; 400; 40 SUSPENSION ORAL at 06:47

## 2022-01-22 RX ADMIN — SODIUM BICARBONATE 650 MG: 650 TABLET ORAL at 17:24

## 2022-01-22 RX ADMIN — SODIUM BICARBONATE 650 MG: 650 TABLET ORAL at 11:25

## 2022-01-22 RX ADMIN — SODIUM BICARBONATE 650 MG: 650 TABLET ORAL at 20:17

## 2022-01-22 RX ADMIN — VANCOMYCIN HYDROCHLORIDE 2000 MG: 5 INJECTION, POWDER, LYOPHILIZED, FOR SOLUTION INTRAVENOUS at 23:26

## 2022-01-22 RX ADMIN — SODIUM CHLORIDE, POTASSIUM CHLORIDE, SODIUM LACTATE AND CALCIUM CHLORIDE: 600; 310; 30; 20 INJECTION, SOLUTION INTRAVENOUS at 11:22

## 2022-01-22 RX ADMIN — ALUMINUM HYDROXIDE, MAGNESIUM HYDROXIDE, AND DIMETHICONE 30 ML: 400; 400; 40 SUSPENSION ORAL at 20:39

## 2022-01-22 RX ADMIN — ATORVASTATIN CALCIUM 40 MG: 40 TABLET, FILM COATED ORAL at 17:24

## 2022-01-22 RX ADMIN — ACETAMINOPHEN 650 MG: 325 TABLET, FILM COATED ORAL at 02:27

## 2022-01-22 RX ADMIN — ACETAMINOPHEN 650 MG: 325 TABLET, FILM COATED ORAL at 22:02

## 2022-01-22 RX ADMIN — DOCUSATE SODIUM 50 MG AND SENNOSIDES 8.6 MG 2 TABLET: 8.6; 5 TABLET, FILM COATED ORAL at 05:12

## 2022-01-22 ASSESSMENT — LIFESTYLE VARIABLES
DOES PATIENT WANT TO STOP DRINKING: NO
EVER FELT BAD OR GUILTY ABOUT YOUR DRINKING: NO
EVER FELT BAD OR GUILTY ABOUT YOUR DRINKING: NO
HOW MANY TIMES IN THE PAST YEAR HAVE YOU HAD 5 OR MORE DRINKS IN A DAY: 0
TOTAL SCORE: 0
DOES PATIENT WANT TO STOP DRINKING: NO
ALCOHOL_USE: NO
HAVE PEOPLE ANNOYED YOU BY CRITICIZING YOUR DRINKING: NO
HAVE YOU EVER FELT YOU SHOULD CUT DOWN ON YOUR DRINKING: NO
TOTAL SCORE: 0
ON A TYPICAL DAY WHEN YOU DRINK ALCOHOL HOW MANY DRINKS DO YOU HAVE: 0
AVERAGE NUMBER OF DAYS PER WEEK YOU HAVE A DRINK CONTAINING ALCOHOL: 0
TOTAL SCORE: 0
HAVE YOU EVER FELT YOU SHOULD CUT DOWN ON YOUR DRINKING: NO
CONSUMPTION TOTAL: INCOMPLETE
EVER HAD A DRINK FIRST THING IN THE MORNING TO STEADY YOUR NERVES TO GET RID OF A HANGOVER: NO
EVER HAD A DRINK FIRST THING IN THE MORNING TO STEADY YOUR NERVES TO GET RID OF A HANGOVER: NO
HAVE PEOPLE ANNOYED YOU BY CRITICIZING YOUR DRINKING: NO
TOTAL SCORE: 0
TOTAL SCORE: 0
ALCOHOL_USE: NO
TOTAL SCORE: 0
CONSUMPTION TOTAL: NEGATIVE

## 2022-01-22 ASSESSMENT — PATIENT HEALTH QUESTIONNAIRE - PHQ9
SUM OF ALL RESPONSES TO PHQ9 QUESTIONS 1 AND 2: 0
2. FEELING DOWN, DEPRESSED, IRRITABLE, OR HOPELESS: NOT AT ALL
1. LITTLE INTEREST OR PLEASURE IN DOING THINGS: NOT AT ALL
SUM OF ALL RESPONSES TO PHQ9 QUESTIONS 1 AND 2: 0
1. LITTLE INTEREST OR PLEASURE IN DOING THINGS: NOT AT ALL
2. FEELING DOWN, DEPRESSED, IRRITABLE, OR HOPELESS: NOT AT ALL

## 2022-01-22 ASSESSMENT — FIBROSIS 4 INDEX: FIB4 SCORE: 3.95

## 2022-01-22 ASSESSMENT — COPD QUESTIONNAIRES
HAVE YOU SMOKED AT LEAST 100 CIGARETTES IN YOUR ENTIRE LIFE: YES
DURING THE PAST 4 WEEKS HOW MUCH DID YOU FEEL SHORT OF BREATH: NONE/LITTLE OF THE TIME
DO YOU EVER COUGH UP ANY MUCUS OR PHLEGM?: NO/ONLY WITH OCCASIONAL COLDS OR INFECTIONS
COPD SCREENING SCORE: 4

## 2022-01-22 ASSESSMENT — PAIN DESCRIPTION - PAIN TYPE
TYPE: ACUTE PAIN
TYPE: ACUTE PAIN

## 2022-01-22 ASSESSMENT — PAIN SCALES - WONG BAKER: WONGBAKER_NUMERICALRESPONSE: DOESN'T HURT AT ALL

## 2022-01-22 NOTE — ASSESSMENT & PLAN NOTE
SIRS criteria identified on my evaluation include:  Fever, with temperature greater than 101 deg F and Tachycardia, with heart rate greater than 90 BPM  SIRS is non-infectious, the patient does not have sepsis  1/23: Blood cultures positive for Streptococcus species 2 out of 2 bottles.  No source of infection has been identified yet.  CT scan abdominal pelvis given right upper quadrant tenderness with negative ultrasound ordered and pending.  1/24: CT abdomen and pelvis and ultrasound both negative for any acute findings.  MRI of the cervical and thoracic spine with no evidence of discitis or osteomyelitis.  No epidural abscess.  RENU was done which was negative for any endocarditis. No source has been identified yet.  Repeat blood cultures from 1/23/2022 still positive.  Repeat blood cultures in 48 hours.  May be consider reimaging abdomen and pelvis with enhanced contrast study given continued abdominal pain!?

## 2022-01-22 NOTE — PROGRESS NOTES
Pt with onset of fever and confusion   Pt has shivering noted  Pt medicated for fever per MAR by charge RN

## 2022-01-22 NOTE — ED NOTES
Pt placed in hospital bed. Pt provided warm blankets. Pt refused pillow and to have second bed railing up. Pt educated on safety reasons and continued to refused. No other needs at this time.

## 2022-01-22 NOTE — ASSESSMENT & PLAN NOTE
Chronic  Worsened likely due to worsening kidney functions plus dehydration  On IV fluids for now.  Monitor

## 2022-01-22 NOTE — ASSESSMENT & PLAN NOTE
Rule out ACS.  Echocardiogram and stress test ordered.  1/23: Negative work-up with unremarkable echocardiogram and nuclear stress test.

## 2022-01-22 NOTE — PROGRESS NOTES
On call hospitalist paged and notified of pt's continued fever and new onset confusion   Orders placed by MD

## 2022-01-22 NOTE — PROGRESS NOTES
[facility] Medicine Daily Progress Note    Date of Service  1/22/2022    Chief Complaint  Emigdio Jiménez is a 86 y.o. male admitted 1/21/2022 with right-sided chest pain/right upper quadrant pain.    Hospital Course  This is 86 years old male who has past medical history of hypertension, chronic kidney disease stage III comes in with right-sided chest pain/right upper quadrant pain.  Pain is provoked with taking deep breath and when he coughs.  Recently presented to ER with similar presentation but left AMA.  Denies any fever chills.  Admits to nausea but no vomiting.  No diarrhea.  In ER initially looked hemodynamically stable.  Afebrile.  Was saturating well on room air.  EKG was negative for ACS.  Troponin was elevated.  TSH was low and free T4 was elevated.  UDS positive for opiates.  D-dimer slightly elevated.  Patient had a CTA of the chest which was negative for PE or any acute lung pathology.  It showed coronary artery calcifications however.  There was slight leukocytosis on CBC.  UA was negative for UTI.  Patient was admitted for ACS work-up.  Echocardiogram and nuclear stress test ordered.  Later on patient spiked a fever of 102.7 °F.  He was slightly tachycardic and hypotensive.  Kidney functions worsen and became acidotic.  On exam noted to have right upper quadrant tenderness.  Abdominal ultrasound ordered.  No distress  Interval Problem Update  Patient resting in bed comfortable.  No acute distress noted.  Blood pressure has been soft but asymptomatic.  Spiked fever this morning.  Feels nauseous but no vomiting.  Tenderness on right upper quadrant noted.  Ultrasound ordered.  No acute distress noted.  No issues overnight per staff.     I have personally seen and examined the patient at bedside. I discussed the plan of care with patient and bedside RN.    Consultants/Specialty  None    Code Status  Full Code    Disposition  Patient is not medically cleared for discharge.   Anticipate discharge to to home  with close outpatient follow-up.  I have placed the appropriate orders for post-discharge needs.    Review of Systems  ROS     Physical Exam  Temp:  [36.3 °C (97.4 °F)-39.3 °C (102.7 °F)] 36.4 °C (97.6 °F)  Pulse:  [] 108  Resp:  [16-24] 18  BP: ()/() 116/57  SpO2:  [90 %-100 %] 93 %    Physical Exam    Fluids    Intake/Output Summary (Last 24 hours) at 1/22/2022 1210  Last data filed at 1/21/2022 2227  Gross per 24 hour   Intake --   Output 200 ml   Net -200 ml       Laboratory  Recent Labs     01/21/22  1548 01/22/22  0413   WBC 11.9* 10.7   RBC 4.09* 3.96*   HEMOGLOBIN 12.2* 11.5*   HEMATOCRIT 35.7* 33.7*   MCV 87.3 85.1   MCH 29.8 29.0   MCHC 34.2 34.1   RDW 45.2 44.2   PLATELETCT 156* 152*   MPV 10.9 10.9     Recent Labs     01/21/22  1548 01/22/22  0413   SODIUM 128* 125*   POTASSIUM 4.9 4.8   CHLORIDE 98 97   CO2 18* 15*   GLUCOSE 115* 93   BUN 28* 37*   CREATININE 2.08* 2.24*   CALCIUM 9.1 8.6             Recent Labs     01/22/22  0413   TRIGLYCERIDE 189*   HDL 14*   LDL 65       Imaging  EC-ECHOCARDIOGRAM COMPLETE W/O CONT   Final Result      CT-CTA CHEST PULMONARY ARTERY W/ RECONS   Final Result      1.  No evidence of pulmonary embolus.      2.  Coronary artery calcifications.            DX-CHEST-PORTABLE (1 VIEW)   Final Result      No acute cardiac or pulmonary abnormalities are identified.      NM-CARDIAC STRESS TEST    (Results Pending)   US-RUQ    (Results Pending)        Assessment/Plan  * Elevated troponin- (present on admission)  Assessment & Plan  EKG negative for ACS.  Echocardiogram and stress test ordered.    Right upper quadrant abdominal tenderness with rebound tenderness  Assessment & Plan  Would benefit from CT abdomen and pelvis with contrast but due to worsening kidney functions will do fair for now.  Will check abdominal ultrasound.    SIRS (systemic inflammatory response syndrome) (HCC)- (present on admission)  Assessment & Plan  SIRS criteria identified on my  evaluation include:  Fever, with temperature greater than 101 deg F and Tachycardia, with heart rate greater than 90 BPM  SIRS is non-infectious, the patient does not have sepsis      Metabolic acidosis- (present on admission)  Assessment & Plan  Could be due to worsening kidney functions.  Started on sodium bicarb  Fluids resuscitation.  Monitor.    DANIS (acute kidney injury) (HCC)- (present on admission)  Assessment & Plan  CKD 3 based on retrospective lab review.  Continue to monitor  1/22: Worsening.  Likely combination of prerenal component and possibly ATN from contrast exposure.  Gentle fluid resuscitation.  Avoid nephrotoxins.  Renally dose all medications.    CHF (congestive heart failure) (HCC)- (present on admission)  Assessment & Plan  Echo and stress test ordered  BNP 2591  Consider cardiology consult  No previous dx of CHF    HLD (hyperlipidemia)- (present on admission)  Assessment & Plan  Lipid panel ordered to assess  Continue home meds    Hyponatremia- (present on admission)  Assessment & Plan  Chronic  Worsened likely due to worsening kidney functions plus dehydration  On IV fluids for now.  Monitor    Essential hypertension- (present on admission)  Assessment & Plan  Continue home meds  Admitted with telemetry      Hypothyroidism  Assessment & Plan  Low TSH and elevated free T4.  Will decrease Synthroid dose to 112 mcg  Repeat TSH/free T4 in 4 to 6 weeks and adjust dose accordingly    Chest pain- (present on admission)  Assessment & Plan  Rule out ACS.  Echocardiogram and stress test ordered.       VTE prophylaxis: heparin ppx    I have performed a physical exam and reviewed and updated ROS and Plan today (1/22/2022). In review of yesterday's note (1/21/2022), there are no changes except as documented above.

## 2022-01-22 NOTE — H&P
"Hospital Medicine History & Physical Note    Date of Service  1/21/2022    Primary Care Physician  DC Flores    Consultants      Code Status  Full Code    Chief Complaint  Chief Complaint   Patient presents with   • Chest Pain     left sided chest pain x 1 week describes as pressure and constant worse today. was admitted few days ago for same w/postive troponin result. pt left AMA yesterday told RN \"i can't stay in the hospital\" but today i will stay im sick.       History of Presenting Illness  Emigdio Jiménez is a 86 y.o. male who presented 1/21/2022 with one week hx of worsening chest pain describes as a pressure that does not radiate.  Coughing and sneezing cause severe sharp chest pain, as well as movement in his torso and chest pain is also reproducible at the R. Chest and central chest area.     Notable findings include: BP 98//111, WBC 11.9, Hgb 12.2, platelets 156, Na 128, GFR 30, troponin 61, glucose 115, Bun/creatinine 28/2.08    Chest pain X one week    EKG showing NSR    I discussed the plan of care with patient.    Review of Systems  Review of Systems   Cardiovascular: Positive for chest pain.   All other systems reviewed and are negative.      Past Medical History   has a past medical history of Anxiety, Heart murmur, and Hypertension.    Surgical History   has a past surgical history that includes hernia repair.     Family History  family history includes Heart Disease in his mother; Hyperlipidemia in his mother.   Family history reviewed with patient. There is no family history that is pertinent to the chief complaint.     Social History   reports that he has quit smoking. He smoked 0.00 packs per day. He has never used smokeless tobacco. He reports previous alcohol use. He reports that he does not use drugs.    Allergies  No Known Allergies    Medications  Prior to Admission Medications   Prescriptions Last Dose Informant Patient Reported? Taking?   LORazepam (ATIVAN) 0.5 MG " Tab   No No   Sig: Take 1 Tablet by mouth at bedtime for 7 days.   albuterol 108 (90 Base) MCG/ACT Aero Soln inhalation aerosol   Yes No   Sig: Inhale 2 Puffs every 6 hours as needed for Shortness of Breath.   amitriptyline (ELAVIL) 25 MG Tab  Patient's Home Pharmacy Yes No   Sig: Take 25 mg by mouth every evening.   levothyroxine (SYNTHROID) 137 MCG Tab  Patient's Home Pharmacy Yes No   Sig: Take 137 mcg by mouth every morning on an empty stomach.   levothyroxine (SYNTHROID) 150 MCG Tab  Patient's Home Pharmacy Yes No   Sig: Take 150 mcg by mouth every morning on an empty stomach.   lisinopril (PRINIVIL) 20 MG Tab  Patient's Home Pharmacy Yes No   Sig: Take 20 mg by mouth every day.   tamsulosin (FLOMAX) 0.4 MG capsule  Patient's Home Pharmacy Yes No   Sig: Take 0.8 mg by mouth every day.      Facility-Administered Medications: None       Physical Exam  Temp:  [36.3 °C (97.4 °F)] 36.3 °C (97.4 °F)  Pulse:  [75-88] 80  Resp:  [16-20] 19  BP: ()/() 126/87  SpO2:  [94 %-100 %] 98 %  Blood Pressure : 143/64   Temperature: 36.3 °C (97.4 °F)   Pulse: 80   Respiration: 20   Pulse Oximetry: 99 %       Physical Exam   Constitutional: Resting comfortably in NAD   HENT: Normocephalic, no obvious evidence of acute trauma.  Eyes: No scleral icterus. Normal conjunctiva   Neck: Comfortable movement without any obvious restriction in the range of motion.  Cardiovascular: Upon ascultation I appreciate a regular heart rhythm and a normal rate with no murmurs, rubs or gallops, chest pain at right thorax that is reproducible with palpation and occurs with sneezing and movement  Thorax & Lungs: No respiratory distress. No wheezing, rales or rhonchi heard on ausculation.  there is no obvious chest wall tenderness. I appreciate normal air movement throughout.   Abdomen: The abdomen is not visibly distended. Upon palpation, I find it to be without tenderness.  No mass appreciated.  Skin: The exposed portions of skin reveal no  obvious rash or other abnormalities.  Extremities/Musculoskeletal: no lower extremity edema with no asymmetry.  Neurologic: Alert & oriented. No focal deficits observed.   Psychiatric: Normal affect appropriate for the clinical situation.    Laboratory:  Recent Labs     01/21/22  1548   WBC 11.9*   RBC 4.09*   HEMOGLOBIN 12.2*   HEMATOCRIT 35.7*   MCV 87.3   MCH 29.8   MCHC 34.2   RDW 45.2   PLATELETCT 156*   MPV 10.9     Recent Labs     01/21/22  1548   SODIUM 128*   POTASSIUM 4.9   CHLORIDE 98   CO2 18*   GLUCOSE 115*   BUN 28*   CREATININE 2.08*   CALCIUM 9.1     Recent Labs     01/21/22  1548   ALTSGPT 16   ASTSGOT 29   ALKPHOSPHAT 80   TBILIRUBIN 0.5   GLUCOSE 115*         Recent Labs     01/21/22  1548   NTPROBNP 2591*         Recent Labs     01/21/22  1548   TROPONINT 61*       Imaging:  CT-CTA CHEST PULMONARY ARTERY W/ RECONS   Final Result      1.  No evidence of pulmonary embolus.      2.  Coronary artery calcifications.            DX-CHEST-PORTABLE (1 VIEW)   Final Result      No acute cardiac or pulmonary abnormalities are identified.      NM-CARDIAC STRESS TEST    (Results Pending)   EC-ECHOCARDIOGRAM COMPLETE W/O CONT    (Results Pending)         Assessment/Plan:  I anticipate this patient is appropriate for observation status at this time.    * Elevated troponin- (present on admission)  Assessment & Plan  Troponin 61 on arrival   Repeat troponins and EKGs ordered       Chest pain- (present on admission)  Assessment & Plan  - tele monitor  - Stress test  - hold BB therapy  - f/u A1c and lipid panel  - TTE  - analgesics for symptom management    CHF (congestive heart failure) (HCC)- (present on admission)  Assessment & Plan  Echo and stress test ordered  BNP 2591  Consider cardiology consult  No previous dx of CHF    HLD (hyperlipidemia)- (present on admission)  Assessment & Plan  Lipid panel ordered to assess  Continue home meds    Essential hypertension- (present on admission)  Assessment &  Plan  Continue home meds  Admitted with telemetry      Hypothyroidism  Assessment & Plan  Continue home meds  tsh ordered to assess      VTE prophylaxis: SCDs/TEDs

## 2022-01-22 NOTE — CARE PLAN
The patient is Watcher - Medium risk of patient condition declining or worsening    Shift Goals  Clinical Goals: stress test   Patient Goals: sleep  Family Goals: N/A    Progress made toward(s) clinical / shift goals:    Problem: Pain - Standard  Goal: Alleviation of pain or a reduction in pain to the patient’s comfort goal  Outcome: Progressing     Problem: Knowledge Deficit - Standard  Goal: Patient and family/care givers will demonstrate understanding of plan of care, disease process/condition, diagnostic tests and medications  1/22/2022 0430 by ANDRE SheffieldN.  Outcome: Progressing  1/22/2022 0429 by Deena Dia R.N.  Outcome: Progressing     Problem: Hemodynamics  Goal: Patient's hemodynamics, fluid balance and neurologic status will be stable or improve  Outcome: Progressing     Problem: Fluid Volume  Goal: Fluid volume balance will be maintained  Outcome: Progressing     Problem: Fall Risk  Goal: Patient will remain free from falls  Outcome: Progressing       Patient is not progressing towards the following goals:

## 2022-01-22 NOTE — PROGRESS NOTES
4 Eyes Skin Assessment Completed by DELILAH Shaffer and DELILAH Cerrato.    Head WDL  Ears WDL  Nose Discoloration  Mouth WDL  Neck Discoloration  Breast/Chest Discoloration  Shoulder Blades WDL  Spine WDL  (R) Arm/Elbow/Hand Bruising and Discoloration  (L) Arm/Elbow/Hand Bruising and Discoloration  Abdomen WDL, rounded   Groin WDL  Scrotum/Coccyx/Buttocks Redness and Blanching  (R) Leg Bruising  (L) Leg Bruising  (R) Heel/Foot/Toe WDL  (L) Heel/Foot/Toe WDL          Devices In Places Tele Box, Blood Pressure Cuff and Pulse Ox      Interventions In Place Gray Ear Foams, Pillows and Pressure Redistribution Mattress    Possible Skin Injury No    Pictures Uploaded Into Epic N/A  Wound Consult Placed N/A  RN Wound Prevention Protocol Ordered No

## 2022-01-22 NOTE — ED NOTES
Received report on patient. Pt medicated per MAR for pain and nausea. Hospital bed ordered. No other needs at this time.

## 2022-01-22 NOTE — ED PROVIDER NOTES
"ED Provider Note    CHIEF COMPLAINT  Chief Complaint   Patient presents with   • Chest Pain     left sided chest pain x 1 week describes as pressure and constant worse today. was admitted few days ago for same w/postive troponin result. pt left AMA yesterday told RN \"i can't stay in the hospital\" but today i will stay im sick.       HPI  Emigdio Jiménez is a 86 y.o. male who presents for evaluation of right-sided mid chest pain which has been present for over a week.  Patient states it is constant and worse when coughing or taking a deep breath.  He notes that his cough has not been productive and there is no hemoptysis.  He notes no fevers, chills, nausea, vomiting, diarrhea, abdominal pain, back pain, hematuria, or dysuria.  He has no neck pain or head pain.  He does not smoke, drink, or use illegal drugs.  He did use marijuana last month however.    REVIEW OF SYSTEMS  Constitutional: No fevers or chills  Skin: No rashes  HEENT: No sore throat, runny nose, sores, trouble swallowing, trouble speaking.  Neck: No neck pain, stiffness, or masses.  Chest: Right-sided chest pain  Pulm: Mild exertional shortness of breath with cough.  No wheezing, or stridor.  Gastrointestinal: No nausea, vomiting, diarrhea, constipation, bloating, melena, hematochezia or abdominal pain.  Genitourinary: No dysuria or hematuria  Musculoskeletal: No pain, swelling, weakness  Neurologic: No sensory or motor changes. No confusion or disorientation.  Heme: No bleeding or bruising problems.   Immuno: No hx of recurrent infections    PAST FAM HISTORY  Family History   Problem Relation Age of Onset   • Hyperlipidemia Mother    • Heart Disease Mother        PAST MEDICAL HISTORY   has a past medical history of Anxiety, Heart murmur, and Hypertension.    SOCIAL HISTORY  Social History     Tobacco Use   • Smoking status: Former Smoker     Packs/day: 0.00   • Smokeless tobacco: Never Used   Vaping Use   • Vaping Use: Never used   Substance and Sexual " "Activity   • Alcohol use: Not Currently   • Drug use: Never   • Sexual activity: Not on file       SURGICAL HISTORY   has a past surgical history that includes hernia repair.    CURRENT MEDICATIONS  Home Medications     Reviewed by Zoë Chung R.N. (Registered Nurse) on 01/21/22 at 1524  Med List Status: Partial   Medication Last Dose Status   albuterol 108 (90 Base) MCG/ACT Aero Soln inhalation aerosol  Active   amitriptyline (ELAVIL) 25 MG Tab  Active   levothyroxine (SYNTHROID) 137 MCG Tab  Active   levothyroxine (SYNTHROID) 150 MCG Tab  Active   lisinopril (PRINIVIL) 20 MG Tab  Active   LORazepam (ATIVAN) 0.5 MG Tab  Active   tamsulosin (FLOMAX) 0.4 MG capsule  Active                ALLERGIES  No Known Allergies    PHYSICAL EXAM  VITAL SIGNS: /64   Pulse 80   Temp 36.3 °C (97.4 °F) (Temporal)   Resp 20   Ht 1.702 m (5' 7\")   Wt 75.7 kg (166 lb 14.2 oz)   SpO2 99%   BMI 26.14 kg/m²    Gen: Alert in no apparent distress.  Hard of hearing  HEENT: No signs of trauma, Bilateral external ears normal, Nose normal. Conjunctiva normal, Non-icteric.   Neck:  No tenderness, Supple, No masses  Lymphatic: No cervical lymphadenopathy noted.   Cardiovascular: Regular rate and rhythm, no murmurs.  Capillary refill less than 3 seconds to all extremities, 2+ distal pulses.  Thorax & Lungs: Normal breath sounds, No respiratory distress, No wheezing bilateral chest rise  Abdomen: Bowel sounds normal, Soft, No tenderness, No masses, No pulsatile masses. No Guarding or rebound  Skin: Warm, Dry, No erythema, No rash noted to exposed areas.   Extremities: Intact distal pulses, No edema  Neurologic: Alert , no facial droop, grossly normal coordination and strength  Psychiatric: Affect pleasant      LABS  Results for orders placed or performed during the hospital encounter of 01/21/22   CBC with Differential   Result Value Ref Range    WBC 11.9 (H) 4.8 - 10.8 K/uL    RBC 4.09 (L) 4.70 - 6.10 M/uL    Hemoglobin 12.2 (L) " 14.0 - 18.0 g/dL    Hematocrit 35.7 (L) 42.0 - 52.0 %    MCV 87.3 81.4 - 97.8 fL    MCH 29.8 27.0 - 33.0 pg    MCHC 34.2 33.7 - 35.3 g/dL    RDW 45.2 35.9 - 50.0 fL    Platelet Count 156 (L) 164 - 446 K/uL    MPV 10.9 9.0 - 12.9 fL    Neutrophils-Polys 78.60 (H) 44.00 - 72.00 %    Lymphocytes 9.50 (L) 22.00 - 41.00 %    Monocytes 10.60 0.00 - 13.40 %    Eosinophils 0.10 0.00 - 6.90 %    Basophils 0.30 0.00 - 1.80 %    Immature Granulocytes 0.90 0.00 - 0.90 %    Nucleated RBC 0.00 /100 WBC    Neutrophils (Absolute) 9.31 (H) 1.82 - 7.42 K/uL    Lymphs (Absolute) 1.12 1.00 - 4.80 K/uL    Monos (Absolute) 1.26 (H) 0.00 - 0.85 K/uL    Eos (Absolute) 0.01 0.00 - 0.51 K/uL    Baso (Absolute) 0.04 0.00 - 0.12 K/uL    Immature Granulocytes (abs) 0.11 0.00 - 0.11 K/uL    NRBC (Absolute) 0.00 K/uL   Complete Metabolic Panel (CMP)   Result Value Ref Range    Sodium 128 (L) 135 - 145 mmol/L    Potassium 4.9 3.6 - 5.5 mmol/L    Chloride 98 96 - 112 mmol/L    Co2 18 (L) 20 - 33 mmol/L    Anion Gap 12.0 7.0 - 16.0    Glucose 115 (H) 65 - 99 mg/dL    Bun 28 (H) 8 - 22 mg/dL    Creatinine 2.08 (H) 0.50 - 1.40 mg/dL    Calcium 9.1 8.5 - 10.5 mg/dL    AST(SGOT) 29 12 - 45 U/L    ALT(SGPT) 16 2 - 50 U/L    Alkaline Phosphatase 80 30 - 99 U/L    Total Bilirubin 0.5 0.1 - 1.5 mg/dL    Albumin 3.5 3.2 - 4.9 g/dL    Total Protein 6.7 6.0 - 8.2 g/dL    Globulin 3.2 1.9 - 3.5 g/dL    A-G Ratio 1.1 g/dL   Troponin   Result Value Ref Range    Troponin T 61 (H) 6 - 19 ng/L   ESTIMATED GFR   Result Value Ref Range    GFR If  37 (A) >60 mL/min/1.73 m 2    GFR If Non African American 30 (A) >60 mL/min/1.73 m 2   EKG   Result Value Ref Range    Report       [facility] Emergency Dept.    Test Date:  2022  Pt Name:    OLAMIDE DAI                Department: ER  MRN:        [id number]                      Room:  Gender:     Male                         Technician: [id number]  :        [id number]                    Requested By:ER TRIAGE PROTOCOL  Order #:    [id number]                    Reading MD:    Measurements  Intervals                                Axis  Rate:       79                           P:          25  NE:         189                          QRS:        21  QRSD:       79                           T:          69  QT:         367  QTc:        421    Interpretive Statements  Sinus rhythm  Abnormal R-wave progression, early transition  Compared to ECG 01/18/2022 05:57:01  No significant changes         RADIOLOGY  CT-CTA CHEST PULMONARY ARTERY W/ RECONS   Final Result      1.  No evidence of pulmonary embolus.      2.  Coronary artery calcifications.            DX-CHEST-PORTABLE (1 VIEW)   Final Result      No acute cardiac or pulmonary abnormalities are identified.        Critical Care Note  Upon my evaluation, this patient had high probability of imminent and life-threatening deterioration due to chest pain with elevated troponin, which required my direct attention, intervention, and personal management. I personally provided 30 minutes of critical care time exclusive of time spent on separately billable procedures. Time includes review of laboratory data, radiology results, discussion with consultants, and monitoring for potential decompensation.     HYDRATION: Based on the patient's presentation of Dehydration the patient was given IV fluids. IV Hydration was used because oral hydration was not adequate alone. Upon recheck following hydration, the patient was stable.    COURSE & MEDICAL DECISION MAKING  Patient arrives for evaluation of continued chest pain in the right chest which is nonradiating but is associated with increased pain with deep inspiration and cough, shortness of breath with exertion and has been constant for over a week.  Notable that patient's troponin is elevated above his last reading when he left AMA earlier this week.  Patient states he is willing to stay this time if it is necessary.   Out of concern for possible pulmonary embolism, we will CTA his chest.    9:01 PM  Patient noted to have no PE or acute findings on CTA but does have coronary artery calcifications.  He does have evidence for kidney dysfunction which may be related to acute dehydration however, given his elevated troponin in the setting of active chest pain, I feel he is best served by further hospital evaluation for possible ACS.  We will discussed the case with the hospitalist service.  Notable that the patient's GFR was borderline for CTA however I felt this was necessary to rule out emergent pathology.  We will plan on fluid hydration in the emergency department to help wash out contrast and hopefully protect kidneys.    FINAL IMPRESSION  1. Chest pain, unspecified type    2. Elevated troponin        Electronically signed by: Stu Rincon M.D., 1/21/2022 5:08 PM

## 2022-01-22 NOTE — PROGRESS NOTES
Pt picked up in ER with Tele monitor   Pt transported to room T 218 via wheelchair, on Tele with ACLS RN   Pt awake and alert, no c/o pain at this time  Pt oriented to unit and room   Admission assessment and admission navigator completed   Safety information reviewed with pt  Plan of care for NOC reviewed with pt and pt agrees   All other pt needs addressed at this time   Hourly rounding in place   Bed locked and in lowest position, call light within reach, bed alarm placed on due to pt's fall risk status

## 2022-01-22 NOTE — ASSESSMENT & PLAN NOTE
Would benefit from CT abdomen and pelvis with contrast but due to worsening kidney functions will do fair for now.  Will check abdominal ultrasound.  1/22: Ultrasound of the abdomen negative for acute findings rather than showing gallstones with no evidence of cholecystitis.  Given continued tenderness of the abdomen, positive blood cultures, and fever, CT scan of the abdomen pelvis without contrast ordered.  Case discussed with surgery.  No surgical intervention recommended at this point.

## 2022-01-22 NOTE — ASSESSMENT & PLAN NOTE
Low TSH and elevated free T4.  Will decrease Synthroid dose to 112 mcg  Repeat TSH/free T4 in 4 to 6 weeks and adjust dose accordingly

## 2022-01-22 NOTE — ASSESSMENT & PLAN NOTE
Could be due to worsening kidney functions.  Started on sodium bicarb  Fluids resuscitation.  Monitor.  1/22: Improving.

## 2022-01-22 NOTE — ASSESSMENT & PLAN NOTE
CKD 3 based on retrospective lab review.  Continue to monitor  1/22: Worsening.  Likely combination of prerenal component and possibly ATN/AIN from contrast exposure.  Gentle fluid resuscitation.  Avoid nephrotoxins.  Renally dose all medications.  1/23: Improving.

## 2022-01-23 ENCOUNTER — APPOINTMENT (OUTPATIENT)
Dept: RADIOLOGY | Facility: MEDICAL CENTER | Age: 87
DRG: 872 | End: 2022-01-23
Attending: FAMILY MEDICINE
Payer: MEDICARE

## 2022-01-23 PROBLEM — R78.81 BACTEREMIA: Status: ACTIVE | Noted: 2022-01-23

## 2022-01-23 PROBLEM — A41.9 SEPSIS (HCC): Status: ACTIVE | Noted: 2022-01-22

## 2022-01-23 LAB
ALBUMIN SERPL BCP-MCNC: 2.9 G/DL (ref 3.2–4.9)
ALBUMIN/GLOB SERPL: 1.2 G/DL
ALP SERPL-CCNC: 89 U/L (ref 30–99)
ALT SERPL-CCNC: 18 U/L (ref 2–50)
ANION GAP SERPL CALC-SCNC: 11 MMOL/L (ref 7–16)
AST SERPL-CCNC: 35 U/L (ref 12–45)
BASOPHILS # BLD AUTO: 0.2 % (ref 0–1.8)
BASOPHILS # BLD: 0.02 K/UL (ref 0–0.12)
BILIRUB SERPL-MCNC: 0.3 MG/DL (ref 0.1–1.5)
BUN SERPL-MCNC: 32 MG/DL (ref 8–22)
CALCIUM SERPL-MCNC: 7.9 MG/DL (ref 8.5–10.5)
CHLORIDE SERPL-SCNC: 98 MMOL/L (ref 96–112)
CO2 SERPL-SCNC: 17 MMOL/L (ref 20–33)
CREAT SERPL-MCNC: 1.33 MG/DL (ref 0.5–1.4)
EOSINOPHIL # BLD AUTO: 0.01 K/UL (ref 0–0.51)
EOSINOPHIL NFR BLD: 0.1 % (ref 0–6.9)
ERYTHROCYTE [DISTWIDTH] IN BLOOD BY AUTOMATED COUNT: 44.6 FL (ref 35.9–50)
GLOBULIN SER CALC-MCNC: 2.4 G/DL (ref 1.9–3.5)
GLUCOSE SERPL-MCNC: 113 MG/DL (ref 65–99)
HCT VFR BLD AUTO: 30.4 % (ref 42–52)
HGB BLD-MCNC: 10.4 G/DL (ref 14–18)
IMM GRANULOCYTES # BLD AUTO: 0.08 K/UL (ref 0–0.11)
IMM GRANULOCYTES NFR BLD AUTO: 0.7 % (ref 0–0.9)
LACTATE BLD-SCNC: 1.2 MMOL/L (ref 0.5–2)
LYMPHOCYTES # BLD AUTO: 0.41 K/UL (ref 1–4.8)
LYMPHOCYTES NFR BLD: 3.4 % (ref 22–41)
MCH RBC QN AUTO: 29.5 PG (ref 27–33)
MCHC RBC AUTO-ENTMCNC: 34.2 G/DL (ref 33.7–35.3)
MCV RBC AUTO: 86.1 FL (ref 81.4–97.8)
MONOCYTES # BLD AUTO: 1.3 K/UL (ref 0–0.85)
MONOCYTES NFR BLD AUTO: 10.9 % (ref 0–13.4)
NEUTROPHILS # BLD AUTO: 10.13 K/UL (ref 1.82–7.42)
NEUTROPHILS NFR BLD: 84.7 % (ref 44–72)
NRBC # BLD AUTO: 0 K/UL
NRBC BLD-RTO: 0 /100 WBC
PLATELET # BLD AUTO: 147 K/UL (ref 164–446)
PMV BLD AUTO: 11 FL (ref 9–12.9)
POTASSIUM SERPL-SCNC: 4.7 MMOL/L (ref 3.6–5.5)
PROT SERPL-MCNC: 5.3 G/DL (ref 6–8.2)
RBC # BLD AUTO: 3.53 M/UL (ref 4.7–6.1)
SODIUM SERPL-SCNC: 126 MMOL/L (ref 135–145)
WBC # BLD AUTO: 12 K/UL (ref 4.8–10.8)

## 2022-01-23 PROCEDURE — 85025 COMPLETE CBC W/AUTO DIFF WBC: CPT

## 2022-01-23 PROCEDURE — 74176 CT ABD & PELVIS W/O CONTRAST: CPT | Mod: ME

## 2022-01-23 PROCEDURE — 700111 HCHG RX REV CODE 636 W/ 250 OVERRIDE (IP): Performed by: INTERNAL MEDICINE

## 2022-01-23 PROCEDURE — 80053 COMPREHEN METABOLIC PANEL: CPT

## 2022-01-23 PROCEDURE — 700105 HCHG RX REV CODE 258: Performed by: FAMILY MEDICINE

## 2022-01-23 PROCEDURE — 96376 TX/PRO/DX INJ SAME DRUG ADON: CPT

## 2022-01-23 PROCEDURE — A9270 NON-COVERED ITEM OR SERVICE: HCPCS | Performed by: FAMILY MEDICINE

## 2022-01-23 PROCEDURE — 700111 HCHG RX REV CODE 636 W/ 250 OVERRIDE (IP): Performed by: STUDENT IN AN ORGANIZED HEALTH CARE EDUCATION/TRAINING PROGRAM

## 2022-01-23 PROCEDURE — 700105 HCHG RX REV CODE 258: Performed by: INTERNAL MEDICINE

## 2022-01-23 PROCEDURE — 72141 MRI NECK SPINE W/O DYE: CPT | Mod: ME

## 2022-01-23 PROCEDURE — A9270 NON-COVERED ITEM OR SERVICE: HCPCS | Performed by: STUDENT IN AN ORGANIZED HEALTH CARE EDUCATION/TRAINING PROGRAM

## 2022-01-23 PROCEDURE — 99226 PR SUBSEQUENT OBSERVATION CARE,LEVEL III: CPT | Performed by: FAMILY MEDICINE

## 2022-01-23 PROCEDURE — 87040 BLOOD CULTURE FOR BACTERIA: CPT

## 2022-01-23 PROCEDURE — 72146 MRI CHEST SPINE W/O DYE: CPT | Mod: MG

## 2022-01-23 PROCEDURE — 99205 OFFICE O/P NEW HI 60 MIN: CPT | Performed by: INTERNAL MEDICINE

## 2022-01-23 PROCEDURE — G0378 HOSPITAL OBSERVATION PER HR: HCPCS

## 2022-01-23 PROCEDURE — 700111 HCHG RX REV CODE 636 W/ 250 OVERRIDE (IP): Performed by: FAMILY MEDICINE

## 2022-01-23 PROCEDURE — 87150 DNA/RNA AMPLIFIED PROBE: CPT | Mod: 91

## 2022-01-23 PROCEDURE — 96367 TX/PROPH/DG ADDL SEQ IV INF: CPT

## 2022-01-23 PROCEDURE — 700102 HCHG RX REV CODE 250 W/ 637 OVERRIDE(OP): Performed by: FAMILY MEDICINE

## 2022-01-23 PROCEDURE — 96372 THER/PROPH/DIAG INJ SC/IM: CPT

## 2022-01-23 PROCEDURE — 83605 ASSAY OF LACTIC ACID: CPT

## 2022-01-23 PROCEDURE — 700102 HCHG RX REV CODE 250 W/ 637 OVERRIDE(OP): Performed by: STUDENT IN AN ORGANIZED HEALTH CARE EDUCATION/TRAINING PROGRAM

## 2022-01-23 PROCEDURE — 87077 CULTURE AEROBIC IDENTIFY: CPT

## 2022-01-23 RX ORDER — MORPHINE SULFATE 4 MG/ML
4 INJECTION INTRAVENOUS EVERY 4 HOURS PRN
Status: DISCONTINUED | OUTPATIENT
Start: 2022-01-23 | End: 2022-01-23

## 2022-01-23 RX ORDER — HYDROCODONE BITARTRATE AND ACETAMINOPHEN 10; 325 MG/1; MG/1
1 TABLET ORAL EVERY 6 HOURS PRN
Status: DISCONTINUED | OUTPATIENT
Start: 2022-01-23 | End: 2022-01-28 | Stop reason: HOSPADM

## 2022-01-23 RX ORDER — HYDROMORPHONE HYDROCHLORIDE 1 MG/ML
0.5 INJECTION, SOLUTION INTRAMUSCULAR; INTRAVENOUS; SUBCUTANEOUS
Status: DISCONTINUED | OUTPATIENT
Start: 2022-01-23 | End: 2022-01-28 | Stop reason: HOSPADM

## 2022-01-23 RX ORDER — SODIUM CHLORIDE, SODIUM LACTATE, POTASSIUM CHLORIDE, AND CALCIUM CHLORIDE .6; .31; .03; .02 G/100ML; G/100ML; G/100ML; G/100ML
500 INJECTION, SOLUTION INTRAVENOUS
Status: DISCONTINUED | OUTPATIENT
Start: 2022-01-23 | End: 2022-01-28 | Stop reason: HOSPADM

## 2022-01-23 RX ADMIN — SODIUM BICARBONATE 650 MG: 650 TABLET ORAL at 10:12

## 2022-01-23 RX ADMIN — PIPERACILLIN AND TAZOBACTAM 3.38 G: 3; .375 INJECTION, POWDER, LYOPHILIZED, FOR SOLUTION INTRAVENOUS; PARENTERAL at 09:17

## 2022-01-23 RX ADMIN — LEVOTHYROXINE SODIUM 112 MCG: 0.11 TABLET ORAL at 05:04

## 2022-01-23 RX ADMIN — SODIUM CHLORIDE, POTASSIUM CHLORIDE, SODIUM LACTATE AND CALCIUM CHLORIDE: 600; 310; 30; 20 INJECTION, SOLUTION INTRAVENOUS at 15:48

## 2022-01-23 RX ADMIN — HEPARIN SODIUM 5000 UNITS: 5000 INJECTION, SOLUTION INTRAVENOUS; SUBCUTANEOUS at 15:44

## 2022-01-23 RX ADMIN — ATORVASTATIN CALCIUM 40 MG: 40 TABLET, FILM COATED ORAL at 19:01

## 2022-01-23 RX ADMIN — HEPARIN SODIUM 5000 UNITS: 5000 INJECTION, SOLUTION INTRAVENOUS; SUBCUTANEOUS at 23:11

## 2022-01-23 RX ADMIN — SODIUM BICARBONATE 650 MG: 650 TABLET ORAL at 19:56

## 2022-01-23 RX ADMIN — HYDROCODONE BITARTRATE AND ACETAMINOPHEN 1 TABLET: 10; 325 TABLET ORAL at 10:07

## 2022-01-23 RX ADMIN — MORPHINE SULFATE 4 MG: 4 INJECTION INTRAVENOUS at 09:02

## 2022-01-23 RX ADMIN — SODIUM BICARBONATE 650 MG: 650 TABLET ORAL at 15:52

## 2022-01-23 RX ADMIN — HYDROCODONE BITARTRATE AND ACETAMINOPHEN 1 TABLET: 10; 325 TABLET ORAL at 15:51

## 2022-01-23 RX ADMIN — HYDROCODONE BITARTRATE AND ACETAMINOPHEN 1 TABLET: 10; 325 TABLET ORAL at 23:12

## 2022-01-23 RX ADMIN — AMPICILLIN SODIUM AND SULBACTAM SODIUM 3 G: 2; 1 INJECTION, POWDER, FOR SOLUTION INTRAMUSCULAR; INTRAVENOUS at 15:43

## 2022-01-23 RX ADMIN — AMPICILLIN SODIUM AND SULBACTAM SODIUM 3 G: 2; 1 INJECTION, POWDER, FOR SOLUTION INTRAMUSCULAR; INTRAVENOUS at 19:01

## 2022-01-23 ASSESSMENT — ENCOUNTER SYMPTOMS
DOUBLE VISION: 0
ABDOMINAL PAIN: 1
DEPRESSION: 0
CHILLS: 1
FEVER: 1
BLURRED VISION: 0
DIZZINESS: 0
NAUSEA: 1
PALPITATIONS: 0
HEMOPTYSIS: 0
PHOTOPHOBIA: 0
HEADACHES: 0
VOMITING: 0
MYALGIAS: 0
COUGH: 0
NECK PAIN: 0

## 2022-01-23 ASSESSMENT — PAIN SCALES - WONG BAKER
WONGBAKER_NUMERICALRESPONSE: HURTS EVEN MORE
WONGBAKER_NUMERICALRESPONSE: DOESN'T HURT AT ALL
WONGBAKER_NUMERICALRESPONSE: DOESN'T HURT AT ALL

## 2022-01-23 ASSESSMENT — PAIN DESCRIPTION - PAIN TYPE: TYPE: ACUTE PAIN

## 2022-01-23 NOTE — CARE PLAN
The patient is stable.    Shift Goals  Clinical Goals: relief from pain  Patient Goals: go home tomorrow  Family Goals: n/a    Progress made toward(s) clinical / shift goals:  progressing    Problem: Pain - Standard  Goal: Alleviation of pain or a reduction in pain to the patient’s comfort goal  Outcome: Progressing     Problem: Knowledge Deficit - Standard  Goal: Patient and family/care givers will demonstrate understanding of plan of care, disease process/condition, diagnostic tests and medications  Outcome: Progressing     Problem: Hemodynamics  Goal: Patient's hemodynamics, fluid balance and neurologic status will be stable or improve  Outcome: Progressing     Problem: Infection - Standard  Goal: Patient will remain free from infection  Outcome: Progressing

## 2022-01-23 NOTE — PROGRESS NOTES
Blood culture is + for gram positive cocci, possible streptococcus, relayed result to Dr. EVANGELINA Skinner.

## 2022-01-23 NOTE — PROGRESS NOTES
Patient noted to have fever tachycardia, with no known source of infection.  Labs show increased procalcitonin, but negative lactic acid.  UA negative for infection  Chest x-ray negative for pneumonia  Echocardiogram does not show any vegetations.  RUQ US shows gallstones without evidence of biliary ductal dilatation.    Blood culture is growing gram-positive cocci, possible Streptococcus.    Endocarditis a possibility.    Will give patient 1 dose of Rocephin now and place patient n.p.o. for possible RENU in a.m.     I spent 31 min in reviewing the patient's condition, physical examination, laboratory and imaging data, prior documentation, in discussion with RN, RT, charge nurse, patient, and in formulating an assessment/plan.     Critical Care time: 31 min. No time overlap, procedures not included in time.

## 2022-01-23 NOTE — PROGRESS NOTES
Pt in bed awake,with general weakness,pleasant,npo for stress test,c/o pain intermittent,pt not in distress,poc explained.

## 2022-01-23 NOTE — ASSESSMENT & PLAN NOTE
Blood cultures growing Streptococcus species 2 out of 2 bottles.  Repeat cultures ordered  No source of infection has been identified yet.  CT abdomen and pelvis without contrast ordered given continuous abdominal pain.  Started on Unasyn.  TTE with no evidence of endocarditis or vegetations  Might need RENU if no source of infection has been identified in the light of TAVR.  ID consulted.  1/24: Repeat blood cultures from 1/23/2022 still positive.  Will repeat in 48 hours.  Continue Unasyn for now.  ID following.  1/25: We will repeat blood cultures today.  1/26: Does have remained negative infectious disease recommending IV antibiotics after 2 weeks of negative cultures.

## 2022-01-23 NOTE — HEART FAILURE PROGRAM
Patient with no documented history of heart failure who presented with right chest and upper quadrant pain.    It appears ra based upon an NT-proBNP, in the 2 K's, patient has been diagnosed definitively with heart failure.    Echocardiogram has come back not supportive of the diagnosis. Patient has DANIS and metabolic acidosis and is diagnosed with SIRS. It is not surprising to find elevated NT proBNP in this setting.    Very respectfully request that a cardiology consult be obtained if the HF diagnosis is to proceed.

## 2022-01-23 NOTE — THERAPY
Missed Therapy     Patient Name: Emigdio Jiménez  Age:  86 y.o., Sex:  male  Medical Record #: [id number]  Today's Date: 1/23/2022 01/23/22 0850   Interdisciplinary Plan of Care Collaboration   Collaboration Comments PT aylin attempted. RN requesting hold at this time

## 2022-01-23 NOTE — PROGRESS NOTES
[facility] Daily Progress Note    Date of Service  1/23/2022    Chief Complaint  Emigdio Jiménez is a 86 y.o. male admitted 1/21/2022 with right-sided chest pain/right upper quadrant pain.    Hospital Course  This is 86 years old male who has past medical history of hypertension, chronic kidney disease stage III comes in with right-sided chest pain/right upper quadrant pain.  Pain is provoked with taking deep breath and when he coughs.  Recently presented to ER with similar presentation but left AMA.  Denies any fever chills.  Admits to nausea but no vomiting.  No diarrhea.  In ER initially looked hemodynamically stable.  Afebrile.  Was saturating well on room air.  EKG was negative for ACS.  Troponin was elevated.  TSH was low and free T4 was elevated.  UDS positive for opiates.  D-dimer slightly elevated.  Patient had a CTA of the chest which was negative for PE or any acute lung pathology.  It showed coronary artery calcifications however.  There was slight leukocytosis on CBC.  UA was negative for UTI.  Patient was admitted for ACS work-up.  Echocardiogram and nuclear stress test ordered.  Later on patient spiked a fever of 102.7 °F.  He was slightly tachycardic and hypotensive.  Kidney functions worsen and became acidotic.  On exam noted to have right upper quadrant tenderness.  Abdominal ultrasound ordered.  No distress  Interval Problem Update  Patient resting in bed comfortable.  No acute distress noted.  Blood pressure has been soft but asymptomatic.  Spiked fever this morning.  Feels nauseous but no vomiting.  Tenderness on right upper quadrant noted.  Ultrasound ordered.  No acute distress noted.  No issues overnight per staff.     I have personally seen and examined the patient at bedside. I discussed the plan of care with patient and bedside RN.    Consultants/Specialty  None    Code Status  Full Code    Disposition  Patient is not medically cleared for discharge.   Anticipate discharge to to home  with close outpatient follow-up.  I have placed the appropriate orders for post-discharge needs.    Review of Systems  Review of Systems   Constitutional: Positive for chills and fever.   HENT: Negative for hearing loss and tinnitus.    Eyes: Negative for blurred vision, double vision and photophobia.   Respiratory: Negative for cough and hemoptysis.    Cardiovascular: Negative for chest pain and palpitations.   Gastrointestinal: Positive for abdominal pain and nausea. Negative for vomiting.   Genitourinary: Negative for dysuria and urgency.   Musculoskeletal: Negative for myalgias and neck pain.   Neurological: Negative for dizziness and headaches.   Psychiatric/Behavioral: Negative for depression and suicidal ideas.        Physical Exam  Temp:  [36.2 °C (97.2 °F)-38.1 °C (100.6 °F)] 37.1 °C (98.7 °F)  Pulse:  [78-92] 78  Resp:  [16-18] 18  BP: (115-156)/(56-83) 146/67  SpO2:  [94 %-98 %] 96 %    Physical Exam  Constitutional:       General: He is in acute distress.      Appearance: He is ill-appearing.   HENT:      Head: Atraumatic.      Mouth/Throat:      Mouth: Mucous membranes are dry.   Eyes:      Extraocular Movements: Extraocular movements intact.      Pupils: Pupils are equal, round, and reactive to light.   Cardiovascular:      Rate and Rhythm: Normal rate and regular rhythm.      Heart sounds: No friction rub. No gallop.    Pulmonary:      Effort: Pulmonary effort is normal. No respiratory distress.   Abdominal:      General: There is no distension.      Tenderness: There is abdominal tenderness in the right lower quadrant and periumbilical area. There is guarding and rebound. Positive signs include McBurney's sign.   Musculoskeletal:      Right lower leg: No edema.      Left lower leg: No edema.   Skin:     General: Skin is dry.   Neurological:      General: No focal deficit present.      Mental Status: He is oriented to person, place, and time.   Psychiatric:         Cognition and Memory: Cognition is  impaired.         Judgment: Judgment is impulsive.      Comments: Poor insight and judgment         Fluids    Intake/Output Summary (Last 24 hours) at 1/23/2022 1306  Last data filed at 1/22/2022 2018  Gross per 24 hour   Intake --   Output 100 ml   Net -100 ml       Laboratory  Recent Labs     01/21/22  1548 01/22/22  0413 01/23/22  0030   WBC 11.9* 10.7 12.0*   RBC 4.09* 3.96* 3.53*   HEMOGLOBIN 12.2* 11.5* 10.4*   HEMATOCRIT 35.7* 33.7* 30.4*   MCV 87.3 85.1 86.1   MCH 29.8 29.0 29.5   MCHC 34.2 34.1 34.2   RDW 45.2 44.2 44.6   PLATELETCT 156* 152* 147*   MPV 10.9 10.9 11.0     Recent Labs     01/21/22  1548 01/22/22  0413 01/23/22  0030   SODIUM 128* 125* 126*   POTASSIUM 4.9 4.8 4.7   CHLORIDE 98 97 98   CO2 18* 15* 17*   GLUCOSE 115* 93 113*   BUN 28* 37* 32*   CREATININE 2.08* 2.24* 1.33   CALCIUM 9.1 8.6 7.9*             Recent Labs     01/22/22  0413   TRIGLYCERIDE 189*   HDL 14*   LDL 65       Imaging  NM-CARDIAC STRESS TEST   Final Result      US-RUQ   Final Result      Gallstones within the gallbladder. No evidence of biliary ductal dilatation.      EC-ECHOCARDIOGRAM COMPLETE W/O CONT   Final Result      CT-CTA CHEST PULMONARY ARTERY W/ RECONS   Final Result      1.  No evidence of pulmonary embolus.      2.  Coronary artery calcifications.            DX-CHEST-PORTABLE (1 VIEW)   Final Result      No acute cardiac or pulmonary abnormalities are identified.      CT-ABDOMEN-PELVIS W/O    (Results Pending)        Assessment/Plan  * Elevated troponin- (present on admission)  Assessment & Plan  EKG negative for ACS.  Echocardiogram and stress test ordered.    Bacteremia- (present on admission)  Assessment & Plan  Blood cultures growing Streptococcus species 2 out of 2 bottles.  Repeat cultures ordered  No source of infection has been identified yet.  CT abdomen and pelvis without contrast ordered given continuous abdominal pain.  Started on Unasyn.  TTE with no evidence of endocarditis or vegetations  Might  need RENU if no source of infection has been identified in the light of TAVR.  ID consulted.    Right upper quadrant abdominal tenderness with rebound tenderness  Assessment & Plan  Would benefit from CT abdomen and pelvis with contrast but due to worsening kidney functions will do fair for now.  Will check abdominal ultrasound.  1/22: Ultrasound of the abdomen negative for acute findings rather than showing gallstones with no evidence of cholecystitis.  Given continued tenderness of the abdomen, positive blood cultures, and fever, CT scan of the abdomen pelvis without contrast ordered.  Case discussed with surgery.  No surgical intervention recommended at this point.    Sepsis (HCC)- (present on admission)  Assessment & Plan  SIRS criteria identified on my evaluation include:  Fever, with temperature greater than 101 deg F and Tachycardia, with heart rate greater than 90 BPM  SIRS is non-infectious, the patient does not have sepsis  1/23: Blood cultures positive for Streptococcus species 2 out of 2 bottles.  No source of infection has been identified yet.  CT scan abdominal pelvis given right upper quadrant tenderness with negative ultrasound ordered and pending.      Metabolic acidosis- (present on admission)  Assessment & Plan  Could be due to worsening kidney functions.  Started on sodium bicarb  Fluids resuscitation.  Monitor.  1/22: Improving.    DANIS (acute kidney injury) (HCC)- (present on admission)  Assessment & Plan  CKD 3 based on retrospective lab review.  Continue to monitor  1/22: Worsening.  Likely combination of prerenal component and possibly ATN/AIN from contrast exposure.  Gentle fluid resuscitation.  Avoid nephrotoxins.  Renally dose all medications.  1/23: Improving.    CHF (congestive heart failure) (HCC)- (present on admission)  Assessment & Plan  Echo and stress test ordered  BNP 2591  Consider cardiology consult  No previous dx of CHF    HLD (hyperlipidemia)- (present on  admission)  Assessment & Plan  Lipid panel ordered to assess  Continue home meds    Hyponatremia- (present on admission)  Assessment & Plan  Chronic  Worsened likely due to worsening kidney functions plus dehydration  On IV fluids for now.  Monitor    Essential hypertension- (present on admission)  Assessment & Plan  Continue home meds  Admitted with telemetry      Hypothyroidism  Assessment & Plan  Low TSH and elevated free T4.  Will decrease Synthroid dose to 112 mcg  Repeat TSH/free T4 in 4 to 6 weeks and adjust dose accordingly    Chest pain- (present on admission)  Assessment & Plan  Rule out ACS.  Echocardiogram and stress test ordered.  1/23: Negative work-up with unremarkable echocardiogram and nuclear stress test.       VTE prophylaxis: heparin ppx    I have performed a physical exam and reviewed and updated ROS and Plan today (1/23/2022). In review of yesterday's note (1/22/2022), there are no changes except as documented above.

## 2022-01-23 NOTE — CONSULTS
"[facility] INFECTIOUS DISEASES INPATIENT CONSULT NOTE     Date of Service: 1/23/2022    Consult Requested By: Daniella Drummond M.D.    Reason for Consultation: Bacteremia    Chief Complaint: Chest pain    History of Present Illness:     Emigdio Jiménez is a 86 y.o. male admitted 1/21/2022.  Patient with history of TAVR, presented on 1/21 per H&P with 1 week history of worsening pressure-like chest pain, worse with cough, movement, deep inspiration, also reproducible with firm palpation. However, per patient today, he does not really have any chest pain, states that most of his pain is in the right upper quadrant/right flank. He also notes neck pain and upper back pain. Patient states that for the past 2 to 3 months he has been having \"strange\". It was difficult to pinpoint the symptoms on history, but patient appears to have had a generalized weakness and chills. No fevers, no night sweats.    In the hospital, patient with fever spikes as high as 102.7, mild leukocytosis of 12,000.  Patient was started on vancomycin and ceftriaxone, switched to Zosyn.  2 out of 2 blood cultures are positive for GPC in chains.  TTE with no obvious valvular vegetations, stress test with no issues.  Ultrasound with gallstones within the gallbladder but no evidence of cholecystitis, CT is pending.  No LFT abnormalities.  CTA on 1/21 with no evidence of PE.    Review of Systems:  All other systems reviewed and are negative expect as noted in HPI    Past Medical History:   Diagnosis Date   • Anxiety    • Heart murmur    • Hypertension        Past Surgical History:   Procedure Laterality Date   • HERNIA REPAIR         Family History   Problem Relation Age of Onset   • Hyperlipidemia Mother    • Heart Disease Mother        Social History     Socioeconomic History   • Marital status:      Spouse name: Not on file   • Number of children: Not on file   • Years of education: Not on file   • Highest education level: Not on file   Occupational " History   • Not on file   Tobacco Use   • Smoking status: Former Smoker     Packs/day: 0.00   • Smokeless tobacco: Never Used   Vaping Use   • Vaping Use: Never used   Substance and Sexual Activity   • Alcohol use: Not Currently   • Drug use: Never   • Sexual activity: Not on file   Other Topics Concern   • Not on file   Social History Narrative   • Not on file     Social Determinants of Health     Financial Resource Strain:    • Difficulty of Paying Living Expenses: Not on file   Food Insecurity:    • Worried About Running Out of Food in the Last Year: Not on file   • Ran Out of Food in the Last Year: Not on file   Transportation Needs:    • Lack of Transportation (Medical): Not on file   • Lack of Transportation (Non-Medical): Not on file   Physical Activity:    • Days of Exercise per Week: Not on file   • Minutes of Exercise per Session: Not on file   Stress:    • Feeling of Stress : Not on file   Social Connections:    • Frequency of Communication with Friends and Family: Not on file   • Frequency of Social Gatherings with Friends and Family: Not on file   • Attends Restorationist Services: Not on file   • Active Member of Clubs or Organizations: Not on file   • Attends Club or Organization Meetings: Not on file   • Marital Status: Not on file   Intimate Partner Violence:    • Fear of Current or Ex-Partner: Not on file   • Emotionally Abused: Not on file   • Physically Abused: Not on file   • Sexually Abused: Not on file   Housing Stability:    • Unable to Pay for Housing in the Last Year: Not on file   • Number of Places Lived in the Last Year: Not on file   • Unstable Housing in the Last Year: Not on file       No Known Allergies    Medications:    Current Facility-Administered Medications:   •  lactated ringers infusion (BOLUS), 500 mL, Intravenous, Once PRN, Daniella Drummond M.D.  •  [COMPLETED] piperacillin-tazobactam (ZOSYN) 3.375 g in  mL IVPB, 3.375 g, Intravenous, Once, Stopped at 01/23/22 0947  **AND** piperacillin-tazobactam (ZOSYN) 3.375 g in  mL IVPB, 3.375 g, Intravenous, Q8HRS, Daniella Drummond M.D.  •  HYDROcodone/acetaminophen (NORCO)  MG per tablet 1 Tablet, 1 Tablet, Oral, Q6HRS PRN, Daniella Drummond M.D., 1 Tablet at 01/23/22 1007  •  HYDROmorphone (Dilaudid) injection 0.5 mg, 0.5 mg, Intravenous, Q2HRS PRN, Daniella Drummond M.D.  •  sodium bicarbonate tablet 650 mg, 650 mg, Oral, TID, Daniella Drummond M.D., 650 mg at 01/23/22 1012  •  lactated ringers infusion, , Intravenous, Continuous, Daniella Drummond M.D., Last Rate: 100 mL/hr at 01/22/22 1122, New Bag at 01/22/22 1122  •  levothyroxine (SYNTHROID) tablet 112 mcg, 112 mcg, Oral, AM ES, Daniella Drummond M.D., 112 mcg at 01/23/22 0504  •  senna-docusate (PERICOLACE or SENOKOT S) 8.6-50 MG per tablet 2 Tablet, 2 Tablet, Oral, BID, 2 Tablet at 01/22/22 0512 **AND** polyethylene glycol/lytes (MIRALAX) PACKET 1 Packet, 1 Packet, Oral, QDAY PRN **AND** [DISCONTINUED] magnesium hydroxide (MILK OF MAGNESIA) suspension 30 mL, 30 mL, Oral, QDAY PRN **AND** bisacodyl (DULCOLAX) suppository 10 mg, 10 mg, Rectal, QDAY PRN, Amina Anne M.D.  •  aspirin (ASA) tablet 325 mg, 325 mg, Oral, Q EVENING, 325 mg at 01/22/22 1724 **OR** aspirin (ASA) chewable tab 324 mg, 324 mg, Oral, Q EVENING **OR** aspirin (ASA) suppository 300 mg, 300 mg, Rectal, Q EVENING, Amina Anne M.D.  •  Respiratory Therapy Consult, , Nebulization, Continuous RT, Amina Anne M.D.  •  acetaminophen (Tylenol) tablet 650 mg, 650 mg, Oral, Q6HRS PRN, Amina Anne M.D., 650 mg at 01/22/22 2202  •  ondansetron (ZOFRAN) syringe/vial injection 4 mg, 4 mg, Intravenous, Q4HRS PRN, Amina Anne M.D., 4 mg at 01/21/22 2220  •  ondansetron (ZOFRAN ODT) dispertab 4 mg, 4 mg, Oral, Q4HRS PRN, Amina Anne M.D.  •  mag hydrox-al hydrox-simeth (MAALOX PLUS ES or MYLANTA DS) suspension 30 mL, 30 mL, Oral, Q4HRS PRN, Amina Anne M.D., 30 mL at  "22  •  aminophylline injection 100 mg, 100 mg, Intravenous, Q5 MIN PRN, Amina Anne M.D.  •  atorvastatin (LIPITOR) tablet 40 mg, 40 mg, Oral, Q EVENING, Amina Anne M.D., 40 mg at 22 1724  •  heparin injection 5,000 Units, 5,000 Units, Subcutaneous, Q8HRS, Amina Anne M.D.    Physical Exam:   Vital Signs: /67   Pulse 78   Temp 37.1 °C (98.7 °F) (Temporal)   Resp 18   Ht 1.626 m (5' 4\")   Wt 75 kg (165 lb 5.5 oz)   SpO2 96%   BMI 28.38 kg/m²   Temp  Av.1 °C (98.8 °F)  Min: 36.2 °C (97.2 °F)  Max: 39.3 °C (102.7 °F)  Vital signs reviewed  Constitutional: Patient is oriented to person, place, and time. Appears well-developed and well-nourished. No distress  Head: Atraumatic, normocephalic  Eyes: Conjunctivae normal, EOM intact  Mouth/Throat: Lips without lesions  Neck: Neck supple. No masses/lymphadenopathy  Cardiovascular: Normal rate, regular rhythm, normal S1S2 and intact distal pulses. No murmur, gallop, or friction rub. No pedal edema.  Pulmonary/Chest: No respiratory distress. Unlabored respiratory effort, lungs clear to auscultation. No wheezes or rales.   Abdominal: Soft, right-sided discomfort, Enrique's negative. BS + x 4. No masses or hepatosplenomegaly.   Musculoskeletal: No joint tenderness, swelling, erythema, or restriction of motion noted.  Neurological: Alert and oriented to person, place, and time.  Hard of hearing.  No focal neural deficit noted  Skin: Skin is warm and dry. No rashes or embolic phenomena noted on exposed skin  Psychiatric: Tangential    LABS:  Recent Labs     22  1548 22  0413 22  0030   WBC 11.9* 10.7 12.0*      Recent Labs     22  1548 22  0413 22  0030   HEMOGLOBIN 12.2* 11.5* 10.4*   HEMATOCRIT 35.7* 33.7* 30.4*   MCV 87.3 85.1 86.1   MCH 29.8 29.0 29.5   PLATELETCT 156* 152* 147*       Recent Labs     22  1548 22  0413 22  0030   SODIUM 128* 125* 126*   POTASSIUM 4.9 4.8 " 4.7   CHLORIDE 98 97 98   CO2 18* 15* 17*   CREATININE 2.08* 2.24* 1.33        Recent Labs     01/21/22  1548 01/22/22  0413 01/23/22  0030   ALBUMIN 3.5 3.1* 2.9*        MICRO:  No results found for: BLOODCULTU, BLDCULT, BCHOLD     Latest pertinent labs were reviewed    IMAGING STUDIES:  As above    Hospital Course/Assessment:   Emigdio Jiménez is a 86 y.o. male with history of TAVR, presented on 1/21 per H&P with 1 week history of worsening pleuritic chest pain, found to be septic with high-grade fevers and GPC bacteremia.  Today, patient states he does not have any chest pain, notes vague symptoms for the past 2 months that he is not able to elucidate.  Appears to have had generalized weakness and chills off and on for the past 2 months, now with a right upper quadrant/right flank pain, neck and upper back pain.    Pertinent Diagnoses:  Sepsis  Gram-positive bacteremia  TAVR in place  DANIS on CKD stage II    Plan:   -Change Zosyn to renally dosed IV Unasyn  -Repeat blood cultures x2  -Given right sided abdominal pain, reasonable to obtain CT scan  -Recommend MRI of the cervical and thoracic spine  -Will need to keep a low threshold for RENU, especially if alternative source is not established.  Patient with TAVR and vague symptoms for the past 2 months concerning for subacute infective endocarditis. Presence of prosthetic valve infective endocarditis will vastly change treatment    Plan was discussed with the primary team, Dr. Drummond    ID will follow. Please feel free to call with questions.    Saad Sood M.D.    Please note that this dictation was created using voice recognition software. I have worked with technical experts from [employer] to optimize the interface.  I have made every reasonable attempt to correct obvious errors, but there may be errors of grammar and possibly content that I did not discover before finalizing the note.

## 2022-01-23 NOTE — PROGRESS NOTES
Woke up, upset with all the medical stuff attached to him, wants to leave AMA, explained the risks for AMA  and benefits of staying. Explained to him that he has infection and that we are still treating him with antibiotics and he has some test in the morning. He wants his tele and pulse ox removed, claimed that he could not sleep. Saw him earlier  sleeping. Notified tele room that he's disconnected from monitor. Wanted to walk down the hallways, escorted by [employer] tech. To continue to monitor.

## 2022-01-24 ENCOUNTER — ANESTHESIA (OUTPATIENT)
Dept: CARDIOLOGY | Facility: MEDICAL CENTER | Age: 87
DRG: 872 | End: 2022-01-24
Payer: MEDICARE

## 2022-01-24 ENCOUNTER — ANESTHESIA EVENT (OUTPATIENT)
Dept: CARDIOLOGY | Facility: MEDICAL CENTER | Age: 87
DRG: 872 | End: 2022-01-24
Payer: MEDICARE

## 2022-01-24 ENCOUNTER — APPOINTMENT (OUTPATIENT)
Dept: CARDIOLOGY | Facility: MEDICAL CENTER | Age: 87
DRG: 872 | End: 2022-01-24
Attending: PHYSICIAN ASSISTANT
Payer: MEDICARE

## 2022-01-24 LAB
ALBUMIN SERPL BCP-MCNC: 3 G/DL (ref 3.2–4.9)
ALBUMIN/GLOB SERPL: 1.2 G/DL
ALP SERPL-CCNC: 75 U/L (ref 30–99)
ALT SERPL-CCNC: 18 U/L (ref 2–50)
ANION GAP SERPL CALC-SCNC: 12 MMOL/L (ref 7–16)
AST SERPL-CCNC: 33 U/L (ref 12–45)
BASOPHILS # BLD AUTO: 0.5 % (ref 0–1.8)
BASOPHILS # BLD: 0.05 K/UL (ref 0–0.12)
BILIRUB SERPL-MCNC: 0.4 MG/DL (ref 0.1–1.5)
BUN SERPL-MCNC: 17 MG/DL (ref 8–22)
CALCIUM SERPL-MCNC: 8.2 MG/DL (ref 8.5–10.5)
CHLORIDE SERPL-SCNC: 97 MMOL/L (ref 96–112)
CO2 SERPL-SCNC: 19 MMOL/L (ref 20–33)
CREAT SERPL-MCNC: 0.91 MG/DL (ref 0.5–1.4)
EKG IMPRESSION: NORMAL
EOSINOPHIL # BLD AUTO: 0.03 K/UL (ref 0–0.51)
EOSINOPHIL NFR BLD: 0.3 % (ref 0–6.9)
ERYTHROCYTE [DISTWIDTH] IN BLOOD BY AUTOMATED COUNT: 46.6 FL (ref 35.9–50)
GLOBULIN SER CALC-MCNC: 2.5 G/DL (ref 1.9–3.5)
GLUCOSE SERPL-MCNC: 87 MG/DL (ref 65–99)
HCT VFR BLD AUTO: 32.2 % (ref 42–52)
HGB BLD-MCNC: 10.7 G/DL (ref 14–18)
IMM GRANULOCYTES # BLD AUTO: 0.14 K/UL (ref 0–0.11)
IMM GRANULOCYTES NFR BLD AUTO: 1.5 % (ref 0–0.9)
INR PPP: 1.07 (ref 0.87–1.13)
LV EJECT FRACT  99904: 70
LYMPHOCYTES # BLD AUTO: 1.24 K/UL (ref 1–4.8)
LYMPHOCYTES NFR BLD: 13.5 % (ref 22–41)
MCH RBC QN AUTO: 29.6 PG (ref 27–33)
MCHC RBC AUTO-ENTMCNC: 33.2 G/DL (ref 33.7–35.3)
MCV RBC AUTO: 89 FL (ref 81.4–97.8)
MONOCYTES # BLD AUTO: 1.36 K/UL (ref 0–0.85)
MONOCYTES NFR BLD AUTO: 14.8 % (ref 0–13.4)
NEUTROPHILS # BLD AUTO: 6.34 K/UL (ref 1.82–7.42)
NEUTROPHILS NFR BLD: 69.4 % (ref 44–72)
NRBC # BLD AUTO: 0 K/UL
NRBC BLD-RTO: 0 /100 WBC
PLATELET # BLD AUTO: 155 K/UL (ref 164–446)
PMV BLD AUTO: 11.1 FL (ref 9–12.9)
POTASSIUM SERPL-SCNC: 4.5 MMOL/L (ref 3.6–5.5)
PROT SERPL-MCNC: 5.5 G/DL (ref 6–8.2)
PROTHROMBIN TIME: 13.6 SEC (ref 12–14.6)
RBC # BLD AUTO: 3.62 M/UL (ref 4.7–6.1)
SODIUM SERPL-SCNC: 128 MMOL/L (ref 135–145)
TROPONIN T SERPL-MCNC: 30 NG/L (ref 6–19)
WBC # BLD AUTO: 9.2 K/UL (ref 4.8–10.8)

## 2022-01-24 PROCEDURE — 97165 OT EVAL LOW COMPLEX 30 MIN: CPT

## 2022-01-24 PROCEDURE — 97161 PT EVAL LOW COMPLEX 20 MIN: CPT

## 2022-01-24 PROCEDURE — 76376 3D RENDER W/INTRP POSTPROCES: CPT | Mod: 26 | Performed by: INTERNAL MEDICINE

## 2022-01-24 PROCEDURE — A9270 NON-COVERED ITEM OR SERVICE: HCPCS | Performed by: FAMILY MEDICINE

## 2022-01-24 PROCEDURE — 160036 HCHG PACU - EA ADDL 30 MINS PHASE I

## 2022-01-24 PROCEDURE — B24BZZ4 ULTRASONOGRAPHY OF HEART WITH AORTA, TRANSESOPHAGEAL: ICD-10-PCS | Performed by: PHYSICIAN ASSISTANT

## 2022-01-24 PROCEDURE — 96375 TX/PRO/DX INJ NEW DRUG ADDON: CPT

## 2022-01-24 PROCEDURE — 96372 THER/PROPH/DIAG INJ SC/IM: CPT

## 2022-01-24 PROCEDURE — 770020 HCHG ROOM/CARE - TELE (206)

## 2022-01-24 PROCEDURE — 160002 HCHG RECOVERY MINUTES (STAT)

## 2022-01-24 PROCEDURE — 93010 ELECTROCARDIOGRAM REPORT: CPT | Performed by: INTERNAL MEDICINE

## 2022-01-24 PROCEDURE — 700111 HCHG RX REV CODE 636 W/ 250 OVERRIDE (IP): Performed by: STUDENT IN AN ORGANIZED HEALTH CARE EDUCATION/TRAINING PROGRAM

## 2022-01-24 PROCEDURE — 700111 HCHG RX REV CODE 636 W/ 250 OVERRIDE (IP): Performed by: INTERNAL MEDICINE

## 2022-01-24 PROCEDURE — 93312 ECHO TRANSESOPHAGEAL: CPT

## 2022-01-24 PROCEDURE — 80053 COMPREHEN METABOLIC PANEL: CPT

## 2022-01-24 PROCEDURE — 93312 ECHO TRANSESOPHAGEAL: CPT | Performed by: INTERNAL MEDICINE

## 2022-01-24 PROCEDURE — 700105 HCHG RX REV CODE 258: Performed by: INTERNAL MEDICINE

## 2022-01-24 PROCEDURE — A9270 NON-COVERED ITEM OR SERVICE: HCPCS | Performed by: STUDENT IN AN ORGANIZED HEALTH CARE EDUCATION/TRAINING PROGRAM

## 2022-01-24 PROCEDURE — 93320 DOPPLER ECHO COMPLETE: CPT | Mod: 26 | Performed by: INTERNAL MEDICINE

## 2022-01-24 PROCEDURE — 93005 ELECTROCARDIOGRAM TRACING: CPT | Performed by: FAMILY MEDICINE

## 2022-01-24 PROCEDURE — 85025 COMPLETE CBC W/AUTO DIFF WBC: CPT

## 2022-01-24 PROCEDURE — 700105 HCHG RX REV CODE 258: Performed by: ANESTHESIOLOGY

## 2022-01-24 PROCEDURE — 99233 SBSQ HOSP IP/OBS HIGH 50: CPT | Performed by: FAMILY MEDICINE

## 2022-01-24 PROCEDURE — 36415 COLL VENOUS BLD VENIPUNCTURE: CPT

## 2022-01-24 PROCEDURE — 76376 3D RENDER W/INTRP POSTPROCES: CPT | Performed by: INTERNAL MEDICINE

## 2022-01-24 PROCEDURE — 700111 HCHG RX REV CODE 636 W/ 250 OVERRIDE (IP): Performed by: FAMILY MEDICINE

## 2022-01-24 PROCEDURE — 700102 HCHG RX REV CODE 250 W/ 637 OVERRIDE(OP): Performed by: FAMILY MEDICINE

## 2022-01-24 PROCEDURE — 93320 DOPPLER ECHO COMPLETE: CPT | Performed by: INTERNAL MEDICINE

## 2022-01-24 PROCEDURE — 700111 HCHG RX REV CODE 636 W/ 250 OVERRIDE (IP): Performed by: ANESTHESIOLOGY

## 2022-01-24 PROCEDURE — 700102 HCHG RX REV CODE 250 W/ 637 OVERRIDE(OP): Performed by: STUDENT IN AN ORGANIZED HEALTH CARE EDUCATION/TRAINING PROGRAM

## 2022-01-24 PROCEDURE — 160035 HCHG PACU - 1ST 60 MINS PHASE I

## 2022-01-24 PROCEDURE — 700101 HCHG RX REV CODE 250: Performed by: ANESTHESIOLOGY

## 2022-01-24 PROCEDURE — 93312 ECHO TRANSESOPHAGEAL: CPT | Mod: 26 | Performed by: INTERNAL MEDICINE

## 2022-01-24 PROCEDURE — 96366 THER/PROPH/DIAG IV INF ADDON: CPT

## 2022-01-24 PROCEDURE — 99233 SBSQ HOSP IP/OBS HIGH 50: CPT | Performed by: INTERNAL MEDICINE

## 2022-01-24 PROCEDURE — 85610 PROTHROMBIN TIME: CPT

## 2022-01-24 PROCEDURE — 84484 ASSAY OF TROPONIN QUANT: CPT

## 2022-01-24 RX ORDER — SODIUM CHLORIDE, SODIUM LACTATE, POTASSIUM CHLORIDE, CALCIUM CHLORIDE 600; 310; 30; 20 MG/100ML; MG/100ML; MG/100ML; MG/100ML
INJECTION, SOLUTION INTRAVENOUS CONTINUOUS
Status: DISCONTINUED | OUTPATIENT
Start: 2022-01-24 | End: 2022-01-24 | Stop reason: HOSPADM

## 2022-01-24 RX ORDER — SODIUM CHLORIDE 9 MG/ML
INJECTION, SOLUTION INTRAVENOUS
Status: DISCONTINUED | OUTPATIENT
Start: 2022-01-24 | End: 2022-01-24 | Stop reason: SURG

## 2022-01-24 RX ORDER — LIDOCAINE HYDROCHLORIDE 20 MG/ML
INJECTION, SOLUTION EPIDURAL; INFILTRATION; INTRACAUDAL; PERINEURAL PRN
Status: DISCONTINUED | OUTPATIENT
Start: 2022-01-24 | End: 2022-01-24 | Stop reason: SURG

## 2022-01-24 RX ORDER — LABETALOL HYDROCHLORIDE 5 MG/ML
10 INJECTION, SOLUTION INTRAVENOUS EVERY 4 HOURS PRN
Status: DISCONTINUED | OUTPATIENT
Start: 2022-01-24 | End: 2022-01-28 | Stop reason: HOSPADM

## 2022-01-24 RX ORDER — CLONIDINE HYDROCHLORIDE 0.1 MG/1
0.1 TABLET ORAL EVERY 6 HOURS PRN
Status: DISCONTINUED | OUTPATIENT
Start: 2022-01-24 | End: 2022-01-28 | Stop reason: HOSPADM

## 2022-01-24 RX ORDER — ONDANSETRON 2 MG/ML
4 INJECTION INTRAMUSCULAR; INTRAVENOUS
Status: DISCONTINUED | OUTPATIENT
Start: 2022-01-24 | End: 2022-01-24 | Stop reason: HOSPADM

## 2022-01-24 RX ORDER — HALOPERIDOL 5 MG/ML
1 INJECTION INTRAMUSCULAR
Status: DISCONTINUED | OUTPATIENT
Start: 2022-01-24 | End: 2022-01-24 | Stop reason: HOSPADM

## 2022-01-24 RX ORDER — HYDRALAZINE HYDROCHLORIDE 20 MG/ML
5 INJECTION INTRAMUSCULAR; INTRAVENOUS
Status: DISCONTINUED | OUTPATIENT
Start: 2022-01-24 | End: 2022-01-24 | Stop reason: HOSPADM

## 2022-01-24 RX ORDER — AMLODIPINE BESYLATE 5 MG/1
5 TABLET ORAL
Status: DISCONTINUED | OUTPATIENT
Start: 2022-01-24 | End: 2022-01-28 | Stop reason: HOSPADM

## 2022-01-24 RX ORDER — ENALAPRILAT 1.25 MG/ML
1.25 INJECTION INTRAVENOUS EVERY 6 HOURS PRN
Status: DISCONTINUED | OUTPATIENT
Start: 2022-01-24 | End: 2022-01-24

## 2022-01-24 RX ORDER — LIDOCAINE HYDROCHLORIDE 40 MG/ML
SOLUTION TOPICAL
Status: DISPENSED
Start: 2022-01-24 | End: 2022-01-24

## 2022-01-24 RX ORDER — LABETALOL HYDROCHLORIDE 5 MG/ML
5 INJECTION, SOLUTION INTRAVENOUS
Status: DISCONTINUED | OUTPATIENT
Start: 2022-01-24 | End: 2022-01-24 | Stop reason: HOSPADM

## 2022-01-24 RX ADMIN — HYDRALAZINE HYDROCHLORIDE 5 MG: 20 INJECTION INTRAMUSCULAR; INTRAVENOUS at 14:03

## 2022-01-24 RX ADMIN — PROPOFOL 20 MG: 10 INJECTION, EMULSION INTRAVENOUS at 12:52

## 2022-01-24 RX ADMIN — PROPOFOL 20 MG: 10 INJECTION, EMULSION INTRAVENOUS at 12:46

## 2022-01-24 RX ADMIN — ATORVASTATIN CALCIUM 40 MG: 40 TABLET, FILM COATED ORAL at 17:54

## 2022-01-24 RX ADMIN — HYDROCODONE BITARTRATE AND ACETAMINOPHEN 1 TABLET: 10; 325 TABLET ORAL at 18:28

## 2022-01-24 RX ADMIN — LIDOCAINE HYDROCHLORIDE 5 ML: 20 INJECTION, SOLUTION EPIDURAL; INFILTRATION; INTRACAUDAL at 12:38

## 2022-01-24 RX ADMIN — ALUMINUM HYDROXIDE, MAGNESIUM HYDROXIDE, AND DIMETHICONE 30 ML: 400; 400; 40 SUSPENSION ORAL at 00:08

## 2022-01-24 RX ADMIN — SODIUM CHLORIDE: 9 INJECTION, SOLUTION INTRAVENOUS at 12:37

## 2022-01-24 RX ADMIN — PROPOFOL 20 MG: 10 INJECTION, EMULSION INTRAVENOUS at 12:43

## 2022-01-24 RX ADMIN — HYDROMORPHONE HYDROCHLORIDE 0.5 MG: 1 INJECTION, SOLUTION INTRAMUSCULAR; INTRAVENOUS; SUBCUTANEOUS at 19:59

## 2022-01-24 RX ADMIN — AMPICILLIN SODIUM AND SULBACTAM SODIUM 3 G: 2; 1 INJECTION, POWDER, FOR SOLUTION INTRAMUSCULAR; INTRAVENOUS at 23:01

## 2022-01-24 RX ADMIN — PROPOFOL 20 MG: 10 INJECTION, EMULSION INTRAVENOUS at 12:49

## 2022-01-24 RX ADMIN — AMPICILLIN SODIUM AND SULBACTAM SODIUM 3 G: 2; 1 INJECTION, POWDER, FOR SOLUTION INTRAMUSCULAR; INTRAVENOUS at 00:08

## 2022-01-24 RX ADMIN — SODIUM BICARBONATE 650 MG: 650 TABLET ORAL at 09:07

## 2022-01-24 RX ADMIN — PROPOFOL 50 MG: 10 INJECTION, EMULSION INTRAVENOUS at 12:40

## 2022-01-24 RX ADMIN — HYDROCODONE BITARTRATE AND ACETAMINOPHEN 1 TABLET: 10; 325 TABLET ORAL at 06:03

## 2022-01-24 RX ADMIN — LEVOTHYROXINE SODIUM 112 MCG: 0.11 TABLET ORAL at 06:02

## 2022-01-24 RX ADMIN — HEPARIN SODIUM 5000 UNITS: 5000 INJECTION, SOLUTION INTRAVENOUS; SUBCUTANEOUS at 06:03

## 2022-01-24 RX ADMIN — PROPOFOL 20 MG: 10 INJECTION, EMULSION INTRAVENOUS at 12:54

## 2022-01-24 RX ADMIN — SODIUM BICARBONATE 650 MG: 650 TABLET ORAL at 22:08

## 2022-01-24 RX ADMIN — AMLODIPINE BESYLATE 5 MG: 5 TABLET ORAL at 17:01

## 2022-01-24 RX ADMIN — AMPICILLIN SODIUM AND SULBACTAM SODIUM 3 G: 2; 1 INJECTION, POWDER, FOR SOLUTION INTRAMUSCULAR; INTRAVENOUS at 06:03

## 2022-01-24 RX ADMIN — AMPICILLIN SODIUM AND SULBACTAM SODIUM 3 G: 2; 1 INJECTION, POWDER, FOR SOLUTION INTRAMUSCULAR; INTRAVENOUS at 17:54

## 2022-01-24 RX ADMIN — HYDROMORPHONE HYDROCHLORIDE 0.5 MG: 1 INJECTION, SOLUTION INTRAMUSCULAR; INTRAVENOUS; SUBCUTANEOUS at 15:53

## 2022-01-24 RX ADMIN — HEPARIN SODIUM 5000 UNITS: 5000 INJECTION, SOLUTION INTRAVENOUS; SUBCUTANEOUS at 22:08

## 2022-01-24 ASSESSMENT — COGNITIVE AND FUNCTIONAL STATUS - GENERAL
DAILY ACTIVITIY SCORE: 24
MOBILITY SCORE: 24
MOBILITY SCORE: 24
SUGGESTED CMS G CODE MODIFIER MOBILITY: CH
SUGGESTED CMS G CODE MODIFIER MOBILITY: CH
DAILY ACTIVITIY SCORE: 24
SUGGESTED CMS G CODE MODIFIER DAILY ACTIVITY: CH
SUGGESTED CMS G CODE MODIFIER DAILY ACTIVITY: CH

## 2022-01-24 ASSESSMENT — PAIN SCALES - WONG BAKER
WONGBAKER_NUMERICALRESPONSE: DOESN'T HURT AT ALL
WONGBAKER_NUMERICALRESPONSE: HURTS EVEN MORE

## 2022-01-24 ASSESSMENT — ENCOUNTER SYMPTOMS
DOUBLE VISION: 0
FEVER: 0
DIZZINESS: 0
BLURRED VISION: 0
NECK PAIN: 0
HEMOPTYSIS: 0
PALPITATIONS: 0
COUGH: 0
PHOTOPHOBIA: 0
NAUSEA: 1
SHORTNESS OF BREATH: 0
HEADACHES: 0
ABDOMINAL PAIN: 1
VOMITING: 0
CHILLS: 0
DEPRESSION: 0
MYALGIAS: 0

## 2022-01-24 ASSESSMENT — GAIT ASSESSMENTS
GAIT LEVEL OF ASSIST: SUPERVISED
DISTANCE (FEET): 50
DEVIATION: NO DEVIATION

## 2022-01-24 ASSESSMENT — PAIN DESCRIPTION - PAIN TYPE
TYPE: ACUTE PAIN

## 2022-01-24 ASSESSMENT — ACTIVITIES OF DAILY LIVING (ADL): TOILETING: INDEPENDENT

## 2022-01-24 ASSESSMENT — LIFESTYLE VARIABLES: SUBSTANCE_ABUSE: 0

## 2022-01-24 NOTE — HEART FAILURE PROGRAM
Discussed with Dr. Drummond on 1/24/22: no heart failure diagnosis at this time. Therefore, appointment and checklist not indicated. Thank you, Amina, Cardio RN Navigator [id number]   Last edited by Amina Strong R.N. on 01/24/22 at 1637

## 2022-01-24 NOTE — THERAPY
Physical Therapy   Initial Evaluation     Patient Name: Emigdio Jiménez  Age:  86 y.o., Sex:  male  Medical Record #: [id number]  Today's Date: 1/24/2022          Assessment  Mr. Jiménez is an 87 y/o male who presents to acute secondary to bacteremia, R upper quadrant abd tenderness, and sepsis. Pt able to perform gait, transfers, and bed mobility without physical assist. Reports he is very active and has no concerns regarding his mobility. No additional acute PT needs.      Plan    Recommend Physical Therapy for Evaluation only  DC Equipment Recommendations: None  Discharge Recommendations: Anticipate that the patient will have no further physical therapy needs after discharge from the hospital            Objective       01/24/22 0846   Prior Living Situation   Prior Services None   Housing / Facility 1 Story Apartment / Condo   Steps Into Home 0   Equipment Owned None   Lives with - Patient's Self Care Capacity Adult Children   Comments lives with daughter   Prior Level of Functional Mobility   Bed Mobility Independent   Transfer Status Independent   Ambulation Independent   Distance Ambulation (Feet)   (community ambulator)   Assistive Devices Used None   Cognition    Level of Consciousness Alert   Passive ROM Lower Body   Passive ROM Lower Body WDL   Active ROM Lower Body    Active ROM Lower Body  WDL   Strength Lower Body   Lower Body Strength  WDL   Sensation Lower Body   Lower Extremity Sensation   WDL   Balance Assessment   Sitting Balance (Static) Good   Sitting Balance (Dynamic) Good   Standing Balance (Static) Good   Standing Balance (Dynamic) Fair +   Weight Shift Sitting Good   Weight Shift Standing Fair   Comments no AD   Gait Analysis   Gait Level Of Assist Supervised   Assistive Device None   Distance (Feet) 50   # of Times Distance was Traveled 1   Deviation No deviation   Weight Bearing Status no restrictions   Comments    Bed Mobility    Supine to Sit Modified Independent   Sit to Supine Modified  Independent   Scooting Modified Independent   Functional Mobility   Sit to Stand Modified Independent   Bed, Chair, Wheelchair Transfer Modified Independent

## 2022-01-24 NOTE — PROGRESS NOTES
Bedside report received from NOC RN   Pt awake and alert, no c/o pain at this time  Safety information reviewed with pt  Plan of care for day reviewed with pt and pt agrees   All other pt needs addressed at this time   Hourly rounding in place   Bed locked and in lowest position, call light within reach

## 2022-01-24 NOTE — PROGRESS NOTES
Jessica from Lab called with critical result of GPP clusters Coag - Stap at 0756. Critical lab result read back to Jessica.   MD Drummond notified of result

## 2022-01-24 NOTE — PROGRESS NOTES
Infectious Disease Progress Note    Author: Poornima Horn M.D. Date & Time of service: 2022  1:29 PM    Chief Complaint:  Bacteremia  +TAVR    Interval History:  86 y.o. 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  Today, patient states he does not have any chest pain, notes vague symptoms for the past 2 months that he is not able to elucidate.  Appears to have had generalized weakness and chills off and on for the past 2 months, now with a right upper quadrant/right flank pain, neck and upper back pain    AF planned for RENU today +CP, mild worse with palpation ALso RUQ discomfort Appetite good    Labs Reviewed, Medications Reviewed and Radiology Reviewed.    Review of Systems:  Review of Systems   Constitutional: Negative for fever.   Respiratory: Negative for cough and shortness of breath.    Cardiovascular: Positive for chest pain.   Musculoskeletal: Positive for joint pain.   All other systems reviewed and are negative.      Hemodynamics:  Temp (24hrs), Av.4 °C (97.5 °F), Min:36.2 °C (97.1 °F), Max:36.6 °C (97.8 °F)  Temperature: 36.6 °C (97.8 °F)  Pulse  Av.4  Min: 67  Max: 108   Blood Pressure : 160/80       Physical Exam:  Physical Exam  Vitals and nursing note reviewed.   Constitutional:       General: He is not in acute distress.     Appearance: He is not ill-appearing, toxic-appearing or diaphoretic.   HENT:      Nose: No congestion or rhinorrhea.      Mouth/Throat:      Pharynx: No oropharyngeal exudate.      Comments: Fair dentition  Eyes:      Extraocular Movements: Extraocular movements intact.      Pupils: Pupils are equal, round, and reactive to light.   Cardiovascular:      Rate and Rhythm: Normal rate.      Heart sounds: No murmur heard.      Pulmonary:      Effort: Pulmonary effort is normal. No respiratory distress.   Abdominal:      General: There  is no distension.      Palpations: Abdomen is soft.      Tenderness: There is abdominal tenderness. There is no guarding or rebound.      Comments: RUQ   Musculoskeletal:      Cervical back: Neck supple. No rigidity.      Right lower leg: No edema.      Left lower leg: No edema.   Skin:     Coloration: Skin is not jaundiced.   Neurological:      General: No focal deficit present.      Mental Status: He is alert and oriented to person, place, and time.   Psychiatric:         Mood and Affect: Mood normal.         Behavior: Behavior normal.         Meds:    Current Facility-Administered Medications:   •  fentaNYL  •  sugammadex  •  lidocaine 4%  •  LR  •  ondansetron  •  haloperidol lactate  •  albuterol  •  labetalol  •  hydrALAZINE  •  [MAR Hold] LR  •  [MAR Hold] HYDROcodone/acetaminophen  •  [MAR Hold] HYDROmorphone  •  [MAR Hold] ampicillin-sulbactam (UNASYN) IV  •  [MAR Hold] sodium bicarbonate  •  LR  •  [MAR Hold] levothyroxine  •  [MAR Hold] senna-docusate **AND** [MAR Hold] polyethylene glycol/lytes **AND** [DISCONTINUED] magnesium hydroxide **AND** [MAR Hold] bisacodyl  •  [MAR Hold] Respiratory Therapy Consult  •  [MAR Hold] acetaminophen  •  [MAR Hold] ondansetron  •  [MAR Hold] ondansetron  •  [MAR Hold] mag hydrox-al hydrox-simeth  •  [MAR Hold] aminophylline  •  [MAR Hold] atorvastatin  •  [MAR Hold] heparin    Labs:  Recent Labs     01/22/22  0413 01/23/22  0030 01/24/22  0644   WBC 10.7 12.0* 9.2   RBC 3.96* 3.53* 3.62*   HEMOGLOBIN 11.5* 10.4* 10.7*   HEMATOCRIT 33.7* 30.4* 32.2*   MCV 85.1 86.1 89.0   MCH 29.0 29.5 29.6   RDW 44.2 44.6 46.6   PLATELETCT 152* 147* 155*   MPV 10.9 11.0 11.1   NEUTSPOLYS 87.30* 84.70* 69.40   LYMPHOCYTES 2.30* 3.40* 13.50*   MONOCYTES 8.70 10.90 14.80*   EOSINOPHILS 0.40 0.10 0.30   BASOPHILS 0.60 0.20 0.50     Recent Labs     01/22/22  0413 01/23/22  0030 01/24/22  0644   SODIUM 125* 126* 128*   POTASSIUM 4.8 4.7 4.5   CHLORIDE 97 98 97   CO2 15* 17* 19*   GLUCOSE 93  113* 87   BUN 37* 32* 17     Recent Labs     01/22/22  0413 01/23/22  0030 01/24/22  0644   ALBUMIN 3.1* 2.9* 3.0*   TBILIRUBIN 0.5 0.3 0.4   ALKPHOSPHAT 84 89 75   TOTPROTEIN 6.1 5.3* 5.5*   ALTSGPT 19 18 18   ASTSGOT 36 35 33   CREATININE 2.24* 1.33 0.91       Imaging:  CT-ABDOMEN-PELVIS W/O    Result Date: 1/23/2022 1/23/2022 2:38 PM HISTORY/REASON FOR EXAM:  Abdominal pain, acute, nonlocalized. abdominal pain TECHNIQUE/EXAM DESCRIPTION: CT scan of the abdomen and pelvis without contrast. Noncontrast helical scanning was obtained from the diaphragmatic domes through the pubic symphysis. Low dose optimization technique was utilized for this CT exam including automated exposure control and adjustment of the mA and/or kV according to patient size. COMPARISON: Ultrasound 1/23/2022. FINDINGS: Lower Chest: Small hiatal hernia Liver: Normal. Spleen: Unremarkable. Pancreas: Unremarkable. Gallbladder: Distended. Biliary: No biliary dilatation.. Adrenal glands: Nonenlarged. Kidneys: No renal calculus. No hydronephrosis.. Bowel: No bowel wall thickening or bowel dilatation. Lymph nodes: No adenopathy. Vasculature: The abdominal aorta is normal in caliber. Moderate atherosclerotic disease of the abdominal aorta and its branches. Peritoneum: Unremarkable without ascites. Musculoskeletal: Mild anterolisthesis of L4-5. Moderate degenerative change of the lumbar spine, most at L2-3. Pelvis: Small fat-containing left inguinal hernia. Unremarkable urinary bladder. Prostate calcification..     1. No acute inflammatory change in the abdomen or pelvis on this noncontrast CT. 2. Small hiatal hernia. 3. No hydronephrosis. No renal calculus.    DX-CHEST-PORTABLE (1 VIEW)    Result Date: 1/21/2022 1/21/2022 5:17 PM HISTORY/REASON FOR EXAM:  Chest Pain. TECHNIQUE/EXAM DESCRIPTION AND NUMBER OF VIEWS: Single portable view of the chest. COMPARISON: January 18, 2022 FINDINGS: Heart size is within normal limits. Patient status post ureteric  valve replacement. There are calcified right hilar lymph nodes. No pulmonary infiltrates or consolidations are noted. No pleural abnormalities are noted.     No acute cardiac or pulmonary abnormalities are identified.    DX-CHEST-PORTABLE (1 VIEW)    Result Date: 1/18/2022 1/18/2022 7:32 AM HISTORY/REASON FOR EXAM:  Chest Pain. TECHNIQUE/EXAM DESCRIPTION AND NUMBER OF VIEWS: Single portable view of the chest. COMPARISON: 3/12/2021 FINDINGS: Cardiac mediastinal contour is unchanged.  Prosthetic heart valve present. Atherosclerotic calcifications of the aortic arch. Lungs show hypoinflation. Calcified mediastinal lymph nodes again seen. No focal consolidation. No pleural fluid collection or pneumothorax. Degenerative change of both shoulders.     No acute cardiopulmonary abnormality    MR-CERVICAL SPINE-W/O    Result Date: 1/24/2022 1/23/2022 10:25 PM HISTORY/REASON FOR EXAM:  Cervical radiculopathy, infection suspected. Positive blood cultures for Gram-positive cocci, possible Streptococcus. Possible endocarditis. Neck pain and upper back pain. TECHNIQUE/EXAM DESCRIPTION: MRI of the cervical spine without contrast. The study was performed on a [employer] 1.5 Lachelle MRI scanner. T1 sagittal, T2 fast spin-echo sagittal, and gradient echo axial images were obtained of the cervical spine. T2 fat suppressed sagittal images were also obtained. Optional T2 axial images may also be included. COMPARISON: There are no previous cervical spine films for comparison FINDINGS: Alignment in the cervical spine is within normal limits. There is advanced degenerative disc space narrowing at C3-4 through C6/C7 and moderate disc space narrowing at C7-T1. There is marginal spurring at C3-4 through C6-7. Marrow signal shows mild Modic type I discogenic endplate marrow changes at C5 to C6. No evidence of discitis or osteomyelitis. The prevertebral and paraspinous soft tissues are unremarkable. There are no anomalies at the craniovertebral  junction.  The cervical spinal cord is normal in caliber and signal throughout its course. At C2-3, no central or foraminal stenosis. At C3-4, there is disc/osteophyte change in the left with effacement of left paramedian ventral subarachnoid space. There is mild left lateral recess stenosis. There is mild right foraminal stenosis and moderate left foraminal stenosis with uncinate hypertrophy. At C4-5, there is a small broad-based disc/osteophyte complex. There is buckling or hypertrophy of the ligamentum flavum. There is overall moderate central stenosis. Marked bilateral foraminal stenosis due to spondylotic change. At C5-6, there is broad-based disc/osteophyte change and ligamentum flavum buckling or hypertrophy. Mild central stenosis. Moderate left lateral recess stenosis. Mild right foraminal stenosis. Marked left foraminal stenosis due to spondylotic change. At C6-7, minor disc/osteophyte change. Partial effacement of ventral subarachnoid space. No central stenosis. Mild right foraminal stenosis. The left neural foramen is intact. At C7-T1, no significant disc bulge or protrusion. No central or foraminal stenosis.     1.  Multilevel degenerative disc disease and spondylotic changes most notable for C4-5 moderate central stenosis at C5-6 mild central stenosis. Foraminal stenoses due to spondylotic change as described above. 2.  No evidence of discitis, osteomyelitis, or epidural collection. 3.  No myelopathic cord signal abnormality.    MR-THORACIC SPINE-W/O    Result Date: 1/24/2022 1/23/2022 10:25 PM HISTORY/REASON FOR EXAM: Suspected Epidural abscess. Cervical radiculopathy, infection suspected. Positive blood cultures for Gram-positive cocci, possible Streptococcus. Possible endocarditis. Neck pain and upper back pain. TECHNIQUE/EXAM DESCRIPTION: MRI of the thoracic spine without contrast. The study was performed on a [employer] 1.5 Lachelle MRI scanner. T1 sagittal, T2 fast spin-echo sagittal, T2  fat-suppressed sagittal and T2 axial images were obtained of the thoracic spine. COMPARISON: CTA of the chest 1/21/2022 FINDINGS: Alignment in the thoracic spine is normal. There are minor Schmorl's node endplate irregularities at T5-T6, T6-T7, T7-T8, T8-T9. Minor anterior marginal endplate spurring at T11-T12. Marrow signal in the vertebral bodies is unremarkable. No evidence of osteomyelitis or discitis. There are no epidural collections. No evidence of epidural abscess. The prevertebral and paraspinous soft tissues are unremarkable except for an incidental discoid shaped lipoma in the right posterior paraspinous soft tissues. This measures about 59 mm x 15 mm (T2 axial image 1, series 8) and correlation with CT indicates that the lesion measures about 47 mm in craniocaudad dimension (sagittal reconstruction CT image 2, series 301 from 1/21/2022). There are some minimal dependent subsegmental atelectatic changes at the posterior lung bases. The thoracic spinal cord is normal in caliber and signal throughout its course. There is some longitudinal pulsation artifact seen on the T2 sagittal and T2 fat-suppressed sagittal sequences. There is no corroborating signal abnormality on the T2 axial images. There is no significant disc bulge or protrusion at any thoracic level. There is no central stenosis at any thoracic level. The thoracic neural foramina are intact.     1.  Minor degenerative changes including a few Schmorl's node endplate irregularities and anterior marginal spurring at T11-T12. 2.  Incidental superficial soft tissue lipoma in the right posterior paraspinous soft tissues. No clinical significance. 3.  No significant disc bulge or protrusion, central stenosis, or foraminal stenosis at any thoracic level. 4.  No evidence of discitis, osteomyelitis, or epidural phlegmon/epidural abscess.    US-RUQ    Result Date: 1/22/2022 1/22/2022 1:44 PM HISTORY/REASON FOR EXAM: Right upper quadrant abdominal pain.  TECHNIQUE/EXAM DESCRIPTION: Ultrasound of the gallbladder, liver and biliary tree. COMPARISON: None FINDINGS: The liver echogenicity is normal. There is no evidence of hepatic mass. There are gallstones within the gallbladder.. The gallbladder wall thickness is measured at 3 mm. No evidence of pericholecystic fluid. The common bile duct is measured at 4 mm. The visualized portions of the portal vein and inferior vena cava are normal. The pancreas is normal. The right kidney is normal.     Gallstones within the gallbladder. No evidence of biliary ductal dilatation.    NM-CARDIAC STRESS TEST    Result Date: 2022   Myocardial Perfusion  Report  NUCLEAR IMAGING INTERPRETATION  No evidence of significant jeopardized viable myocardium or prior myocardial  infarction.  Normal left ventricular size, ejection fraction, and wall motion.  OLAMIDE DAI  MRN:    [id number]         Gender:    M  Exam Date: 2022 13:55  Exam Location:      Inpatient  Ordering Phys:     ELEANOR LOPEZ  NucMed Tech:       Bridgett Celeste RT  Age:    86    :    1935        Ht (in):     64  Wt (lb):     165    BMI:    28.23       Radiologist  Risk Factors:             Smoking  Indications:              Chest pain, unspecified  ICD Codes:                R07.9  Cardiac History:          Chest Pain  Cardiac Meds:  Meds Past 24 hrs:  Pretest Chest Pain:       No symptoms  STRESS TEST      Pharmacologi                   terence Mckinley   Lexiscan       Dose: 0.4 mg  ol:  Post-Injection Exercise:        No exercise followed the intravenous injection  Resting HR (bpm):      90  Peak HR (bpm):         103  Resting BP (mmHg):       152    /   67  Peak BP (mmHg):       140   /   74  MaxPHR:     135     Target HR (bpm):       115  % MaxPHR:     76  Double Product:       92825  BP Response:  Stress Termination:       Completion of Protocol  Stress Symptoms:  DYSPNEA YES,NORMAL SINUS RHYTHM,no chest pain  ECG  Resting ECG:  Stress ECG:  IMAGE PROTOCOL       Rest/Stress 1                      Day          RadiopharmaceuticalDose (mCi)   Imaging  Date      Imaging  Time         Inj to Img Time (min)  Rest:   Tc-99m             7.6          22-Jan-2022        15:05                 30  Stress: Tc-99m             21           22-Jan-2022        16:10                 30  Rest:  Administration Site:       Right antecubital                             fossa  Administered by:      Bridgett Celeste RT  Stress:  Administration Site:       Right antecubital                             fossa  Administered by:      Bridgett Celeste RT  % Percent HR Achieved:  SPECT RESULTS  Technical Quality:       Good  Raw Data Analysis:  Summed Stress Score:    0  Summed Rest Score:    4  Summed Difference Score:        0  PERFUSION:  Normal myocardial perfusion with no ischemia.  FUNCTIONAL RESULTS (calculated via Gated SPECT)  Stress Image LV EF:        76     %  Upper Normal Limit  Stress EDV:      46     ml   EDVI:    25      ml/m2  Stress ESV:      11     ml   ESVI:    6       ml/m2  TID:    1.26   TID - 1.19      TID (ed) - 1.23  LV Function:  Normal left ventricular wall motion.  LV ejection fraction = 76%.  Kamaljit Smith MD  (Electronically Signed)  Final Date:      22 January 2022                   16:37    CT-CTA CHEST PULMONARY ARTERY W/ RECONS    Result Date: 1/21/2022 1/21/2022 6:50 PM HISTORY/REASON FOR EXAM:  PE suspected, high prob; Right-sided chest pain for 1 week, nonradiating, worse with deep inspiration, concern for PE TECHNIQUE/EXAM DESCRIPTION: CT angiogram scan for pulmonary embolism with contrast, with reconstructions. 1.25 mm helical sections were obtained from the lung apices through the lung bases following the rapid bolus administration of 65 mL of Omnipaque 350 nonionic contrast. Thin-section overlapping reconstruction interval was utilized. Coronal reconstructions were generated from the axial data. MIP post processing was performed and utilized for the interpretation. Low  dose optimization technique was utilized for this CT exam including automated exposure control and adjustment of the mA and/or kV according to patient size. COMPARISON: None FINDINGS: Pulmonary Embolism: No. Main Pulmonary Arteries: No. Segmental branches: No. Subsegmental branches: No. Only if positive for PE: RV diameter: cm. LV diameter: cm. RV/LV ratio: . (Greater than 0.9 is abnormal.) Additional Comments: Patient status post aortic valve replacement. There are coronary artery calcifications. Lungs: No suspicious nodules or air space process. Pleura: No pleural effusion. Nodes: No enlarged lymph nodes. Additional findings: .     1.  No evidence of pulmonary embolus. 2.  Coronary artery calcifications.     EC-ECHOCARDIOGRAM COMPLETE W/O CONT    Result Date: 2022  Transthoracic Echo Report Echocardiography Laboratory CONCLUSIONS Hyperdynamic left ventricular systolic function. The left ventricular ejection fraction is visually estimated to be 75%. Normal right ventricular size and systolic function. Known TAVR aortic valve that is functioning normally with normal transvalvular gradients.  OLAIMDE DAI Exam Date:         2022                    09:23 Exam Location:     Inpatient Priority:          Routine Ordering Physician:        ELEANOR LOPEZ Referring Physician:       [id number] [place] Sonographer:               Elizabeth Chanel [facility] Age:    86     Gender:    M MRN:    [id number] :    1935 BSA:    1.87   Ht (in):    67     Wt (lb):    166 Exam Type:     Complete Indications:     Chest Pain ICD Codes:       786.5 CPT Codes:       93025 BP:   143    /   64     HR:   86 Technical Quality:       Fair MEASUREMENTS  (Male / Female) Normal Values 2D ECHO LV Diastolic Diameter PLAX        3.9 cm                4.2 - 5.9 / 3.9 - 5.3 cm LV Systolic Diameter PLAX         1.7 cm                2.1 - 4.0 cm IVS Diastolic Thickness           1.1 cm                LVPW Diastolic Thickness          0.96 cm                LVOT Diameter                     1.9 cm                Estimated LV Ejection Fraction    75 %                  LV Ejection Fraction MOD BP       63.6 %                >= 55  % LV Ejection Fraction MOD 4C       57.7 %                LV Ejection Fraction MOD 2C       80.2 %                IVC Diameter                      1.7 cm                DOPPLER AV Peak Velocity                  2.5 m/s               AV Peak Gradient                  25.1 mmHg             AV Mean Gradient                  16.3 mmHg             LVOT Peak Velocity                1.1 m/s               AV Area Cont Eq vti               1.4 cm2               Mitral E Point Velocity           1 m/s                 Mitral E to A Ratio               0.72                  MV Pressure Half Time             71.2 ms               MV Area PHT                       3.1 cm2               MV Deceleration Time              245 ms                * Indicates values subject to auto-interpretation LV EF:  75    % FINDINGS Left Ventricle Hyperdynamic left ventricular systolic function. The left ventricular ejection fraction is visually estimated to be 75%. Peak velocity is 2.6 m/sec. Grade I diastolic dysfunction.  Mild concentric left ventricular hypertrophy. Right Ventricle Normal right ventricular size and systolic function. Right Atrium Normal right atrial size. Normal inferior vena cava size and inspiratory collapse. Left Atrium Mildly dilated left atrium. Left atrial volume index is 37 mL/sq m. Mitral Valve Thickened mitral valve leaflets. Mild mitral regurgitation. No mitral stenosis. Aortic Valve Known TAVR aortic valve that is functioning normally with normal transvalvular gradients. Vmax is  2.4 m/s. Transvalvular gradients are - Peak: 23 mmHg,  Mean: 14  mmHg. No evidence of paravalvular leak. Tricuspid Valve Structurally normal tricuspid valve. Trace tricuspid regurgitation. Right atrial pressure is estimated to be 3 mmHg. Unable to estimate  "pulmonary artery pressure due to an inadequate tricuspid regurgitant jet. Pulmonic Valve Structurally normal pulmonic valve without significant stenosis or regurgitation. Pericardium Normal pericardium without effusion. Aorta Normal aortic root for body surface area. The ascending aorta diameter is 2.2  cm. Joel Cerna MD (Electronically Signed) Final Date:     22 January 2022                 10:52    EC-RENU W/O CONT    Result Date: 1/24/2022  Results Will be Available after Interpretation by Cardiologist.      Micro:  Results     Procedure Component Value Units Date/Time    BLOOD CULTURE [[id number]]  (Abnormal) Collected: 01/23/22 0915    Order Status: Completed Specimen: Blood from Peripheral Updated: 01/24/22 0758     Significant Indicator POS     Source BLD     Site PERIPHERAL     Culture Result Growth detected by Bactec instrument. 01/24/2022  07:56  Gram Stain: Gram positive cocci: Possible Staphylococcus sp.  Negative for Staphylococcus aureus and MRSA by PCR. Correlate  ongoing need for antibiotics with clinical condition.  Further report to follow.      Narrative:      CALL  Gordon  CPU tel. [phone],  CALLED  CPU tel. [phone] 01/24/2022, 07:58, RB PERF. RESULTS CALLED  TO:Rudy Diez D  Per Hospital Policy: Only change Specimen Src: to \"Line\" if  specified by physician order.  right    BLOOD CULTURE [[id number]]     Order Status: Sent Specimen: Blood from Peripheral     Blood Culture [[id number]]     Order Status: Sent Specimen: Blood from Peripheral     Blood Culture [[id number]]     Order Status: Sent Specimen: Blood from Peripheral     Urinalysis [[id number]]     Order Status: Canceled Specimen: Urine, Clean Catch     BLOOD CULTURE [[id number]]  (Abnormal) Collected: 01/22/22 0450    Order Status: Completed Specimen: Blood from Peripheral Updated: 01/23/22 0017     Significant Indicator POS     Source BLD     Site PERIPHERAL     Culture Result Growth detected by Bactec instrument. 01/23/2022  " "00:15  Gram Stain: Gram positive cocci: Possible Streptococcus sp.      Narrative:      CALL  Gordon  CPU tel. [phone],  CALLED  CPU tel. [phone] 01/23/2022, 00:17, RB PERF. RESULTS CALLED TO: RN  [phone]  Per Hospital Policy: Only change Specimen Src: to \"Line\" if  specified by physician order.  No site indicated    BLOOD CULTURE [[id number]]  (Abnormal) Collected: 01/22/22 0450    Order Status: Completed Specimen: Blood from Peripheral Updated: 01/22/22 2213     Significant Indicator POS     Source BLD     Site PERIPHERAL     Culture Result Growth detected by Bactec instrument. 01/22/2022  22:11  Gram Stain: Gram positive cocci: Possible Streptococcus sp.      Narrative:      CALL  Gordon  CPU tel. [phone],  CALLED  CPU tel. [phone] 01/22/2022, 22:12, RB PERF. RESULTS CALLED TO: RN  [phone]  Per Hospital Policy: Only change Specimen Src: to \"Line\" if  specified by physician order.  No site indicated    CoV, Flu A/B RAPID ANTIGEN: Collect one dry nasal swab [[id number]] Collected: 01/22/22 1119    Order Status: Completed Specimen: Respirate from Nasopharyngeal Updated: 01/22/22 1234     Influenza A AG by MARIE Negative     Influenza B AG by MARIE Negative     SARS-COV ANTIGEN MARIE NotDetected     Comment: A result of Not-Detected is presumptive and should be confirmed with  a molecular assay, if necessary for patient management.    The Quidel Shi test has been authorized by FDA under an  Emergency Use Authorization (EUA).          SARS-CoV-2 Source Nasal Swab    Narrative:      Have you been in close contact with a person who is suspected  or known to be positive for COVID-19 within the last 30 days  (e.g. last seen that person < 30 days ago)->No    COV-2, FLU A/B, AND RSV BY PCR (2-4 HOURS CEPHEID): Collect NP swab in VTM [[id number]]     Order Status: Canceled Specimen: Respirate from Nasopharyngeal     URINALYSIS [[id number]]  (Abnormal) Collected: 01/22/22 0514    Order Status: Completed Specimen: Urine Updated: " 01/22/22 0535     Color Yellow     Character Clear     Specific Gravity 1.043     Ph 5.0     Glucose Negative mg/dL      Ketones Trace mg/dL      Protein 30 mg/dL      Bilirubin Negative     Urobilinogen, Urine 1.0     Nitrite Negative     Leukocyte Esterase Negative     Occult Blood Negative     Micro Urine Req Microscopic          Assessment:  Active Hospital Problems    Diagnosis    • *Elevated troponin [R77.8]    • Bacteremia [R78.81]    • Metabolic acidosis [E87.2]    • Sepsis (HCC) [A41.9]    • Right upper quadrant abdominal tenderness with rebound tenderness [R10.821]    • HLD (hyperlipidemia) [E78.5]    • CHF (congestive heart failure) ([facility]) [I50.9]    • DANIS (acute kidney injury) ([facility]) [N17.9]    • Hyponatremia [E87.1]    • Essential hypertension [I10]    • Chest pain [R07.9]    • Hypothyroidism [E03.9]    Sepsis  Gram-positive bacteremia-possible strep in 1st 2 on 1/22   Blood culture 1/23 prelime staph  TAVR in place  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

## 2022-01-24 NOTE — THERAPY
"Occupational Therapy   Initial Evaluation     Patient Name: Emigdio Jiménez  Age:  86 y.o., Sex:  male  Medical Record #: [id number]  Today's Date: 1/24/2022     Assessment  Patient is 86 y.o. male admitted with R side chest and RUQ pain. Pt presents to OT eval able to demonstrate ADLs and functional mobility without assist, functionally pt presents at his baseline of independence. Pt is very active and athletic, reports frequent exercise. No additional OT needs at this time.      Plan    Recommend Occupational Therapy for Evaluation only     DC Equipment Recommendations: None  Discharge Recommendations: Anticipate that the patient will have no further occupational therapy needs after discharge from the hospital     Subjective    \"I run a mile and do 20 push-ups daily.\"     Objective     01/24/22 0848   Prior Living Situation   Prior Services None   Housing / Facility 1 Story Apartment / Condo   Steps Into Home 0   Steps In Home 0   Bathroom Set up Bathtub / Shower Combination   Equipment Owned None   Lives with - Patient's Self Care Capacity Adult Children   Prior Level of ADL Function   Self Feeding Independent   Grooming / Hygiene Independent   Bathing Independent   Dressing Independent   Toileting Independent   Prior Level of IADL Function   Medication Management Independent   Laundry Independent   Kitchen Mobility Independent   Finances Independent   Home Management Independent   Shopping Independent   Prior Level Of Mobility Independent Without Device in Community;Independent Without Device in Home   Driving / Transportation Driving Independent   Cognition    Cognition / Consciousness WDL   Level of Consciousness Alert   Comments impulsive, hard of hearing   Strength Upper Body   Upper Body Strength  WDL   Comments athletic, active   Upper Body Muscle Tone   Upper Body Muscle Tone  WDL   Coordination Upper Body   Coordination WDL   Balance Assessment   Sitting Balance (Static) Fair +   Sitting Balance (Dynamic) " Fair +   Standing Balance (Static) Fair +   Standing Balance (Dynamic) Fair +   Weight Shift Sitting Fair   Weight Shift Standing Fair   Comments no AD   Bed Mobility    Supine to Sit Supervised   Sit to Supine Supervised   Scooting Supervised   ADL Assessment   Eating Independent   Grooming Independent;Standing   Upper Body Dressing Independent   Lower Body Dressing Standby Assist   Toileting Independent   Comments assist for sock don d/t hang nails    How much help from another person does the patient currently need...   6 Clicks Daily Activity Score 24   Functional Mobility   Sit to Stand Modified Independent   Bed, Chair, Wheelchair Transfer Modified Independent   Toilet Transfers Modified Independent   Transfer Method Stand Step   Mobility no AD   Activity Tolerance   Sitting in Chair functional   Sitting Edge of Bed functional   Standing functional   Education Group   Education Provided Activities of Daily Living;Role of Occupational Therapist   Role of Occupational Therapist Patient Response Patient;Acceptance;Explanation;Verbal Demonstration   ADL Patient Response Patient;Acceptance;Explanation;Verbal Demonstration;Action Demonstration   Problem List   Problem List None   Anticipated Discharge Equipment and Recommendations   DC Equipment Recommendations None   Discharge Recommendations Anticipate that the patient will have no further occupational therapy needs after discharge from the hospital

## 2022-01-24 NOTE — PROGRESS NOTES
Pt very agitated,pulled out the piv wants to go home,pt redirected back in bed,lab called with blood culture result,informed MD,pt and family explained pt's condition,pt still wants to go home also c/o RUQ pain,pt trying to leave,educated pain management,medicated for pain.

## 2022-01-24 NOTE — PROGRESS NOTES
PIV started,abx given,pt becoming calm,very [place],pt refusing second set of culture and PT,explained the importance but refusing.

## 2022-01-24 NOTE — CARE PLAN
The patient is         Shift Goals  Clinical Goals: relief from pain  Patient Goals: wants to go home  Family Goals: n/a    Progress made toward(s) clinical / shift goals:     Patient is not progressing towards the following goals:

## 2022-01-24 NOTE — ANESTHESIA PREPROCEDURE EVALUATION
Date/Time: 01/24/22 1230    Scheduled providers: Debbi Walker M.D.; Emigdio Ortega M.D.    Procedure: EC-RENU W/O CONT    Diagnosis:       Elevated troponin [R77.8]      Sepsis (HCC) [A41.9]      Elevated troponin [R77.8]      Sepsis (HCC) [A41.9]    Location: [facility] - ECHOCARDIOLOGY [facility]      Sepsis, evaluate for endocarditis    Relevant Problems   CARDIAC   (positive) CHF (congestive heart failure) ([facility])   (positive) Essential hypertension   (positive) Nonrheumatic aortic valve stenosis         (positive) DANIS (acute kidney injury) ([facility])      ENDO   (positive) Hypothyroidism   history of TAVR    Physical Exam    Airway   Mallampati: II  TM distance: >3 FB  Neck ROM: full       Cardiovascular - normal exam  Rhythm: regular  Rate: normal  (-) murmur     Dental - normal exam  Comments: Multiple missing teeth         Pulmonary - normal exam  Breath sounds clear to auscultation     Abdominal    Neurological - normal exam               Anesthesia Plan    ASA 3       Plan - general       Airway plan will be natural airway          Induction: intravenous    Postoperative Plan: Postoperative administration of opioids is intended.    Pertinent diagnostic labs and testing reviewed    Informed Consent:    Anesthetic plan and risks discussed with patient.    Use of blood products discussed with: patient whom consented to blood products.

## 2022-01-24 NOTE — ANESTHESIA TIME REPORT
Anesthesia Start and Stop Event Times     Date Time Event    1/24/2022 1235 Ready for Procedure     1237 Anesthesia Start     1305 Anesthesia Stop        Responsible Staff  01/24/22    Name Role Begin End    Emigdio Ortega M.D. Anesth 1237 1305        Preop Diagnosis (Free Text):  Pre-op Diagnosis             Preop Diagnosis (Codes):    Premium Reason  Non-Premium    Comments:

## 2022-01-24 NOTE — CARE PLAN
The patient is Stable - Low risk of patient condition declining or worsening    Shift Goals  Clinical Goals: RENU, pain control  Patient Goals: sleep, test   Family Goals: N/A    Progress made toward(s) clinical / shift goals:    Problem: Pain - Standard  Goal: Alleviation of pain or a reduction in pain to the patient’s comfort goal  Outcome: Progressing     Problem: Knowledge Deficit - Standard  Goal: Patient and family/care givers will demonstrate understanding of plan of care, disease process/condition, diagnostic tests and medications  Outcome: Progressing     Problem: Hemodynamics  Goal: Patient's hemodynamics, fluid balance and neurologic status will be stable or improve  Outcome: Progressing     Problem: Fluid Volume  Goal: Fluid volume balance will be maintained  Outcome: Progressing     Problem: Infection - Standard  Goal: Patient will remain free from infection  Outcome: Progressing       Patient is not progressing towards the following goals:

## 2022-01-24 NOTE — PROGRESS NOTES
Report called to T7 DELILAH Garcia  Pt belongings all packed up and carried to T7 room 738 by LUMA Lea  Pt had only 1 hearing per Daughter and hearing aid sent up with belongings

## 2022-01-25 LAB
ALBUMIN SERPL BCP-MCNC: 3.2 G/DL (ref 3.2–4.9)
ALBUMIN/GLOB SERPL: 1.2 G/DL
ALP SERPL-CCNC: 76 U/L (ref 30–99)
ALT SERPL-CCNC: 19 U/L (ref 2–50)
ANION GAP SERPL CALC-SCNC: 12 MMOL/L (ref 7–16)
AST SERPL-CCNC: 32 U/L (ref 12–45)
BACTERIA BLD CULT: ABNORMAL
BASOPHILS # BLD AUTO: 0.9 % (ref 0–1.8)
BASOPHILS # BLD: 0.08 K/UL (ref 0–0.12)
BILIRUB SERPL-MCNC: 0.5 MG/DL (ref 0.1–1.5)
BUN SERPL-MCNC: 12 MG/DL (ref 8–22)
CALCIUM SERPL-MCNC: 8.3 MG/DL (ref 8.5–10.5)
CHLORIDE SERPL-SCNC: 94 MMOL/L (ref 96–112)
CO2 SERPL-SCNC: 23 MMOL/L (ref 20–33)
CREAT SERPL-MCNC: 0.92 MG/DL (ref 0.5–1.4)
EOSINOPHIL # BLD AUTO: 0.04 K/UL (ref 0–0.51)
EOSINOPHIL NFR BLD: 0.4 % (ref 0–6.9)
ERYTHROCYTE [DISTWIDTH] IN BLOOD BY AUTOMATED COUNT: 45.1 FL (ref 35.9–50)
ETEST SENSITIVITY ETEST: NORMAL
GLOBULIN SER CALC-MCNC: 2.6 G/DL (ref 1.9–3.5)
GLUCOSE SERPL-MCNC: 101 MG/DL (ref 65–99)
HCT VFR BLD AUTO: 32.2 % (ref 42–52)
HGB BLD-MCNC: 10.7 G/DL (ref 14–18)
IMM GRANULOCYTES # BLD AUTO: 0.25 K/UL (ref 0–0.11)
IMM GRANULOCYTES NFR BLD AUTO: 2.7 % (ref 0–0.9)
LYMPHOCYTES # BLD AUTO: 1.03 K/UL (ref 1–4.8)
LYMPHOCYTES NFR BLD: 11 % (ref 22–41)
MCH RBC QN AUTO: 28.7 PG (ref 27–33)
MCHC RBC AUTO-ENTMCNC: 33.2 G/DL (ref 33.7–35.3)
MCV RBC AUTO: 86.3 FL (ref 81.4–97.8)
MONOCYTES # BLD AUTO: 0.92 K/UL (ref 0–0.85)
MONOCYTES NFR BLD AUTO: 9.8 % (ref 0–13.4)
NEUTROPHILS # BLD AUTO: 7.07 K/UL (ref 1.82–7.42)
NEUTROPHILS NFR BLD: 75.2 % (ref 44–72)
NRBC # BLD AUTO: 0 K/UL
NRBC BLD-RTO: 0 /100 WBC
PLATELET # BLD AUTO: 199 K/UL (ref 164–446)
PMV BLD AUTO: 11 FL (ref 9–12.9)
POTASSIUM SERPL-SCNC: 4.6 MMOL/L (ref 3.6–5.5)
PROT SERPL-MCNC: 5.8 G/DL (ref 6–8.2)
RBC # BLD AUTO: 3.73 M/UL (ref 4.7–6.1)
SIGNIFICANT IND 70042: ABNORMAL
SITE SITE: ABNORMAL
SODIUM SERPL-SCNC: 129 MMOL/L (ref 135–145)
SOURCE SOURCE: ABNORMAL
WBC # BLD AUTO: 9.4 K/UL (ref 4.8–10.8)

## 2022-01-25 PROCEDURE — 85025 COMPLETE CBC W/AUTO DIFF WBC: CPT

## 2022-01-25 PROCEDURE — 99233 SBSQ HOSP IP/OBS HIGH 50: CPT | Performed by: INTERNAL MEDICINE

## 2022-01-25 PROCEDURE — 36415 COLL VENOUS BLD VENIPUNCTURE: CPT

## 2022-01-25 PROCEDURE — A9270 NON-COVERED ITEM OR SERVICE: HCPCS | Performed by: STUDENT IN AN ORGANIZED HEALTH CARE EDUCATION/TRAINING PROGRAM

## 2022-01-25 PROCEDURE — 700111 HCHG RX REV CODE 636 W/ 250 OVERRIDE (IP): Performed by: INTERNAL MEDICINE

## 2022-01-25 PROCEDURE — 700102 HCHG RX REV CODE 250 W/ 637 OVERRIDE(OP): Performed by: FAMILY MEDICINE

## 2022-01-25 PROCEDURE — 87040 BLOOD CULTURE FOR BACTERIA: CPT | Mod: 91

## 2022-01-25 PROCEDURE — 700102 HCHG RX REV CODE 250 W/ 637 OVERRIDE(OP): Performed by: STUDENT IN AN ORGANIZED HEALTH CARE EDUCATION/TRAINING PROGRAM

## 2022-01-25 PROCEDURE — 700111 HCHG RX REV CODE 636 W/ 250 OVERRIDE (IP): Performed by: STUDENT IN AN ORGANIZED HEALTH CARE EDUCATION/TRAINING PROGRAM

## 2022-01-25 PROCEDURE — 700105 HCHG RX REV CODE 258: Performed by: INTERNAL MEDICINE

## 2022-01-25 PROCEDURE — A9270 NON-COVERED ITEM OR SERVICE: HCPCS | Performed by: FAMILY MEDICINE

## 2022-01-25 PROCEDURE — 80053 COMPREHEN METABOLIC PANEL: CPT

## 2022-01-25 PROCEDURE — 770020 HCHG ROOM/CARE - TELE (206)

## 2022-01-25 PROCEDURE — 99232 SBSQ HOSP IP/OBS MODERATE 35: CPT | Performed by: HOSPITALIST

## 2022-01-25 PROCEDURE — 700101 HCHG RX REV CODE 250: Performed by: INTERNAL MEDICINE

## 2022-01-25 PROCEDURE — 700105 HCHG RX REV CODE 258: Performed by: FAMILY MEDICINE

## 2022-01-25 RX ADMIN — AMPICILLIN SODIUM AND SULBACTAM SODIUM 3 G: 2; 1 INJECTION, POWDER, FOR SOLUTION INTRAMUSCULAR; INTRAVENOUS at 12:34

## 2022-01-25 RX ADMIN — DOCUSATE SODIUM 50 MG AND SENNOSIDES 8.6 MG 2 TABLET: 8.6; 5 TABLET, FILM COATED ORAL at 04:59

## 2022-01-25 RX ADMIN — HEPARIN SODIUM 5000 UNITS: 5000 INJECTION, SOLUTION INTRAVENOUS; SUBCUTANEOUS at 21:05

## 2022-01-25 RX ADMIN — LEVOTHYROXINE SODIUM 112 MCG: 0.11 TABLET ORAL at 04:59

## 2022-01-25 RX ADMIN — WATER 1 G: 1000 INJECTION, SOLUTION INTRAVENOUS at 18:53

## 2022-01-25 RX ADMIN — AMLODIPINE BESYLATE 5 MG: 5 TABLET ORAL at 04:59

## 2022-01-25 RX ADMIN — HYDROCODONE BITARTRATE AND ACETAMINOPHEN 1 TABLET: 10; 325 TABLET ORAL at 03:03

## 2022-01-25 RX ADMIN — CLONIDINE HYDROCHLORIDE 0.1 MG: 0.1 TABLET ORAL at 03:07

## 2022-01-25 RX ADMIN — DOCUSATE SODIUM 50 MG AND SENNOSIDES 8.6 MG 2 TABLET: 8.6; 5 TABLET, FILM COATED ORAL at 18:54

## 2022-01-25 RX ADMIN — SODIUM CHLORIDE, POTASSIUM CHLORIDE, SODIUM LACTATE AND CALCIUM CHLORIDE: 600; 310; 30; 20 INJECTION, SOLUTION INTRAVENOUS at 05:00

## 2022-01-25 RX ADMIN — SODIUM BICARBONATE 650 MG: 650 TABLET ORAL at 15:37

## 2022-01-25 RX ADMIN — SODIUM BICARBONATE 650 MG: 650 TABLET ORAL at 09:35

## 2022-01-25 RX ADMIN — HEPARIN SODIUM 5000 UNITS: 5000 INJECTION, SOLUTION INTRAVENOUS; SUBCUTANEOUS at 15:37

## 2022-01-25 RX ADMIN — AMPICILLIN SODIUM AND SULBACTAM SODIUM 3 G: 2; 1 INJECTION, POWDER, FOR SOLUTION INTRAMUSCULAR; INTRAVENOUS at 05:00

## 2022-01-25 RX ADMIN — ATORVASTATIN CALCIUM 40 MG: 40 TABLET, FILM COATED ORAL at 18:53

## 2022-01-25 RX ADMIN — SODIUM BICARBONATE 650 MG: 650 TABLET ORAL at 21:05

## 2022-01-25 RX ADMIN — HEPARIN SODIUM 5000 UNITS: 5000 INJECTION, SOLUTION INTRAVENOUS; SUBCUTANEOUS at 04:59

## 2022-01-25 ASSESSMENT — ENCOUNTER SYMPTOMS
PHOTOPHOBIA: 0
PALPITATIONS: 0
DIZZINESS: 0
BLURRED VISION: 0
FEVER: 0
DOUBLE VISION: 0
CHILLS: 0
ABDOMINAL PAIN: 1
VOMITING: 0
CONSTIPATION: 0
NECK PAIN: 0
MYALGIAS: 0
HEMOPTYSIS: 0
DIARRHEA: 0
NAUSEA: 0
SHORTNESS OF BREATH: 0
DEPRESSION: 0
HEADACHES: 0
COUGH: 0
NAUSEA: 1

## 2022-01-25 ASSESSMENT — PAIN DESCRIPTION - PAIN TYPE
TYPE: ACUTE PAIN

## 2022-01-25 ASSESSMENT — LIFESTYLE VARIABLES: SUBSTANCE_ABUSE: 0

## 2022-01-25 NOTE — PROGRESS NOTES
Alarmed by yelling and verbal aggression toward staff. Pt found yelling at CNA. Pt verbally aggressive toward RN. Pt educated not to yell at staff and directed to kindly ask and express concerns.     Pt concerns addressed with all questions answered.

## 2022-01-25 NOTE — PROGRESS NOTES
Bedside report received and patient care assumed. Pt is resting in bed, A&Ox4, with  complaints of abdominal pain 7/10, and is on RA. Tele box on. All fall precautions are in place, belongings at bedside table.  Pt was updated on POC, no questions or concerns. Pt educated on use of call light for assistance.

## 2022-01-25 NOTE — CARE PLAN
The patient is Watcher - Medium risk of patient condition declining or worsening    Shift Goals  Clinical Goals: Pain management, monitor and control BP, maintain hemodynamically stable, monitor diagnostic lab values  Patient Goals: Pain control, sleep, discharge  Family Goals: MAYURI. No family present.     Progress made toward(s) clinical / shift goals:    Problem: Pain - Standard  Goal: Alleviation of pain or a reduction in pain to the patient’s comfort goal  Outcome: Progressing     Problem: Hemodynamics  Goal: Patient's hemodynamics, fluid balance and neurologic status will be stable or improve  Outcome: Progressing     Problem: Fluid Volume  Goal: Fluid volume balance will be maintained  Outcome: Progressing     Problem: Fall Risk  Goal: Patient will remain free from falls  Outcome: Progressing       Patient is not progressing towards the following goals: NA

## 2022-01-25 NOTE — PROGRESS NOTES
Infectious Disease Progress Note    Author: Patricia Devi M.D. Date & Time of service: 2022  2:49 PM    Chief Complaint:  Bacteremia  +TAVR     Interval History:   AF planned for RENU today +CP, mild worse with palpation ALso RUQ discomfort Appetite good     Review of Systems:  Review of Systems   Constitutional: Negative for chills, fever and malaise/fatigue.   Respiratory: Negative for cough and shortness of breath.    Gastrointestinal: Positive for abdominal pain. Negative for constipation, diarrhea, nausea and vomiting.   Musculoskeletal: Negative for myalgias.       Hemodynamics:  Temp (24hrs), Av.4 °C (97.5 °F), Min:35.9 °C (96.7 °F), Max:36.7 °C (98 °F)  Temperature: 36.4 °C (97.6 °F)  Pulse  Av.1  Min: 67  Max: 108   Blood Pressure : 156/87       Physical Exam:  Physical Exam  Constitutional:       Appearance: Normal appearance.   Cardiovascular:      Rate and Rhythm: Normal rate and regular rhythm.      Heart sounds: Normal heart sounds.   Pulmonary:      Effort: Pulmonary effort is normal.      Breath sounds: Normal breath sounds.   Abdominal:      General: Abdomen is flat. Bowel sounds are normal.      Palpations: Abdomen is soft.      Tenderness: There is abdominal tenderness.      Comments: Right upper quadrant tenderness to palpation   Musculoskeletal:         General: Normal range of motion.   Skin:     General: Skin is warm and dry.   Neurological:      General: No focal deficit present.      Mental Status: He is alert and oriented to person, place, and time.   Psychiatric:         Mood and Affect: Mood normal.         Behavior: Behavior normal.         Meds:    Current Facility-Administered Medications:   •  amLODIPine  •  labetalol  •  cloNIDine  •  LR  •  HYDROcodone/acetaminophen  •  HYDROmorphone  •  ampicillin-sulbactam (UNASYN) IV  •  sodium bicarbonate  •  levothyroxine  •  senna-docusate **AND** polyethylene glycol/lytes **AND** [DISCONTINUED] magnesium hydroxide  **AND** bisacodyl  •  Respiratory Therapy Consult  •  acetaminophen  •  ondansetron  •  ondansetron  •  mag hydrox-al hydrox-simeth  •  aminophylline  •  atorvastatin  •  heparin    Labs:  Recent Labs     01/23/22  0030 01/24/22  0644 01/25/22  0225   WBC 12.0* 9.2 9.4   RBC 3.53* 3.62* 3.73*   HEMOGLOBIN 10.4* 10.7* 10.7*   HEMATOCRIT 30.4* 32.2* 32.2*   MCV 86.1 89.0 86.3   MCH 29.5 29.6 28.7   RDW 44.6 46.6 45.1   PLATELETCT 147* 155* 199   MPV 11.0 11.1 11.0   NEUTSPOLYS 84.70* 69.40 75.20*   LYMPHOCYTES 3.40* 13.50* 11.00*   MONOCYTES 10.90 14.80* 9.80   EOSINOPHILS 0.10 0.30 0.40   BASOPHILS 0.20 0.50 0.90     Recent Labs     01/23/22  0030 01/24/22  0644 01/25/22  0225   SODIUM 126* 128* 129*   POTASSIUM 4.7 4.5 4.6   CHLORIDE 98 97 94*   CO2 17* 19* 23   GLUCOSE 113* 87 101*   BUN 32* 17 12     Recent Labs     01/23/22  0030 01/24/22  0644 01/25/22  0225   ALBUMIN 2.9* 3.0* 3.2   TBILIRUBIN 0.3 0.4 0.5   ALKPHOSPHAT 89 75 76   TOTPROTEIN 5.3* 5.5* 5.8*   ALTSGPT 18 18 19   ASTSGOT 35 33 32   CREATININE 1.33 0.91 0.92       Imaging:  CT-ABDOMEN-PELVIS W/O    Result Date: 1/23/2022 1/23/2022 2:38 PM HISTORY/REASON FOR EXAM:  Abdominal pain, acute, nonlocalized. abdominal pain TECHNIQUE/EXAM DESCRIPTION: CT scan of the abdomen and pelvis without contrast. Noncontrast helical scanning was obtained from the diaphragmatic domes through the pubic symphysis. Low dose optimization technique was utilized for this CT exam including automated exposure control and adjustment of the mA and/or kV according to patient size. COMPARISON: Ultrasound 1/23/2022. FINDINGS: Lower Chest: Small hiatal hernia Liver: Normal. Spleen: Unremarkable. Pancreas: Unremarkable. Gallbladder: Distended. Biliary: No biliary dilatation.. Adrenal glands: Nonenlarged. Kidneys: No renal calculus. No hydronephrosis.. Bowel: No bowel wall thickening or bowel dilatation. Lymph nodes: No adenopathy. Vasculature: The abdominal aorta is normal in  caliber. Moderate atherosclerotic disease of the abdominal aorta and its branches. Peritoneum: Unremarkable without ascites. Musculoskeletal: Mild anterolisthesis of L4-5. Moderate degenerative change of the lumbar spine, most at L2-3. Pelvis: Small fat-containing left inguinal hernia. Unremarkable urinary bladder. Prostate calcification..     1. No acute inflammatory change in the abdomen or pelvis on this noncontrast CT. 2. Small hiatal hernia. 3. No hydronephrosis. No renal calculus.    DX-CHEST-PORTABLE (1 VIEW)    Result Date: 1/21/2022 1/21/2022 5:17 PM HISTORY/REASON FOR EXAM:  Chest Pain. TECHNIQUE/EXAM DESCRIPTION AND NUMBER OF VIEWS: Single portable view of the chest. COMPARISON: January 18, 2022 FINDINGS: Heart size is within normal limits. Patient status post ureteric valve replacement. There are calcified right hilar lymph nodes. No pulmonary infiltrates or consolidations are noted. No pleural abnormalities are noted.     No acute cardiac or pulmonary abnormalities are identified.    DX-CHEST-PORTABLE (1 VIEW)    Result Date: 1/18/2022 1/18/2022 7:32 AM HISTORY/REASON FOR EXAM:  Chest Pain. TECHNIQUE/EXAM DESCRIPTION AND NUMBER OF VIEWS: Single portable view of the chest. COMPARISON: 3/12/2021 FINDINGS: Cardiac mediastinal contour is unchanged.  Prosthetic heart valve present. Atherosclerotic calcifications of the aortic arch. Lungs show hypoinflation. Calcified mediastinal lymph nodes again seen. No focal consolidation. No pleural fluid collection or pneumothorax. Degenerative change of both shoulders.     No acute cardiopulmonary abnormality    MR-CERVICAL SPINE-W/O    Result Date: 1/24/2022 1/23/2022 10:25 PM HISTORY/REASON FOR EXAM:  Cervical radiculopathy, infection suspected. Positive blood cultures for Gram-positive cocci, possible Streptococcus. Possible endocarditis. Neck pain and upper back pain. TECHNIQUE/EXAM DESCRIPTION: MRI of the cervical spine without contrast. The study was performed  on a [employer] 1.5 Lachelle MRI scanner. T1 sagittal, T2 fast spin-echo sagittal, and gradient echo axial images were obtained of the cervical spine. T2 fat suppressed sagittal images were also obtained. Optional T2 axial images may also be included. COMPARISON: There are no previous cervical spine films for comparison FINDINGS: Alignment in the cervical spine is within normal limits. There is advanced degenerative disc space narrowing at C3-4 through C6/C7 and moderate disc space narrowing at C7-T1. There is marginal spurring at C3-4 through C6-7. Marrow signal shows mild Modic type I discogenic endplate marrow changes at C5 to C6. No evidence of discitis or osteomyelitis. The prevertebral and paraspinous soft tissues are unremarkable. There are no anomalies at the craniovertebral junction.  The cervical spinal cord is normal in caliber and signal throughout its course. At C2-3, no central or foraminal stenosis. At C3-4, there is disc/osteophyte change in the left with effacement of left paramedian ventral subarachnoid space. There is mild left lateral recess stenosis. There is mild right foraminal stenosis and moderate left foraminal stenosis with uncinate hypertrophy. At C4-5, there is a small broad-based disc/osteophyte complex. There is buckling or hypertrophy of the ligamentum flavum. There is overall moderate central stenosis. Marked bilateral foraminal stenosis due to spondylotic change. At C5-6, there is broad-based disc/osteophyte change and ligamentum flavum buckling or hypertrophy. Mild central stenosis. Moderate left lateral recess stenosis. Mild right foraminal stenosis. Marked left foraminal stenosis due to spondylotic change. At C6-7, minor disc/osteophyte change. Partial effacement of ventral subarachnoid space. No central stenosis. Mild right foraminal stenosis. The left neural foramen is intact. At C7-T1, no significant disc bulge or protrusion. No central or foraminal stenosis.     1.  Multilevel  degenerative disc disease and spondylotic changes most notable for C4-5 moderate central stenosis at C5-6 mild central stenosis. Foraminal stenoses due to spondylotic change as described above. 2.  No evidence of discitis, osteomyelitis, or epidural collection. 3.  No myelopathic cord signal abnormality.    MR-THORACIC SPINE-W/O    Result Date: 1/24/2022 1/23/2022 10:25 PM HISTORY/REASON FOR EXAM: Suspected Epidural abscess. Cervical radiculopathy, infection suspected. Positive blood cultures for Gram-positive cocci, possible Streptococcus. Possible endocarditis. Neck pain and upper back pain. TECHNIQUE/EXAM DESCRIPTION: MRI of the thoracic spine without contrast. The study was performed on a [employer] 1.5 Lachelle MRI scanner. T1 sagittal, T2 fast spin-echo sagittal, T2 fat-suppressed sagittal and T2 axial images were obtained of the thoracic spine. COMPARISON: CTA of the chest 1/21/2022 FINDINGS: Alignment in the thoracic spine is normal. There are minor Schmorl's node endplate irregularities at T5-T6, T6-T7, T7-T8, T8-T9. Minor anterior marginal endplate spurring at T11-T12. Marrow signal in the vertebral bodies is unremarkable. No evidence of osteomyelitis or discitis. There are no epidural collections. No evidence of epidural abscess. The prevertebral and paraspinous soft tissues are unremarkable except for an incidental discoid shaped lipoma in the right posterior paraspinous soft tissues. This measures about 59 mm x 15 mm (T2 axial image 1, series 8) and correlation with CT indicates that the lesion measures about 47 mm in craniocaudad dimension (sagittal reconstruction CT image 2, series 301 from 1/21/2022). There are some minimal dependent subsegmental atelectatic changes at the posterior lung bases. The thoracic spinal cord is normal in caliber and signal throughout its course. There is some longitudinal pulsation artifact seen on the T2 sagittal and T2 fat-suppressed sagittal sequences. There is no  corroborating signal abnormality on the T2 axial images. There is no significant disc bulge or protrusion at any thoracic level. There is no central stenosis at any thoracic level. The thoracic neural foramina are intact.     1.  Minor degenerative changes including a few Schmorl's node endplate irregularities and anterior marginal spurring at T11-T12. 2.  Incidental superficial soft tissue lipoma in the right posterior paraspinous soft tissues. No clinical significance. 3.  No significant disc bulge or protrusion, central stenosis, or foraminal stenosis at any thoracic level. 4.  No evidence of discitis, osteomyelitis, or epidural phlegmon/epidural abscess.    US-RUQ    Result Date: 2022 1:44 PM HISTORY/REASON FOR EXAM: Right upper quadrant abdominal pain. TECHNIQUE/EXAM DESCRIPTION: Ultrasound of the gallbladder, liver and biliary tree. COMPARISON: None FINDINGS: The liver echogenicity is normal. There is no evidence of hepatic mass. There are gallstones within the gallbladder.. The gallbladder wall thickness is measured at 3 mm. No evidence of pericholecystic fluid. The common bile duct is measured at 4 mm. The visualized portions of the portal vein and inferior vena cava are normal. The pancreas is normal. The right kidney is normal.     Gallstones within the gallbladder. No evidence of biliary ductal dilatation.    NM-CARDIAC STRESS TEST    Result Date: 2022   Myocardial Perfusion  Report  NUCLEAR IMAGING INTERPRETATION  No evidence of significant jeopardized viable myocardium or prior myocardial  infarction.  Normal left ventricular size, ejection fraction, and wall motion.  ECG INTERPRETATION  Negative stress ECG for ischemia.  OLAMIDE DAI  MRN:    [id number]         Gender:    M  Exam Date: 2022 13:55  Exam Location:      Inpatient  Ordering Phys:     ELEANOR LOPEZ  NucMed Tech:       Bridgett Celeste RT  Age:    86    :    1935        Ht (in):     64  Wt (lb):     165     BMI:    28.23       Radiologist  Risk Factors:             Smoking  Indications:              Chest pain, unspecified  ICD Codes:                R07.9  Cardiac History:          Chest Pain  Cardiac Meds:  Meds Past 24 hrs:  Pretest Chest Pain:       No symptoms  STRESS TEST      Pharmacologi                   terence Mckinley   Lexiscan       Dose: 0.4 mg  ol:  Post-Injection Exercise:        No exercise followed the intravenous injection  Resting HR (bpm):      90  Peak HR (bpm):         103  Resting BP (mmHg):       152    /   67  Peak BP (mmHg):       140   /   74  MaxPHR:     135     Target HR (bpm):       115  % MaxPHR:     76  Double Product:       85804  BP Response:  Stress Termination:       Completion of Protocol  Stress Symptoms:  DYSPNEA YES,NORMAL SINUS RHYTHM,no chest pain  ECG  Resting ECG:     Sinus rhythm.  Stress ECG:      No ischemic changes with Regadenoson.   Occasional PAC's.  IMAGE PROTOCOL      Rest/Stress 1                      Day          RadiopharmaceuticalDose (mCi)   Imaging  Date      Imaging  Time         Inj to Img Time (min)  Rest:   Tc-99m             7.6          22-Jan-2022        15:05                 30  Stress: Tc-99m             21           22-Jan-2022        16:10                 30  Rest:  Administration Site:       Right antecubital                             fossa  Administered by:      Bridgett Celeste RT  Stress:  Administration Site:       Right antecubital                             fossa  Administered by:      Brdigett Celeste RT  % Percent HR Achieved:  SPECT RESULTS  Technical Quality:       Good  Raw Data Analysis:  Summed Stress Score:    0  Summed Rest Score:    4  Summed Difference Score:        0  PERFUSION:  Normal myocardial perfusion with no ischemia.  FUNCTIONAL RESULTS (calculated via Gated SPECT)  Stress Image LV EF:        76     %  Upper Normal Limit  Stress EDV:      46     ml   EDVI:    25      ml/m2  Stress ESV:      11     ml   ESVI:    6       ml/m2  TID:    1.26    TID - 1.19      TID (ed) - 1.23  LV Function:  Normal left ventricular wall motion.  LV ejection fraction = 76%.  Kamaljit Smith MD  Edited by: Sadiq Bustamante MD  (Electronically Signed)  Final Date:      22 January 2022                   16:37  Amended:         24 January 2022 14:03    CT-CTA CHEST PULMONARY ARTERY W/ RECONS    Result Date: 1/21/2022 1/21/2022 6:50 PM HISTORY/REASON FOR EXAM:  PE suspected, high prob; Right-sided chest pain for 1 week, nonradiating, worse with deep inspiration, concern for PE TECHNIQUE/EXAM DESCRIPTION: CT angiogram scan for pulmonary embolism with contrast, with reconstructions. 1.25 mm helical sections were obtained from the lung apices through the lung bases following the rapid bolus administration of 65 mL of Omnipaque 350 nonionic contrast. Thin-section overlapping reconstruction interval was utilized. Coronal reconstructions were generated from the axial data. MIP post processing was performed and utilized for the interpretation. Low dose optimization technique was utilized for this CT exam including automated exposure control and adjustment of the mA and/or kV according to patient size. COMPARISON: None FINDINGS: Pulmonary Embolism: No. Main Pulmonary Arteries: No. Segmental branches: No. Subsegmental branches: No. Only if positive for PE: RV diameter: cm. LV diameter: cm. RV/LV ratio: . (Greater than 0.9 is abnormal.) Additional Comments: Patient status post aortic valve replacement. There are coronary artery calcifications. Lungs: No suspicious nodules or air space process. Pleura: No pleural effusion. Nodes: No enlarged lymph nodes. Additional findings: .     1.  No evidence of pulmonary embolus. 2.  Coronary artery calcifications.     EC-ECHOCARDIOGRAM COMPLETE W/O CONT    Result Date: 1/22/2022  Transthoracic Echo Report Echocardiography Laboratory CONCLUSIONS Hyperdynamic left ventricular systolic function. The left ventricular ejection fraction is visually  estimated to be 75%. Normal right ventricular size and systolic function. Known TAVR aortic valve that is functioning normally with normal transvalvular gradients.  OLAMIDE DAI Exam Date:         2022                    09:23 Exam Location:     Inpatient Priority:          Routine Ordering Physician:        ELEANOR LOPEZ Referring Physician:       [id number] Alicea Sonographer:               Elizabeth Chanel [facility] Age:    86     Gender:    M MRN:    [id number] :    1935 BSA:    1.87   Ht (in):    67     Wt (lb):    166 Exam Type:     Complete Indications:     Chest Pain ICD Codes:       786.5 CPT Codes:       27495 BP:   143    /   64     HR:   86 Technical Quality:       Fair MEASUREMENTS  (Male / Female) Normal Values 2D ECHO LV Diastolic Diameter PLAX        3.9 cm                4.2 - 5.9 / 3.9 - 5.3 cm LV Systolic Diameter PLAX         1.7 cm                2.1 - 4.0 cm IVS Diastolic Thickness           1.1 cm                LVPW Diastolic Thickness          0.96 cm               LVOT Diameter                     1.9 cm                Estimated LV Ejection Fraction    75 %                  LV Ejection Fraction MOD BP       63.6 %                >= 55  % LV Ejection Fraction MOD 4C       57.7 %                LV Ejection Fraction MOD 2C       80.2 %                IVC Diameter                      1.7 cm                DOPPLER AV Peak Velocity                  2.5 m/s               AV Peak Gradient                  25.1 mmHg             AV Mean Gradient                  16.3 mmHg             LVOT Peak Velocity                1.1 m/s               AV Area Cont Eq vti               1.4 cm2               Mitral E Point Velocity           1 m/s                 Mitral E to A Ratio               0.72                  MV Pressure Half Time             71.2 ms               MV Area PHT                       3.1 cm2               MV Deceleration Time              245 ms                * Indicates values  subject to auto-interpretation LV EF:  75    % FINDINGS Left Ventricle Hyperdynamic left ventricular systolic function. The left ventricular ejection fraction is visually estimated to be 75%. Peak velocity is 2.6 m/sec. Grade I diastolic dysfunction.  Mild concentric left ventricular hypertrophy. Right Ventricle Normal right ventricular size and systolic function. Right Atrium Normal right atrial size. Normal inferior vena cava size and inspiratory collapse. Left Atrium Mildly dilated left atrium. Left atrial volume index is 37 mL/sq m. Mitral Valve Thickened mitral valve leaflets. Mild mitral regurgitation. No mitral stenosis. Aortic Valve Known TAVR aortic valve that is functioning normally with normal transvalvular gradients. Vmax is  2.4 m/s. Transvalvular gradients are - Peak: 23 mmHg,  Mean: 14  mmHg. No evidence of paravalvular leak. Tricuspid Valve Structurally normal tricuspid valve. Trace tricuspid regurgitation. Right atrial pressure is estimated to be 3 mmHg. Unable to estimate pulmonary artery pressure due to an inadequate tricuspid regurgitant jet. Pulmonic Valve Structurally normal pulmonic valve without significant stenosis or regurgitation. Pericardium Normal pericardium without effusion. Aorta Normal aortic root for body surface area. The ascending aorta diameter is 2.2  cm. Joel Cerna MD (Electronically Signed) Final Date:     22 January 2022                 10:52    EC-RENU W/O CONT    Result Date: 1/24/2022  Transesophageal Echo Report Echocardiography Laboratory CONCLUSIONS The left ventricular ejection fraction is visually estimated to be 70%. Known TAVR, well seated, mild aortic insufficiency. Borderline increased gradients across the TAVR, Vmax 3 m/s, PG 38 mmHg, MG 18 mmHg. No evidence of endocarditis. OLAMIDE DAI Exam Date:          01/24/2022                     12:24 Exam Location:      Inpatient Priority:            Routine Ordering Physician:        GENEVA MCKEON  Physician:       [id number] Sonographer:               Samantha Moffett                            [facility] Age:    86     Gender:    M MRN:    [id number] :    1935 BSA:           Ht (in):           Wt (lb): Report Type:      Complete Indications:     Acute/Subacute Bacterial Endocarditis ICD Codes:       421 CPT Codes:       45859 BP:          /          HR: Technical Quality:       Fair MEASUREMENTS  (Male / Female) Normal Values 2D ECHO Estimated LV Ejection Fraction    70 %                  * Indicates values subject to auto-interpretation LV EF:  70    % Medications Medications determined by anesthesiologist. Limitations None. Complications None. Proc. Components The probe was inserted and manipulated by Dr. Walker . Epiq probe # 2 was used for this procedure. =2D, color Doppler, spectral Doppler, and 3D imaging were used as part of the evaluation as clinically indicated. FINDINGS Left Ventricle The left ventricle is normal in size and thickness.  The left ventricular ejection fraction is visually estimated to be 70%. Normal wall motion. Right Ventricle The right ventricle is normal in size and systolic function. Right Atrium The right atrium is normal in size. Normal inferior vena cava size and inspiratory collapse. Left Atrium The left atrium is normal in size. Left atrial volume index is  mL/sq m. LA Appendage Normal left atrial appendage. IA Septum The interatrial septum is normal. IV Septum The interventricular septum is normal. Mitral Valve Structurally normal mitral valve without significant stenosis. Mild mitral regurgitation. Aortic Valve Known TAVR, well seated, mild aortic insufficiency. Borderline increased gradients across the TAVR, Vmax 3 m/s, PG 38 mmHg, MG 18 mmHg. Tricuspid Valve Structurally normal tricuspid valve without significant stenosis or regurgitation. Pulmonic Valve Structurally normal pulmonic valve without significant stenosis or regurgitation. Pericardium Normal  "pericardium without effusion. Aorta Normal aortic root for body surface area. The ascending aorta diameter is 3.2 cm. Debbi Walker MD (Electronically Signed) Final Date:     24 January 2022                 13:56      Micro:  Results     Procedure Component Value Units Date/Time    BLOOD CULTURE [[id number]]     Order Status: Sent Specimen: Blood from Peripheral     BLOOD CULTURE [[id number]]     Order Status: Sent Specimen: Blood from Peripheral     BLOOD CULTURE [[id number]]  (Abnormal) Collected: 01/22/22 0450    Order Status: Completed Specimen: Blood from Peripheral Updated: 01/25/22 1305     Significant Indicator POS     Source BLD     Site PERIPHERAL     Culture Result Growth detected by Bactec instrument. 01/22/2022  22:11      Streptococcus anginosus  See previous culture for sensitivity report.      Narrative:      CALL  Gordon  CPU tel. [phone],  CALLED  CPU tel. [phone] 01/22/2022, 22:12, RB PERF. RESULTS CALLED TO: RN  [id number]  Per Hospital Policy: Only change Specimen Src: to \"Line\" if  specified by physician order.  No site indicated    E-Test [[id number]] Collected: 01/22/22 0450    Order Status: Completed Specimen: Other Updated: 01/25/22 1304     ETEST Sensitivity FINAL    Narrative:      CPU tel. [phone] 01/23/2022, 00:17, RB PERF. RESULTS CALLED TO: RN [id number]  Per Hospital Policy: Only change Specimen Src: to \"Line\" if  specified by physician order.    BLOOD CULTURE [[id number]]  (Abnormal)  (Susceptibility) Collected: 01/22/22 0450    Order Status: Completed Specimen: Blood from Peripheral Updated: 01/25/22 1304     Significant Indicator POS     Source BLD     Site PERIPHERAL     Culture Result Growth detected by Bactec instrument. 01/23/2022  00:15      Streptococcus anginosus    Narrative:      CALL  Gordon  CPU tel. [phone],  CALLED  CPU tel. [phone] 01/23/2022, 00:17, RB PERF. RESULTS CALLED TO: RN  [phone]  Per Hospital Policy: Only change Specimen Src: to \"Line\" if  specified by " "physician order.  No site indicated    Susceptibility     Streptococcus anginosus (1)     Antibiotic Interpretation Microscan   Method Status    Penicillin Sensitive 0.064 mcg/mL E-TEST Final    Cefotaxime Sensitive 0.25 mcg/mL E-TEST Final                   BLOOD CULTURE [[id number]]  (Abnormal) Collected: 01/23/22 0915    Order Status: Completed Specimen: Blood from Peripheral Updated: 01/25/22 1237     Significant Indicator POS     Source BLD     Site PERIPHERAL     Culture Result Growth detected by Bactec instrument. 01/24/2022  07:56  Negative for Staphylococcus aureus and MRSA by PCR. Correlate  ongoing need for antibiotics with clinical condition.        Coagulase-negative Staphylococcus species  Isolated from one set only, please correlate with clinical  condition. Contact the Microbiology department within 48 hr  if identification and susceptibility are needed.  Possible Contaminant      Narrative:      CALL  Gordon  CPU tel. [phone],  CALLED  CPU tel. [phone] 01/24/2022, 07:58, RB PERF. RESULTS CALLED  TO:Rudy Diez D  Per Hospital Policy: Only change Specimen Src: to \"Line\" if  specified by physician order.  right    BLOOD CULTURE [[id number]]     Order Status: Canceled Specimen: Blood from Peripheral     Blood Culture [[id number]]     Order Status: Canceled Specimen: Blood from Peripheral     Blood Culture [[id number]]     Order Status: Canceled Specimen: Blood from Peripheral     Urinalysis [[id number]]     Order Status: Canceled Specimen: Urine, Clean Catch     CoV, Flu A/B RAPID ANTIGEN: Collect one dry nasal swab [[id number]] Collected: 01/22/22 1119    Order Status: Completed Specimen: Respirate from Nasopharyngeal Updated: 01/22/22 1234     Influenza A AG by MARIE Negative     Influenza B AG by MARIE Negative     SARS-COV ANTIGEN MARIE NotDetected     Comment: A result of Not-Detected is presumptive and should be confirmed with  a molecular assay, if necessary for patient management.    The Quidel Shi " test has been authorized by FDA under an  Emergency Use Authorization (EUA).          SARS-CoV-2 Source Nasal Swab    Narrative:      Have you been in close contact with a person who is suspected  or known to be positive for COVID-19 within the last 30 days  (e.g. last seen that person < 30 days ago)->No    COV-2, FLU A/B, AND RSV BY PCR (2-4 HOURS CEPHEID): Collect NP swab in [facility] [[id number]]     Order Status: Canceled Specimen: Respirate from Nasopharyngeal     URINALYSIS [[id number]]  (Abnormal) Collected: 01/22/22 0514    Order Status: Completed Specimen: Urine Updated: 01/22/22 0535     Color Yellow     Character Clear     Specific Gravity 1.043     Ph 5.0     Glucose Negative mg/dL      Ketones Trace mg/dL      Protein 30 mg/dL      Bilirubin Negative     Urobilinogen, Urine 1.0     Nitrite Negative     Leukocyte Esterase Negative     Occult Blood Negative     Micro Urine Req Microscopic          Assessment:  Active Hospital Problems    Diagnosis    • *Bacteremia [R78.81]    • Metabolic acidosis [E87.2]    • Sepsis (HCC) [A41.9]    • Right upper quadrant abdominal tenderness with rebound tenderness [R10.821]    • HLD (hyperlipidemia) [E78.5]    • Elevated troponin [R77.8]    • CHF (congestive heart failure) ([facility]) [I50.9]    • DANIS (acute kidney injury) ([facility]) [N17.9]    • Hyponatremia [E87.1]    • Essential hypertension [I10]    • Chest pain [R07.9]    • Hypothyroidism [E03.9]      Interval 24 hours:      AF, O2 RA  Labs reviewed  Imaging personally reviewed both images and report.   Micro reviewed    Patient doing well overall although still with some right upper quadrant tenderness.  Continued on antibiotics as below.    Assessment:  86 y.o. male with history of TAVR, presented on 1/21 per H&P with 1 week history of worsening pleuritic chest pain, found to be septic with high-grade fevers and GPC bacteremia.  Today, patient states he does not have any chest pain, notes vague symptoms for the past 2 months that  he is not able to elucidate.  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  Prefer IV antibiotics given the TAVR.  Patient wishes to complete the antibiotic course at [facility] and states he only lives a mile away and will be able to get there daily.  --- Follow-up repeat blood cultures-blood cultures and if they remain negative for 48 hours can place midline and will place orders for [facility]     Discussed with internal medicine, Dr. Robison.  ID will follow.

## 2022-01-25 NOTE — PROGRESS NOTES
"CCT answered call light found patient in bathroom monitor in hand near water running from sink.  CCT asked what patient was doing and patient replied, \"Warming water up can't we get that around here\".  He was warming water up for enema cause he felt bound up.  CCT said not to worry that the RN will take care of that and that it be ok to sit she will handle enema and will be here shortly. Patient started to yell at CCT that she hostile and grimacing.   \"Why are you so hostile no one answers the call\".  RN arrived shortly.  "

## 2022-01-25 NOTE — PROGRESS NOTES
Hospital Medicine Daily Progress Note    Date of Service  1/25/2022    Chief Complaint  Emigdio Jiménez is a 86 y.o. male admitted 1/21/2022 with right-sided chest pain/right upper quadrant pain.    Hospital Course  This is 86 years old male who has past medical history of hypertension, chronic kidney disease stage III comes in with right-sided chest pain/right upper quadrant pain.  Pain is provoked with taking deep breath and when he coughs.  Recently presented to ER with similar presentation but left AMA.  Denies any fever chills.  Admits to nausea but no vomiting.  No diarrhea.  In ER initially looked hemodynamically stable.  Afebrile.  Was saturating well on room air.  EKG was negative for ACS.  Troponin was elevated.  TSH was low and free T4 was elevated.  UDS positive for opiates.  D-dimer slightly elevated.  Patient had a CTA of the chest which was negative for PE or any acute lung pathology.  It showed coronary artery calcifications however.  There was slight leukocytosis on CBC.  UA was negative for UTI.  Patient was admitted for ACS work-up.  Echocardiogram and nuclear stress test ordered.  Later on patient spiked a fever of 102.7 °F.  He was slightly tachycardic and hypotensive.  Kidney functions worsen and became acidotic.  On exam noted to have right upper quadrant tenderness.  Abdominal ultrasound ordered.  No distress    Interval Problem Update   1/25: Repeat cultures will be drawn today.  We will continue with IV Unasyn.  MRI and RENU is negative.  Gallbladder does have cholelithiasis but no evidence of acute cholecystitis.  UA did not show any evidence of infection.  CT scan of the chest and abdomen are also negative for source of infection.    I have personally seen and examined the patient at bedside. I discussed the plan of care with patient and bedside RN.    Consultants/Specialty  None    Code Status  Full Code    Disposition  Patient is not medically cleared for discharge.   Anticipate discharge  to to home with close outpatient follow-up.  I have placed the appropriate orders for post-discharge needs.    Review of Systems  Review of Systems   Constitutional: Negative for chills and fever.   HENT: Negative for hearing loss and tinnitus.    Eyes: Negative for blurred vision, double vision and photophobia.   Respiratory: Negative for cough and hemoptysis.    Cardiovascular: Negative for chest pain and palpitations.   Gastrointestinal: Positive for abdominal pain and nausea. Negative for vomiting.   Genitourinary: Negative for dysuria, frequency and urgency.   Musculoskeletal: Negative for myalgias and neck pain.   Neurological: Negative for dizziness and headaches.   Psychiatric/Behavioral: Negative for depression, substance abuse and suicidal ideas.        Physical Exam  Temp:  [35.9 °C (96.7 °F)-36.7 °C (98 °F)] 36.4 °C (97.6 °F)  Pulse:  [71-98] 89  Resp:  [16-26] 17  BP: (114-171)/() 156/87  SpO2:  [93 %-97 %] 96 %    Physical Exam  Constitutional:       General: He is in acute distress.      Appearance: He is ill-appearing.   HENT:      Head: Atraumatic.      Mouth/Throat:      Mouth: Mucous membranes are dry.   Eyes:      Extraocular Movements: Extraocular movements intact.      Pupils: Pupils are equal, round, and reactive to light.   Cardiovascular:      Rate and Rhythm: Normal rate and regular rhythm.      Heart sounds: No friction rub. No gallop.    Pulmonary:      Effort: Pulmonary effort is normal. No respiratory distress.   Abdominal:      General: There is distension.      Tenderness: There is abdominal tenderness in the right lower quadrant and periumbilical area. There is guarding and rebound. Positive signs include McBurney's sign.   Musculoskeletal:      Right lower leg: No edema.      Left lower leg: No edema.   Skin:     General: Skin is dry.   Neurological:      General: No focal deficit present.      Mental Status: He is oriented to person, place, and time.   Psychiatric:          Mood and Affect: Mood normal.         Cognition and Memory: Cognition is impaired.         Judgment: Judgment is impulsive.      Comments: Poor insight and judgment         Fluids    Intake/Output Summary (Last 24 hours) at 1/25/2022 1358  Last data filed at 1/25/2022 0301  Gross per 24 hour   Intake 360 ml   Output 1050 ml   Net -690 ml       Laboratory  Recent Labs     01/23/22  0030 01/24/22  0644 01/25/22  0225   WBC 12.0* 9.2 9.4   RBC 3.53* 3.62* 3.73*   HEMOGLOBIN 10.4* 10.7* 10.7*   HEMATOCRIT 30.4* 32.2* 32.2*   MCV 86.1 89.0 86.3   MCH 29.5 29.6 28.7   MCHC 34.2 33.2* 33.2*   RDW 44.6 46.6 45.1   PLATELETCT 147* 155* 199   MPV 11.0 11.1 11.0     Recent Labs     01/23/22  0030 01/24/22  0644 01/25/22  0225   SODIUM 126* 128* 129*   POTASSIUM 4.7 4.5 4.6   CHLORIDE 98 97 94*   CO2 17* 19* 23   GLUCOSE 113* 87 101*   BUN 32* 17 12   CREATININE 1.33 0.91 0.92   CALCIUM 7.9* 8.2* 8.3*     Recent Labs     01/24/22  0651   INR 1.07               Imaging  EC-RENU W/O CONT   Final Result      MR-THORACIC SPINE-W/O   Final Result      1.  Minor degenerative changes including a few Schmorl's node endplate irregularities and anterior marginal spurring at T11-T12.   2.  Incidental superficial soft tissue lipoma in the right posterior paraspinous soft tissues. No clinical significance.   3.  No significant disc bulge or protrusion, central stenosis, or foraminal stenosis at any thoracic level.   4.  No evidence of discitis, osteomyelitis, or epidural phlegmon/epidural abscess.      MR-CERVICAL SPINE-W/O   Final Result      1.  Multilevel degenerative disc disease and spondylotic changes most notable for C4-5 moderate central stenosis at C5-6 mild central stenosis. Foraminal stenoses due to spondylotic change as described above.   2.  No evidence of discitis, osteomyelitis, or epidural collection.   3.  No myelopathic cord signal abnormality.      CT-ABDOMEN-PELVIS W/O   Final Result         1. No acute inflammatory  change in the abdomen or pelvis on this noncontrast CT.   2. Small hiatal hernia.   3. No hydronephrosis. No renal calculus.      NM-CARDIAC STRESS TEST   Final Result      US-RUQ   Final Result      Gallstones within the gallbladder. No evidence of biliary ductal dilatation.      EC-ECHOCARDIOGRAM COMPLETE W/O CONT   Final Result      CT-CTA CHEST PULMONARY ARTERY W/ RECONS   Final Result      1.  No evidence of pulmonary embolus.      2.  Coronary artery calcifications.            DX-CHEST-PORTABLE (1 VIEW)   Final Result      No acute cardiac or pulmonary abnormalities are identified.           Assessment/Plan  * Bacteremia- (present on admission)  Assessment & Plan  Blood cultures growing Streptococcus species 2 out of 2 bottles.  Repeat cultures ordered  No source of infection has been identified yet.  CT abdomen and pelvis without contrast ordered given continuous abdominal pain.  Started on Unasyn.  TTE with no evidence of endocarditis or vegetations  Might need RENU if no source of infection has been identified in the light of TAVR.  ID consulted.  1/24: Repeat blood cultures from 1/23/2022 still positive.  Will repeat in 48 hours.  Continue Unasyn for now.  ID following.  1/25: We will repeat blood cultures today.    Right upper quadrant abdominal tenderness with rebound tenderness  Assessment & Plan  Would benefit from CT abdomen and pelvis with contrast but due to worsening kidney functions will do fair for now.  Will check abdominal ultrasound.  1/22: Ultrasound of the abdomen negative for acute findings rather than showing gallstones with no evidence of cholecystitis.  Given continued tenderness of the abdomen, positive blood cultures, and fever, CT scan of the abdomen pelvis without contrast ordered.  Case discussed with surgery.  No surgical intervention recommended at this point.    Sepsis (HCC)- (present on admission)  Assessment & Plan  SIRS criteria identified on my evaluation include:  Fever, with  temperature greater than 101 deg F and Tachycardia, with heart rate greater than 90 BPM  SIRS is non-infectious, the patient does not have sepsis  1/23: Blood cultures positive for Streptococcus species 2 out of 2 bottles.  No source of infection has been identified yet.  CT scan abdominal pelvis given right upper quadrant tenderness with negative ultrasound ordered and pending.  1/24: CT abdomen and pelvis and ultrasound both negative for any acute findings.  MRI of the cervical and thoracic spine with no evidence of discitis or osteomyelitis.  No epidural abscess.  RENU was done which was negative for any endocarditis. No source has been identified yet.  Repeat blood cultures from 1/23/2022 still positive.  Repeat blood cultures in 48 hours.  May be consider reimaging abdomen and pelvis with enhanced contrast study given continued abdominal pain!?      Metabolic acidosis- (present on admission)  Assessment & Plan  Could be due to worsening kidney functions.  Started on sodium bicarb  Fluids resuscitation.  Monitor.  1/22: Improving.    DANIS (acute kidney injury) (HCC)- (present on admission)  Assessment & Plan  CKD 3 based on retrospective lab review.  Continue to monitor  1/22: Worsening.  Likely combination of prerenal component and possibly ATN/AIN from contrast exposure.  Gentle fluid resuscitation.  Avoid nephrotoxins.  Renally dose all medications.  1/23: Improving.    CHF (congestive heart failure) (HCC)- (present on admission)  Assessment & Plan  Echo and stress test ordered  BNP 2591  Consider cardiology consult  No previous dx of CHF    Elevated troponin- (present on admission)  Assessment & Plan  EKG negative for ACS.  Echocardiogram and stress test ordered.    HLD (hyperlipidemia)- (present on admission)  Assessment & Plan  Lipid panel ordered to assess  Continue home meds    Hyponatremia- (present on admission)  Assessment & Plan  Chronic  Worsened likely due to worsening kidney functions plus  dehydration  On IV fluids for now.  Monitor    Essential hypertension- (present on admission)  Assessment & Plan  Continue home meds  Admitted with telemetry      Hypothyroidism  Assessment & Plan  Low TSH and elevated free T4.  Will decrease Synthroid dose to 112 mcg  Repeat TSH/free T4 in 4 to 6 weeks and adjust dose accordingly    Chest pain- (present on admission)  Assessment & Plan  Rule out ACS.  Echocardiogram and stress test ordered.  1/23: Negative work-up with unremarkable echocardiogram and nuclear stress test.       VTE prophylaxis: heparin ppx    I have performed a physical exam and reviewed and updated ROS and Plan today (1/25/2022). In review of yesterday's note (1/24/2022), there are no changes except as documented above.

## 2022-01-25 NOTE — PROGRESS NOTES
[facility] Medicine Daily Progress Note    Date of Service  1/24/2022    Chief Complaint  Emigdio Jiménez is a 86 y.o. male admitted 1/21/2022 with right-sided chest pain/right upper quadrant pain.    Hospital Course  This is 86 years old male who has past medical history of hypertension, chronic kidney disease stage III comes in with right-sided chest pain/right upper quadrant pain.  Pain is provoked with taking deep breath and when he coughs.  Recently presented to ER with similar presentation but left AMA.  Denies any fever chills.  Admits to nausea but no vomiting.  No diarrhea.  In ER initially looked hemodynamically stable.  Afebrile.  Was saturating well on room air.  EKG was negative for ACS.  Troponin was elevated.  TSH was low and free T4 was elevated.  UDS positive for opiates.  D-dimer slightly elevated.  Patient had a CTA of the chest which was negative for PE or any acute lung pathology.  It showed coronary artery calcifications however.  There was slight leukocytosis on CBC.  UA was negative for UTI.  Patient was admitted for ACS work-up.  Echocardiogram and nuclear stress test ordered.  Later on patient spiked a fever of 102.7 °F.  He was slightly tachycardic and hypotensive.  Kidney functions worsen and became acidotic.  On exam noted to have right upper quadrant tenderness.  Abdominal ultrasound ordered.  No distress  Interval Problem Update  Patient resting in bed comfortable.  No acute distress noted.  Blood pressure has been soft but asymptomatic.  Spiked fever this morning.  Feels nauseous but no vomiting.  Tenderness on right upper quadrant noted.  Ultrasound ordered.  No acute distress noted.  No issues overnight per staff.   1/24: Discussed the case with surgery Dr. Ferreira.  CT abdomen pelvis without contrast came back negative.  No surgical intervention at this point.  MRI of the cervical and thoracic spine negative for acute findings.  RENU negative for endocarditis.  Repeat cultures from  1/23/2022 still positive.  Still complains of abdominal pain.  Occasionally nauseous but no vomiting.    I have personally seen and examined the patient at bedside. I discussed the plan of care with patient and bedside RN.    Consultants/Specialty  None    Code Status  Full Code    Disposition  Patient is not medically cleared for discharge.   Anticipate discharge to to home with close outpatient follow-up.  I have placed the appropriate orders for post-discharge needs.    Review of Systems  Review of Systems   Constitutional: Negative for chills and fever.   HENT: Negative for hearing loss and tinnitus.    Eyes: Negative for blurred vision, double vision and photophobia.   Respiratory: Negative for cough and hemoptysis.    Cardiovascular: Negative for chest pain and palpitations.   Gastrointestinal: Positive for abdominal pain and nausea. Negative for vomiting.   Genitourinary: Negative for dysuria, frequency and urgency.   Musculoskeletal: Negative for myalgias and neck pain.   Neurological: Negative for dizziness and headaches.   Psychiatric/Behavioral: Negative for depression, substance abuse and suicidal ideas.        Physical Exam  Temp:  [36.2 °C (97.1 °F)-36.6 °C (97.8 °F)] 36.5 °C (97.7 °F)  Pulse:  [75-95] 93  Resp:  [16-26] 18  BP: (119-168)/(62-86) 167/75  SpO2:  [93 %-100 %] 95 %    Physical Exam  Constitutional:       General: He is in acute distress.      Appearance: He is ill-appearing.   HENT:      Head: Atraumatic.      Mouth/Throat:      Mouth: Mucous membranes are dry.   Eyes:      Extraocular Movements: Extraocular movements intact.      Pupils: Pupils are equal, round, and reactive to light.   Cardiovascular:      Rate and Rhythm: Normal rate and regular rhythm.      Heart sounds: No friction rub. No gallop.    Pulmonary:      Effort: Pulmonary effort is normal. No respiratory distress.   Abdominal:      General: There is distension.      Tenderness: There is abdominal tenderness in the right  lower quadrant and periumbilical area. There is guarding and rebound. Positive signs include McBurney's sign.   Musculoskeletal:      Right lower leg: No edema.      Left lower leg: No edema.   Skin:     General: Skin is dry.   Neurological:      General: No focal deficit present.      Mental Status: He is oriented to person, place, and time.   Psychiatric:         Mood and Affect: Mood normal.         Cognition and Memory: Cognition is impaired.         Judgment: Judgment is impulsive.      Comments: Poor insight and judgment         Fluids    Intake/Output Summary (Last 24 hours) at 1/24/2022 1618  Last data filed at 1/24/2022 1304  Gross per 24 hour   Intake 300 ml   Output 350 ml   Net -50 ml       Laboratory  Recent Labs     01/22/22  0413 01/23/22  0030 01/24/22  0644   WBC 10.7 12.0* 9.2   RBC 3.96* 3.53* 3.62*   HEMOGLOBIN 11.5* 10.4* 10.7*   HEMATOCRIT 33.7* 30.4* 32.2*   MCV 85.1 86.1 89.0   MCH 29.0 29.5 29.6   MCHC 34.1 34.2 33.2*   RDW 44.2 44.6 46.6   PLATELETCT 152* 147* 155*   MPV 10.9 11.0 11.1     Recent Labs     01/22/22 0413 01/23/22  0030 01/24/22  0644   SODIUM 125* 126* 128*   POTASSIUM 4.8 4.7 4.5   CHLORIDE 97 98 97   CO2 15* 17* 19*   GLUCOSE 93 113* 87   BUN 37* 32* 17   CREATININE 2.24* 1.33 0.91   CALCIUM 8.6 7.9* 8.2*     Recent Labs     01/24/22  0651   INR 1.07         Recent Labs     01/22/22  0413   TRIGLYCERIDE 189*   HDL 14*   LDL 65       Imaging  EC-RENU W/O CONT   Final Result      MR-THORACIC SPINE-W/O   Final Result      1.  Minor degenerative changes including a few Schmorl's node endplate irregularities and anterior marginal spurring at T11-T12.   2.  Incidental superficial soft tissue lipoma in the right posterior paraspinous soft tissues. No clinical significance.   3.  No significant disc bulge or protrusion, central stenosis, or foraminal stenosis at any thoracic level.   4.  No evidence of discitis, osteomyelitis, or epidural phlegmon/epidural abscess.      MR-CERVICAL  SPINE-W/O   Final Result      1.  Multilevel degenerative disc disease and spondylotic changes most notable for C4-5 moderate central stenosis at C5-6 mild central stenosis. Foraminal stenoses due to spondylotic change as described above.   2.  No evidence of discitis, osteomyelitis, or epidural collection.   3.  No myelopathic cord signal abnormality.      CT-ABDOMEN-PELVIS W/O   Final Result         1. No acute inflammatory change in the abdomen or pelvis on this noncontrast CT.   2. Small hiatal hernia.   3. No hydronephrosis. No renal calculus.      NM-CARDIAC STRESS TEST   Final Result      US-RUQ   Final Result      Gallstones within the gallbladder. No evidence of biliary ductal dilatation.      EC-ECHOCARDIOGRAM COMPLETE W/O CONT   Final Result      CT-CTA CHEST PULMONARY ARTERY W/ RECONS   Final Result      1.  No evidence of pulmonary embolus.      2.  Coronary artery calcifications.            DX-CHEST-PORTABLE (1 VIEW)   Final Result      No acute cardiac or pulmonary abnormalities are identified.           Assessment/Plan  * Elevated troponin- (present on admission)  Assessment & Plan  EKG negative for ACS.  Echocardiogram and stress test ordered.    Bacteremia- (present on admission)  Assessment & Plan  Blood cultures growing Streptococcus species 2 out of 2 bottles.  Repeat cultures ordered  No source of infection has been identified yet.  CT abdomen and pelvis without contrast ordered given continuous abdominal pain.  Started on Unasyn.  TTE with no evidence of endocarditis or vegetations  Might need RENU if no source of infection has been identified in the light of TAVR.  ID consulted.  1/24: Repeat blood cultures from 1/23/2022 still positive.  Will repeat in 48 hours.  Continue Unasyn for now.  ID following.    Right upper quadrant abdominal tenderness with rebound tenderness  Assessment & Plan  Would benefit from CT abdomen and pelvis with contrast but due to worsening kidney functions will do  fair for now.  Will check abdominal ultrasound.  1/22: Ultrasound of the abdomen negative for acute findings rather than showing gallstones with no evidence of cholecystitis.  Given continued tenderness of the abdomen, positive blood cultures, and fever, CT scan of the abdomen pelvis without contrast ordered.  Case discussed with surgery.  No surgical intervention recommended at this point.    Sepsis (HCC)- (present on admission)  Assessment & Plan  SIRS criteria identified on my evaluation include:  Fever, with temperature greater than 101 deg F and Tachycardia, with heart rate greater than 90 BPM  SIRS is non-infectious, the patient does not have sepsis  1/23: Blood cultures positive for Streptococcus species 2 out of 2 bottles.  No source of infection has been identified yet.  CT scan abdominal pelvis given right upper quadrant tenderness with negative ultrasound ordered and pending.  1/24: CT abdomen and pelvis and ultrasound both negative for any acute findings.  MRI of the cervical and thoracic spine with no evidence of discitis or osteomyelitis.  No epidural abscess.  RENU was done which was negative for any endocarditis. No source has been identified yet.  Repeat blood cultures from 1/23/2022 still positive.  Repeat blood cultures in 48 hours.  May be consider reimaging abdomen and pelvis with enhanced contrast study given continued abdominal pain!?      Metabolic acidosis- (present on admission)  Assessment & Plan  Could be due to worsening kidney functions.  Started on sodium bicarb  Fluids resuscitation.  Monitor.  1/22: Improving.    DANIS (acute kidney injury) (HCC)- (present on admission)  Assessment & Plan  CKD 3 based on retrospective lab review.  Continue to monitor  1/22: Worsening.  Likely combination of prerenal component and possibly ATN/AIN from contrast exposure.  Gentle fluid resuscitation.  Avoid nephrotoxins.  Renally dose all medications.  1/23: Improving.    CHF (congestive heart failure)  (HCC)- (present on admission)  Assessment & Plan  Echo and stress test ordered  BNP 2591  Consider cardiology consult  No previous dx of CHF    HLD (hyperlipidemia)- (present on admission)  Assessment & Plan  Lipid panel ordered to assess  Continue home meds    Hyponatremia- (present on admission)  Assessment & Plan  Chronic  Worsened likely due to worsening kidney functions plus dehydration  On IV fluids for now.  Monitor    Essential hypertension- (present on admission)  Assessment & Plan  Continue home meds  Admitted with telemetry      Hypothyroidism  Assessment & Plan  Low TSH and elevated free T4.  Will decrease Synthroid dose to 112 mcg  Repeat TSH/free T4 in 4 to 6 weeks and adjust dose accordingly    Chest pain- (present on admission)  Assessment & Plan  Rule out ACS.  Echocardiogram and stress test ordered.  1/23: Negative work-up with unremarkable echocardiogram and nuclear stress test.       VTE prophylaxis: heparin ppx    I have performed a physical exam and reviewed and updated ROS and Plan today (1/24/2022). In review of yesterday's note (1/23/2022), there are no changes except as documented above.

## 2022-01-25 NOTE — PROGRESS NOTES
This RN providing sedation for a procedure, CDU RN Deena attempted to call but did not give report to another available RN until this RN was free, and not in the middle of a procedure

## 2022-01-26 DIAGNOSIS — R78.81 BACTEREMIA: ICD-10-CM

## 2022-01-26 LAB
ALBUMIN SERPL BCP-MCNC: 3.2 G/DL (ref 3.2–4.9)
ALBUMIN/GLOB SERPL: 1.1 G/DL
ALP SERPL-CCNC: 73 U/L (ref 30–99)
ALT SERPL-CCNC: 22 U/L (ref 2–50)
ANION GAP SERPL CALC-SCNC: 13 MMOL/L (ref 7–16)
ANISOCYTOSIS BLD QL SMEAR: ABNORMAL
AST SERPL-CCNC: 41 U/L (ref 12–45)
BASOPHILS # BLD AUTO: 0.9 % (ref 0–1.8)
BASOPHILS # BLD: 0.08 K/UL (ref 0–0.12)
BILIRUB SERPL-MCNC: 0.6 MG/DL (ref 0.1–1.5)
BUN SERPL-MCNC: 10 MG/DL (ref 8–22)
CALCIUM SERPL-MCNC: 8.5 MG/DL (ref 8.5–10.5)
CHLORIDE SERPL-SCNC: 94 MMOL/L (ref 96–112)
CO2 SERPL-SCNC: 21 MMOL/L (ref 20–33)
CREAT SERPL-MCNC: 0.91 MG/DL (ref 0.5–1.4)
EOSINOPHIL # BLD AUTO: 0.15 K/UL (ref 0–0.51)
EOSINOPHIL NFR BLD: 1.7 % (ref 0–6.9)
ERYTHROCYTE [DISTWIDTH] IN BLOOD BY AUTOMATED COUNT: 43.7 FL (ref 35.9–50)
GLOBULIN SER CALC-MCNC: 2.9 G/DL (ref 1.9–3.5)
GLUCOSE SERPL-MCNC: 93 MG/DL (ref 65–99)
HCT VFR BLD AUTO: 31.3 % (ref 42–52)
HGB BLD-MCNC: 10.9 G/DL (ref 14–18)
LYMPHOCYTES # BLD AUTO: 1.07 K/UL (ref 1–4.8)
LYMPHOCYTES NFR BLD: 12.2 % (ref 22–41)
MANUAL DIFF BLD: NORMAL
MCH RBC QN AUTO: 29.6 PG (ref 27–33)
MCHC RBC AUTO-ENTMCNC: 34.8 G/DL (ref 33.7–35.3)
MCV RBC AUTO: 85.1 FL (ref 81.4–97.8)
MICROCYTES BLD QL SMEAR: ABNORMAL
MONOCYTES # BLD AUTO: 0.54 K/UL (ref 0–0.85)
MONOCYTES NFR BLD AUTO: 6.1 % (ref 0–13.4)
MORPHOLOGY BLD-IMP: NORMAL
MYELOCYTES NFR BLD MANUAL: 2.6 %
NEUTROPHILS # BLD AUTO: 6.73 K/UL (ref 1.82–7.42)
NEUTROPHILS NFR BLD: 76.5 % (ref 44–72)
NRBC # BLD AUTO: 0 K/UL
NRBC BLD-RTO: 0 /100 WBC
OVALOCYTES BLD QL SMEAR: NORMAL
PLATELET # BLD AUTO: 228 K/UL (ref 164–446)
PLATELET BLD QL SMEAR: NORMAL
PMV BLD AUTO: 10.6 FL (ref 9–12.9)
POIKILOCYTOSIS BLD QL SMEAR: NORMAL
POLYCHROMASIA BLD QL SMEAR: NORMAL
POTASSIUM SERPL-SCNC: 4.5 MMOL/L (ref 3.6–5.5)
PROT SERPL-MCNC: 6.1 G/DL (ref 6–8.2)
RBC # BLD AUTO: 3.68 M/UL (ref 4.7–6.1)
RBC BLD AUTO: PRESENT
SODIUM SERPL-SCNC: 128 MMOL/L (ref 135–145)
WBC # BLD AUTO: 8.8 K/UL (ref 4.8–10.8)

## 2022-01-26 PROCEDURE — 700102 HCHG RX REV CODE 250 W/ 637 OVERRIDE(OP): Performed by: STUDENT IN AN ORGANIZED HEALTH CARE EDUCATION/TRAINING PROGRAM

## 2022-01-26 PROCEDURE — 700111 HCHG RX REV CODE 636 W/ 250 OVERRIDE (IP): Performed by: STUDENT IN AN ORGANIZED HEALTH CARE EDUCATION/TRAINING PROGRAM

## 2022-01-26 PROCEDURE — 770001 HCHG ROOM/CARE - MED/SURG/GYN PRIV*

## 2022-01-26 PROCEDURE — 700102 HCHG RX REV CODE 250 W/ 637 OVERRIDE(OP): Performed by: FAMILY MEDICINE

## 2022-01-26 PROCEDURE — A9270 NON-COVERED ITEM OR SERVICE: HCPCS | Performed by: STUDENT IN AN ORGANIZED HEALTH CARE EDUCATION/TRAINING PROGRAM

## 2022-01-26 PROCEDURE — 99232 SBSQ HOSP IP/OBS MODERATE 35: CPT | Performed by: INTERNAL MEDICINE

## 2022-01-26 PROCEDURE — 80053 COMPREHEN METABOLIC PANEL: CPT

## 2022-01-26 PROCEDURE — 94760 N-INVAS EAR/PLS OXIMETRY 1: CPT

## 2022-01-26 PROCEDURE — A9270 NON-COVERED ITEM OR SERVICE: HCPCS | Performed by: FAMILY MEDICINE

## 2022-01-26 PROCEDURE — 99232 SBSQ HOSP IP/OBS MODERATE 35: CPT | Performed by: HOSPITALIST

## 2022-01-26 PROCEDURE — 700111 HCHG RX REV CODE 636 W/ 250 OVERRIDE (IP): Performed by: INTERNAL MEDICINE

## 2022-01-26 PROCEDURE — 700101 HCHG RX REV CODE 250: Performed by: INTERNAL MEDICINE

## 2022-01-26 PROCEDURE — 36415 COLL VENOUS BLD VENIPUNCTURE: CPT

## 2022-01-26 PROCEDURE — A9270 NON-COVERED ITEM OR SERVICE: HCPCS | Performed by: HOSPITALIST

## 2022-01-26 PROCEDURE — 85027 COMPLETE CBC AUTOMATED: CPT

## 2022-01-26 PROCEDURE — 85007 BL SMEAR W/DIFF WBC COUNT: CPT

## 2022-01-26 PROCEDURE — 700102 HCHG RX REV CODE 250 W/ 637 OVERRIDE(OP): Performed by: HOSPITALIST

## 2022-01-26 RX ORDER — 0.9 % SODIUM CHLORIDE 0.9 %
VIAL (ML) INJECTION PRN
Status: CANCELLED | OUTPATIENT
Start: 2022-01-28

## 2022-01-26 RX ORDER — LORAZEPAM 0.5 MG/1
0.5 TABLET ORAL EVERY 4 HOURS PRN
Status: DISCONTINUED | OUTPATIENT
Start: 2022-01-26 | End: 2022-01-28 | Stop reason: HOSPADM

## 2022-01-26 RX ORDER — 0.9 % SODIUM CHLORIDE 0.9 %
10 VIAL (ML) INJECTION PRN
Status: CANCELLED | OUTPATIENT
Start: 2022-01-28

## 2022-01-26 RX ORDER — 0.9 % SODIUM CHLORIDE 0.9 %
3 VIAL (ML) INJECTION PRN
Status: CANCELLED | OUTPATIENT
Start: 2022-01-28

## 2022-01-26 RX ORDER — HEPARIN SODIUM (PORCINE) LOCK FLUSH IV SOLN 100 UNIT/ML 100 UNIT/ML
500 SOLUTION INTRAVENOUS PRN
Status: CANCELLED | OUTPATIENT
Start: 2022-01-28

## 2022-01-26 RX ADMIN — AMLODIPINE BESYLATE 5 MG: 5 TABLET ORAL at 05:50

## 2022-01-26 RX ADMIN — LEVOTHYROXINE SODIUM 112 MCG: 0.11 TABLET ORAL at 05:51

## 2022-01-26 RX ADMIN — ALUMINUM HYDROXIDE, MAGNESIUM HYDROXIDE, AND DIMETHICONE 30 ML: 400; 400; 40 SUSPENSION ORAL at 05:51

## 2022-01-26 RX ADMIN — LIDOCAINE HYDROCHLORIDE 15 ML: 20 SOLUTION OROPHARYNGEAL at 00:03

## 2022-01-26 RX ADMIN — HEPARIN SODIUM 5000 UNITS: 5000 INJECTION, SOLUTION INTRAVENOUS; SUBCUTANEOUS at 15:08

## 2022-01-26 RX ADMIN — SODIUM BICARBONATE 650 MG: 650 TABLET ORAL at 21:07

## 2022-01-26 RX ADMIN — LORAZEPAM 0.5 MG: 0.5 TABLET ORAL at 12:25

## 2022-01-26 RX ADMIN — HYDROCODONE BITARTRATE AND ACETAMINOPHEN 1 TABLET: 10; 325 TABLET ORAL at 21:07

## 2022-01-26 RX ADMIN — HYDROCODONE BITARTRATE AND ACETAMINOPHEN 1 TABLET: 10; 325 TABLET ORAL at 12:25

## 2022-01-26 RX ADMIN — ATORVASTATIN CALCIUM 40 MG: 40 TABLET, FILM COATED ORAL at 17:19

## 2022-01-26 RX ADMIN — WATER 1 G: 1000 INJECTION, SOLUTION INTRAVENOUS at 17:19

## 2022-01-26 RX ADMIN — HEPARIN SODIUM 5000 UNITS: 5000 INJECTION, SOLUTION INTRAVENOUS; SUBCUTANEOUS at 05:51

## 2022-01-26 RX ADMIN — SODIUM BICARBONATE 650 MG: 650 TABLET ORAL at 15:08

## 2022-01-26 ASSESSMENT — PAIN DESCRIPTION - PAIN TYPE
TYPE: ACUTE PAIN

## 2022-01-26 ASSESSMENT — ENCOUNTER SYMPTOMS
NAUSEA: 0
HEADACHES: 0
MYALGIAS: 0
NERVOUS/ANXIOUS: 1
SHORTNESS OF BREATH: 0
FEVER: 0
DIZZINESS: 0
COUGH: 0
PALPITATIONS: 0
DOUBLE VISION: 0
NECK PAIN: 0
CONSTIPATION: 0
ABDOMINAL PAIN: 1
VOMITING: 0
HEMOPTYSIS: 0
DIARRHEA: 0
BLURRED VISION: 0
PHOTOPHOBIA: 0
NAUSEA: 1
CHILLS: 0
DEPRESSION: 0

## 2022-01-26 ASSESSMENT — LIFESTYLE VARIABLES: SUBSTANCE_ABUSE: 0

## 2022-01-26 NOTE — PROGRESS NOTES
Infectious Disease Progress Note    Author: Patricia Devi M.D. Date & Time of service: 2022  11:23 AM    Chief Complaint:  Bacteremia  +TAVR     Interval History:   AF planned for RENU today +CP, mild worse with palpation ALso RUQ discomfort Appetite good    AF, O2 RA,  still with some right upper quadrant tenderness.  Antibiotics transitioned to ceftriaxone.     Review of Systems:  Review of Systems   Constitutional: Negative for chills, fever and malaise/fatigue.   Respiratory: Negative for cough and shortness of breath.    Gastrointestinal: Positive for abdominal pain. Negative for constipation, diarrhea, nausea and vomiting.   Musculoskeletal: Negative for myalgias.   Psychiatric/Behavioral: The patient is nervous/anxious.        Hemodynamics:  Temp (24hrs), Av.7 °C (98.1 °F), Min:36.1 °C (96.9 °F), Max:37.7 °C (99.9 °F)  Temperature: 36.7 °C (98 °F)  Pulse  Av.5  Min: 67  Max: 108   Blood Pressure : 116/88       Physical Exam:  Physical Exam  Constitutional:       Appearance: Normal appearance.   Cardiovascular:      Rate and Rhythm: Normal rate and regular rhythm.      Heart sounds: Normal heart sounds.   Pulmonary:      Effort: Pulmonary effort is normal.      Breath sounds: Normal breath sounds.   Abdominal:      General: Bowel sounds are normal. There is no distension.      Palpations: Abdomen is soft.      Tenderness: There is abdominal tenderness.   Musculoskeletal:         General: Normal range of motion.   Skin:     General: Skin is warm and dry.   Neurological:      General: No focal deficit present.      Mental Status: He is alert and oriented to person, place, and time.   Psychiatric:         Mood and Affect: Mood normal.         Behavior: Behavior normal.         Meds:    Current Facility-Administered Medications:   •  LORazepam  •  cefTRIAXone (ROCEPHIN) IV  •  amLODIPine  •  labetalol  •  cloNIDine  •  LR  •  HYDROcodone/acetaminophen  •  HYDROmorphone  •  sodium  bicarbonate  •  levothyroxine  •  senna-docusate **AND** polyethylene glycol/lytes **AND** [DISCONTINUED] magnesium hydroxide **AND** bisacodyl  •  Respiratory Therapy Consult  •  acetaminophen  •  ondansetron  •  ondansetron  •  mag hydrox-al hydrox-simeth  •  aminophylline  •  atorvastatin  •  heparin    Labs:  Recent Labs     01/24/22  0644 01/25/22  0225 01/26/22  0235   WBC 9.2 9.4 8.8   RBC 3.62* 3.73* 3.68*   HEMOGLOBIN 10.7* 10.7* 10.9*   HEMATOCRIT 32.2* 32.2* 31.3*   MCV 89.0 86.3 85.1   MCH 29.6 28.7 29.6   RDW 46.6 45.1 43.7   PLATELETCT 155* 199 228   MPV 11.1 11.0 10.6   NEUTSPOLYS 69.40 75.20* 76.50*   LYMPHOCYTES 13.50* 11.00* 12.20*   MONOCYTES 14.80* 9.80 6.10   EOSINOPHILS 0.30 0.40 1.70   BASOPHILS 0.50 0.90 0.90   RBCMORPHOLO  --   --  Present     Recent Labs     01/24/22  0644 01/25/22  0225 01/26/22  0235   SODIUM 128* 129* 128*   POTASSIUM 4.5 4.6 4.5   CHLORIDE 97 94* 94*   CO2 19* 23 21   GLUCOSE 87 101* 93   BUN 17 12 10     Recent Labs     01/24/22  0644 01/25/22  0225 01/26/22  0235   ALBUMIN 3.0* 3.2 3.2   TBILIRUBIN 0.4 0.5 0.6   ALKPHOSPHAT 75 76 73   TOTPROTEIN 5.5* 5.8* 6.1   ALTSGPT 18 19 22   ASTSGOT 33 32 41   CREATININE 0.91 0.92 0.91       Imaging:  CT-ABDOMEN-PELVIS W/O    Result Date: 1/23/2022 1/23/2022 2:38 PM HISTORY/REASON FOR EXAM:  Abdominal pain, acute, nonlocalized. abdominal pain TECHNIQUE/EXAM DESCRIPTION: CT scan of the abdomen and pelvis without contrast. Noncontrast helical scanning was obtained from the diaphragmatic domes through the pubic symphysis. Low dose optimization technique was utilized for this CT exam including automated exposure control and adjustment of the mA and/or kV according to patient size. COMPARISON: Ultrasound 1/23/2022. FINDINGS: Lower Chest: Small hiatal hernia Liver: Normal. Spleen: Unremarkable. Pancreas: Unremarkable. Gallbladder: Distended. Biliary: No biliary dilatation.. Adrenal glands: Nonenlarged. Kidneys: No renal calculus. No  hydronephrosis.. Bowel: No bowel wall thickening or bowel dilatation. Lymph nodes: No adenopathy. Vasculature: The abdominal aorta is normal in caliber. Moderate atherosclerotic disease of the abdominal aorta and its branches. Peritoneum: Unremarkable without ascites. Musculoskeletal: Mild anterolisthesis of L4-5. Moderate degenerative change of the lumbar spine, most at L2-3. Pelvis: Small fat-containing left inguinal hernia. Unremarkable urinary bladder. Prostate calcification..     1. No acute inflammatory change in the abdomen or pelvis on this noncontrast CT. 2. Small hiatal hernia. 3. No hydronephrosis. No renal calculus.    DX-CHEST-PORTABLE (1 VIEW)    Result Date: 1/21/2022 1/21/2022 5:17 PM HISTORY/REASON FOR EXAM:  Chest Pain. TECHNIQUE/EXAM DESCRIPTION AND NUMBER OF VIEWS: Single portable view of the chest. COMPARISON: January 18, 2022 FINDINGS: Heart size is within normal limits. Patient status post ureteric valve replacement. There are calcified right hilar lymph nodes. No pulmonary infiltrates or consolidations are noted. No pleural abnormalities are noted.     No acute cardiac or pulmonary abnormalities are identified.    DX-CHEST-PORTABLE (1 VIEW)    Result Date: 1/18/2022 1/18/2022 7:32 AM HISTORY/REASON FOR EXAM:  Chest Pain. TECHNIQUE/EXAM DESCRIPTION AND NUMBER OF VIEWS: Single portable view of the chest. COMPARISON: 3/12/2021 FINDINGS: Cardiac mediastinal contour is unchanged.  Prosthetic heart valve present. Atherosclerotic calcifications of the aortic arch. Lungs show hypoinflation. Calcified mediastinal lymph nodes again seen. No focal consolidation. No pleural fluid collection or pneumothorax. Degenerative change of both shoulders.     No acute cardiopulmonary abnormality    MR-CERVICAL SPINE-W/O    Result Date: 1/24/2022 1/23/2022 10:25 PM HISTORY/REASON FOR EXAM:  Cervical radiculopathy, infection suspected. Positive blood cultures for Gram-positive cocci, possible Streptococcus.  Possible endocarditis. Neck pain and upper back pain. TECHNIQUE/EXAM DESCRIPTION: MRI of the cervical spine without contrast. The study was performed on a [employer] 1.5 Lachelle MRI scanner. T1 sagittal, T2 fast spin-echo sagittal, and gradient echo axial images were obtained of the cervical spine. T2 fat suppressed sagittal images were also obtained. Optional T2 axial images may also be included. COMPARISON: There are no previous cervical spine films for comparison FINDINGS: Alignment in the cervical spine is within normal limits. There is advanced degenerative disc space narrowing at C3-4 through C6/C7 and moderate disc space narrowing at C7-T1. There is marginal spurring at C3-4 through C6-7. Marrow signal shows mild Modic type I discogenic endplate marrow changes at C5 to C6. No evidence of discitis or osteomyelitis. The prevertebral and paraspinous soft tissues are unremarkable. There are no anomalies at the craniovertebral junction.  The cervical spinal cord is normal in caliber and signal throughout its course. At C2-3, no central or foraminal stenosis. At C3-4, there is disc/osteophyte change in the left with effacement of left paramedian ventral subarachnoid space. There is mild left lateral recess stenosis. There is mild right foraminal stenosis and moderate left foraminal stenosis with uncinate hypertrophy. At C4-5, there is a small broad-based disc/osteophyte complex. There is buckling or hypertrophy of the ligamentum flavum. There is overall moderate central stenosis. Marked bilateral foraminal stenosis due to spondylotic change. At C5-6, there is broad-based disc/osteophyte change and ligamentum flavum buckling or hypertrophy. Mild central stenosis. Moderate left lateral recess stenosis. Mild right foraminal stenosis. Marked left foraminal stenosis due to spondylotic change. At C6-7, minor disc/osteophyte change. Partial effacement of ventral subarachnoid space. No central stenosis. Mild right foraminal  stenosis. The left neural foramen is intact. At C7-T1, no significant disc bulge or protrusion. No central or foraminal stenosis.     1.  Multilevel degenerative disc disease and spondylotic changes most notable for C4-5 moderate central stenosis at C5-6 mild central stenosis. Foraminal stenoses due to spondylotic change as described above. 2.  No evidence of discitis, osteomyelitis, or epidural collection. 3.  No myelopathic cord signal abnormality.    MR-THORACIC SPINE-W/O    Result Date: 1/24/2022 1/23/2022 10:25 PM HISTORY/REASON FOR EXAM: Suspected Epidural abscess. Cervical radiculopathy, infection suspected. Positive blood cultures for Gram-positive cocci, possible Streptococcus. Possible endocarditis. Neck pain and upper back pain. TECHNIQUE/EXAM DESCRIPTION: MRI of the thoracic spine without contrast. The study was performed on a [employer] 1.5 Lachelle MRI scanner. T1 sagittal, T2 fast spin-echo sagittal, T2 fat-suppressed sagittal and T2 axial images were obtained of the thoracic spine. COMPARISON: CTA of the chest 1/21/2022 FINDINGS: Alignment in the thoracic spine is normal. There are minor Schmorl's node endplate irregularities at T5-T6, T6-T7, T7-T8, T8-T9. Minor anterior marginal endplate spurring at T11-T12. Marrow signal in the vertebral bodies is unremarkable. No evidence of osteomyelitis or discitis. There are no epidural collections. No evidence of epidural abscess. The prevertebral and paraspinous soft tissues are unremarkable except for an incidental discoid shaped lipoma in the right posterior paraspinous soft tissues. This measures about 59 mm x 15 mm (T2 axial image 1, series 8) and correlation with CT indicates that the lesion measures about 47 mm in craniocaudad dimension (sagittal reconstruction CT image 2, series 301 from 1/21/2022). There are some minimal dependent subsegmental atelectatic changes at the posterior lung bases. The thoracic spinal cord is normal in caliber and signal  throughout its course. There is some longitudinal pulsation artifact seen on the T2 sagittal and T2 fat-suppressed sagittal sequences. There is no corroborating signal abnormality on the T2 axial images. There is no significant disc bulge or protrusion at any thoracic level. There is no central stenosis at any thoracic level. The thoracic neural foramina are intact.     1.  Minor degenerative changes including a few Schmorl's node endplate irregularities and anterior marginal spurring at T11-T12. 2.  Incidental superficial soft tissue lipoma in the right posterior paraspinous soft tissues. No clinical significance. 3.  No significant disc bulge or protrusion, central stenosis, or foraminal stenosis at any thoracic level. 4.  No evidence of discitis, osteomyelitis, or epidural phlegmon/epidural abscess.    US-RUQ    Result Date: 1/22/2022 1/22/2022 1:44 PM HISTORY/REASON FOR EXAM: Right upper quadrant abdominal pain. TECHNIQUE/EXAM DESCRIPTION: Ultrasound of the gallbladder, liver and biliary tree. COMPARISON: None FINDINGS: The liver echogenicity is normal. There is no evidence of hepatic mass. There are gallstones within the gallbladder.. The gallbladder wall thickness is measured at 3 mm. No evidence of pericholecystic fluid. The common bile duct is measured at 4 mm. The visualized portions of the portal vein and inferior vena cava are normal. The pancreas is normal. The right kidney is normal.     Gallstones within the gallbladder. No evidence of biliary ductal dilatation.    NM-CARDIAC STRESS TEST    Result Date: 1/24/2022   Myocardial Perfusion  Report  NUCLEAR IMAGING INTERPRETATION  No evidence of significant jeopardized viable myocardium or prior myocardial  infarction.  Normal left ventricular size, ejection fraction, and wall motion.  ECG INTERPRETATION  Negative stress ECG for ischemia.  OLAMIDE DAI  MRN:    [id number]         Gender:    M  Exam Date: 01/22/2022 13:55  Exam Location:      Inpatient   Ordering Phys:     ELEANOR LOPEZ  NucMed Tech:       Bridgett Celeste RT  Age:    86    :    1935        Ht (in):     64  Wt (lb):     165    BMI:    28.23       Radiologist  Risk Factors:             Smoking  Indications:              Chest pain, unspecified  ICD Codes:                R07.9  Cardiac History:          Chest Pain  Cardiac Meds:  Meds Past 24 hrs:  Pretest Chest Pain:       No symptoms  STRESS TEST      Pharmacologi                   c  Protoc   Lexiscan       Dose: 0.4 mg  ol:  Post-Injection Exercise:        No exercise followed the intravenous injection  Resting HR (bpm):      90  Peak HR (bpm):         103  Resting BP (mmHg):       152    /   67  Peak BP (mmHg):       140   /   74  MaxPHR:     135     Target HR (bpm):       115  % MaxPHR:     76  Double Product:       50175  BP Response:  Stress Termination:       Completion of Protocol  Stress Symptoms:  DYSPNEA YES,NORMAL SINUS RHYTHM,no chest pain  ECG  Resting ECG:     Sinus rhythm.  Stress ECG:      No ischemic changes with Regadenoson.   Occasional PAC's.  IMAGE PROTOCOL      Rest/Stress 1                      Day          RadiopharmaceuticalDose (mCi)   Imaging  Date      Imaging  Time         Inj to Img Time (min)  Rest:   Tc-99m             7.6          2022        15:05                 30  Stress: Tc-99m             21           2022        16:10                 30  Rest:  Administration Site:       Right antecubital                             fossa  Administered by:      Bridgett RENE  Stress:  Administration Site:       Right antecubital                             fossa  Administered by:      Bridgett RENE  % Percent HR Achieved:  SPECT RESULTS  Technical Quality:       Good  Raw Data Analysis:  Summed Stress Score:    0  Summed Rest Score:    4  Summed Difference Score:        0  PERFUSION:  Normal myocardial perfusion with no ischemia.  FUNCTIONAL RESULTS (calculated via Gated SPECT)  Stress Image LV EF:         76     %  Upper Normal Limit  Stress EDV:      46     ml   EDVI:    25      ml/m2  Stress ESV:      11     ml   ESVI:    6       ml/m2  TID:    1.26   TID - 1.19      TID (ed) - 1.23  LV Function:  Normal left ventricular wall motion.  LV ejection fraction = 76%.  Kamaljit Smith MD  Edited by: Sadiq Bustamante MD  (Electronically Signed)  Final Date:      22 January 2022                   16:37  Amended:         24 January 2022 14:03    CT-CTA CHEST PULMONARY ARTERY W/ RECONS    Result Date: 1/21/2022 1/21/2022 6:50 PM HISTORY/REASON FOR EXAM:  PE suspected, high prob; Right-sided chest pain for 1 week, nonradiating, worse with deep inspiration, concern for PE TECHNIQUE/EXAM DESCRIPTION: CT angiogram scan for pulmonary embolism with contrast, with reconstructions. 1.25 mm helical sections were obtained from the lung apices through the lung bases following the rapid bolus administration of 65 mL of Omnipaque 350 nonionic contrast. Thin-section overlapping reconstruction interval was utilized. Coronal reconstructions were generated from the axial data. MIP post processing was performed and utilized for the interpretation. Low dose optimization technique was utilized for this CT exam including automated exposure control and adjustment of the mA and/or kV according to patient size. COMPARISON: None FINDINGS: Pulmonary Embolism: No. Main Pulmonary Arteries: No. Segmental branches: No. Subsegmental branches: No. Only if positive for PE: RV diameter: cm. LV diameter: cm. RV/LV ratio: . (Greater than 0.9 is abnormal.) Additional Comments: Patient status post aortic valve replacement. There are coronary artery calcifications. Lungs: No suspicious nodules or air space process. Pleura: No pleural effusion. Nodes: No enlarged lymph nodes. Additional findings: .     1.  No evidence of pulmonary embolus. 2.  Coronary artery calcifications.     EC-ECHOCARDIOGRAM COMPLETE W/O CONT    Result Date: 1/22/2022  Transthoracic Echo  Report Echocardiography Laboratory CONCLUSIONS Hyperdynamic left ventricular systolic function. The left ventricular ejection fraction is visually estimated to be 75%. Normal right ventricular size and systolic function. Known TAVR aortic valve that is functioning normally with normal transvalvular gradients.  OLAMIDE DAI Exam Date:         2022                    09:23 Exam Location:     Inpatient Priority:          Routine Ordering Physician:        ELEANOR LOPEZ Referring Physician:       [id number] Alicea Sonographer:               Elizabeth Chanel [facility] Age:    86     Gender:    M MRN:    [id number] :    1935 BSA:    1.87   Ht (in):    67     Wt (lb):    166 Exam Type:     Complete Indications:     Chest Pain ICD Codes:       786.5 CPT Codes:       52186 BP:   143    /   64     HR:   86 Technical Quality:       Fair MEASUREMENTS  (Male / Female) Normal Values 2D ECHO LV Diastolic Diameter PLAX        3.9 cm                4.2 - 5.9 / 3.9 - 5.3 cm LV Systolic Diameter PLAX         1.7 cm                2.1 - 4.0 cm IVS Diastolic Thickness           1.1 cm                LVPW Diastolic Thickness          0.96 cm               LVOT Diameter                     1.9 cm                Estimated LV Ejection Fraction    75 %                  LV Ejection Fraction MOD BP       63.6 %                >= 55  % LV Ejection Fraction MOD 4C       57.7 %                LV Ejection Fraction MOD 2C       80.2 %                IVC Diameter                      1.7 cm                DOPPLER AV Peak Velocity                  2.5 m/s               AV Peak Gradient                  25.1 mmHg             AV Mean Gradient                  16.3 mmHg             LVOT Peak Velocity                1.1 m/s               AV Area Cont Eq vti               1.4 cm2               Mitral E Point Velocity           1 m/s                 Mitral E to A Ratio               0.72                  MV Pressure Half Time             71.2 ms                MV Area PHT                       3.1 cm2               MV Deceleration Time              245 ms                * Indicates values subject to auto-interpretation LV EF:  75    % FINDINGS Left Ventricle Hyperdynamic left ventricular systolic function. The left ventricular ejection fraction is visually estimated to be 75%. Peak velocity is 2.6 m/sec. Grade I diastolic dysfunction.  Mild concentric left ventricular hypertrophy. Right Ventricle Normal right ventricular size and systolic function. Right Atrium Normal right atrial size. Normal inferior vena cava size and inspiratory collapse. Left Atrium Mildly dilated left atrium. Left atrial volume index is 37 mL/sq m. Mitral Valve Thickened mitral valve leaflets. Mild mitral regurgitation. No mitral stenosis. Aortic Valve Known TAVR aortic valve that is functioning normally with normal transvalvular gradients. Vmax is  2.4 m/s. Transvalvular gradients are - Peak: 23 mmHg,  Mean: 14  mmHg. No evidence of paravalvular leak. Tricuspid Valve Structurally normal tricuspid valve. Trace tricuspid regurgitation. Right atrial pressure is estimated to be 3 mmHg. Unable to estimate pulmonary artery pressure due to an inadequate tricuspid regurgitant jet. Pulmonic Valve Structurally normal pulmonic valve without significant stenosis or regurgitation. Pericardium Normal pericardium without effusion. Aorta Normal aortic root for body surface area. The ascending aorta diameter is 2.2  cm. Joel Cerna MD (Electronically Signed) Final Date:     22 January 2022                 10:52    EC-RENU W/O CONT    Result Date: 1/24/2022  Transesophageal Echo Report Echocardiography Laboratory CONCLUSIONS The left ventricular ejection fraction is visually estimated to be 70%. Known TAVR, well seated, mild aortic insufficiency. Borderline increased gradients across the TAVR, Vmax 3 m/s, PG 38 mmHg, MG 18 mmHg. No evidence of endocarditis. OLAMIDE DAI Exam Date:          01/24/2022                      12:24 Exam Location:      Inpatient Priority:            Routine Ordering Physician:        GENEVA MCKEON Referring Physician:       [id number] Arguello Sonographer:               Samantha Moffett                            [facility] Age:    86     Gender:    M MRN:    [id number] :    1935 BSA:           Ht (in):           Wt (lb): Report Type:      Complete Indications:     Acute/Subacute Bacterial Endocarditis ICD Codes:       421 CPT Codes:       55082 BP:          /          HR: Technical Quality:       Fair MEASUREMENTS  (Male / Female) Normal Values 2D ECHO Estimated LV Ejection Fraction    70 %                  * Indicates values subject to auto-interpretation LV EF:  70    % Medications Medications determined by anesthesiologist. Limitations None. Complications None. Proc. Components The probe was inserted and manipulated by Dr. Walker . Epiq probe # 2 was used for this procedure. =2D, color Doppler, spectral Doppler, and 3D imaging were used as part of the evaluation as clinically indicated. FINDINGS Left Ventricle The left ventricle is normal in size and thickness.  The left ventricular ejection fraction is visually estimated to be 70%. Normal wall motion. Right Ventricle The right ventricle is normal in size and systolic function. Right Atrium The right atrium is normal in size. Normal inferior vena cava size and inspiratory collapse. Left Atrium The left atrium is normal in size. Left atrial volume index is  mL/sq m. LA Appendage Normal left atrial appendage. IA Septum The interatrial septum is normal. IV Septum The interventricular septum is normal. Mitral Valve Structurally normal mitral valve without significant stenosis. Mild mitral regurgitation. Aortic Valve Known TAVR, well seated, mild aortic insufficiency. Borderline increased gradients across the TAVR, Vmax 3 m/s, PG 38 mmHg, MG 18 mmHg. Tricuspid Valve Structurally normal tricuspid valve without significant stenosis or  "regurgitation. Pulmonic Valve Structurally normal pulmonic valve without significant stenosis or regurgitation. Pericardium Normal pericardium without effusion. Aorta Normal aortic root for body surface area. The ascending aorta diameter is 3.2 cm. Debbi Walker MD (Electronically Signed) Final Date:     24 January 2022                 13:56      Micro:  Results     Procedure Component Value Units Date/Time    BLOOD CULTURE [[id number]] Collected: 01/25/22 1459    Order Status: Completed Specimen: Blood from Peripheral Updated: 01/26/22 0855     Significant Indicator NEG     Source BLD     Site PERIPHERAL     Culture Result No Growth  Note: Blood cultures are incubated for 5 days and  are monitored continuously.Positive blood cultures  are called to the RN and reported as soon as  they are identified.      Narrative:      Per Hospital Policy: Only change Specimen Src: to \"Line\" if  specified by physician order.  No site indicated    BLOOD CULTURE [[id number]] Collected: 01/25/22 1459    Order Status: Completed Specimen: Blood from Peripheral Updated: 01/26/22 0855     Significant Indicator NEG     Source BLD     Site PERIPHERAL     Culture Result No Growth  Note: Blood cultures are incubated for 5 days and  are monitored continuously.Positive blood cultures  are called to the RN and reported as soon as  they are identified.      Narrative:      Per Hospital Policy: Only change Specimen Src: to \"Line\" if  specified by physician order.  No site indicated    BLOOD CULTURE [[id number]]  (Abnormal) Collected: 01/22/22 0450    Order Status: Completed Specimen: Blood from Peripheral Updated: 01/25/22 1305     Significant Indicator POS     Source BLD     Site PERIPHERAL     Culture Result Growth detected by Bactec instrument. 01/22/2022  22:11      Streptococcus anginosus  See previous culture for sensitivity report.      Narrative:      CALL  Gordon  CPU tel. [phone],  CALLED  CPU tel. [phone] 01/22/2022, 22:12, RB " "PERF. RESULTS CALLED TO: RN  [id number]  Per Hospital Policy: Only change Specimen Src: to \"Line\" if  specified by physician order.  No site indicated    E-Test [[id number]] Collected: 01/22/22 0450    Order Status: Completed Specimen: Other Updated: 01/25/22 1304     ETEST Sensitivity FINAL    Narrative:      CPU tel. [phone] 01/23/2022, 00:17, RB PERF. RESULTS CALLED TO: RN [id number]  Per Hospital Policy: Only change Specimen Src: to \"Line\" if  specified by physician order.    BLOOD CULTURE [[id number]]  (Abnormal)  (Susceptibility) Collected: 01/22/22 0450    Order Status: Completed Specimen: Blood from Peripheral Updated: 01/25/22 1304     Significant Indicator POS     Source BLD     Site PERIPHERAL     Culture Result Growth detected by Bactec instrument. 01/23/2022  00:15      Streptococcus anginosus    Narrative:      CALL  Gordon  CPU tel. [phone],  CALLED  CPU tel. [phone] 01/23/2022, 00:17, RB PERF. RESULTS CALLED TO: RN  [id number]  Per Hospital Policy: Only change Specimen Src: to \"Line\" if  specified by physician order.  No site indicated    Susceptibility     Streptococcus anginosus (1)     Antibiotic Interpretation Microscan   Method Status    Penicillin Sensitive 0.064 mcg/mL E-TEST Final    Cefotaxime Sensitive 0.25 mcg/mL E-TEST Final                   BLOOD CULTURE [[id number]]  (Abnormal) Collected: 01/23/22 0915    Order Status: Completed Specimen: Blood from Peripheral Updated: 01/25/22 1237     Significant Indicator POS     Source BLD     Site PERIPHERAL     Culture Result Growth detected by Bactec instrument. 01/24/2022  07:56  Negative for Staphylococcus aureus and MRSA by PCR. Correlate  ongoing need for antibiotics with clinical condition.        Coagulase-negative Staphylococcus species  Isolated from one set only, please correlate with clinical  condition. Contact the Microbiology department within 48 hr  if identification and susceptibility are needed.  Possible Contaminant      Narrative:      " "CALL  Gordon  CPU tel. [phone],  CALLED  CPU tel. [phone] 01/24/2022, 07:58, RB PERF. RESULTS CALLED  TO:Rudy Diez D  Per Hospital Policy: Only change Specimen Src: to \"Line\" if  specified by physician order.  right    BLOOD CULTURE [[id number]]     Order Status: Canceled Specimen: Blood from Peripheral     Blood Culture [[id number]]     Order Status: Canceled Specimen: Blood from Peripheral     Blood Culture [[id number]]     Order Status: Canceled Specimen: Blood from Peripheral     Urinalysis [[id number]]     Order Status: Canceled Specimen: Urine, Clean Catch     CoV, Flu A/B RAPID ANTIGEN: Collect one dry nasal swab [[id number]] Collected: 01/22/22 1119    Order Status: Completed Specimen: Respirate from Nasopharyngeal Updated: 01/22/22 1234     Influenza A AG by MARIE Negative     Influenza B AG by MARIE Negative     SARS-COV ANTIGEN MARIE NotDetected     Comment: A result of Not-Detected is presumptive and should be confirmed with  a molecular assay, if necessary for patient management.    The [employer] Shi test has been authorized by FDA under an  Emergency Use Authorization (EUA).          SARS-CoV-2 Source Nasal Swab    Narrative:      Have you been in close contact with a person who is suspected  or known to be positive for COVID-19 within the last 30 days  (e.g. last seen that person < 30 days ago)->No    COV-2, FLU A/B, AND RSV BY PCR (2-4 HOURS CEPHEID): Collect NP swab in VTM [[id number]]     Order Status: Canceled Specimen: Respirate from Nasopharyngeal     URINALYSIS [[id number]]  (Abnormal) Collected: 01/22/22 0514    Order Status: Completed Specimen: Urine Updated: 01/22/22 0535     Color Yellow     Character Clear     Specific Gravity 1.043     Ph 5.0     Glucose Negative mg/dL      Ketones Trace mg/dL      Protein 30 mg/dL      Bilirubin Negative     Urobilinogen, Urine 1.0     Nitrite Negative     Leukocyte Esterase Negative     Occult Blood Negative     Micro Urine Req Microscopic    "       Assessment:  Active Hospital Problems    Diagnosis    • *Bacteremia [R78.81]    • Metabolic acidosis [E87.2]    • Sepsis (HCC) [A41.9]    • Right upper quadrant abdominal tenderness with rebound tenderness [R10.821]    • HLD (hyperlipidemia) [E78.5]    • Elevated troponin [R77.8]    • CHF (congestive heart failure) (HCC) [I50.9]    • DANIS (acute kidney injury) (HCC) [N17.9]    • Hyponatremia [E87.1]    • Essential hypertension [I10]    • Chest pain [R07.9]    • Hypothyroidism [E03.9]      Interval 24 hours:      AF, O2 RA  Labs reviewed  Micro reviewed    Patient anxious for discharge.  TMild upper quadrant abdominal pain unchanged.  Patient continued on ceftriaxone as below.    Assessment:  86 y.o. male with history of TAVR, presented on 1/21 per H&P with 1 week history of worsening pleuritic chest pain, found to be septic with high-grade fevers and GPC bacteremia.  Today, patient states he does not have any chest pain, notes vague symptoms for the past 2 months that he is not able to elucidate.  Appears to have had generalized weakness and chills off and on for the past 2 months, now with a right upper quadrant/right flank pain, neck and upper back pain      Sepsis, improved   Streptococcus bacteremia, unclear etiology, no urine culture, right upper quadrant abdominal pain but no obvious infection on imaging  -Blood culture on 1/22 2/2 + Streptococcus anginosus  -Blood culture 1/23 +CoNS-likely contaminant  -RENU on 1/24, no evidence of endocarditis, no significant valvular disease  -MRI cervical spine and thoracic spine without contrast with no findings of infection   TAVR in place  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  Patient wishes to pleat the antibiotic course at [facility] and states he only lives a mile away and will be able to get there daily.  --- Orders placed in Epic for [facility]   --- Follow-up repeat blood cultures-blood cultures and if they remain negative tomorrow then ok to place midline and if he has appointment set up at the infusion center would be okay to discharge     Discussed with internal medicine, Dr. Robison.   ID will follow.       ADDENDA     Plan is now for home infusion and pt daughter will assist- orders written and faxed in to be uploaded into epic

## 2022-01-26 NOTE — CARE PLAN
The patient is Stable - Low risk of patient condition declining or worsening    Shift Goals  Clinical Goals: Pain management, monitor and control BP, maintain hemodynamically stable, monitor diagnostic lab values  Patient Goals: Pain control, sleep, discharge  Family Goals: MAYURI. No family present.     Progress made toward(s) clinical / shift goals:    Problem: Pain - Standard  Goal: Alleviation of pain or a reduction in pain to the patient’s comfort goal  Outcome: Progressing     Problem: Knowledge Deficit - Standard  Goal: Patient and family/care givers will demonstrate understanding of plan of care, disease process/condition, diagnostic tests and medications  Outcome: Progressing       Patient is not progressing towards the following goals:

## 2022-01-26 NOTE — PROGRESS NOTES
Report received, pt care assumed, tele box on and rate verified. VSS and pt is on room air. Pt aaox4, no signs of distress noted at this time. POC discussed with pt and verbalizes no questions. Pt c/o of no pain at this time. Pt denies any additional needs at this time. Bed in lowest position, pt educated on fall risk and verbalized understanding, call light within reach, will continue with plan of care.

## 2022-01-26 NOTE — FACE TO FACE
Face to Face Supporting Documentation - Home Health    The encounter with this patient was in whole or in part the primary reason for home health admission.    Date of encounter:   Patient:                    MRN:                       YOB: 2021  Emigdio Jiménez  [id number]  1935     Home health to see patient for:  Skilled Nursing care for assessment, interventions & education, Home health aide, Physical Therapy evaluation and treatment and Occupational therapy evaluation and treatment    Skilled need for:  Medication Management IV ceftriaxone     Skilled nursing interventions to include:  Venous access care    Homebound status evidenced by:  Needs the assistance of another person in order to leave the home. Leaving home requires a considerable and taxing effort. There is a normal inability to leave the home.    Community Physician to provide follow up care: DC Flores     Optional Interventions? No      I certify the face to face encounter for this home health care referral meets the CMS requirements and the encounter/clinical assessment with the patient was, in whole, or in part, for the medical condition(s) listed above, which is the primary reason for home health care. Based on my clinical findings: the service(s) are medically necessary, support the need for home health care, and the homebound criteria are met.  I certify that this patient has had a face to face encounter by myself.  Efrem Robison M.D. - NPI: [id number]

## 2022-01-26 NOTE — CARE PLAN
Problem: Hemodynamics  Goal: Patient's hemodynamics, fluid balance and neurologic status will be stable or improve  Outcome: Progressing  Note: Pt remains hemodynamically stable this shift      Problem: Infection - Standard  Goal: Patient will remain free from infection  Outcome: Progressing  Note: Monitoring labs for negative blood cultures      Problem: Fall Risk  Goal: Patient will remain free from falls  Outcome: Progressing  Note: Pt steady on feet and no assistance is needed    The patient is Watcher - Medium risk of patient condition declining or worsening    Shift Goals  Clinical Goals: hemodynamics, monitor labs  Patient Goals: sleep, go home  Family Goals: MAYURI. No family present.     Progress made toward(s) clinical / shift goals:  Pt remains hemodynamically stable, labs monitored     Patient is not progressing towards the following goals:

## 2022-01-26 NOTE — PROGRESS NOTES
Report received at bedside from NOC RN, pt care assumed, tele box on. Pt aaox4, no signs of distress noted at this time. Patient resting comfortably in bed. POC discussed with pt and verbalizes no questions. Pt denies any additional needs at this time. Bed in lowest position, pt educated on fall risk and verbalized understanding, call light within reach.

## 2022-01-26 NOTE — CARE PLAN
The patient is Stable - Low risk of patient condition declining or worsening    Shift Goals  Clinical Goals: Safety, education with discharge barriers  Patient Goals: sleep, go home      Progress made toward(s) clinical / shift goals:  Pt educated on safety precautions and precautions in place. Treaded socks on, bed locked and in lowest position, belongings and call light within reach. Bed alarm in place and on.   Patient discharge needs are assessed to identify potential barriers. Patient encouraged to participate in patient goals and discharge. Proper interdisciplinary teams collaborated with. Patient actively involved in care.       Problem: Knowledge Deficit - Standard  Goal: Patient and family/care givers will demonstrate understanding of plan of care, disease process/condition, diagnostic tests and medications  Outcome: Progressing     Problem: Fluid Volume  Goal: Fluid volume balance will be maintained  Outcome: Progressing     Problem: Fall Risk  Goal: Patient will remain free from falls  Outcome: Progressing

## 2022-01-26 NOTE — DISCHARGE PLANNING
Anticipated Discharge Disposition: Home with [facility] for outpt IV ABX x 2 weeks    Action: Per ID notes >> referred pt to [facility] for IV CTX infusion. No need for HH. Notified MD of the above. Pending DC date so can schedule appt with [facility].    Barriers to Discharge: pending medical stability     Plan: cont to f/u for any DC needs.

## 2022-01-27 ENCOUNTER — APPOINTMENT (OUTPATIENT)
Dept: RADIOLOGY | Facility: MEDICAL CENTER | Age: 87
DRG: 872 | End: 2022-01-27
Attending: HOSPITALIST
Payer: MEDICARE

## 2022-01-27 LAB
ALBUMIN SERPL BCP-MCNC: 3 G/DL (ref 3.2–4.9)
ALBUMIN/GLOB SERPL: 1.2 G/DL
ALP SERPL-CCNC: 70 U/L (ref 30–99)
ALT SERPL-CCNC: 41 U/L (ref 2–50)
ANION GAP SERPL CALC-SCNC: 10 MMOL/L (ref 7–16)
ANISOCYTOSIS BLD QL SMEAR: ABNORMAL
AST SERPL-CCNC: 57 U/L (ref 12–45)
BASOPHILS # BLD AUTO: 0.9 % (ref 0–1.8)
BASOPHILS # BLD: 0.08 K/UL (ref 0–0.12)
BILIRUB SERPL-MCNC: 0.5 MG/DL (ref 0.1–1.5)
BUN SERPL-MCNC: 8 MG/DL (ref 8–22)
CALCIUM SERPL-MCNC: 8.4 MG/DL (ref 8.5–10.5)
CHLORIDE SERPL-SCNC: 98 MMOL/L (ref 96–112)
CO2 SERPL-SCNC: 21 MMOL/L (ref 20–33)
CREAT SERPL-MCNC: 0.94 MG/DL (ref 0.5–1.4)
EOSINOPHIL # BLD AUTO: 0.17 K/UL (ref 0–0.51)
EOSINOPHIL NFR BLD: 1.8 % (ref 0–6.9)
ERYTHROCYTE [DISTWIDTH] IN BLOOD BY AUTOMATED COUNT: 42.8 FL (ref 35.9–50)
GLOBULIN SER CALC-MCNC: 2.6 G/DL (ref 1.9–3.5)
GLUCOSE SERPL-MCNC: 111 MG/DL (ref 65–99)
HCT VFR BLD AUTO: 29.6 % (ref 42–52)
HGB BLD-MCNC: 10.2 G/DL (ref 14–18)
LYMPHOCYTES # BLD AUTO: 1.48 K/UL (ref 1–4.8)
LYMPHOCYTES NFR BLD: 15.9 % (ref 22–41)
MANUAL DIFF BLD: NORMAL
MCH RBC QN AUTO: 29.5 PG (ref 27–33)
MCHC RBC AUTO-ENTMCNC: 34.5 G/DL (ref 33.7–35.3)
MCV RBC AUTO: 85.5 FL (ref 81.4–97.8)
METAMYELOCYTES NFR BLD MANUAL: 0.9 %
MICROCYTES BLD QL SMEAR: ABNORMAL
MONOCYTES # BLD AUTO: 0.73 K/UL (ref 0–0.85)
MONOCYTES NFR BLD AUTO: 7.9 % (ref 0–13.4)
MORPHOLOGY BLD-IMP: NORMAL
MYELOCYTES NFR BLD MANUAL: 0.9 %
NEUTROPHILS # BLD AUTO: 6.5 K/UL (ref 1.82–7.42)
NEUTROPHILS NFR BLD: 69.9 % (ref 44–72)
NRBC # BLD AUTO: 0 K/UL
NRBC BLD-RTO: 0 /100 WBC
PLATELET # BLD AUTO: 252 K/UL (ref 164–446)
PLATELET BLD QL SMEAR: NORMAL
PMV BLD AUTO: 10.6 FL (ref 9–12.9)
POTASSIUM SERPL-SCNC: 4.4 MMOL/L (ref 3.6–5.5)
PROMYELOCYTES NFR BLD MANUAL: 1.8 %
PROT SERPL-MCNC: 5.6 G/DL (ref 6–8.2)
RBC # BLD AUTO: 3.46 M/UL (ref 4.7–6.1)
RBC BLD AUTO: PRESENT
SODIUM SERPL-SCNC: 129 MMOL/L (ref 135–145)
TOXIC GRANULES BLD QL SMEAR: NORMAL
WBC # BLD AUTO: 9.3 K/UL (ref 4.8–10.8)

## 2022-01-27 PROCEDURE — 99231 SBSQ HOSP IP/OBS SF/LOW 25: CPT | Performed by: HOSPITALIST

## 2022-01-27 PROCEDURE — 85027 COMPLETE CBC AUTOMATED: CPT

## 2022-01-27 PROCEDURE — A9270 NON-COVERED ITEM OR SERVICE: HCPCS | Performed by: STUDENT IN AN ORGANIZED HEALTH CARE EDUCATION/TRAINING PROGRAM

## 2022-01-27 PROCEDURE — 85007 BL SMEAR W/DIFF WBC COUNT: CPT

## 2022-01-27 PROCEDURE — 76937 US GUIDE VASCULAR ACCESS: CPT

## 2022-01-27 PROCEDURE — 770001 HCHG ROOM/CARE - MED/SURG/GYN PRIV*

## 2022-01-27 PROCEDURE — 05HC33Z INSERTION OF INFUSION DEVICE INTO LEFT BASILIC VEIN, PERCUTANEOUS APPROACH: ICD-10-PCS | Performed by: HOSPITALIST

## 2022-01-27 PROCEDURE — 700101 HCHG RX REV CODE 250: Performed by: INTERNAL MEDICINE

## 2022-01-27 PROCEDURE — A9270 NON-COVERED ITEM OR SERVICE: HCPCS | Performed by: FAMILY MEDICINE

## 2022-01-27 PROCEDURE — 700102 HCHG RX REV CODE 250 W/ 637 OVERRIDE(OP): Performed by: HOSPITALIST

## 2022-01-27 PROCEDURE — A9270 NON-COVERED ITEM OR SERVICE: HCPCS | Performed by: HOSPITALIST

## 2022-01-27 PROCEDURE — B54NZZA ULTRASONOGRAPHY OF LEFT UPPER EXTREMITY VEINS, GUIDANCE: ICD-10-PCS | Performed by: HOSPITALIST

## 2022-01-27 PROCEDURE — 700102 HCHG RX REV CODE 250 W/ 637 OVERRIDE(OP): Performed by: STUDENT IN AN ORGANIZED HEALTH CARE EDUCATION/TRAINING PROGRAM

## 2022-01-27 PROCEDURE — 700102 HCHG RX REV CODE 250 W/ 637 OVERRIDE(OP): Performed by: FAMILY MEDICINE

## 2022-01-27 PROCEDURE — 80053 COMPREHEN METABOLIC PANEL: CPT

## 2022-01-27 PROCEDURE — 700111 HCHG RX REV CODE 636 W/ 250 OVERRIDE (IP): Performed by: INTERNAL MEDICINE

## 2022-01-27 PROCEDURE — 36415 COLL VENOUS BLD VENIPUNCTURE: CPT

## 2022-01-27 RX ORDER — ATORVASTATIN CALCIUM 40 MG/1
40 TABLET, FILM COATED ORAL EVERY EVENING
Qty: 90 TABLET | Refills: 0 | Status: SHIPPED | OUTPATIENT
Start: 2022-01-27 | End: 2022-06-10

## 2022-01-27 RX ORDER — AMLODIPINE BESYLATE 5 MG/1
5 TABLET ORAL DAILY
Qty: 90 TABLET | Refills: 0 | Status: SHIPPED | OUTPATIENT
Start: 2022-01-28 | End: 2022-06-10

## 2022-01-27 RX ORDER — LEVOTHYROXINE SODIUM 112 UG/1
112 TABLET ORAL
Qty: 90 TABLET | Refills: 0 | Status: ON HOLD | OUTPATIENT
Start: 2022-01-28 | End: 2022-02-03

## 2022-01-27 RX ADMIN — BENZOCAINE AND MENTHOL 1 LOZENGE: 15; 3.6 LOZENGE ORAL at 14:19

## 2022-01-27 RX ADMIN — HYDROCODONE BITARTRATE AND ACETAMINOPHEN 1 TABLET: 10; 325 TABLET ORAL at 07:49

## 2022-01-27 RX ADMIN — LORAZEPAM 0.5 MG: 0.5 TABLET ORAL at 03:54

## 2022-01-27 RX ADMIN — WATER 1 G: 1000 INJECTION, SOLUTION INTRAVENOUS at 16:18

## 2022-01-27 RX ADMIN — ALUMINUM HYDROXIDE, MAGNESIUM HYDROXIDE, AND DIMETHICONE 30 ML: 400; 400; 40 SUSPENSION ORAL at 05:55

## 2022-01-27 RX ADMIN — SODIUM BICARBONATE 650 MG: 650 TABLET ORAL at 07:50

## 2022-01-27 RX ADMIN — LORAZEPAM 0.5 MG: 0.5 TABLET ORAL at 23:04

## 2022-01-27 RX ADMIN — BENZOCAINE AND MENTHOL 1 LOZENGE: 15; 3.6 LOZENGE ORAL at 23:04

## 2022-01-27 RX ADMIN — BENZOCAINE AND MENTHOL 1 LOZENGE: 15; 3.6 LOZENGE ORAL at 11:56

## 2022-01-27 RX ADMIN — ATORVASTATIN CALCIUM 40 MG: 40 TABLET, FILM COATED ORAL at 16:18

## 2022-01-27 RX ADMIN — SODIUM BICARBONATE 650 MG: 650 TABLET ORAL at 20:31

## 2022-01-27 RX ADMIN — HYDROCODONE BITARTRATE AND ACETAMINOPHEN 1 TABLET: 10; 325 TABLET ORAL at 18:26

## 2022-01-27 RX ADMIN — AMLODIPINE BESYLATE 5 MG: 5 TABLET ORAL at 05:55

## 2022-01-27 RX ADMIN — LEVOTHYROXINE SODIUM 112 MCG: 0.11 TABLET ORAL at 05:55

## 2022-01-27 RX ADMIN — LORAZEPAM 0.5 MG: 0.5 TABLET ORAL at 15:39

## 2022-01-27 ASSESSMENT — PAIN DESCRIPTION - PAIN TYPE
TYPE: ACUTE PAIN
TYPE: ACUTE PAIN

## 2022-01-27 ASSESSMENT — FIBROSIS 4 INDEX: FIB4 SCORE: 3

## 2022-01-27 NOTE — DISCHARGE SUMMARY
"Discharge Summary    CHIEF COMPLAINT ON ADMISSION  Chief Complaint   Patient presents with   • Chest Pain     left sided chest pain x 1 week describes as pressure and constant worse today. was admitted few days ago for same w/postive troponin result. pt left AMA yesterday told RN \"i can't stay in the hospital\" but today i will stay im sick.       Reason for Admission  abd pain      Admission Date  1/21/2022    CODE STATUS  Prior    HPI & HOSPITAL COURSE  This is a 86 y.o. male here with past medical history of hypertension, chronic kidney disease stage III comes in with right-sided chest pain/right upper quadrant pain.  Pain is provoked with taking deep breath and when he coughs.  Recently presented to ER with similar presentation but left AMA.  Denies any fever chills.  Admits to nausea but no vomiting.  No diarrhea.  In ER initially looked hemodynamically stable.  Afebrile.  Was saturating well on room air.  EKG was negative for ACS.  Troponin was elevated.  TSH was low and free T4 was elevated.  UDS positive for opiates.  D-dimer slightly elevated.  Patient had a CTA of the chest which was negative for PE or any acute lung pathology.  It showed coronary artery calcifications however.  There was slight leukocytosis on CBC.  UA was negative for UTI.  Patient was admitted for ACS work-up.  Stress test and cardiac echo were negative for acute ischemia. Echocardiogram and nuclear stress test ordered.  Later on patient spiked a fever of 102.7 °F.  He was slightly tachycardic and hypotensive.  Kidney functions worsen and became acidotic.  On exam noted to have right upper quadrant tenderness.  Abdominal ultrasound ordered that found cholelithiasis without evidence of cholecyst  Blood cultures were positive for Streptococcus.  ID requested IV ceftriaxone daily with end date on 2/8.  Patient was asked to follow-up with surgery to have his gallbladder removed.      Therefore, he is discharged in good and stable condition " to home with close outpatient follow-up.    The patient met 2-midnight criteria for an inpatient stay at the time of discharge.    Discharge Date  1/28/2022    FOLLOW UP ITEMS POST DISCHARGE  Follow up with PCP and surgery     DISCHARGE DIAGNOSES  Principal Problem:    Bacteremia POA: Yes  Active Problems:    Chest pain POA: Yes    Hypothyroidism POA: Unknown    Essential hypertension POA: Yes    Hyponatremia POA: Yes    HLD (hyperlipidemia) POA: Yes    Elevated troponin POA: Yes    CHF (congestive heart failure) (HCC) POA: Yes    DANIS (acute kidney injury) (HCC) POA: Yes    Metabolic acidosis POA: Yes    Sepsis (HCC) POA: Yes    Right upper quadrant abdominal tenderness with rebound tenderness POA: Unknown  Resolved Problems:    * No resolved hospital problems. *      FOLLOW UP  No future appointments.  DC Flores  [address] [id number] [phone]    Call  Please call your primary care provider to schedule a hospital follow up. Thank you.     [facility] [address] [address] [phone]          MEDICATIONS ON DISCHARGE     Medication List      START taking these medications      Instructions   amLODIPine 5 MG Tabs  Commonly known as: NORVASC   Take 1 Tablet by mouth every day.  Dose: 5 mg     atorvastatin 40 MG Tabs  Commonly known as: LIPITOR   Take 1 Tablet by mouth every evening.  Dose: 40 mg     cefTRIAXone 1 g/10 mL   Commonly known as: Rocephin   Infuse 10 mL into a venous catheter every evening for 14 days.  Dose: 1 g     HYDROcodone/acetaminophen  MG Tabs  Commonly known as: NORCO   Take 1 Tablet by mouth every 6 hours as needed for Moderate Pain for up to 3 days.  Dose: 1 Tablet     ondansetron 4 MG Tbdp  Commonly known as: ZOFRAN ODT   Take 1 Tablet by mouth every four hours as needed for Nausea (give PO if IV route is unavailable.).  Dose: 4 mg     zolpidem 5 MG Tabs  Commonly known as: AMBIEN   Take 1 Tablet by mouth  at bedtime as needed for Sleep for up to 30 days.  Dose: 5 mg        CHANGE how you take these medications      Instructions   levothyroxine 112 MCG Tabs  What changed:   · medication strength  · how much to take  Commonly known as: SYNTHROID   Take 1 Tablet by mouth every morning on an empty stomach.  Dose: 112 mcg        CONTINUE taking these medications      Instructions   amitriptyline 25 MG Tabs  Commonly known as: ELAVIL   Take 25 mg by mouth every evening.  Dose: 25 mg     lisinopril 20 MG Tabs  Commonly known as: PRINIVIL   Take 20 mg by mouth every day.  Dose: 20 mg     tamsulosin 0.4 MG capsule  Commonly known as: FLOMAX   Take 0.8 mg by mouth every day.  Dose: 0.8 mg        STOP taking these medications    LORazepam 0.5 MG Tabs  Commonly known as: ATIVAN            Allergies  No Known Allergies    DIET  No orders of the defined types were placed in this encounter.      ACTIVITY  As tolerated.  Weight bearing as tolerated    CONSULTATIONS  ID    PROCEDURES  Midline 1/27    LABORATORY  Lab Results   Component Value Date    SODIUM 128 (L) 01/28/2022    POTASSIUM 4.3 01/28/2022    CHLORIDE 95 (L) 01/28/2022    CO2 22 01/28/2022    GLUCOSE 105 (H) 01/28/2022    BUN 8 01/28/2022    CREATININE 0.94 01/28/2022        Lab Results   Component Value Date    WBC 9.3 01/27/2022    HEMOGLOBIN 10.2 (L) 01/27/2022    HEMATOCRIT 29.6 (L) 01/27/2022    PLATELETCT 252 01/27/2022        Total time of the discharge process exceeds 50 minutes.

## 2022-01-27 NOTE — CARE PLAN
The patient is Watcher - Medium risk of patient condition declining or worsening    Shift Goals  Clinical Goals: stable VS/labs, pain control, tolerate IV antbx  Patient Goals: pain control, discharge  Family Goals: MAYURI. No family present.     Progress made toward(s) clinical / shift goals:    Problem: Pain - Standard  Goal: Alleviation of pain or a reduction in pain to the patient’s comfort goal  Outcome: Progressing  Note: Medicated with Norco 10/325 per MAR with adequate pain control, hourly rounding in progress     Problem: Knowledge Deficit - Standard  Goal: Patient and family/care givers will demonstrate understanding of plan of care, disease process/condition, diagnostic tests and medications  Outcome: Progressing  Note: Educated on POC, monitor VS/labs, IV antbx, Midline catheter placemen, safety, DC planning, all questions answered       Patient is not progressing towards the following goals:

## 2022-01-27 NOTE — DISCHARGE PLANNING
Anticipated Discharge Disposition: Home with HH and IV infusion    Action: Attempted to speak with the pt at bedside, but very hard of hearing and requested for CM to f/u with his dtr Azeb. PC to the dtr, discussed DC plan with her. She states that she will not be home 3 days next week and asked if HH can be arranged for ABX administration at home as she is the one who provides transportation for him to and from the outpt infusion clinic. Also, called [facility] and they are not able to service the pt as they are not contracted with his insurance.   Fellow CM called [facility] and they are contracted with pt's health plan. Notified MD of the above. Voalte message to Dr Devi with ID requesting infusion order for IV ABX. Pending response.   Choice form for HH and IV infusion obtained and faxed to [facility].     1450 - ID MD read the Voalte message but did not respond at this time.   Spoke with pt's RN Triny. States that pt was telling night shift he will leave the hospital today even if it is AMA. Called the dtr Azeb and explained to her that it may take 1 day to arrange for IV ABX to home and that pt was adamant about leaving the hospital today. She states she will come to the hospital and speak with the pt and convince him to stay for as long as needed.     1545 - Rec'd IV Abx order from Dr Devi. Faxed to [facility] to send to infusion company. PC to [facility], spoke with Isaias who states that they will have to get an authorization from pt's insurance for IV ABX. States that it may take up to 2 days. Provided him with pt information and ask to call pt's dtr Azeb as pt is very hard of hearing. Provided dtr's phone number as well.   Isaias states they are not able to pt's referral in [facility] and requested for CM to fax it at [phone]. Faxed the packet to the number provided. Pending authorization. MD, RN and pt's dtr notified.     9396 - spoke with the pt and dtr at bedside. Pt is agreeable to stay in the hospital  and DC tomorrow. Explained to him and dtr what happened to [facility] clinic and they verbalized understanding and dtr reinforced that she would like to have HH IV ABX Infusion. Informed them that [facility]  has accepted the pt. Spoke with Darron from [facility] and informed him that pt most likely will be DC home tomorrow after his ABX dose at 1800. He states they will do SOC and IV ABX administration on Saturday 1/29/2022.   Will f/u with [facility] tomorrow am for the status of ABX authorization.       Barriers to Discharge: pending HH and IV ABX arrangements    Plan: cont to f/u with DC needs

## 2022-01-27 NOTE — PROCEDURES
Vascular Access Team    Date of Insertion: 1/27/22  Arm Circumference: 28  Internal length: 14  External Length: 0  Vein Occupancy %: 45  Reason for Midline: antibiotic therapy   Labs: WBC 9.3, , BUN 8, Cr 0.94, GFR >60, INR 1.07 on 1/24/22    Orders confirmed, vessel patency confirmed with ultrasound. Risks and benefits of procedure explained to patient and education regarding line associated bloodstream infections provided. Questions answered.     Power Midline placed in LUE per licensed provider order with ultrasound guidance. 4  Fr, single lumen Power Midline placed in basilic vein after 1 attempt(s). 2 mL of 1% lidocaine injected intradermally, 21 gauge microintroducer needle was visualized entering the vein and modified Seldinger technique used. 14 cm catheter inserted with good blood return. Secured at 0 cm marker. Internal positioning stylet removed and verified to be intact. Each lumen flushed without resistance with 10 mL 0.9% normal saline. Midline secured with Biopatch and Tegaderm.     Midline placement is confirmed by nurse using ultrasound and ability to flush and draw blood. Midline is appropriate for use at this time.  Patient tolerated procedure well, without complications.  No X-ray is needed for placement confirmation.  Patient condition relayed to unit RN or ordering physician via this post procedure note in the EMR.     Ultrasound images uploaded to PACS and viewable in the EMR - yes  Ultrasound imaged printed and placed in paper chart - no     BARD Power Midline ref # [id number], Lot # [id number], Expiration Date 02/28/2023

## 2022-01-27 NOTE — PROGRESS NOTES
Notified MD of pt's c/o SOB. Pt was anxious about not discharging today. No orders received. Pt was given Ativan per MAR for anxiety. Pt's daughter at bedside. Will continue to monitor.

## 2022-01-27 NOTE — CARE PLAN
Problem: Pain - Standard  Goal: Alleviation of pain or a reduction in pain to the patient’s comfort goal  Outcome: Progressing  Note: Pt complains of RUQ pain. Medicated per MAR      Problem: Hemodynamics  Goal: Patient's hemodynamics, fluid balance and neurologic status will be stable or improve  Outcome: Progressing  Note: Pt remains hemodynamically stable      Problem: Fall Risk  Goal: Patient will remain free from falls  Outcome: Progressing  Note: Pt remains free from injury this shift      The patient is Watcher - Medium risk of patient condition declining or worsening    Shift Goals  Clinical Goals: safety,education with discharge barriers  Patient Goals: sleep  Family Goals: MAYURI. No family present.     Progress made toward(s) clinical / shift goals:  Pt remains free from injury and was able to rest this shift     Patient is not progressing towards the following goals:

## 2022-01-27 NOTE — PROGRESS NOTES
Report received. Assumed care. Pt in bed awake. A/O x4. VSS. Responds appropriately. C/O pain, medicated per MAR, no  SOB. Assessment complete. Discussed POC, pt verbalizes understanding. Explained importance of calling before getting OOB. Call light and belongings within reach. Bed in the lowest position. Treaded socks in place. Hourly rounding in progress. Will continue to monitor .

## 2022-01-27 NOTE — DOCUMENTATION QUERY
[facility]                                                                       Query Response Note      PATIENT:               OLAMIDE DAI  ACCT #:                  [id number]  MRN:                     [id number]  :                      1935  ADMIT DATE:       2022 4:49 PM  DISCH DATE:          RESPONDING  PROVIDER #:        [id number]           QUERY TEXT:    Congestive Heart Failure is documented in the Medical Record. Please clarify the type (includes probable or suspected)    NOTE:  If an appropriate response is not listed below, please respond with a new note.      The patient's Clinical Indicators include:  Per Progress Note   -CHF (congestive heart failure) (HCC)- (present on admission)  -Echo and stress test ordered   -BNP 2591   -Consider cardiology consult   -No previous dx of CHF     Results Review:   NT-proBNP 2591  Troponin T 61, 51, 72, 30  CXR:  No acute cardiac or pulmonary abnormalities are identified.  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    Treatment:   Echocardiogram, CXR, NT-proBNP, Troponin T, & Home Rx lisinopril    Risk Factors:   Hx of TAVR, HTN, & CKD 3, & current admission with sepsis/bacteremia, acute kidney injury, & metabolic acidosis    Thank You,  Cari Gentile RN BSN  Clinical   Connect via [employer]  Options provided:   -- Chronic Diastolic heart failure   -- Heart failure ruled out; other explanation of clinical findings, (Please specify other explanation of clinical findings)   -- Unable to determine      Query created by: Cari Gentile on 2022 2:05 PM    RESPONSE TEXT:    Chronic Diastolic heart failure          Electronically signed by:  SMOOTH CHANEY MD 2022 2:46 PM

## 2022-01-27 NOTE — DISCHARGE PLANNING
Received Choice form at 1520  Agency/Facility Name: [facility]  Referral sent per Choice form @ 1520     Received Choice form at 1430  Agency/Facility Name: [facility]  Referral sent per Choice form @ 1430

## 2022-01-27 NOTE — PROGRESS NOTES
Report received, pt care assumed, pt is medical. VSS and pt is on room air. Pt aaox4, no signs of distress noted at this time. POC discussed with pt and verbalizes no questions.Pt denies any additional needs at this time. Bed in lowest position,pt educated on fall risk and verbalized understanding, call light within reach, will continue with plan of care.

## 2022-01-28 ENCOUNTER — PATIENT OUTREACH (OUTPATIENT)
Dept: HEALTH INFORMATION MANAGEMENT | Facility: OTHER | Age: 87
End: 2022-01-28

## 2022-01-28 VITALS
DIASTOLIC BLOOD PRESSURE: 85 MMHG | HEIGHT: 64 IN | OXYGEN SATURATION: 97 % | WEIGHT: 162.7 LBS | HEART RATE: 92 BPM | SYSTOLIC BLOOD PRESSURE: 142 MMHG | RESPIRATION RATE: 18 BRPM | TEMPERATURE: 97.7 F | BODY MASS INDEX: 27.78 KG/M2

## 2022-01-28 LAB
ALBUMIN SERPL BCP-MCNC: 3.5 G/DL (ref 3.2–4.9)
ALBUMIN/GLOB SERPL: 1.2 G/DL
ALP SERPL-CCNC: 77 U/L (ref 30–99)
ALT SERPL-CCNC: 47 U/L (ref 2–50)
ANION GAP SERPL CALC-SCNC: 11 MMOL/L (ref 7–16)
AST SERPL-CCNC: 43 U/L (ref 12–45)
BILIRUB SERPL-MCNC: 0.5 MG/DL (ref 0.1–1.5)
BUN SERPL-MCNC: 8 MG/DL (ref 8–22)
CALCIUM SERPL-MCNC: 8.8 MG/DL (ref 8.5–10.5)
CHLORIDE SERPL-SCNC: 95 MMOL/L (ref 96–112)
CO2 SERPL-SCNC: 22 MMOL/L (ref 20–33)
CREAT SERPL-MCNC: 0.94 MG/DL (ref 0.5–1.4)
GLOBULIN SER CALC-MCNC: 3 G/DL (ref 1.9–3.5)
GLUCOSE SERPL-MCNC: 105 MG/DL (ref 65–99)
POTASSIUM SERPL-SCNC: 4.3 MMOL/L (ref 3.6–5.5)
PROT SERPL-MCNC: 6.5 G/DL (ref 6–8.2)
SODIUM SERPL-SCNC: 128 MMOL/L (ref 135–145)

## 2022-01-28 PROCEDURE — 700102 HCHG RX REV CODE 250 W/ 637 OVERRIDE(OP): Performed by: HOSPITALIST

## 2022-01-28 PROCEDURE — 99239 HOSP IP/OBS DSCHRG MGMT >30: CPT | Performed by: HOSPITALIST

## 2022-01-28 PROCEDURE — 700102 HCHG RX REV CODE 250 W/ 637 OVERRIDE(OP): Performed by: FAMILY MEDICINE

## 2022-01-28 PROCEDURE — A9270 NON-COVERED ITEM OR SERVICE: HCPCS | Performed by: FAMILY MEDICINE

## 2022-01-28 PROCEDURE — 700101 HCHG RX REV CODE 250: Performed by: INTERNAL MEDICINE

## 2022-01-28 PROCEDURE — 80053 COMPREHEN METABOLIC PANEL: CPT

## 2022-01-28 PROCEDURE — 94760 N-INVAS EAR/PLS OXIMETRY 1: CPT

## 2022-01-28 PROCEDURE — A9270 NON-COVERED ITEM OR SERVICE: HCPCS | Performed by: HOSPITALIST

## 2022-01-28 PROCEDURE — 700111 HCHG RX REV CODE 636 W/ 250 OVERRIDE (IP): Performed by: INTERNAL MEDICINE

## 2022-01-28 RX ORDER — HYDROCODONE BITARTRATE AND ACETAMINOPHEN 10; 325 MG/1; MG/1
1 TABLET ORAL EVERY 6 HOURS PRN
Qty: 12 TABLET | Refills: 0 | Status: SHIPPED | OUTPATIENT
Start: 2022-01-28 | End: 2022-01-31

## 2022-01-28 RX ORDER — ONDANSETRON 4 MG/1
4 TABLET, ORALLY DISINTEGRATING ORAL EVERY 4 HOURS PRN
Qty: 30 TABLET | Refills: 0 | Status: ON HOLD | OUTPATIENT
Start: 2022-01-28 | End: 2022-02-03

## 2022-01-28 RX ORDER — ZOLPIDEM TARTRATE 5 MG/1
5 TABLET ORAL NIGHTLY PRN
Qty: 30 TABLET | Refills: 0 | Status: SHIPPED | OUTPATIENT
Start: 2022-01-28 | End: 2022-02-27

## 2022-01-28 RX ADMIN — AMLODIPINE BESYLATE 5 MG: 5 TABLET ORAL at 06:03

## 2022-01-28 RX ADMIN — LEVOTHYROXINE SODIUM 112 MCG: 0.11 TABLET ORAL at 06:03

## 2022-01-28 RX ADMIN — WATER 1 G: 1000 INJECTION, SOLUTION INTRAVENOUS at 15:42

## 2022-01-28 RX ADMIN — HYDROCODONE BITARTRATE AND ACETAMINOPHEN 1 TABLET: 10; 325 TABLET ORAL at 11:25

## 2022-01-28 RX ADMIN — LORAZEPAM 0.5 MG: 0.5 TABLET ORAL at 08:08

## 2022-01-28 RX ADMIN — SODIUM BICARBONATE 650 MG: 650 TABLET ORAL at 08:08

## 2022-01-28 RX ADMIN — HYDROCODONE BITARTRATE AND ACETAMINOPHEN 1 TABLET: 10; 325 TABLET ORAL at 02:54

## 2022-01-28 ASSESSMENT — ENCOUNTER SYMPTOMS
PALPITATIONS: 0
ABDOMINAL PAIN: 1
VOMITING: 0
COUGH: 0
NAUSEA: 1
NECK PAIN: 0
DIZZINESS: 0
FEVER: 0
HEADACHES: 0
CHILLS: 0
HEMOPTYSIS: 0
PHOTOPHOBIA: 0
DOUBLE VISION: 0
DEPRESSION: 0
MYALGIAS: 0
BLURRED VISION: 0

## 2022-01-28 ASSESSMENT — PAIN DESCRIPTION - PAIN TYPE
TYPE: ACUTE PAIN

## 2022-01-28 ASSESSMENT — LIFESTYLE VARIABLES: SUBSTANCE_ABUSE: 0

## 2022-01-28 NOTE — PROGRESS NOTES
Report received, pt care assumed, pt is medical. VSS and pt is on room air. Pt aaox4, no signs of distress noted at this time. POC discussed with pt and verbalizes no questions. Pt denies any additional needs at this time. Bed in lowest position, pt educated on fall risk and verbalized understanding, call light within reach, will continue with plan of care.

## 2022-01-28 NOTE — PROGRESS NOTES
Report received at bedside from NOC RN, pt care assumed, medical patient. Pt aaox4, no signs of distress noted at this time. Patient resting comfortably in bed. POC discussed with pt and verbalizes no questions.  Pt denies any additional needs at this time. Bed in lowest position, pt educated on fall risk and verbalized understanding, call light within reach.

## 2022-01-28 NOTE — CARE PLAN
Problem: Pain - Standard  Goal: Alleviation of pain or a reduction in pain to the patient’s comfort goal  Outcome: Progressing  Note: Pt complains of increasing pain this shift      Problem: Fall Risk  Goal: Patient will remain free from falls  Outcome: Progressing  Note: Pt remains free from falls and injury this shift      Problem: Respiratory  Goal: Patient will achieve/maintain optimum respiratory ventilation and gas exchange  Outcome: Progressing  Note: Pt complains of SOB but PRN ativan relieved symptoms    The patient is Watcher - Medium risk of patient condition declining or worsening    Shift Goals  Clinical Goals: hemodynamics stable, pain control   Patient Goals: pain control  Family Goals: MAYURI. No family present.     Progress made toward(s) clinical / shift goals:  Pt remains hemodynamically stable, pain controlled per MAR     Patient is not progressing towards the following goals:

## 2022-01-28 NOTE — PROGRESS NOTES
[facility] Medicine Daily Progress Note    Date of Service  1/28/2022    Chief Complaint  Emigdio Jiménez is a 86 y.o. male admitted 1/21/2022 with right-sided chest pain/right upper quadrant pain.    Hospital Course  This is 86 years old male who has past medical history of hypertension, chronic kidney disease stage III comes in with right-sided chest pain/right upper quadrant pain.  Pain is provoked with taking deep breath and when he coughs.  Recently presented to ER with similar presentation but left AMA.  Denies any fever chills.  Admits to nausea but no vomiting.  No diarrhea.  In ER initially looked hemodynamically stable.  Afebrile.  Was saturating well on room air.  EKG was negative for ACS.  Troponin was elevated.  TSH was low and free T4 was elevated.  UDS positive for opiates.  D-dimer slightly elevated.  Patient had a CTA of the chest which was negative for PE or any acute lung pathology.  It showed coronary artery calcifications however.  There was slight leukocytosis on CBC.  UA was negative for UTI.  Patient was admitted for ACS work-up.  Echocardiogram and nuclear stress test ordered.  Later on patient spiked a fever of 102.7 °F.  He was slightly tachycardic and hypotensive.  Kidney functions worsen and became acidotic.  On exam noted to have right upper quadrant tenderness.  Abdominal ultrasound ordered.  No distress    Interval Problem Update   1/25: Repeat cultures will be drawn today.  We will continue with IV Unasyn.  MRI and RENU is negative.  Gallbladder does have cholelithiasis but no evidence of acute cholecystitis.  UA did not show any evidence of infection.  CT scan of the chest and abdomen are also negative for source of infection.  1/26: Patient's cultures from yesterday have remained negative.  We will wait 48 hours before placing a PICC line.  Infectious disease have recommended 2 weeks of IV antibiotics after cultures are negative.  He is on ceftriaxone.  He will be discharged home with  home health as he has help with his daughter.  Patient was complaining of nausea and right upper quadrant abdominal pain.  1/27 Unable to be discharged since antibiotics were not approved.   I have personally seen and examined the patient at bedside. I discussed the plan of care with patient and bedside RN.    Consultants/Specialty  None    Code Status  Full Code    Disposition  Patient is not medically cleared for discharge.   Anticipate discharge to to home with close outpatient follow-up.  I have placed the appropriate orders for post-discharge needs.    Review of Systems  Review of Systems   Constitutional: Negative for chills and fever.   HENT: Negative for hearing loss and tinnitus.    Eyes: Negative for blurred vision, double vision and photophobia.   Respiratory: Negative for cough and hemoptysis.    Cardiovascular: Negative for chest pain and palpitations.   Gastrointestinal: Positive for abdominal pain and nausea. Negative for vomiting.   Genitourinary: Negative for dysuria, frequency and urgency.   Musculoskeletal: Negative for myalgias and neck pain.   Neurological: Negative for dizziness and headaches.   Psychiatric/Behavioral: Negative for depression, substance abuse and suicidal ideas.        Physical Exam  Temp:  [36.4 °C (97.5 °F)-36.6 °C (97.9 °F)] 36.4 °C (97.5 °F)  Pulse:  [] 92  Resp:  [18] 18  BP: (129-155)/(79-90) 155/79  SpO2:  [95 %-98 %] 98 %    Physical Exam  Constitutional:       General: He is in acute distress.      Appearance: He is ill-appearing.   HENT:      Head: Atraumatic.      Mouth/Throat:      Mouth: Mucous membranes are dry.   Eyes:      Extraocular Movements: Extraocular movements intact.      Pupils: Pupils are equal, round, and reactive to light.   Cardiovascular:      Rate and Rhythm: Normal rate and regular rhythm.      Heart sounds: No friction rub. No gallop.    Pulmonary:      Effort: Pulmonary effort is normal. No respiratory distress.   Abdominal:      General:  There is distension.      Tenderness: There is abdominal tenderness in the right lower quadrant and periumbilical area. There is guarding and rebound. Positive signs include McBurney's sign.   Musculoskeletal:      Right lower leg: No edema.      Left lower leg: No edema.   Skin:     General: Skin is dry.   Neurological:      General: No focal deficit present.      Mental Status: He is oriented to person, place, and time.   Psychiatric:         Mood and Affect: Mood normal.         Cognition and Memory: Cognition is impaired.         Judgment: Judgment is impulsive.      Comments: Poor insight and judgment         Fluids    Intake/Output Summary (Last 24 hours) at 1/28/2022 1233  Last data filed at 1/28/2022 0800  Gross per 24 hour   Intake 120 ml   Output 300 ml   Net -180 ml       Laboratory  Recent Labs     01/26/22  0235 01/27/22  0016   WBC 8.8 9.3   RBC 3.68* 3.46*   HEMOGLOBIN 10.9* 10.2*   HEMATOCRIT 31.3* 29.6*   MCV 85.1 85.5   MCH 29.6 29.5   MCHC 34.8 34.5   RDW 43.7 42.8   PLATELETCT 228 252   MPV 10.6 10.6     Recent Labs     01/26/22  0235 01/27/22  0016 01/28/22  0304   SODIUM 128* 129* 128*   POTASSIUM 4.5 4.4 4.3   CHLORIDE 94* 98 95*   CO2 21 21 22   GLUCOSE 93 111* 105*   BUN 10 8 8   CREATININE 0.91 0.94 0.94   CALCIUM 8.5 8.4* 8.8                   Imaging  IR-MIDLINE CATHETER INSERTION WO GUIDANCE > AGE 3   Final Result                  Ultrasound-guided midline placement performed by qualified nursing staff    as above.          EC-RENU W/O CONT   Final Result      MR-THORACIC SPINE-W/O   Final Result      1.  Minor degenerative changes including a few Schmorl's node endplate irregularities and anterior marginal spurring at T11-T12.   2.  Incidental superficial soft tissue lipoma in the right posterior paraspinous soft tissues. No clinical significance.   3.  No significant disc bulge or protrusion, central stenosis, or foraminal stenosis at any thoracic level.   4.  No evidence of discitis,  osteomyelitis, or epidural phlegmon/epidural abscess.      MR-CERVICAL SPINE-W/O   Final Result      1.  Multilevel degenerative disc disease and spondylotic changes most notable for C4-5 moderate central stenosis at C5-6 mild central stenosis. Foraminal stenoses due to spondylotic change as described above.   2.  No evidence of discitis, osteomyelitis, or epidural collection.   3.  No myelopathic cord signal abnormality.      CT-ABDOMEN-PELVIS W/O   Final Result         1. No acute inflammatory change in the abdomen or pelvis on this noncontrast CT.   2. Small hiatal hernia.   3. No hydronephrosis. No renal calculus.      NM-CARDIAC STRESS TEST   Final Result      US-RUQ   Final Result      Gallstones within the gallbladder. No evidence of biliary ductal dilatation.      EC-ECHOCARDIOGRAM COMPLETE W/O CONT   Final Result      CT-CTA CHEST PULMONARY ARTERY W/ RECONS   Final Result      1.  No evidence of pulmonary embolus.      2.  Coronary artery calcifications.            DX-CHEST-PORTABLE (1 VIEW)   Final Result      No acute cardiac or pulmonary abnormalities are identified.           Assessment/Plan  * Bacteremia- (present on admission)  Assessment & Plan  Blood cultures growing Streptococcus species 2 out of 2 bottles.  Repeat cultures ordered  No source of infection has been identified yet.  CT abdomen and pelvis without contrast ordered given continuous abdominal pain.  Started on Unasyn.  TTE with no evidence of endocarditis or vegetations  Might need RENU if no source of infection has been identified in the light of TAVR.  ID consulted.  1/24: Repeat blood cultures from 1/23/2022 still positive.  Will repeat in 48 hours.  Continue Unasyn for now.  ID following.  1/25: We will repeat blood cultures today.  1/26: Does have remained negative infectious disease recommending IV antibiotics after 2 weeks of negative cultures.    Right upper quadrant abdominal tenderness with rebound tenderness  Assessment &  Plan  Would benefit from CT abdomen and pelvis with contrast but due to worsening kidney functions will do fair for now.  Will check abdominal ultrasound.  1/22: Ultrasound of the abdomen negative for acute findings rather than showing gallstones with no evidence of cholecystitis.  Given continued tenderness of the abdomen, positive blood cultures, and fever, CT scan of the abdomen pelvis without contrast ordered.  Case discussed with surgery.  No surgical intervention recommended at this point.    Sepsis (HCC)- (present on admission)  Assessment & Plan  SIRS criteria identified on my evaluation include:  Fever, with temperature greater than 101 deg F and Tachycardia, with heart rate greater than 90 BPM  SIRS is non-infectious, the patient does not have sepsis  1/23: Blood cultures positive for Streptococcus species 2 out of 2 bottles.  No source of infection has been identified yet.  CT scan abdominal pelvis given right upper quadrant tenderness with negative ultrasound ordered and pending.  1/24: CT abdomen and pelvis and ultrasound both negative for any acute findings.  MRI of the cervical and thoracic spine with no evidence of discitis or osteomyelitis.  No epidural abscess.  RENU was done which was negative for any endocarditis. No source has been identified yet.  Repeat blood cultures from 1/23/2022 still positive.  Repeat blood cultures in 48 hours.  May be consider reimaging abdomen and pelvis with enhanced contrast study given continued abdominal pain!?      Metabolic acidosis- (present on admission)  Assessment & Plan  Could be due to worsening kidney functions.  Started on sodium bicarb  Fluids resuscitation.  Monitor.  1/22: Improving.    DANIS (acute kidney injury) (HCC)- (present on admission)  Assessment & Plan  CKD 3 based on retrospective lab review.  Continue to monitor  1/22: Worsening.  Likely combination of prerenal component and possibly ATN/AIN from contrast exposure.  Gentle fluid resuscitation.   Avoid nephrotoxins.  Renally dose all medications.  1/23: Improving.    CHF (congestive heart failure) (HCC)- (present on admission)  Assessment & Plan  Echo and stress test ordered  BNP 2591  Consider cardiology consult  No previous dx of CHF    Elevated troponin- (present on admission)  Assessment & Plan  EKG negative for ACS.  Echocardiogram and stress test ordered.    HLD (hyperlipidemia)- (present on admission)  Assessment & Plan  Lipid panel ordered to assess  Continue home meds    Hyponatremia- (present on admission)  Assessment & Plan  Chronic  Worsened likely due to worsening kidney functions plus dehydration  On IV fluids for now.  Monitor    Essential hypertension- (present on admission)  Assessment & Plan  Continue home meds  Admitted with telemetry      Hypothyroidism  Assessment & Plan  Low TSH and elevated free T4.  Will decrease Synthroid dose to 112 mcg  Repeat TSH/free T4 in 4 to 6 weeks and adjust dose accordingly    Chest pain- (present on admission)  Assessment & Plan  Rule out ACS.  Echocardiogram and stress test ordered.  1/23: Negative work-up with unremarkable echocardiogram and nuclear stress test.       VTE prophylaxis: heparin ppx    I have performed a physical exam and reviewed and updated ROS and Plan today (1/28/2022). In review of yesterday's note (1/27/2022), there are no changes except as documented above.

## 2022-01-28 NOTE — CARE PLAN
The patient is Stable - Low risk of patient condition declining or worsening    Shift Goals  Clinical Goals: Pain control, discharge barriers  Patient Goals: pain control  Family Goals: MAYURI. No family present.     Progress made toward(s) clinical / shift goals:  Patient educated on pain rating scale, pain control management, nonpharmacologic methods of pain management and reaching a tolerable level. Pain medication provided PRN in addition to nonpharmacologic methods. Patient verbalizes understanding of teaching and has no additional questions at this time.       Problem: Pain - Standard  Goal: Alleviation of pain or a reduction in pain to the patient’s comfort goal  Outcome: Progressing     Problem: Knowledge Deficit - Standard  Goal: Patient and family/care givers will demonstrate understanding of plan of care, disease process/condition, diagnostic tests and medications  Outcome: Progressing     Problem: Hemodynamics  Goal: Patient's hemodynamics, fluid balance and neurologic status will be stable or improve  Outcome: Progressing     Problem: Fall Risk  Goal: Patient will remain free from falls  Outcome: Progressing

## 2022-01-28 NOTE — DISCHARGE PLANNING
Anticipated Discharge Disposition: Home with HH and IV ABX    Action: PC from [facility]. States they have gotten the paper fax from me yesterday and they have submitted the request for auth to pt's insurance this am. States they did reach out to the pt's dtr and provided with updates. He states he will be reaching out to me when they get an update on auth.   [facility] has accepted the pt for SOC on 1/29/2022.    Barriers to Discharge: pending auth for IV ABX from pt's insurance    Plan: cont to f/u on DC needs.     Addendum:   1305 - PC to [facility]. Spoke with Jose Alfredo the pharmacist. States that they ended up not needing an auth for pt's IV ABX. States they reached out to the pt's dtr and set up a meeting at bedside today at 1430 for teaching. Spoke with the pt at bedside and provided updates. Pt is thankful and verbalized understanding of the above information.   MD and RN notified as well. Call to Darron with [facility] and notified that pt will dc today and he confirmed SOC tomorrow. Informed him that IV ABX will need to be administered tomorrow around 1700. He will let the RN know.

## 2022-01-28 NOTE — DISCHARGE INSTRUCTIONS
Discharge Instructions    Discharged to home by car with relative. Discharged via wheelchair, hospital escort: Yes.  Special equipment needed: Not Applicable    Be sure to schedule a follow-up appointment with your primary care doctor or any specialists as instructed.     Discharge Plan:   Influenza Vaccine Indication: Not indicated: Previously immunized this influenza season and > 8 years of age    I understand that a diet low in cholesterol, fat, and sodium is recommended for good health. Unless I have been given specific instructions below for another diet, I accept this instruction as my diet prescription.   Other diet: cardiac diet    Special Instructions: None    · Is patient discharged on Warfarin / Coumadin?   No       Bacteremia, Adult  Bacteremia is the presence of bacteria in the blood. When bacteria enter the bloodstream, they can cause a life-threatening reaction called sepsis, which is a medical emergency. Bacteremia can spread to other parts of the body, including the heart, joints, and brain.  What are the causes?  This condition is caused by bacteria that get into the blood.  · Bacteria can enter the blood:  ? From a skin infection or injury, such as a burn or a cut.  ? From a lung infection (pneumonia).  ? From an infection in your stomach or intestines (gastrointestinal infection).  ? From an infection in your bladder or urinary system (urinary tract infection).  ? During a dental or medical procedure.  ? From bleeding gums.  ? When a bacterial infection in another part of your body spreads to your blood.  ? Through an unclean (contaminated) needle.  What increases the risk?  This condition is more likely to develop in children, the elderly, and people who:  · Have a long-term (chronic) disease or condition like diabetes or chronic kidney failure.  · Have an artificial joint or heart valve.  · Have heart valve disease.  · Have a tube inserted to treat a medical condition, such as a urinary  catheter or IV.  · Have a weak disease-fighting system (immune system).  · Inject illegal drugs.  · Have been hospitalized for more than 10 days in a row.  What are the signs or symptoms?  Symptoms of this condition include:  · Fever.  · Chills.  · Fast heartbeat.  · Shortness of breath.  · Dizziness.  · Weakness.  · Confusion.  · Nausea or vomiting.  · Diarrhea.  · Low blood pressure.  · Decreased urine output.  Bacteremia that has spread to other parts of the body may cause symptoms in those areas. In some cases, there are no symptoms.  How is this diagnosed?  This condition may be diagnosed with a physical exam and tests, such as:  · A complete blood count (CBC). This test checks for signs of infection.  · Blood cultures. These check for bacteria in your blood.  · Tests of any tubes that you have had inserted. These tests check for a source of infection.  · Urine tests, including urine cultures. These check for bacteria in the urine that could be a source of infection.  · Imaging tests, such as an X-ray, CT scan, MRI, or heart ultrasound. These check for a source of infection in other parts of your body, such as your lungs, heart valves, or joints.  How is this treated?  This condition is usually treated in the hospital. Treatment may involve:  · Antibiotic medicines. These may be given by mouth (orally) or directly into your blood through an IV (infusion through your vein).  ? Depending on the source of infection, you may need antibiotics for several weeks.  ? At first, you may be given an antibiotic to kill most types of blood bacteria (broad-spectrum antibiotic). If your test results show that a certain kind of bacteria is causing the problem, you may be given a different antibiotic to kill that specific bacteria.  · IV fluids.  · Removing any catheter or device that could be a source of infection.  · Blood pressure and breathing support, if needed.  · Surgery to control the source or the spread of infection,  such as surgery to remove an infected device, abscess, or tissue.  · Having follow-up visits for medicines, blood tests, and further evaluation.  Follow these instructions at home:  Medicines  · Take over-the-counter and prescription medicines only as told by your health care provider.  · If you were prescribed an antibiotic medicine, take it as told by your health care provider. Do not stop taking the antibiotic even if you start to feel better.  General instructions    · Rest as needed. Ask your health care provider when you may return to normal activities.  · Drink enough fluid to keep your urine pale yellow.  · Do not use any products that contain nicotine or tobacco, such as cigarettes and e-cigarettes. If you need help quitting, ask your health care provider.  · Keep all follow-up visits as told by your health care provider. This is important.  How is this prevented?    · Wash your hands regularly with soap and water. If soap and water are not available, use hand .  · You should wash your hands:  ? After using the toilet or changing a diaper.  ? Before preparing, cooking, or serving food.  ? While caring for a sick person or while visiting someone in a hospital.  ? Before and after changing bandages (dressings) over wounds.  · Clean any scrapes or cuts with soap and water and cover them with clean dressings.  · Get vaccinations as recommended by your health care provider.  · Practice good oral hygiene. Brush your teeth two times a day, and floss regularly.  · Take good care of your skin. This includes bathing and moisturizing on a regular basis.  Get help right away if you have:  · Pain.  · A fever or chills.  · Trouble breathing.  · A fast heart rate.  · Skin that is blotchy, pale, or clammy.  · Confusion.  · Weakness.  · Lack of energy (lethargy) or unusual sleepiness.  · Diarrhea.  · New symptoms that develop after treatment has started.  These symptoms may represent a serious problem that is an  emergency. Do not wait to see if the symptoms will go away. Get medical help right away. Call your local emergency services (911 in the [place] Do not drive yourself to the hospital.  Summary  · Bacteremia is the presence of bacteria in the blood. When bacteria enter the bloodstream, they can cause a life-threatening reaction called sepsis.  · Some symptoms of bacteremia include fever, chills, shortness of breath, confusion, nausea or vomiting, and diarrhea.  · Tests may be done to find the source of infection that led to bacteremia. These tests may include blood tests, urine tests, and imaging tests.  · Bacteremia is usually treated with antibiotic medicines in the hospital.  · Get help right away if you have any new symptoms that develop after treatment has started.  This information is not intended to replace advice given to you by your health care provider. Make sure you discuss any questions you have with your health care provider.  Document Released: 10/01/2007 Document Revised: 04/29/2019 Document Reviewed: 04/29/2019  [employer] Patient Education © 2020 [employer]      Midline Instructions    How to Care for your Midline   · Do not take a bath, swim, or use hot tubs when you have a midline.   · Cover midline with clear plastic wrap and tape to keep it dry while showering.   · Check the midline insertion site daily for leakage, redness, swelling, or pain.   · Flush the midline as directed by your health care provider. Let your health care provider know right away if the midine is difficult to flush or does not flush. Do not use force to flush the midline.   · Do not use a syringe that is less than 10 mL to flush the midline.   · Avoid blood pressure checks on the arm with the midline. Do not take the midline out yourself.   · Only a trained clinical professional should remove the midline.   · Make sure you or anyone who access your midline washes their hands before using the line.   · Make sure the hub of the  "line is \"scrubbed\" prior to using the line.    Dressing Changes  · Change the midline dressing as instructed by your health care provider.   · Change your midline dressing if it becomes loose or wet.    When to seek medical attention   · Midline is accidentally pulled all the way out.  · There is any type of drainage, redness, or swelling where the midline enters the skin.   · Noticeable increase in arm circumference due to swelling of arm. You cannot flush the midline, it is difficult to flush, or the midline leaks around the insertion site when it is flushed.   · You notice a hole or tear in the midline. You develop chills or a fever.        Midline Instructions    How to Care for your Midline   · Do not take a bath, swim, or use hot tubs when you have a midline.   · Cover midline with clear plastic wrap and tape to keep it dry while showering.   · Check the midline insertion site daily for leakage, redness, swelling, or pain.   · Flush the midline as directed by your health care provider. Let your health care provider know right away if the midine is difficult to flush or does not flush. Do not use force to flush the midline.   · Do not use a syringe that is less than 10 mL to flush the midline.   · Avoid blood pressure checks on the arm with the midline. Do not take the midline out yourself.   · Only a trained clinical professional should remove the midline.   · Make sure you or anyone who access your midline washes their hands before using the line.   · Make sure the hub of the line is \"scrubbed\" prior to using the line.    Dressing Changes  · Change the midline dressing as instructed by your health care provider.   · Change your midline dressing if it becomes loose or wet.    When to seek medical attention   · Midline is accidentally pulled all the way out.  · There is any type of drainage, redness, or swelling where the midline enters the skin.   · Noticeable increase in arm circumference due to swelling of " arm. You cannot flush the midline, it is difficult to flush, or the midline leaks around the insertion site when it is flushed.   · You notice a hole or tear in the midline. You develop chills or a fever.    Depression / Suicide Risk    As you are discharged from this [facility] facility, it is important to learn how to keep safe from harming yourself.    Recognize the warning signs:  · Abrupt changes in personality, positive or negative- including increase in energy   · Giving away possessions  · Change in eating patterns- significant weight changes-  positive or negative  · Change in sleeping patterns- unable to sleep or sleeping all the time   · Unwillingness or inability to communicate  · Depression  · Unusual sadness, discouragement and loneliness  · Talk of wanting to die  · Neglect of personal appearance   · Rebelliousness- reckless behavior  · Withdrawal from people/activities they love  · Confusion- inability to concentrate     If you or a loved one observes any of these behaviors or has concerns about self-harm, here's what you can do:  · Talk about it- your feelings and reasons for harming yourself  · Remove any means that you might use to hurt yourself (examples: pills, rope, extension cords, firearm)  · Get professional help from the community (Mental Health, Substance Abuse, psychological counseling)  · Do not be alone:Call your Safe Contact- someone whom you trust who will be there for you.  · Call your local CRISIS HOTLINE 271-2168 or 713-931-5444  · Call your local Children's Mobile Crisis Response Team [place] (158) 861-6527 or www.[employer]  · Call the toll free National Suicide Prevention Hotlines   · National Suicide Prevention Lifeline 581-284-XDPV (2863)  · National Hope Line Network 800-SUICIDE (205-2609)

## 2022-01-29 NOTE — PROGRESS NOTES
Patient discharged to car to go home with daughter. All belongings gathered and sent home with patient. Patient a&ox4, educated on medications and where to pick them up. Daughter and pt provided instructions on midline and infusion educated was provided previously. Discussed patients desire for pain, nausea and anxiety medications on discharge to MD. Medications ordered as appropriate. Verbalized understanding and denied any questions or additional needs at this time.

## 2022-01-30 LAB
BACTERIA BLD CULT: NORMAL
BACTERIA BLD CULT: NORMAL
SIGNIFICANT IND 70042: NORMAL
SIGNIFICANT IND 70042: NORMAL
SITE SITE: NORMAL
SITE SITE: NORMAL
SOURCE SOURCE: NORMAL
SOURCE SOURCE: NORMAL

## 2022-02-01 ENCOUNTER — HOSPITAL ENCOUNTER (OUTPATIENT)
Facility: MEDICAL CENTER | Age: 87
End: 2022-02-03
Attending: EMERGENCY MEDICINE | Admitting: HOSPITALIST
Payer: MEDICARE

## 2022-02-01 ENCOUNTER — APPOINTMENT (OUTPATIENT)
Dept: RADIOLOGY | Facility: MEDICAL CENTER | Age: 87
End: 2022-02-01
Attending: EMERGENCY MEDICINE
Payer: MEDICARE

## 2022-02-01 DIAGNOSIS — N17.9 ACUTE RENAL FAILURE, UNSPECIFIED ACUTE RENAL FAILURE TYPE (HCC): ICD-10-CM

## 2022-02-01 DIAGNOSIS — D72.828 OTHER ELEVATED WHITE BLOOD CELL (WBC) COUNT: ICD-10-CM

## 2022-02-01 DIAGNOSIS — E03.9 HYPOTHYROIDISM, UNSPECIFIED TYPE: ICD-10-CM

## 2022-02-01 DIAGNOSIS — K80.00 CALCULUS OF GALLBLADDER WITH ACUTE CHOLECYSTITIS WITHOUT OBSTRUCTION: ICD-10-CM

## 2022-02-01 DIAGNOSIS — R10.13 EPIGASTRIC PAIN: ICD-10-CM

## 2022-02-01 DIAGNOSIS — K80.20 CALCULUS OF GALLBLADDER WITHOUT CHOLECYSTITIS WITHOUT OBSTRUCTION: ICD-10-CM

## 2022-02-01 PROBLEM — D72.829 LEUKOCYTOSIS: Status: ACTIVE | Noted: 2022-02-01

## 2022-02-01 PROBLEM — K81.0 ACUTE CHOLECYSTITIS: Status: ACTIVE | Noted: 2022-02-01

## 2022-02-01 LAB
ALBUMIN SERPL BCP-MCNC: 3.8 G/DL (ref 3.2–4.9)
ALBUMIN/GLOB SERPL: 1.4 G/DL
ALP SERPL-CCNC: 79 U/L (ref 30–99)
ALT SERPL-CCNC: 18 U/L (ref 2–50)
ANION GAP SERPL CALC-SCNC: 10 MMOL/L (ref 7–16)
APPEARANCE UR: CLEAR
AST SERPL-CCNC: 17 U/L (ref 12–45)
BASOPHILS # BLD AUTO: 0.7 % (ref 0–1.8)
BASOPHILS # BLD: 0.09 K/UL (ref 0–0.12)
BILIRUB SERPL-MCNC: 0.6 MG/DL (ref 0.1–1.5)
BILIRUB UR QL STRIP.AUTO: NEGATIVE
BUN SERPL-MCNC: 22 MG/DL (ref 8–22)
CALCIUM SERPL-MCNC: 8.7 MG/DL (ref 8.5–10.5)
CHLORIDE SERPL-SCNC: 96 MMOL/L (ref 96–112)
CO2 SERPL-SCNC: 21 MMOL/L (ref 20–33)
COLOR UR: YELLOW
CREAT SERPL-MCNC: 1.76 MG/DL (ref 0.5–1.4)
EKG IMPRESSION: NORMAL
EOSINOPHIL # BLD AUTO: 0.08 K/UL (ref 0–0.51)
EOSINOPHIL NFR BLD: 0.7 % (ref 0–6.9)
ERYTHROCYTE [DISTWIDTH] IN BLOOD BY AUTOMATED COUNT: 47.9 FL (ref 35.9–50)
GLOBULIN SER CALC-MCNC: 2.7 G/DL (ref 1.9–3.5)
GLUCOSE SERPL-MCNC: 101 MG/DL (ref 65–99)
GLUCOSE UR STRIP.AUTO-MCNC: NEGATIVE MG/DL
HCT VFR BLD AUTO: 31.2 % (ref 42–52)
HGB BLD-MCNC: 10.2 G/DL (ref 14–18)
IMM GRANULOCYTES # BLD AUTO: 0.12 K/UL (ref 0–0.11)
IMM GRANULOCYTES NFR BLD AUTO: 1 % (ref 0–0.9)
KETONES UR STRIP.AUTO-MCNC: NEGATIVE MG/DL
LACTATE BLD-SCNC: 0.8 MMOL/L (ref 0.5–2)
LEUKOCYTE ESTERASE UR QL STRIP.AUTO: NEGATIVE
LIPASE SERPL-CCNC: 19 U/L (ref 11–82)
LYMPHOCYTES # BLD AUTO: 1.39 K/UL (ref 1–4.8)
LYMPHOCYTES NFR BLD: 11.4 % (ref 22–41)
MAGNESIUM SERPL-MCNC: 2.5 MG/DL (ref 1.5–2.5)
MCH RBC QN AUTO: 29.1 PG (ref 27–33)
MCHC RBC AUTO-ENTMCNC: 32.7 G/DL (ref 33.7–35.3)
MCV RBC AUTO: 88.9 FL (ref 81.4–97.8)
MICRO URNS: NORMAL
MONOCYTES # BLD AUTO: 1.31 K/UL (ref 0–0.85)
MONOCYTES NFR BLD AUTO: 10.8 % (ref 0–13.4)
NEUTROPHILS # BLD AUTO: 9.16 K/UL (ref 1.82–7.42)
NEUTROPHILS NFR BLD: 75.4 % (ref 44–72)
NITRITE UR QL STRIP.AUTO: NEGATIVE
NRBC # BLD AUTO: 0 K/UL
NRBC BLD-RTO: 0 /100 WBC
PH UR STRIP.AUTO: 6 [PH] (ref 5–8)
PLATELET # BLD AUTO: 279 K/UL (ref 164–446)
PMV BLD AUTO: 10.7 FL (ref 9–12.9)
POTASSIUM SERPL-SCNC: 4.6 MMOL/L (ref 3.6–5.5)
PROT SERPL-MCNC: 6.5 G/DL (ref 6–8.2)
PROT UR QL STRIP: NEGATIVE MG/DL
RBC # BLD AUTO: 3.51 M/UL (ref 4.7–6.1)
RBC UR QL AUTO: NEGATIVE
SARS-COV+SARS-COV-2 AG RESP QL IA.RAPID: NOTDETECTED
SODIUM SERPL-SCNC: 127 MMOL/L (ref 135–145)
SP GR UR STRIP.AUTO: 1.02
SPECIMEN SOURCE: NORMAL
UROBILINOGEN UR STRIP.AUTO-MCNC: 0.2 MG/DL
WBC # BLD AUTO: 12.2 K/UL (ref 4.8–10.8)

## 2022-02-01 PROCEDURE — 99285 EMERGENCY DEPT VISIT HI MDM: CPT

## 2022-02-01 PROCEDURE — 80053 COMPREHEN METABOLIC PANEL: CPT

## 2022-02-01 PROCEDURE — 700102 HCHG RX REV CODE 250 W/ 637 OVERRIDE(OP): Performed by: EMERGENCY MEDICINE

## 2022-02-01 PROCEDURE — 96375 TX/PRO/DX INJ NEW DRUG ADDON: CPT

## 2022-02-01 PROCEDURE — 83605 ASSAY OF LACTIC ACID: CPT

## 2022-02-01 PROCEDURE — 700111 HCHG RX REV CODE 636 W/ 250 OVERRIDE (IP): Performed by: NURSE PRACTITIONER

## 2022-02-01 PROCEDURE — 85025 COMPLETE CBC W/AUTO DIFF WBC: CPT

## 2022-02-01 PROCEDURE — 36415 COLL VENOUS BLD VENIPUNCTURE: CPT

## 2022-02-01 PROCEDURE — 76705 ECHO EXAM OF ABDOMEN: CPT

## 2022-02-01 PROCEDURE — 93005 ELECTROCARDIOGRAM TRACING: CPT | Performed by: EMERGENCY MEDICINE

## 2022-02-01 PROCEDURE — 700101 HCHG RX REV CODE 250: Performed by: NURSE PRACTITIONER

## 2022-02-01 PROCEDURE — 700105 HCHG RX REV CODE 258: Performed by: HOSPITALIST

## 2022-02-01 PROCEDURE — 96374 THER/PROPH/DIAG INJ IV PUSH: CPT

## 2022-02-01 PROCEDURE — 700102 HCHG RX REV CODE 250 W/ 637 OVERRIDE(OP): Performed by: INTERNAL MEDICINE

## 2022-02-01 PROCEDURE — 700102 HCHG RX REV CODE 250 W/ 637 OVERRIDE(OP): Performed by: HOSPITALIST

## 2022-02-01 PROCEDURE — 87426 SARSCOV CORONAVIRUS AG IA: CPT

## 2022-02-01 PROCEDURE — 700102 HCHG RX REV CODE 250 W/ 637 OVERRIDE(OP): Performed by: NURSE PRACTITIONER

## 2022-02-01 PROCEDURE — A9270 NON-COVERED ITEM OR SERVICE: HCPCS | Performed by: EMERGENCY MEDICINE

## 2022-02-01 PROCEDURE — G0378 HOSPITAL OBSERVATION PER HR: HCPCS

## 2022-02-01 PROCEDURE — 87040 BLOOD CULTURE FOR BACTERIA: CPT

## 2022-02-01 PROCEDURE — 99225 PR SUBSEQUENT OBSERVATION CARE,LEVEL II: CPT | Performed by: SURGERY

## 2022-02-01 PROCEDURE — 83690 ASSAY OF LIPASE: CPT

## 2022-02-01 PROCEDURE — A9270 NON-COVERED ITEM OR SERVICE: HCPCS | Performed by: HOSPITALIST

## 2022-02-01 PROCEDURE — 81003 URINALYSIS AUTO W/O SCOPE: CPT

## 2022-02-01 PROCEDURE — A9270 NON-COVERED ITEM OR SERVICE: HCPCS | Performed by: NURSE PRACTITIONER

## 2022-02-01 PROCEDURE — 99220 PR INITIAL OBSERVATION CARE,LEVL III: CPT | Performed by: HOSPITALIST

## 2022-02-01 PROCEDURE — 87086 URINE CULTURE/COLONY COUNT: CPT

## 2022-02-01 PROCEDURE — 83735 ASSAY OF MAGNESIUM: CPT

## 2022-02-01 PROCEDURE — 700111 HCHG RX REV CODE 636 W/ 250 OVERRIDE (IP): Performed by: EMERGENCY MEDICINE

## 2022-02-01 PROCEDURE — A9270 NON-COVERED ITEM OR SERVICE: HCPCS | Performed by: INTERNAL MEDICINE

## 2022-02-01 PROCEDURE — 700105 HCHG RX REV CODE 258: Performed by: EMERGENCY MEDICINE

## 2022-02-01 PROCEDURE — 71045 X-RAY EXAM CHEST 1 VIEW: CPT

## 2022-02-01 RX ORDER — METRONIDAZOLE 500 MG/1
500 TABLET ORAL EVERY 12 HOURS
Status: DISCONTINUED | OUTPATIENT
Start: 2022-02-01 | End: 2022-02-03 | Stop reason: HOSPADM

## 2022-02-01 RX ORDER — TAMSULOSIN HYDROCHLORIDE 0.4 MG/1
0.8 CAPSULE ORAL DAILY
Status: CANCELLED | OUTPATIENT
Start: 2022-02-01

## 2022-02-01 RX ORDER — AMOXICILLIN 250 MG
2 CAPSULE ORAL 2 TIMES DAILY
Status: DISCONTINUED | OUTPATIENT
Start: 2022-02-01 | End: 2022-02-03 | Stop reason: HOSPADM

## 2022-02-01 RX ORDER — METRONIDAZOLE 500 MG/1
500 TABLET ORAL EVERY 8 HOURS
Status: DISCONTINUED | OUTPATIENT
Start: 2022-02-01 | End: 2022-02-01

## 2022-02-01 RX ORDER — ONDANSETRON 4 MG/1
4 TABLET, ORALLY DISINTEGRATING ORAL EVERY 4 HOURS PRN
Status: DISCONTINUED | OUTPATIENT
Start: 2022-02-01 | End: 2022-02-02

## 2022-02-01 RX ORDER — LEVOTHYROXINE SODIUM 112 UG/1
112 TABLET ORAL
Status: DISCONTINUED | OUTPATIENT
Start: 2022-02-02 | End: 2022-02-03 | Stop reason: HOSPADM

## 2022-02-01 RX ORDER — SODIUM CHLORIDE, SODIUM LACTATE, POTASSIUM CHLORIDE, AND CALCIUM CHLORIDE .6; .31; .03; .02 G/100ML; G/100ML; G/100ML; G/100ML
30 INJECTION, SOLUTION INTRAVENOUS ONCE
Status: COMPLETED | OUTPATIENT
Start: 2022-02-01 | End: 2022-02-01

## 2022-02-01 RX ORDER — CHLORPROMAZINE HYDROCHLORIDE 50 MG/1
50 TABLET, FILM COATED ORAL 3 TIMES DAILY PRN
Status: DISCONTINUED | OUTPATIENT
Start: 2022-02-01 | End: 2022-02-02

## 2022-02-01 RX ORDER — CHLORPROMAZINE HYDROCHLORIDE 50 MG/1
50 TABLET, FILM COATED ORAL ONCE
Status: COMPLETED | OUTPATIENT
Start: 2022-02-01 | End: 2022-02-01

## 2022-02-01 RX ORDER — ACETAMINOPHEN 325 MG/1
650 TABLET ORAL EVERY 6 HOURS PRN
Status: DISCONTINUED | OUTPATIENT
Start: 2022-02-01 | End: 2022-02-03 | Stop reason: HOSPADM

## 2022-02-01 RX ORDER — ATORVASTATIN CALCIUM 40 MG/1
40 TABLET, FILM COATED ORAL EVERY EVENING
Status: CANCELLED | OUTPATIENT
Start: 2022-02-01

## 2022-02-01 RX ORDER — TAMSULOSIN HYDROCHLORIDE 0.4 MG/1
0.8 CAPSULE ORAL EVERY EVENING
Status: DISCONTINUED | OUTPATIENT
Start: 2022-02-01 | End: 2022-02-03 | Stop reason: HOSPADM

## 2022-02-01 RX ORDER — AMITRIPTYLINE HYDROCHLORIDE 25 MG/1
25 TABLET, FILM COATED ORAL NIGHTLY
Status: DISCONTINUED | OUTPATIENT
Start: 2022-02-01 | End: 2022-02-03 | Stop reason: HOSPADM

## 2022-02-01 RX ORDER — HYDROCODONE BITARTRATE AND ACETAMINOPHEN 10; 325 MG/1; MG/1
1 TABLET ORAL EVERY 6 HOURS PRN
Status: CANCELLED | OUTPATIENT
Start: 2022-02-01

## 2022-02-01 RX ORDER — ATORVASTATIN CALCIUM 40 MG/1
40 TABLET, FILM COATED ORAL EVERY EVENING
Status: DISCONTINUED | OUTPATIENT
Start: 2022-02-01 | End: 2022-02-03 | Stop reason: HOSPADM

## 2022-02-01 RX ORDER — HYDROCODONE BITARTRATE AND ACETAMINOPHEN 10; 325 MG/1; MG/1
1 TABLET ORAL EVERY 6 HOURS PRN
Status: ON HOLD | COMMUNITY
Start: 2022-01-28 | End: 2022-02-03

## 2022-02-01 RX ORDER — LEVOTHYROXINE SODIUM 112 UG/1
112 TABLET ORAL
Status: CANCELLED | OUTPATIENT
Start: 2022-02-01

## 2022-02-01 RX ORDER — POLYETHYLENE GLYCOL 3350 17 G/17G
1 POWDER, FOR SOLUTION ORAL
Status: DISCONTINUED | OUTPATIENT
Start: 2022-02-01 | End: 2022-02-03 | Stop reason: HOSPADM

## 2022-02-01 RX ORDER — BISACODYL 10 MG
10 SUPPOSITORY, RECTAL RECTAL
Status: DISCONTINUED | OUTPATIENT
Start: 2022-02-01 | End: 2022-02-03 | Stop reason: HOSPADM

## 2022-02-01 RX ORDER — SODIUM CHLORIDE, SODIUM LACTATE, POTASSIUM CHLORIDE, AND CALCIUM CHLORIDE .6; .31; .03; .02 G/100ML; G/100ML; G/100ML; G/100ML
500 INJECTION, SOLUTION INTRAVENOUS
Status: COMPLETED | OUTPATIENT
Start: 2022-02-01 | End: 2022-02-02

## 2022-02-01 RX ORDER — MORPHINE SULFATE 4 MG/ML
4 INJECTION INTRAVENOUS ONCE
Status: COMPLETED | OUTPATIENT
Start: 2022-02-01 | End: 2022-02-01

## 2022-02-01 RX ORDER — AMLODIPINE BESYLATE 10 MG/1
5 TABLET ORAL DAILY
Status: DISCONTINUED | OUTPATIENT
Start: 2022-02-02 | End: 2022-02-03 | Stop reason: HOSPADM

## 2022-02-01 RX ORDER — ZOLPIDEM TARTRATE 5 MG/1
5 TABLET ORAL NIGHTLY PRN
Status: CANCELLED | OUTPATIENT
Start: 2022-02-01

## 2022-02-01 RX ORDER — SODIUM CHLORIDE, SODIUM LACTATE, POTASSIUM CHLORIDE, CALCIUM CHLORIDE 600; 310; 30; 20 MG/100ML; MG/100ML; MG/100ML; MG/100ML
INJECTION, SOLUTION INTRAVENOUS CONTINUOUS
Status: DISCONTINUED | OUTPATIENT
Start: 2022-02-01 | End: 2022-02-02

## 2022-02-01 RX ORDER — LISINOPRIL 20 MG/1
20 TABLET ORAL DAILY
Status: DISCONTINUED | OUTPATIENT
Start: 2022-02-01 | End: 2022-02-01

## 2022-02-01 RX ORDER — ONDANSETRON 2 MG/ML
4 INJECTION INTRAMUSCULAR; INTRAVENOUS EVERY 4 HOURS PRN
Status: DISCONTINUED | OUTPATIENT
Start: 2022-02-01 | End: 2022-02-02

## 2022-02-01 RX ORDER — LABETALOL HYDROCHLORIDE 5 MG/ML
10 INJECTION, SOLUTION INTRAVENOUS EVERY 4 HOURS PRN
Status: DISCONTINUED | OUTPATIENT
Start: 2022-02-01 | End: 2022-02-03 | Stop reason: HOSPADM

## 2022-02-01 RX ORDER — ALBUTEROL SULFATE 90 UG/1
2 AEROSOL, METERED RESPIRATORY (INHALATION) EVERY 4 HOURS PRN
Status: CANCELLED | OUTPATIENT
Start: 2022-02-01

## 2022-02-01 RX ORDER — ONDANSETRON 2 MG/ML
4 INJECTION INTRAMUSCULAR; INTRAVENOUS ONCE
Status: COMPLETED | OUTPATIENT
Start: 2022-02-01 | End: 2022-02-01

## 2022-02-01 RX ORDER — ALBUTEROL SULFATE 90 UG/1
2 AEROSOL, METERED RESPIRATORY (INHALATION) EVERY 4 HOURS PRN
COMMUNITY

## 2022-02-01 RX ORDER — AMLODIPINE BESYLATE 5 MG/1
5 TABLET ORAL DAILY
Status: CANCELLED | OUTPATIENT
Start: 2022-02-01

## 2022-02-01 RX ADMIN — SODIUM CHLORIDE, POTASSIUM CHLORIDE, SODIUM LACTATE AND CALCIUM CHLORIDE 2244 ML: 600; 310; 30; 20 INJECTION, SOLUTION INTRAVENOUS at 09:01

## 2022-02-01 RX ADMIN — SODIUM CHLORIDE, POTASSIUM CHLORIDE, SODIUM LACTATE AND CALCIUM CHLORIDE: 600; 310; 30; 20 INJECTION, SOLUTION INTRAVENOUS at 14:00

## 2022-02-01 RX ADMIN — CHLORPROMAZINE HYDROCHLORIDE 50 MG: 50 TABLET, SUGAR COATED ORAL at 15:56

## 2022-02-01 RX ADMIN — ONDANSETRON 4 MG: 2 INJECTION INTRAMUSCULAR; INTRAVENOUS at 09:44

## 2022-02-01 RX ADMIN — CEFTRIAXONE SODIUM 1 G: 10 INJECTION, POWDER, FOR SOLUTION INTRAVENOUS at 20:10

## 2022-02-01 RX ADMIN — AMITRIPTYLINE HYDROCHLORIDE 25 MG: 25 TABLET, FILM COATED ORAL at 20:14

## 2022-02-01 RX ADMIN — MORPHINE SULFATE 4 MG: 4 INJECTION INTRAVENOUS at 09:44

## 2022-02-01 RX ADMIN — METRONIDAZOLE 500 MG: 500 TABLET ORAL at 18:18

## 2022-02-01 RX ADMIN — ATORVASTATIN CALCIUM 40 MG: 40 TABLET, FILM COATED ORAL at 18:18

## 2022-02-01 RX ADMIN — CHLORPROMAZINE HYDROCHLORIDE 50 MG: 50 TABLET, SUGAR COATED ORAL at 10:59

## 2022-02-01 RX ADMIN — TAMSULOSIN HYDROCHLORIDE 0.8 MG: 0.4 CAPSULE ORAL at 18:18

## 2022-02-01 RX ADMIN — METRONIDAZOLE 500 MG: 500 TABLET ORAL at 11:13

## 2022-02-01 ASSESSMENT — COGNITIVE AND FUNCTIONAL STATUS - GENERAL
HELP NEEDED FOR BATHING: A LITTLE
SUGGESTED CMS G CODE MODIFIER MOBILITY: CK
DRESSING REGULAR LOWER BODY CLOTHING: A LITTLE
MOVING TO AND FROM BED TO CHAIR: A LITTLE
SUGGESTED CMS G CODE MODIFIER DAILY ACTIVITY: CJ
CLIMB 3 TO 5 STEPS WITH RAILING: A LITTLE
STANDING UP FROM CHAIR USING ARMS: A LITTLE
WALKING IN HOSPITAL ROOM: A LITTLE
DRESSING REGULAR UPPER BODY CLOTHING: A LITTLE
TURNING FROM BACK TO SIDE WHILE IN FLAT BAD: A LITTLE
TOILETING: A LITTLE
DAILY ACTIVITIY SCORE: 20
MOVING FROM LYING ON BACK TO SITTING ON SIDE OF FLAT BED: A LITTLE
MOBILITY SCORE: 18

## 2022-02-01 ASSESSMENT — LIFESTYLE VARIABLES
AVERAGE NUMBER OF DAYS PER WEEK YOU HAVE A DRINK CONTAINING ALCOHOL: 0
HAVE PEOPLE ANNOYED YOU BY CRITICIZING YOUR DRINKING: NO
TOTAL SCORE: 0
ON A TYPICAL DAY WHEN YOU DRINK ALCOHOL HOW MANY DRINKS DO YOU HAVE: 0
ALCOHOL_USE: NO
EVER FELT BAD OR GUILTY ABOUT YOUR DRINKING: NO
TOTAL SCORE: 0
EVER HAD A DRINK FIRST THING IN THE MORNING TO STEADY YOUR NERVES TO GET RID OF A HANGOVER: NO
HOW MANY TIMES IN THE PAST YEAR HAVE YOU HAD 5 OR MORE DRINKS IN A DAY: 0
TOTAL SCORE: 0
DOES PATIENT WANT TO STOP DRINKING: NO
CONSUMPTION TOTAL: NEGATIVE
HAVE YOU EVER FELT YOU SHOULD CUT DOWN ON YOUR DRINKING: NO

## 2022-02-01 ASSESSMENT — ENCOUNTER SYMPTOMS
HEADACHES: 0
DIZZINESS: 0
ABDOMINAL PAIN: 1
CLAUDICATION: 0
HEARTBURN: 1
BLURRED VISION: 0
COUGH: 0
NAUSEA: 1
MUSCULOSKELETAL NEGATIVE: 1
SHORTNESS OF BREATH: 1
TINGLING: 0
DIARRHEA: 1
BLOOD IN STOOL: 0
SORE THROAT: 1
DOUBLE VISION: 0
CONSTIPATION: 0
DEPRESSION: 0
VOMITING: 0
PALPITATIONS: 0
FEVER: 0
CHILLS: 0

## 2022-02-01 ASSESSMENT — PAIN DESCRIPTION - PAIN TYPE
TYPE: ACUTE PAIN
TYPE: ACUTE PAIN

## 2022-02-01 ASSESSMENT — PATIENT HEALTH QUESTIONNAIRE - PHQ9
1. LITTLE INTEREST OR PLEASURE IN DOING THINGS: NOT AT ALL
SUM OF ALL RESPONSES TO PHQ9 QUESTIONS 1 AND 2: 0
2. FEELING DOWN, DEPRESSED, IRRITABLE, OR HOPELESS: NOT AT ALL

## 2022-02-01 ASSESSMENT — COPD QUESTIONNAIRES
DO YOU EVER COUGH UP ANY MUCUS OR PHLEGM?: NO/ONLY WITH OCCASIONAL COLDS OR INFECTIONS
DURING THE PAST 4 WEEKS HOW MUCH DID YOU FEEL SHORT OF BREATH: NONE/LITTLE OF THE TIME
COPD SCREENING SCORE: 4
HAVE YOU SMOKED AT LEAST 100 CIGARETTES IN YOUR ENTIRE LIFE: YES

## 2022-02-01 ASSESSMENT — FIBROSIS 4 INDEX: FIB4 SCORE: 2.12

## 2022-02-01 NOTE — ASSESSMENT & PLAN NOTE
WBC elevated to 12.2 on presentation  Currently afebrile  Likely secondary to cholecystitis   Continue antibiotic coverage  Trend CBC

## 2022-02-01 NOTE — ED NOTES
Med rec completed per pt and home pharmacy ([facility])  Allergies reviewed    Pt is on a 14 day course of Rocephin that started on 1/27/2022

## 2022-02-01 NOTE — ED NOTES
1 set blood cultures collected from PICC line, 2nd set from R FA. Dry COVID swab, UA collected and sent. Awaiting US, needs met.

## 2022-02-01 NOTE — PROGRESS NOTES
Patient arrived to T432 via gurney with ER tech   VSS  Pain well managed   Pt A&Ox4 Patient is hard of heading, no hearing aids with patient   Tolerating NPO diet, denies n/v. + bowel sounds, +flatus, LBM PTA   +void   Saturating >90% on room air   Denies SOB    Pt ambulates with standby assistance, bed alarm in place for patient safety     Updated on plan of care. Safety education provided. Bed locked in low. Call light within reach. Rounding in place.

## 2022-02-01 NOTE — ED PROVIDER NOTES
"ED Provider Note    CHIEF COMPLAINT  Chief Complaint   Patient presents with   • Nausea     x 1 week. denies vomiting. pt \"asking for\" gastronterologist referral   • Abdominal Pain     it feels like \"gas and pressure\" and it happens periodically. last bowel movement was last night and i had diarrhea. \"only had 1 episode\"       HPI  Emigdio Jiménez is a 86 y.o. male who presents with a history of hypertension, status post TAVR, the patient presented 10 days ago with bacteremia of unknown cause.  At that time he had a TAVR that was negative for endocarditis.  He had a PICC line placed and has been taking Rocephin IV daily.  The patient at that time had epigastric pain and was found to have gallstones on his ultrasound.  The patient returns today stating that he is having severe epigastric pain and right upper quadrant pain after eating.  He denies radiation of the pain to the back.  He denies cough, he says he has intermittent diarrhea, he has had nausea with no vomiting.  He was told he discharged to follow-up with general surgeon which she did not do to have his gallbladder removed.  The patient is back with continuing epigastric pain symptoms after eating.    REVIEW OF SYSTEMS  See HPI for further details. All other systems are negative.     PAST MEDICAL HISTORY   has a past medical history of Anxiety, Heart murmur, and Hypertension.    SOCIAL HISTORY  Social History     Tobacco Use   • Smoking status: Former Smoker     Packs/day: 0.00   • Smokeless tobacco: Never Used   Vaping Use   • Vaping Use: Never used   Substance and Sexual Activity   • Alcohol use: Not Currently   • Drug use: Never   • Sexual activity: Not on file       SURGICAL HISTORY   has a past surgical history that includes hernia repair.    CURRENT MEDICATIONS  Home Medications     Reviewed by Zoë Chung R.N. (Registered Nurse) on 02/01/22 at 0762  Med List Status: Partial   Medication Last Dose Status   amitriptyline (ELAVIL) 25 MG Tab  Active " "  amLODIPine (NORVASC) 5 MG Tab  Active   atorvastatin (LIPITOR) 40 MG Tab  Active   cefTRIAXone (ROCEPHIN) 1 g/10 mL  Active   levothyroxine (SYNTHROID) 112 MCG Tab  Active   lisinopril (PRINIVIL) 20 MG Tab  Active   ondansetron (ZOFRAN ODT) 4 MG TABLET DISPERSIBLE  Active   tamsulosin (FLOMAX) 0.4 MG capsule  Active   zolpidem (AMBIEN) 5 MG Tab  Active                ALLERGIES  No Known Allergies    FAMILY HISTORY  No pertinent family history    PHYSICAL EXAM  VITAL SIGNS: /53   Pulse 94   Temp 36.9 °C (98.4 °F) (Temporal)   Resp 18   Ht 1.676 m (5' 6\")   Wt 74.8 kg (164 lb 14.5 oz)   SpO2 97%   BMI 26.62 kg/m²  @WAGNER[[id number]::@   Pulse ox interpretation: I interpret this pulse ox as normal.  Constitutional: Alert.  HENT: No signs of trauma, Bilateral external ears normal, Nose normal.   Eyes: Pupils are equal and reactive, Conjunctiva normal, Non-icteric.   Neck: Normal range of motion, No tenderness, Supple, No stridor.   Lymphatic: No lymphadenopathy noted.   Cardiovascular: Regular rate and rhythm, no murmurs.   Thorax & Lungs: Normal breath sounds, No respiratory distress, No wheezing, No chest tenderness.   Abdomen: Bowel sounds normal, Soft, epigastric tenderness.  No peritoneal signs.  Skin: Warm, Dry, No erythema, No rash.   Back: No bony tenderness, No CVA tenderness.   Extremities: Intact distal pulses, No edema, No tenderness, No cyanosis.  Musculoskeletal: Good range of motion in all major joints. No tenderness to palpation or major deformities noted.   Neurologic: Alert , Normal motor function, Normal sensory function, No focal deficits noted.   Psychiatric: Affect normal, Judgment normal, Mood normal.       DIAGNOSTIC STUDIES / PROCEDURES    EKG  This is a 12-lead EKG interpretation by myself.  It is normal sinus rhythm at a rate of 80.  The axis is normal.  The intervals are normal.  There is no ST elevation or depression.  My impression of this EKG, does not indicate ischemia nor " "arrhythmia at this time.    LABS  Labs Reviewed   CBC WITH DIFFERENTIAL - Abnormal; Notable for the following components:       Result Value    WBC 12.2 (*)     RBC 3.51 (*)     Hemoglobin 10.2 (*)     Hematocrit 31.2 (*)     MCHC 32.7 (*)     Neutrophils-Polys 75.40 (*)     Lymphocytes 11.40 (*)     Immature Granulocytes 1.00 (*)     Neutrophils (Absolute) 9.16 (*)     Monos (Absolute) 1.31 (*)     Immature Granulocytes (abs) 0.12 (*)     All other components within normal limits   COMP METABOLIC PANEL - Abnormal; Notable for the following components:    Sodium 127 (*)     Glucose 101 (*)     Creatinine 1.76 (*)     All other components within normal limits   ESTIMATED GFR - Abnormal; Notable for the following components:    GFR If  45 (*)     GFR If Non  37 (*)     All other components within normal limits   LIPASE   URINALYSIS    Narrative:     Indication for culture:->Patient WITHOUT an indwelling Harrison  catheter in place with new onset of Dysuria, Frequency,  Urgency, and/or Suprapubic pain   BLOOD CULTURE    Narrative:     Per Hospital Policy: Only change Specimen Src: to \"Line\" if  specified by physician order.   BLOOD CULTURE    Narrative:     Per Hospital Policy: Only change Specimen Src: to \"Line\" if  specified by physician order.   URINE CULTURE(NEW)    Narrative:     Indication for culture:->Patient WITHOUT an indwelling Harrison  catheter in place with new onset of Dysuria, Frequency,  Urgency, and/or Suprapubic pain   SARS-COV ANTIGEN MARIE         RADIOLOGY  US-RUQ   Final Result      1.  There is a single gallstone with mild gallbladder wall thickening but no biliary dilatation.      DX-CHEST-PORTABLE (1 VIEW)   Final Result      No acute cardiopulmonary abnormality.              COURSE & MEDICAL DECISION MAKING  Pertinent Labs & Imaging studies reviewed. (See chart for details)    Differential diagnosis: Sepsis, bacteremia, biliary colic, cholelithiasis, " cholecystitis    The patient presents with epigastric pain after eating, labs were ordered, blood cultures were ordered, urine and chest x-ray were ordered.    I have ordered a 30 cc/kg IV LR bolus after seeing that the patient's creatinine is elevated and he has an elevated white blood count which she did not have on previous visits.    I spoke with Dr. Sheridan of surgery, he will consult on the patient for possible cholecystectomy.    I spoke with Dr. Devi of infectious disease, she will consult on the patient.    I spoke with the [facility] hospitalist who will assess the patient for hospitalization.  The patient is in fair condition at the time of hospitalization.        FINAL IMPRESSION  1. Epigastric pain     2. Other elevated white blood cell (WBC) count     3. Acute renal failure, unspecified acute renal failure type (HCC)     4. Calculus of gallbladder without cholecystitis without obstruction                Electronically signed by: Jose Alfredo Jackson M.D., 2/1/2022 8:56 AM

## 2022-02-01 NOTE — CONSULTS
Patient is return to the ER complaining of right upper quadrant abdominal pain and with new leukocytosis despite ongoing therapy with ceftriaxone for previously diagnosed Streptococcus bacteremia.  Patient had right upper quadrant pain last visit and imaging without any definitive cholecystitis but stones were noted.  No clear source for the Streptococcus bacteremia but certainly could be associated with gall bladder or GI inflammation.  Right upper quad ultrasound today with gallstones and mild bladder wall thickening.    --- Continue ceftriaxone  --- Will add Flagyl  --- Will follow outstanding cultures  --- Agree with surgical assessment and potential removal of the gallbladder as this is a continued source of pain for the patient and per discussion with the ED doctor it is limiting his oral intake.    ID will see the patient when he comes to the floor.    Patricia Devi MD

## 2022-02-01 NOTE — H&P
"Hospital Medicine History & Physical Note    Date of Service  2/1/2022    Primary Care Physician  LIZ Flores.    Consultants  GI and infectious disease    Specialist Names: Dr. Devi ID                      Dr. Sheridan Surgery    Code Status  Full Code    Chief Complaint  Chief Complaint   Patient presents with   • Nausea     x 1 week. denies vomiting. pt \"asking for\" gastronterologist referral   • Abdominal Pain     it feels like \"gas and pressure\" and it happens periodically. last bowel movement was last night and i had diarrhea. \"only had 1 episode\"       History of Presenting Illness  Emigdio Jiménez is a 86 y.o. male who presents with a history of hypertension, status post TAVR. The patient presented 10 days ago with bacteremia of unknown cause.  At that time he had a TAVR that was negative for endocarditis. The patient was also experiencing epigastric pain at that time and an abdominal ultrasound demonstrated gallstones. A PICC line was placed and has been taking Rocephin IV daily.    The patient returns today stating that he is having severe epigastric pain and right upper quadrant pain after eating, Pain does not radiate to back. Relief is achieved while leaning forward and with small sips of water. He denies cough but reports shortness of breath secondary to hiccups. Patient has experienced intermittent nausea without vomiting and diarrhea x1 week following antibiotic administration. Of note, the patient was told on last discharge to follow-up with general surgeon to have gallbladder removed, which he did not do.    Labs in the ED reveal leukocytosis of 12.2,  hyponatremia of 127, and elevated creatinine to 1.76. UA appears negative for UTI and COVID swab is negative. ID Dr. Devi was consulted who continued ceftriaxone and added Flagyl. Dr. Sheridan with Surgery was also consulted for possible cholecystectomy.     I discussed the plan of care with patient and bedside RN.    Review of " Systems  Review of Systems   Constitutional: Positive for malaise/fatigue. Negative for chills and fever.   HENT: Positive for hearing loss (Hard of hearing) and sore throat. Negative for congestion, ear pain and tinnitus.    Eyes: Negative for blurred vision and double vision.   Respiratory: Positive for shortness of breath (with hiccups). Negative for cough.    Cardiovascular: Negative for chest pain, palpitations and claudication.   Gastrointestinal: Positive for abdominal pain, diarrhea, heartburn and nausea. Negative for blood in stool, constipation and vomiting.   Genitourinary: Negative for dysuria and urgency.   Musculoskeletal: Negative.    Neurological: Negative for dizziness, tingling and headaches.   Psychiatric/Behavioral: Negative for depression and suicidal ideas.   All other systems reviewed and are negative.      Past Medical History   has a past medical history of Anxiety, Cholecystitis (01/18/2022), Heart murmur, and Hypertension.    Surgical History   has a past surgical history that includes hernia repair.     Family History  family history includes Heart Disease in his mother; Hyperlipidemia in his mother.   Family history reviewed with patient. There is no family history that is pertinent to the chief complaint.     Social History   reports that he has quit smoking. He smoked 0.00 packs per day. He has never used smokeless tobacco. He reports previous alcohol use. He reports that he does not use drugs.    Allergies  No Known Allergies    Medications  Prior to Admission Medications   Prescriptions Last Dose Informant Patient Reported? Taking?   HYDROcodone/acetaminophen (NORCO)  MG Tab PRN at PRN Patient's Home Pharmacy Yes Yes   Sig: Take 1 Tablet by mouth every 6 hours as needed for Moderate Pain. 3 day course   albuterol 108 (90 Base) MCG/ACT Aero Soln inhalation aerosol PRN at PRN Patient's Home Pharmacy Yes Yes   Sig: Inhale 2 Puffs every four hours as needed for Shortness of Breath.    amLODIPine (NORVASC) 5 MG Tab 2/1/2022 at AM Patient's Home Pharmacy No No   Sig: Take 1 Tablet by mouth every day.   amitriptyline (ELAVIL) 25 MG Tab 1/31/2022 at PM Patient's Home Pharmacy Yes No   Sig: Take 25 mg by mouth every evening.   atorvastatin (LIPITOR) 40 MG Tab 1/31/2022 at PM Patient's Home Pharmacy No No   Sig: Take 1 Tablet by mouth every evening.   cefTRIAXone (ROCEPHIN) 1 g/10 mL 1/31/2022 at PM Patient's Home Pharmacy No No   Sig: Infuse 10 mL into a venous catheter every evening for 14 days.   levothyroxine (SYNTHROID) 112 MCG Tab 2/1/2022 at AM Patient's Home Pharmacy No No   Sig: Take 1 Tablet by mouth every morning on an empty stomach.   lisinopril (PRINIVIL) 20 MG Tab 2/1/2022 at AM Patient's Home Pharmacy Yes No   Sig: Take 20 mg by mouth every day.   ondansetron (ZOFRAN ODT) 4 MG TABLET DISPERSIBLE UNK at [facility] Patient's Home Pharmacy No No   Sig: Take 1 Tablet by mouth every four hours as needed for Nausea (give PO if IV route is unavailable.).   tamsulosin (FLOMAX) 0.4 MG capsule 1/31/2022 at PM Patient's Home Pharmacy Yes No   Sig: Take 0.8 mg by mouth every day.   zolpidem (AMBIEN) 5 MG Tab UNK at [facility] Patient's Home Pharmacy No No   Sig: Take 1 Tablet by mouth at bedtime as needed for Sleep for up to 30 days.      Facility-Administered Medications: None       Physical Exam  Temp:  [36.9 °C (98.4 °F)] 36.9 °C (98.4 °F)  Pulse:  [85-94] 85  Resp:  [16-18] 16  BP: (100-136)/(53-62) 136/62  SpO2:  [97 %-99 %] 99 %  Blood Pressure : 136/62   Temperature: 36.9 °C (98.4 °F)   Pulse: 85   Respiration: 16   Pulse Oximetry: 99 %       Physical Exam  Vitals and nursing note reviewed.   Constitutional:       General: He is not in acute distress.     Appearance: Normal appearance.   HENT:      Head: Normocephalic and atraumatic.      Ears:      Comments: Hard of hearing on exam     Nose: Nose normal.      Mouth/Throat:      Mouth: Mucous membranes are moist.      Pharynx: Oropharynx is clear.    Eyes:      Extraocular Movements: Extraocular movements intact.      Pupils: Pupils are equal, round, and reactive to light.   Cardiovascular:      Rate and Rhythm: Normal rate and regular rhythm.      Pulses: Normal pulses.      Heart sounds: Normal heart sounds. No murmur heard.  No friction rub. No gallop.    Pulmonary:      Effort: Pulmonary effort is normal.      Breath sounds: Normal breath sounds. No wheezing, rhonchi or rales.   Abdominal:      General: Abdomen is flat. Bowel sounds are normal. There is no distension.      Palpations: Abdomen is soft.      Tenderness: There is abdominal tenderness (RUQ to palpation).   Musculoskeletal:         General: Normal range of motion.   Skin:     General: Skin is warm and dry.      Capillary Refill: Capillary refill takes less than 2 seconds.      Findings: Bruising (Left dorsal hand) present.   Neurological:      General: No focal deficit present.      Mental Status: He is alert and oriented to person, place, and time.   Psychiatric:         Mood and Affect: Mood normal.         Behavior: Behavior normal.         Laboratory:  Recent Labs     02/01/22  0743   WBC 12.2*   RBC 3.51*   HEMOGLOBIN 10.2*   HEMATOCRIT 31.2*   MCV 88.9   MCH 29.1   MCHC 32.7*   RDW 47.9   PLATELETCT 279   MPV 10.7     Recent Labs     02/01/22  0743   SODIUM 127*   POTASSIUM 4.6   CHLORIDE 96   CO2 21   GLUCOSE 101*   BUN 22   CREATININE 1.76*   CALCIUM 8.7     Recent Labs     02/01/22  0743   ALTSGPT 18   ASTSGOT 17   ALKPHOSPHAT 79   TBILIRUBIN 0.6   LIPASE 19   GLUCOSE 101*         No results for input(s): NTPROBNP in the last 72 hours.      No results for input(s): TROPONINT in the last 72 hours.    Imaging:  US-RUQ   Final Result      1.  There is a single gallstone with mild gallbladder wall thickening but no biliary dilatation.      DX-CHEST-PORTABLE (1 VIEW)   Final Result      No acute cardiopulmonary abnormality.          X-Ray:  I have personally reviewed the images and compared  with prior images.    Assessment/Plan:  I anticipate this patient is appropriate for observation status at this time.    * Acute cholecystitis- (present on admission)  Assessment & Plan  Abdominal ultrasound demonstrates a single gallstone with mild gallbladder wall thickening but no biliary dilatation  ID consulted, appreciate recs  Continue with course of ceftriaxone  Start Flagyl  Will consult with GI     Leukocytosis  Assessment & Plan  WBC elevated to 12.2 on presentation  Currently afebrile  Likely secondary to cholecystitis   Continue antibiotic coverage  Trend CBC    DANIS (acute kidney injury) (HCC)- (present on admission)  Assessment & Plan  BUN/Creatinine 22/1.76  Likely pre-renal  IVF hydration with LR  Avoid nephrotoxic agents    Hyponatremia- (present on admission)  Assessment & Plan  Appears to be chronic, Na 127 on admission  Monitor neuro status  [facility] CMP    History of transcatheter aortic valve replacement (TAVR)- (present on admission)  Assessment & Plan  Recent history, no evidence of endocarditis  On ceftriaxone for bacteremic coverage, Flagly added for atypical coverage    Essential hypertension- (present on admission)  Assessment & Plan  Resume home Norvasc  Hold home lisinopril in setting of DANIS  Will add as needed anti-hypertensives    Normocytic anemia- (present on admission)  Assessment & Plan  History of, stable trend. No signs/symptoms of acute blood loss  Continue to monitor CBC    Hypothyroidism- (present on admission)  Assessment & Plan  Continue home levothyroxine      VTE prophylaxis: SCDs/TEDs and pharmacologic prophylaxis contraindicated due to possible OR

## 2022-02-01 NOTE — ED TRIAGE NOTES
"Chief Complaint   Patient presents with   • Nausea     x 1 week. denies vomiting. pt \"asking for\" gastronterologist referral   • Abdominal Pain     it feels like \"gas and pressure\" and it happens periodically. last bowel movement was last night and i had diarrhea. \"only had 1 episode\"     Pt is hard of hearing. Educated on triage process. Instructed to notify staff for any worsening symptoms. Denies any recent travel. Denies exposure to known covid positive patients. Denies any respiratory symptoms.   "

## 2022-02-01 NOTE — CARE PLAN
The patient is Stable - Low risk of patient condition declining or worsening         Progress made toward(s) clinical / shift goals:        Problem: Knowledge Deficit - Standard  Goal: Patient and family/care givers will demonstrate understanding of plan of care, disease process/condition, diagnostic tests and medications  Outcome: Progressing  Note: All questions and concerns addressed with patient, patient verbalizes understanding of current plan of care      Problem: Pain - Standard  Goal: Alleviation of pain or a reduction in pain to the patient’s comfort goal  Outcome: Progressing  Note: Pain managed with prescribed medications and nonpharmalogical pain interventions        Patient is not progressing towards the following goals:

## 2022-02-02 ENCOUNTER — ANESTHESIA (OUTPATIENT)
Dept: SURGERY | Facility: MEDICAL CENTER | Age: 87
End: 2022-02-02
Payer: MEDICARE

## 2022-02-02 ENCOUNTER — ANESTHESIA EVENT (OUTPATIENT)
Dept: SURGERY | Facility: MEDICAL CENTER | Age: 87
End: 2022-02-02
Payer: MEDICARE

## 2022-02-02 PROBLEM — K80.10 CHOLELITHIASIS WITH CHOLECYSTITIS: Status: ACTIVE | Noted: 2022-02-01

## 2022-02-02 LAB
ALBUMIN SERPL BCP-MCNC: 2.7 G/DL (ref 3.2–4.9)
ALBUMIN/GLOB SERPL: 1 G/DL
ALP SERPL-CCNC: 61 U/L (ref 30–99)
ALT SERPL-CCNC: 12 U/L (ref 2–50)
ANION GAP SERPL CALC-SCNC: 9 MMOL/L (ref 7–16)
AST SERPL-CCNC: 11 U/L (ref 12–45)
BASOPHILS # BLD AUTO: 0.8 % (ref 0–1.8)
BASOPHILS # BLD: 0.07 K/UL (ref 0–0.12)
BILIRUB SERPL-MCNC: 0.6 MG/DL (ref 0.1–1.5)
BUN SERPL-MCNC: 14 MG/DL (ref 8–22)
CALCIUM SERPL-MCNC: 8.5 MG/DL (ref 8.5–10.5)
CHLORIDE SERPL-SCNC: 99 MMOL/L (ref 96–112)
CHOLEST SERPL-MCNC: 102 MG/DL (ref 100–199)
CO2 SERPL-SCNC: 20 MMOL/L (ref 20–33)
CREAT SERPL-MCNC: 1.17 MG/DL (ref 0.5–1.4)
EOSINOPHIL # BLD AUTO: 0.08 K/UL (ref 0–0.51)
EOSINOPHIL NFR BLD: 0.9 % (ref 0–6.9)
ERYTHROCYTE [DISTWIDTH] IN BLOOD BY AUTOMATED COUNT: 47.1 FL (ref 35.9–50)
GLOBULIN SER CALC-MCNC: 2.7 G/DL (ref 1.9–3.5)
GLUCOSE SERPL-MCNC: 84 MG/DL (ref 65–99)
HCT VFR BLD AUTO: 25.7 % (ref 42–52)
HDLC SERPL-MCNC: 31 MG/DL
HGB BLD-MCNC: 8.5 G/DL (ref 14–18)
IMM GRANULOCYTES # BLD AUTO: 0.04 K/UL (ref 0–0.11)
IMM GRANULOCYTES NFR BLD AUTO: 0.4 % (ref 0–0.9)
LDLC SERPL CALC-MCNC: 56 MG/DL
LYMPHOCYTES # BLD AUTO: 1.04 K/UL (ref 1–4.8)
LYMPHOCYTES NFR BLD: 11.4 % (ref 22–41)
MCH RBC QN AUTO: 29.1 PG (ref 27–33)
MCHC RBC AUTO-ENTMCNC: 33.1 G/DL (ref 33.7–35.3)
MCV RBC AUTO: 88 FL (ref 81.4–97.8)
MONOCYTES # BLD AUTO: 1.01 K/UL (ref 0–0.85)
MONOCYTES NFR BLD AUTO: 11.1 % (ref 0–13.4)
NEUTROPHILS # BLD AUTO: 6.86 K/UL (ref 1.82–7.42)
NEUTROPHILS NFR BLD: 75.4 % (ref 44–72)
NRBC # BLD AUTO: 0 K/UL
NRBC BLD-RTO: 0 /100 WBC
PATHOLOGY CONSULT NOTE: NORMAL
PLATELET # BLD AUTO: 224 K/UL (ref 164–446)
PMV BLD AUTO: 10.5 FL (ref 9–12.9)
POTASSIUM SERPL-SCNC: 4.7 MMOL/L (ref 3.6–5.5)
PROT SERPL-MCNC: 5.4 G/DL (ref 6–8.2)
RBC # BLD AUTO: 2.92 M/UL (ref 4.7–6.1)
S PYO DNA SPEC NAA+PROBE: NOT DETECTED
SODIUM SERPL-SCNC: 128 MMOL/L (ref 135–145)
TRIGL SERPL-MCNC: 73 MG/DL (ref 0–149)
WBC # BLD AUTO: 9.1 K/UL (ref 4.8–10.8)

## 2022-02-02 PROCEDURE — 160002 HCHG RECOVERY MINUTES (STAT): Performed by: SURGERY

## 2022-02-02 PROCEDURE — 160036 HCHG PACU - EA ADDL 30 MINS PHASE I: Performed by: SURGERY

## 2022-02-02 PROCEDURE — 700102 HCHG RX REV CODE 250 W/ 637 OVERRIDE(OP): Performed by: NURSE PRACTITIONER

## 2022-02-02 PROCEDURE — 700102 HCHG RX REV CODE 250 W/ 637 OVERRIDE(OP): Performed by: ANESTHESIOLOGY

## 2022-02-02 PROCEDURE — 501582 HCHG TROCAR, THRD BLADED: Performed by: SURGERY

## 2022-02-02 PROCEDURE — 501568 HCHG TROCAR, BLUNTPORT 12MM: Performed by: SURGERY

## 2022-02-02 PROCEDURE — 700105 HCHG RX REV CODE 258: Performed by: FAMILY MEDICINE

## 2022-02-02 PROCEDURE — 700102 HCHG RX REV CODE 250 W/ 637 OVERRIDE(OP): Performed by: INTERNAL MEDICINE

## 2022-02-02 PROCEDURE — 501338 HCHG SHEARS, ENDO: Performed by: SURGERY

## 2022-02-02 PROCEDURE — 160009 HCHG ANES TIME/MIN: Performed by: SURGERY

## 2022-02-02 PROCEDURE — 80053 COMPREHEN METABOLIC PANEL: CPT

## 2022-02-02 PROCEDURE — A9270 NON-COVERED ITEM OR SERVICE: HCPCS | Performed by: NURSE PRACTITIONER

## 2022-02-02 PROCEDURE — 501399 HCHG SPECIMAN BAG, ENDO CATC: Performed by: SURGERY

## 2022-02-02 PROCEDURE — 36415 COLL VENOUS BLD VENIPUNCTURE: CPT

## 2022-02-02 PROCEDURE — 502571 HCHG PACK, LAP CHOLE: Performed by: SURGERY

## 2022-02-02 PROCEDURE — 500002 HCHG ADHESIVE, DERMABOND: Performed by: SURGERY

## 2022-02-02 PROCEDURE — 700111 HCHG RX REV CODE 636 W/ 250 OVERRIDE (IP): Performed by: FAMILY MEDICINE

## 2022-02-02 PROCEDURE — 85025 COMPLETE CBC W/AUTO DIFF WBC: CPT

## 2022-02-02 PROCEDURE — 87651 STREP A DNA AMP PROBE: CPT

## 2022-02-02 PROCEDURE — 501838 HCHG SUTURE GENERAL: Performed by: SURGERY

## 2022-02-02 PROCEDURE — 501571 HCHG TROCAR, SEPARATOR 12X100: Performed by: SURGERY

## 2022-02-02 PROCEDURE — 80061 LIPID PANEL: CPT

## 2022-02-02 PROCEDURE — 700105 HCHG RX REV CODE 258: Performed by: HOSPITALIST

## 2022-02-02 PROCEDURE — 88304 TISSUE EXAM BY PATHOLOGIST: CPT

## 2022-02-02 PROCEDURE — 501583 HCHG TROCAR, THRD CAN&SEAL 5X100: Performed by: SURGERY

## 2022-02-02 PROCEDURE — G0378 HOSPITAL OBSERVATION PER HR: HCPCS

## 2022-02-02 PROCEDURE — 700111 HCHG RX REV CODE 636 W/ 250 OVERRIDE (IP): Performed by: ANESTHESIOLOGY

## 2022-02-02 PROCEDURE — 700101 HCHG RX REV CODE 250: Performed by: SURGERY

## 2022-02-02 PROCEDURE — 501572 HCHG TROCAR, SHIELD OBTU 5X100: Performed by: SURGERY

## 2022-02-02 PROCEDURE — 160041 HCHG SURGERY MINUTES - EA ADDL 1 MIN LEVEL 4: Performed by: SURGERY

## 2022-02-02 PROCEDURE — A9270 NON-COVERED ITEM OR SERVICE: HCPCS | Performed by: FAMILY MEDICINE

## 2022-02-02 PROCEDURE — 99226 PR SUBSEQUENT OBSERVATION CARE,LEVEL III: CPT | Performed by: FAMILY MEDICINE

## 2022-02-02 PROCEDURE — 47562 LAPAROSCOPIC CHOLECYSTECTOMY: CPT | Performed by: SURGERY

## 2022-02-02 PROCEDURE — 160035 HCHG PACU - 1ST 60 MINS PHASE I: Performed by: SURGERY

## 2022-02-02 PROCEDURE — A9270 NON-COVERED ITEM OR SERVICE: HCPCS | Performed by: ANESTHESIOLOGY

## 2022-02-02 PROCEDURE — 160029 HCHG SURGERY MINUTES - 1ST 30 MINS LEVEL 4: Performed by: SURGERY

## 2022-02-02 PROCEDURE — 700102 HCHG RX REV CODE 250 W/ 637 OVERRIDE(OP): Performed by: FAMILY MEDICINE

## 2022-02-02 PROCEDURE — A9270 NON-COVERED ITEM OR SERVICE: HCPCS | Performed by: INTERNAL MEDICINE

## 2022-02-02 PROCEDURE — 700111 HCHG RX REV CODE 636 W/ 250 OVERRIDE (IP): Performed by: NURSE PRACTITIONER

## 2022-02-02 PROCEDURE — 47562 LAPAROSCOPIC CHOLECYSTECTOMY: CPT | Mod: AS | Performed by: NURSE PRACTITIONER

## 2022-02-02 PROCEDURE — 700101 HCHG RX REV CODE 250: Performed by: ANESTHESIOLOGY

## 2022-02-02 PROCEDURE — 160048 HCHG OR STATISTICAL LEVEL 1-5: Performed by: SURGERY

## 2022-02-02 RX ORDER — OXYCODONE HYDROCHLORIDE AND ACETAMINOPHEN 5; 325 MG/1; MG/1
1 TABLET ORAL
Status: COMPLETED | OUTPATIENT
Start: 2022-02-02 | End: 2022-02-02

## 2022-02-02 RX ORDER — ONDANSETRON 4 MG/1
4 TABLET, ORALLY DISINTEGRATING ORAL EVERY 4 HOURS PRN
Status: DISCONTINUED | OUTPATIENT
Start: 2022-02-02 | End: 2022-02-03 | Stop reason: HOSPADM

## 2022-02-02 RX ORDER — OXYCODONE HYDROCHLORIDE 5 MG/1
2.5-5 TABLET ORAL EVERY 4 HOURS PRN
Status: DISCONTINUED | OUTPATIENT
Start: 2022-02-02 | End: 2022-02-03 | Stop reason: HOSPADM

## 2022-02-02 RX ORDER — MORPHINE SULFATE 4 MG/ML
1-2 INJECTION INTRAVENOUS
Status: DISCONTINUED | OUTPATIENT
Start: 2022-02-02 | End: 2022-02-03 | Stop reason: HOSPADM

## 2022-02-02 RX ORDER — MEPERIDINE HYDROCHLORIDE 25 MG/ML
6.25 INJECTION INTRAMUSCULAR; INTRAVENOUS; SUBCUTANEOUS
Status: DISCONTINUED | OUTPATIENT
Start: 2022-02-02 | End: 2022-02-02 | Stop reason: HOSPADM

## 2022-02-02 RX ORDER — OXYCODONE HYDROCHLORIDE AND ACETAMINOPHEN 5; 325 MG/1; MG/1
2 TABLET ORAL
Status: COMPLETED | OUTPATIENT
Start: 2022-02-02 | End: 2022-02-02

## 2022-02-02 RX ORDER — KETOROLAC TROMETHAMINE 30 MG/ML
INJECTION, SOLUTION INTRAMUSCULAR; INTRAVENOUS PRN
Status: DISCONTINUED | OUTPATIENT
Start: 2022-02-02 | End: 2022-02-02 | Stop reason: SURG

## 2022-02-02 RX ORDER — HYDRALAZINE HYDROCHLORIDE 20 MG/ML
5 INJECTION INTRAMUSCULAR; INTRAVENOUS
Status: DISCONTINUED | OUTPATIENT
Start: 2022-02-02 | End: 2022-02-02 | Stop reason: HOSPADM

## 2022-02-02 RX ORDER — CEFAZOLIN SODIUM 1 G/3ML
INJECTION, POWDER, FOR SOLUTION INTRAMUSCULAR; INTRAVENOUS PRN
Status: DISCONTINUED | OUTPATIENT
Start: 2022-02-02 | End: 2022-02-02 | Stop reason: SURG

## 2022-02-02 RX ORDER — DIPHENHYDRAMINE HYDROCHLORIDE 50 MG/ML
12.5 INJECTION INTRAMUSCULAR; INTRAVENOUS
Status: DISCONTINUED | OUTPATIENT
Start: 2022-02-02 | End: 2022-02-02 | Stop reason: HOSPADM

## 2022-02-02 RX ORDER — PROCHLORPERAZINE EDISYLATE 5 MG/ML
10 INJECTION INTRAMUSCULAR; INTRAVENOUS EVERY 6 HOURS PRN
Status: DISCONTINUED | OUTPATIENT
Start: 2022-02-02 | End: 2022-02-03 | Stop reason: HOSPADM

## 2022-02-02 RX ORDER — HYDROMORPHONE HYDROCHLORIDE 2 MG/ML
INJECTION, SOLUTION INTRAMUSCULAR; INTRAVENOUS; SUBCUTANEOUS PRN
Status: DISCONTINUED | OUTPATIENT
Start: 2022-02-02 | End: 2022-02-02 | Stop reason: SURG

## 2022-02-02 RX ORDER — DEXTROSE AND SODIUM CHLORIDE 5; .9 G/100ML; G/100ML
INJECTION, SOLUTION INTRAVENOUS CONTINUOUS
Status: DISCONTINUED | OUTPATIENT
Start: 2022-02-02 | End: 2022-02-03 | Stop reason: HOSPADM

## 2022-02-02 RX ORDER — ONDANSETRON 2 MG/ML
4 INJECTION INTRAMUSCULAR; INTRAVENOUS
Status: DISCONTINUED | OUTPATIENT
Start: 2022-02-02 | End: 2022-02-02 | Stop reason: HOSPADM

## 2022-02-02 RX ORDER — MIDAZOLAM HYDROCHLORIDE 1 MG/ML
INJECTION INTRAMUSCULAR; INTRAVENOUS PRN
Status: DISCONTINUED | OUTPATIENT
Start: 2022-02-02 | End: 2022-02-02 | Stop reason: SURG

## 2022-02-02 RX ORDER — ONDANSETRON 2 MG/ML
4 INJECTION INTRAMUSCULAR; INTRAVENOUS EVERY 4 HOURS PRN
Status: DISCONTINUED | OUTPATIENT
Start: 2022-02-02 | End: 2022-02-03 | Stop reason: HOSPADM

## 2022-02-02 RX ORDER — LIDOCAINE HYDROCHLORIDE 20 MG/ML
INJECTION, SOLUTION EPIDURAL; INFILTRATION; INTRACAUDAL; PERINEURAL PRN
Status: DISCONTINUED | OUTPATIENT
Start: 2022-02-02 | End: 2022-02-02 | Stop reason: SURG

## 2022-02-02 RX ORDER — HALOPERIDOL 5 MG/ML
1 INJECTION INTRAMUSCULAR
Status: DISCONTINUED | OUTPATIENT
Start: 2022-02-02 | End: 2022-02-02 | Stop reason: HOSPADM

## 2022-02-02 RX ORDER — SODIUM CHLORIDE, SODIUM LACTATE, POTASSIUM CHLORIDE, CALCIUM CHLORIDE 600; 310; 30; 20 MG/100ML; MG/100ML; MG/100ML; MG/100ML
INJECTION, SOLUTION INTRAVENOUS CONTINUOUS
Status: DISCONTINUED | OUTPATIENT
Start: 2022-02-02 | End: 2022-02-02 | Stop reason: HOSPADM

## 2022-02-02 RX ORDER — BUPIVACAINE HYDROCHLORIDE AND EPINEPHRINE 5; 5 MG/ML; UG/ML
INJECTION, SOLUTION EPIDURAL; INTRACAUDAL; PERINEURAL
Status: DISCONTINUED | OUTPATIENT
Start: 2022-02-02 | End: 2022-02-02 | Stop reason: HOSPADM

## 2022-02-02 RX ORDER — DEXAMETHASONE SODIUM PHOSPHATE 4 MG/ML
INJECTION, SOLUTION INTRA-ARTICULAR; INTRALESIONAL; INTRAMUSCULAR; INTRAVENOUS; SOFT TISSUE PRN
Status: DISCONTINUED | OUTPATIENT
Start: 2022-02-02 | End: 2022-02-02 | Stop reason: SURG

## 2022-02-02 RX ADMIN — ONDANSETRON 4 MG: 2 INJECTION INTRAMUSCULAR; INTRAVENOUS at 11:15

## 2022-02-02 RX ADMIN — KETOROLAC TROMETHAMINE 30 MG: 30 INJECTION, SOLUTION INTRAMUSCULAR at 11:15

## 2022-02-02 RX ADMIN — LIDOCAINE HYDROCHLORIDE 100 MG: 20 INJECTION, SOLUTION EPIDURAL; INFILTRATION; INTRACAUDAL at 11:10

## 2022-02-02 RX ADMIN — CEFAZOLIN 2 G: 330 INJECTION, POWDER, FOR SOLUTION INTRAMUSCULAR; INTRAVENOUS at 11:11

## 2022-02-02 RX ADMIN — ONDANSETRON 4 MG: 4 TABLET, ORALLY DISINTEGRATING ORAL at 17:03

## 2022-02-02 RX ADMIN — LEVOTHYROXINE SODIUM 112 MCG: 0.11 TABLET ORAL at 05:07

## 2022-02-02 RX ADMIN — DEXTROSE AND SODIUM CHLORIDE: 5; 900 INJECTION, SOLUTION INTRAVENOUS at 07:46

## 2022-02-02 RX ADMIN — DEXAMETHASONE SODIUM PHOSPHATE 8 MG: 4 INJECTION, SOLUTION INTRA-ARTICULAR; INTRALESIONAL; INTRAMUSCULAR; INTRAVENOUS; SOFT TISSUE at 11:15

## 2022-02-02 RX ADMIN — DEXTROSE AND SODIUM CHLORIDE: 5; 900 INJECTION, SOLUTION INTRAVENOUS at 11:05

## 2022-02-02 RX ADMIN — DOCUSATE SODIUM 50 MG AND SENNOSIDES 8.6 MG 2 TABLET: 8.6; 5 TABLET, FILM COATED ORAL at 17:04

## 2022-02-02 RX ADMIN — SODIUM CHLORIDE, POTASSIUM CHLORIDE, SODIUM LACTATE AND CALCIUM CHLORIDE 250 ML: 600; 310; 30; 20 INJECTION, SOLUTION INTRAVENOUS at 11:05

## 2022-02-02 RX ADMIN — OXYCODONE HYDROCHLORIDE AND ACETAMINOPHEN 1 TABLET: 5; 325 TABLET ORAL at 12:52

## 2022-02-02 RX ADMIN — PROPOFOL 100 MG: 10 INJECTION, EMULSION INTRAVENOUS at 11:10

## 2022-02-02 RX ADMIN — METRONIDAZOLE 500 MG: 500 TABLET ORAL at 17:03

## 2022-02-02 RX ADMIN — CEFTRIAXONE SODIUM 1 G: 10 INJECTION, POWDER, FOR SOLUTION INTRAVENOUS at 17:09

## 2022-02-02 RX ADMIN — TAMSULOSIN HYDROCHLORIDE 0.8 MG: 0.4 CAPSULE ORAL at 17:03

## 2022-02-02 RX ADMIN — ATORVASTATIN CALCIUM 40 MG: 40 TABLET, FILM COATED ORAL at 17:03

## 2022-02-02 RX ADMIN — MIDAZOLAM HYDROCHLORIDE 2 MG: 1 INJECTION, SOLUTION INTRAMUSCULAR; INTRAVENOUS at 11:07

## 2022-02-02 RX ADMIN — AMITRIPTYLINE HYDROCHLORIDE 25 MG: 25 TABLET, FILM COATED ORAL at 20:48

## 2022-02-02 RX ADMIN — ROCURONIUM BROMIDE 50 MG: 10 INJECTION, SOLUTION INTRAVENOUS at 11:10

## 2022-02-02 RX ADMIN — SUGAMMADEX 200 MG: 100 INJECTION, SOLUTION INTRAVENOUS at 12:09

## 2022-02-02 RX ADMIN — METRONIDAZOLE 500 MG: 500 TABLET ORAL at 05:06

## 2022-02-02 RX ADMIN — AMLODIPINE BESYLATE 5 MG: 10 TABLET ORAL at 05:06

## 2022-02-02 RX ADMIN — HYDROMORPHONE HYDROCHLORIDE 1 MG: 2 INJECTION INTRAMUSCULAR; INTRAVENOUS; SUBCUTANEOUS at 11:10

## 2022-02-02 ASSESSMENT — ENCOUNTER SYMPTOMS
SENSORY CHANGE: 0
NAUSEA: 1
SORE THROAT: 1
COUGH: 0
MYALGIAS: 0
BACK PAIN: 0
WEAKNESS: 0
RESPIRATORY NEGATIVE: 1
DIAPHORESIS: 0
SPEECH CHANGE: 0
HEADACHES: 0
PSYCHIATRIC NEGATIVE: 1
CARDIOVASCULAR NEGATIVE: 1
NECK PAIN: 0
DIZZINESS: 0
DOUBLE VISION: 0
CHILLS: 0
ABDOMINAL PAIN: 1
SHORTNESS OF BREATH: 0
NEUROLOGICAL NEGATIVE: 1
WHEEZING: 0
HEARTBURN: 0
DIARRHEA: 0
FEVER: 0
PALPITATIONS: 0
BLURRED VISION: 0
FOCAL WEAKNESS: 0
CONSTITUTIONAL NEGATIVE: 1
EYES NEGATIVE: 1
FLANK PAIN: 0
VOMITING: 0
NAUSEA: 0
MUSCULOSKELETAL NEGATIVE: 1
NERVOUS/ANXIOUS: 0

## 2022-02-02 ASSESSMENT — PAIN DESCRIPTION - PAIN TYPE
TYPE: SURGICAL PAIN
TYPE: ACUTE PAIN
TYPE: SURGICAL PAIN
TYPE: SURGICAL PAIN

## 2022-02-02 ASSESSMENT — FIBROSIS 4 INDEX: FIB4 SCORE: 1.2

## 2022-02-02 NOTE — PROGRESS NOTES
Author: DC Manzo Date & Time created: 2022   8:16 AM     Interval Events:  No acute events overnight.    -Remains NPO with sips  -Surgery to be scheduled    Review of Systems   Constitutional: Negative.    Eyes: Negative.  Negative for blurred vision and double vision.   Respiratory: Negative.    Cardiovascular: Negative.    Gastrointestinal: Positive for abdominal pain and nausea.   Genitourinary: Negative.    Musculoskeletal: Negative.    Neurological: Negative.  Negative for dizziness and focal weakness.   Psychiatric/Behavioral: Negative.    All other systems reviewed and are negative.    Hemodynamics:  Temp (24hrs), Av.2 °C (98.9 °F), Min:37 °C (98.6 °F), Max:37.3 °C (99.2 °F)  Temperature: 37.3 °C (99.2 °F)  Pulse  Av.2  Min: 85  Max: 111   Blood Pressure : 127/59        Fluids: D5NS @ 50mL/Hr    Intake/Output Summary (Last 24 hours) at 2022 0816  Last data filed at 2022 0600  Gross per 24 hour   Intake 0 ml   Output 580 ml   Net -580 ml        Current Diet Order   Procedures   • Diet NPO Restrict to: Sips with Medications     Physical Exam  Vitals and nursing note reviewed.   Constitutional:       General: He is not in acute distress.     Appearance: Normal appearance.   HENT:      Head: Normocephalic and atraumatic.      Nose: Nose normal.      Mouth/Throat:      Mouth: Mucous membranes are moist.      Pharynx: Oropharynx is clear.   Eyes:      Extraocular Movements: Extraocular movements intact.      Conjunctiva/sclera: Conjunctivae normal.      Pupils: Pupils are equal, round, and reactive to light.   Cardiovascular:      Rate and Rhythm: Normal rate and regular rhythm.      Pulses: Normal pulses.      Heart sounds: Murmur heard.       Pulmonary:      Effort: Pulmonary effort is normal. No respiratory distress.      Breath sounds: Normal breath sounds.   Abdominal:      General: Abdomen is flat. Bowel sounds are normal.      Palpations: Abdomen is soft.       Tenderness: There is abdominal tenderness in the right upper quadrant.   Musculoskeletal:         General: Normal range of motion.      Cervical back: Normal range of motion.      Right lower leg: No edema.      Left lower leg: No edema.   Skin:     General: Skin is warm and dry.      Capillary Refill: Capillary refill takes less than 2 seconds.   Neurological:      General: No focal deficit present.      Mental Status: He is alert and oriented to person, place, and time. Mental status is at baseline.   Psychiatric:         Mood and Affect: Mood normal.       Labs:  Recent Results (from the past 24 hour(s))   BLOOD CULTURE x2    Collection Time: 22  8:40 AM    Specimen: Peripheral; Blood   Result Value Ref Range    Significant Indicator NEG     Source BLD     Site PICC Line     Culture Result       No Growth  Note: Blood cultures are incubated for 5 days and  are monitored continuously.Positive blood cultures  are called to the RN and reported as soon as  they are identified.     BLOOD CULTURE x2    Collection Time: 22  8:51 AM    Specimen: Peripheral; Blood   Result Value Ref Range    Significant Indicator NEG     Source BLD     Site PERIPHERAL     Culture Result       No Growth  Note: Blood cultures are incubated for 5 days and  are monitored continuously.Positive blood cultures  are called to the RN and reported as soon as  they are identified.     SARS-COV Antigen MARIE: Collect dry Nasal swab    Collection Time: 22  8:51 AM   Result Value Ref Range    SARS-CoV-2 Source Nasal Swab     SARS-COV ANTIGEN MARIE NotDetected NotDetected   EKG    Collection Time: 22  8:57 AM   Result Value Ref Range    Report       [facility]    Test Date:  2022  Pt Name:    OLAMIDE DAI                Department: ER  MRN:        [id number]                      Room:       [facility]  Gender:     Male                         Technician: [id number]  :        [id number]                    Requested By:RAYNE AGUIRRE  Order #:    [id number]                    Reading MD:    Measurements  Intervals                                Axis  Rate:       80                           P:          33  NY:         196                          QRS:        33  QRSD:       70                           T:          65  QT:         372  QTc:        430    Interpretive Statements  SINUS RHYTHM  Compared to ECG 01/24/2022 15:59:29  ST (T wave) deviation no longer present     URINALYSIS    Collection Time: 02/01/22  9:08 AM    Specimen: Urine   Result Value Ref Range    Color Yellow     Character Clear     Specific Gravity 1.019 <1.035    Ph 6.0 5.0 - 8.0    Glucose Negative Negative mg/dL    Ketones Negative Negative mg/dL    Protein Negative Negative mg/dL    Bilirubin Negative Negative    Urobilinogen, Urine 0.2 Negative    Nitrite Negative Negative    Leukocyte Esterase Negative Negative    Occult Blood Negative Negative    Micro Urine Req see below    Lactic Acid Every four hours after STAT order    Collection Time: 02/01/22  7:45 PM   Result Value Ref Range    Lactic Acid 0.8 0.5 - 2.0 mmol/L   CBC with Differential    Collection Time: 02/02/22  4:10 AM   Result Value Ref Range    WBC 9.1 4.8 - 10.8 K/uL    RBC 2.92 (L) 4.70 - 6.10 M/uL    Hemoglobin 8.5 (L) 14.0 - 18.0 g/dL    Hematocrit 25.7 (L) 42.0 - 52.0 %    MCV 88.0 81.4 - 97.8 fL    MCH 29.1 27.0 - 33.0 pg    MCHC 33.1 (L) 33.7 - 35.3 g/dL    RDW 47.1 35.9 - 50.0 fL    Platelet Count 224 164 - 446 K/uL    MPV 10.5 9.0 - 12.9 fL    Neutrophils-Polys 75.40 (H) 44.00 - 72.00 %    Lymphocytes 11.40 (L) 22.00 - 41.00 %    Monocytes 11.10 0.00 - 13.40 %    Eosinophils 0.90 0.00 - 6.90 %    Basophils 0.80 0.00 - 1.80 %    Immature Granulocytes 0.40 0.00 - 0.90 %    Nucleated RBC 0.00 /100 WBC    Neutrophils (Absolute) 6.86 1.82 - 7.42 K/uL    Lymphs (Absolute) 1.04 1.00 - 4.80 K/uL    Monos (Absolute) 1.01 (H) 0.00 - 0.85 K/uL    Eos (Absolute) 0.08 0.00 - 0.51  K/uL    Baso (Absolute) 0.07 0.00 - 0.12 K/uL    Immature Granulocytes (abs) 0.04 0.00 - 0.11 K/uL    NRBC (Absolute) 0.00 K/uL   Comp Metabolic Panel (CMP)    Collection Time: 02/02/22  4:10 AM   Result Value Ref Range    Sodium 128 (L) 135 - 145 mmol/L    Potassium 4.7 3.6 - 5.5 mmol/L    Chloride 99 96 - 112 mmol/L    Co2 20 20 - 33 mmol/L    Anion Gap 9.0 7.0 - 16.0    Glucose 84 65 - 99 mg/dL    Bun 14 8 - 22 mg/dL    Creatinine 1.17 0.50 - 1.40 mg/dL    Calcium 8.5 8.5 - 10.5 mg/dL    AST(SGOT) 11 (L) 12 - 45 U/L    ALT(SGPT) 12 2 - 50 U/L    Alkaline Phosphatase 61 30 - 99 U/L    Total Bilirubin 0.6 0.1 - 1.5 mg/dL    Albumin 2.7 (L) 3.2 - 4.9 g/dL    Total Protein 5.4 (L) 6.0 - 8.2 g/dL    Globulin 2.7 1.9 - 3.5 g/dL    A-G Ratio 1.0 g/dL   Lipid Profile (Lipid Panel) Fasting    Collection Time: 02/02/22  4:10 AM   Result Value Ref Range    Cholesterol,Tot 102 100 - 199 mg/dL    Triglycerides 73 0 - 149 mg/dL    HDL 31 (A) >=40 mg/dL    LDL 56 <100 mg/dL   ESTIMATED GFR    Collection Time: 02/02/22  4:10 AM   Result Value Ref Range    GFR If African American >60 >60 mL/min/1.73 m 2    GFR If Non African American 59 (A) >60 mL/min/1.73 m 2     Medical Decision Making, by Problem:  Active Hospital Problems    Diagnosis    • Hyponatremia [E87.1]      Priority: Medium   • History of transcatheter aortic valve replacement (TAVR) [Z95.2]      Priority: Medium   • Essential hypertension [I10]      Priority: Low   • Hypothyroidism [E03.9]      Priority: Low   • Acute cholecystitis [K81.0]    • Leukocytosis [D72.829]    • DANIS (acute kidney injury) (HCC) [N17.9]    • Normocytic anemia [D64.9]      Plan:  Plan for operating room for laparoscopic cholecystectomy in the next 1-2 days.    Quality Measures:  Quality-Core Measures   Harrison catheter::  No Harrison  DVT prophylaxis pharmacological::  Contraindicated - High bleeding risk  DVT prophylaxis - mechanical:  SCDs  Assessed for rehabilitation services:  Patient returned  to prior level of function, rehabilitation not indicated at this time      Discussed patient condition with RN, Patient and general surgery Dr. Sheridan.

## 2022-02-02 NOTE — OR NURSING
1210: Pt arrived to PACU from OR. Report received. Spontaneous breaths assessed. Lap site x4, CDI. Ice applied to surgical site. VSS.   1227: Attempted to call Azeb, pt's daughter. Unable to reach. Voicemail left.   1234: Pt tolerating orals. Denies pain or nausea at this time.   1250: Pt states pain is 2/10. Will medicate per MAR for mild pain.   1321: Report called to DELILAH Barron. All questions answered. Verbalized understanding.   1325: Pt transported to room via hospital bed with CNA. Pt does not have any belonging with him in PACU.

## 2022-02-02 NOTE — ANESTHESIA POSTPROCEDURE EVALUATION
Patient: Emigdio Jiménez    Procedure Summary     Date: 02/02/22 Room / Location: [place] [address] / SURGERY [facility]    Anesthesia Start: 1105 Anesthesia Stop: 1213    Procedure: CHOLECYSTECTOMY, LAPAROSCOPIC (Abdomen) Diagnosis: (Acute cholecystitis)    Surgeons: Joshua Sheridan M.D. Responsible Provider: Roxie Bruno M.D.    Anesthesia Type: general ASA Status: 3          Final Anesthesia Type: general  Last vitals  BP   Blood Pressure : 130/63    Temp   36.8 °C (98.2 °F)    Pulse   93   Resp   13    SpO2   94 %      Anesthesia Post Evaluation    Patient location during evaluation: PACU  Patient participation: complete - patient participated  Level of consciousness: awake and alert    Airway patency: patent  Anesthetic complications: no  Cardiovascular status: hemodynamically stable  Respiratory status: acceptable  Hydration status: euvolemic    PONV: none          No complications documented.     Nurse Pain Score: 1 (NPRS)

## 2022-02-02 NOTE — CONSULTS
CHIEF COMPLAINT: right upper quadrant pain     HISTORY OF PRESENT ILLNESS: The patient is a 86 year-old  elderly man who presents to the Emergency Department a several week-  history of moderate right upper quadrant  abdominal pain. The pain is associated with nausea. He reports frequent precipitation and exacerbation of symptoms with ingestion of fatty foods. He does not know about a family history of gallstones. The patient had a recent TAVR and is also been recently treated for positive blood cultures.    PAST MEDICAL HISTORY:  has a past medical history of Anxiety, Cholecystitis (01/18/2022), Heart murmur, and Hypertension. He also has no past medical history of COPD (chronic obstructive pulmonary disease) (HCC).    PAST SURGICAL HISTORY:  has a past surgical history that includes hernia repair.    ALLERGIES: No Known Allergies    CURRENT MEDICATIONS:    Home Medications     Reviewed by Raya Pineda R.N. (Registered Nurse) on 02/01/22 at [facility]  Med List Status: Complete   Medication Last Dose Status   albuterol 108 (90 Base) MCG/ACT Aero Soln inhalation aerosol PRN Active   amitriptyline (ELAVIL) 25 MG Tab 1/31/2022 Active   amLODIPine (NORVASC) 5 MG Tab 2/1/2022 Active   atorvastatin (LIPITOR) 40 MG Tab 1/31/2022 Active   cefTRIAXone (ROCEPHIN) 1 g/10 mL 1/31/2022 Active   HYDROcodone/acetaminophen (NORCO)  MG Tab PRN Active   levothyroxine (SYNTHROID) 112 MCG Tab 2/1/2022 Active   lisinopril (PRINIVIL) 20 MG Tab 2/1/2022 Active   ondansetron (ZOFRAN ODT) 4 MG TABLET DISPERSIBLE UNK Active   tamsulosin (FLOMAX) 0.4 MG capsule 1/31/2022 Active   zolpidem (AMBIEN) 5 MG Tab UNK Active                FAMILY HISTORY: family history includes Heart Disease in his mother; Hyperlipidemia in his mother.    SOCIAL HISTORY:  reports that he has quit smoking. He smoked 0.00 packs per day. He has never used smokeless tobacco. He reports previous alcohol use. He reports that he does not use drugs.    REVIEW  "OF SYSTEMS: Comprehensive review of systems is negative with the exception of the aforementioned HPI, PMH, and PSH bullets in accordance with CMS guidelines.    PHYSICAL EXAMINATION:      Constitutional:     Vital Signs: /77   Pulse (!) 111   Temp 37.2 °C (99 °F) (Temporal)   Resp 20   Ht 1.676 m (5' 6\")   Wt 74.8 kg (164 lb 14.5 oz)   SpO2 99%    General Appearance: appears stated age, is in no apparent distress.  HEENT: The pupils are equal, round, and reactive to light bilaterally. The extraocular muscles are intact bilaterally.. The sclera are not icteric. Nares and oropharynx are clear.   Neck: Supple. No adenopathy.  Respiratory:   Inspection: Unlabored respirations, no intercostal retractions, paradoxical motion, or accessory muscle use.   Auscultation: clear to auscultation.  Cardiovascular:   Inspection: The skin is warm.  Auscultation: Regular rate and rhythm.   Peripheral Pulses: Normal.   Abdomen:  Inspection: Abdominal inspection reveals No visible incisions.   Palpation: Palpation is remarkable for no significant tenderness, guarding, or peritoneal findings. No abdominal wall hernias.  Extremities:   Examination of the upper and lower extremities demonstrates no cyanosis edema or clubbing.  Neurologic:   Alert & oriented to person, time and place. Normal motor function. Normal sensory function. No focal deficits noted.    LABORATORY VALUES:   Recent Labs     02/01/22  0743   WBC 12.2*   RBC 3.51*   HEMOGLOBIN 10.2*   HEMATOCRIT 31.2*   MCV 88.9   MCH 29.1   MCHC 32.7*   RDW 47.9   PLATELETCT 279   MPV 10.7     Recent Labs     02/01/22  0743   SODIUM 127*   POTASSIUM 4.6   CHLORIDE 96   CO2 21   GLUCOSE 101*   BUN 22   CREATININE 1.76*   CALCIUM 8.7     Recent Labs     02/01/22  0743   ASTSGOT 17   ALTSGPT 18   TBILIRUBIN 0.6   ALKPHOSPHAT 79   GLOBULIN 2.7            IMAGING:   US-RUQ   Final Result      1.  There is a single gallstone with mild gallbladder wall thickening but no biliary " dilatation.      DX-CHEST-PORTABLE (1 VIEW)   Final Result      No acute cardiopulmonary abnormality.          ASSESSMENT AND PLAN:     Acute cholecystitis  Abdominal ultrasound demonstrates a single gallstone with mild gallbladder wall thickening but no biliary dilatation  ID consulted, appreciate recs  Continue with course of ceftriaxone  Start Flagyl  Will consult with GI     Hypothyroidism  Continue home levothyroxine    Normocytic anemia  History of, stable trend. No signs/symptoms of acute blood loss  Continue to monitor CBC    Essential hypertension  Resume home Norvasc  Hold home lisinopril in setting of DANIS  Will add as needed anti-hypertensives    History of transcatheter aortic valve replacement (TAVR)  Recent history, no evidence of endocarditis  On ceftriaxone for bacteremic coverage, Flagly added for atypical coverage    Leukocytosis  WBC elevated to 12.2 on presentation  Currently afebrile  Likely secondary to cholecystitis   Continue antibiotic coverage  Trend CBC    DANIS (acute kidney injury) (HCC)  BUN/Creatinine 22/1.76  Likely pre-renal  IVF hydration with LR  Avoid nephrotoxic agents    Hyponatremia  Appears to be chronic, Na 127 on admission  Monitor neuro status  [facility] CMP      The patient has Grade I (mild) right upper quadrant pain. Plan: laparoscopic cholecystectomy.    The patient will be taken to the operating room for laparoscopic cholecystectomy. The surgical conduct was discussed in detail. Potential complications including, but not limited to, infection, bleeding, damage to adjacent structures, bile duct injury, need to convert to an open procedure, and anesthetic complications were discussed. Discussed at length with the patient.  Anticipate scheduling in the next 1 to 2 days.     ____________________________________     Joshua Sheridan M.D.    DD: 2/1/2022  6:55 PM    [facility] Guidelines for Acute Cholecystitis 2018  ACS NSQIP Surgical Risk Calculator

## 2022-02-02 NOTE — HOSPITAL COURSE
Emigdio Jiménez is a 86 y.o. male who presents with a history of hypertension, status post TAVR. The patient presented 10 days ago with bacteremia of unknown cause.  At that time he had a TAVR that was negative for endocarditis. The patient was also experiencing epigastric pain at that time and an abdominal ultrasound demonstrated gallstones. A PICC line was placed and has been taking Rocephin IV daily.     The patient returns today stating that he is having severe epigastric pain and right upper quadrant pain after eating, Pain does not radiate to back. Relief is achieved while leaning forward and with small sips of water. He denies cough but reports shortness of breath secondary to hiccups. Patient has experienced intermittent nausea without vomiting and diarrhea x1 week following antibiotic administration. Of note, the patient was told on last discharge to follow-up with general surgeon to have gallbladder removed, which he did not do.     Labs in the ED reveal leukocytosis of 12.2,  hyponatremia of 127, and elevated creatinine to 1.76. UA appears negative for UTI and COVID swab is negative. ID Dr. Devi was consulted who continued ceftriaxone and added Flagyl. Dr. Sheridan with Surgery was also consulted for possible cholecystectomy

## 2022-02-02 NOTE — PROGRESS NOTES
Patient back in room in bed with PACU RN. Patient resting comfortably. VS per flowsheets. Daughter called for update, LVM

## 2022-02-02 NOTE — ANESTHESIA PREPROCEDURE EVALUATION
Date/Time: 01/24/22 1230    Scheduled providers: Debbi Walker M.D.; Emigdio Ortega M.D.    Procedure: EC-RENU W/O CONT    Diagnosis:       Elevated troponin [R77.8]      Sepsis (HCC) [A41.9]      Elevated troponin [R77.8]      Sepsis (HCC) [A41.9]    Location: [facility] - ECHOCARDIOLOGY [facility]      Hx TAVR, valve fxn wnl on RENU, HTN, cholecystitis.     Relevant Problems   CARDIAC   (positive) CHF (congestive heart failure) (HCC)   (positive) Essential hypertension   (positive) Nonrheumatic aortic valve stenosis         (positive) DANIS (acute kidney injury) ([facility])      ENDO   (positive) Hypothyroidism   history of TAVR    Physical Exam    Airway   Mallampati: II  TM distance: >3 FB  Neck ROM: full       Cardiovascular - normal exam  Rhythm: regular  Rate: normal  (-) murmur     Dental - normal exam  Comments: Multiple missing teeth    Very poor dentition   Pulmonary - normal exam  Breath sounds clear to auscultation     Abdominal    Neurological - normal exam                 Anesthesia Plan    ASA 3       Plan - general       Airway plan will be ETT          Induction: intravenous    Postoperative Plan: Postoperative administration of opioids is intended.    Pertinent diagnostic labs and testing reviewed    Informed Consent:    Anesthetic plan and risks discussed with patient.    Use of blood products discussed with: patient whom consented to blood products.

## 2022-02-02 NOTE — ANESTHESIA PROCEDURE NOTES
Airway    Date/Time: 2/2/2022 11:10 AM  Performed by: Roxie Bruno M.D.  Authorized by: Roxie Bruno M.D.     Location:  OR  Urgency:  Elective  Difficult Airway: No    Indications for Airway Management:  Anesthesia      Spontaneous Ventilation: absent    Sedation Level:  Deep  Preoxygenated: Yes    Patient Position:  Sniffing  Mask Difficulty Assessment:  0 - not attempted  Final Airway Type:  Endotracheal airway  Final Endotracheal Airway:  ETT  Cuffed: Yes    Technique Used for Successful ETT Placement:  Direct laryngoscopy  Devices/Methods Used in Placement:  Intubating stylet and cricoid pressure    Insertion Site:  Oral  Blade Type:  Mica  Laryngoscope Blade/Videolaryngoscope Blade Size:  3  ETT Size (mm):  7.0  Measured from:  Teeth  ETT to Teeth (cm):  21  Placement Verified by: auscultation and capnometry    Cormack-Lehane Classification:  Grade I - full view of glottis  Number of Attempts at Approach:  1   Atraumatic intubation, DLx1, + etCO2, BS=BL, bite block placed, eyes taped

## 2022-02-02 NOTE — PROGRESS NOTES
Hospital Medicine Daily Progress Note    Date of Service  2/2/2022    Chief Complaint  Emigdio Jiménez is a 86 y.o. male admitted 2/1/2022 with CHolecystitis    Hospital Course  Admitted with Cholelithiasis with Cholecystitis. Patient was started on empiric coverage with IV Rocephin and Flagyl. Surgery was consulted on the case.   Patient with history of Streptococcus bacteremia and was on IV Rocephin with end date of 2/8/2022. He also has history of TAVR, his repeat blood cultures and RENU were negative.     Interval Problem Update  Cholecystitis - for surgery today  Bacteremia - repeat blood cultures negative so far, complaining of sore throat for the past month  DANIS - crea 1.1  HTN - sbp 120-140    Updates given and plan of care discussed with patient's daughter who was at bedside.    I have personally seen and examined the patient at bedside. I discussed the plan of care with patient, family, bedside RN, charge RN and .    Consultants/Specialty  general surgery and infectious disease    Code Status  Full Code    Disposition  Patient is not medically cleared for discharge.   Anticipate discharge to to home with close outpatient follow-up.  I have placed the appropriate orders for post-discharge needs.    Review of Systems  Review of Systems   Constitutional: Negative for chills, diaphoresis, fever and malaise/fatigue.   HENT: Positive for hearing loss and sore throat. Negative for congestion.    Eyes: Negative for blurred vision.   Respiratory: Negative for cough, shortness of breath and wheezing.    Cardiovascular: Negative for chest pain, palpitations and leg swelling.   Gastrointestinal: Positive for abdominal pain. Negative for diarrhea, heartburn, nausea and vomiting.   Genitourinary: Negative for dysuria, flank pain and hematuria.   Musculoskeletal: Negative for back pain, joint pain, myalgias and neck pain.   Skin: Negative for rash.   Neurological: Negative for dizziness, sensory change, speech  change, focal weakness, weakness and headaches.   Psychiatric/Behavioral: The patient is not nervous/anxious.         Physical Exam  Temp:  [37 °C (98.6 °F)-37.3 °C (99.2 °F)] 37.3 °C (99.2 °F)  Pulse:  [] 88  Resp:  [16-20] 20  BP: (127-147)/(59-77) 127/59  SpO2:  [92 %-99 %] 92 %    Physical Exam  Vitals and nursing note reviewed.   HENT:      Head: Normocephalic and atraumatic.      Nose: No congestion.      Mouth/Throat:      Mouth: Mucous membranes are moist.   Eyes:      Extraocular Movements: Extraocular movements intact.      Conjunctiva/sclera: Conjunctivae normal.   Cardiovascular:      Rate and Rhythm: Normal rate and regular rhythm.   Pulmonary:      Effort: Pulmonary effort is normal.      Breath sounds: Normal breath sounds.   Abdominal:      General: There is no distension.      Tenderness: There is abdominal tenderness (mild RUQ). There is no guarding or rebound.   Musculoskeletal:      Cervical back: No tenderness.      Right lower leg: No edema.      Left lower leg: No edema.   Skin:     General: Skin is warm and dry.   Neurological:      General: No focal deficit present.      Mental Status: He is alert and oriented to person, place, and time.      Cranial Nerves: No cranial nerve deficit.         Fluids    Intake/Output Summary (Last 24 hours) at 2/2/2022 1024  Last data filed at 2/2/2022 0746  Gross per 24 hour   Intake 0 ml   Output 580 ml   Net -580 ml       Laboratory  Recent Labs     02/01/22  0743 02/02/22  0410   WBC 12.2* 9.1   RBC 3.51* 2.92*   HEMOGLOBIN 10.2* 8.5*   HEMATOCRIT 31.2* 25.7*   MCV 88.9 88.0   MCH 29.1 29.1   MCHC 32.7* 33.1*   RDW 47.9 47.1   PLATELETCT 279 224   MPV 10.7 10.5     Recent Labs     02/01/22  0743 02/02/22  0410   SODIUM 127* 128*   POTASSIUM 4.6 4.7   CHLORIDE 96 99   CO2 21 20   GLUCOSE 101* 84   BUN 22 14   CREATININE 1.76* 1.17   CALCIUM 8.7 8.5             Recent Labs     02/02/22  0410   TRIGLYCERIDE 73   HDL 31*   LDL 56       Imaging  US-RUQ    Final Result      1.  There is a single gallstone with mild gallbladder wall thickening but no biliary dilatation.      DX-CHEST-PORTABLE (1 VIEW)   Final Result      No acute cardiopulmonary abnormality.           Assessment/Plan  * Cholelithiasis with cholecystitis- (present on admission)  Assessment & Plan  IV Ceftriaxone, Flagyl  Diet per Surgery  Pain control, encourage IS  For Surgery today    Bacteremia- (present on admission)  Assessment & Plan  IV Rocephin - end date 2/8/2022  Follow blood cultures  Check Strep throat PCR    DANIS (acute kidney injury) (HCC)- (present on admission)  Assessment & Plan  Gentle IVF hydration  Follow cmp, check PTH    Hyponatremia- (present on admission)  Assessment & Plan  Change IVF to NS, follow cmp    History of transcatheter aortic valve replacement (TAVR)- (present on admission)  Assessment & Plan  no evidence of endocarditis on RENU 1/24/2022     Essential hypertension- (present on admission)  Assessment & Plan  Norvasc  Hold Lisinopril    Normocytic anemia- (present on admission)  Assessment & Plan  Follow cbc    Hypothyroidism- (present on admission)  Assessment & Plan  Levothyroxine  Check TSH    HLD (hyperlipidemia)- (present on admission)  Assessment & Plan  Lipitor  Follow cmp       VTE prophylaxis: SCDs/TEDs    I have performed a physical exam and reviewed and updated ROS and Plan today (2/2/2022). In review of yesterday's note (2/1/2022), there are no changes except as documented above.

## 2022-02-02 NOTE — OP REPORT
Surgeon: Joshua Sheridan MD   Assistant: ELIZABETH Guo  Preoperative diagnosis: Acute cholecystitis   Postop diagnosis: Acute cholecystitis   Procedure: Laparoscopic cholecystectomy   Anesthesia: General endotracheal anesthesia   Anesthesiologist: Roxie Bruno MD  Indications: 56-year-old man with symptomatic gallstones and chronic and potentially acute cholecystitis.  A cholecystectomy is indicated.  Narrative: The procedure was discussed in detail with the patient including the laparoscopic approach, the potential for converting to an open procedure, and the associated risk of bleeding, infection, abscess, and hematoma. Also discussed the risk of postoperative bile leak, common bile duct stricture, common bile duct transection, and the significance of these complications. The patient understood all of the above and wished to proceed. The patient was placed under anesthesia by Dr. Bruno. Patient's abdomen was prepped with chlorhexidine prep and sterile drapes. After a time out an infraumbilical incision was made. Through this incision the peritoneal cavity was entered using the open Hason entry technique. pneumoperitoneum was achieved with co2 insufflation to a pressure of 15 mmhg mercury. under direct visualization a 12 millimeters subxiphoid and two 5mm subcostal ports were placed. the gallbladder was identified and retracted superiorly and laterally. multiple adhesions were noted and these were carefully taken down. the base of the gallbladder was carefully dissected. the cystic duct was identified and could be seen to clearly exit the gallbladder and retract laterally along the gallbladder. In  a similar fashion the cystic artery was identified and dissected away from surrounding connective tissue. a critical view was obtained. subsequent to this 3 clips were placed proximally on the duct and 1 distally and it was divided with scissors. Similarly, the cystic artery was clipped twice proximally and one distally  and divided sharply with scissors. The gallbladder was then dissected off the liver bed and placed in a bag retrieval device. Hemostasis was assured with cautery. There was no evidence of bleeding or bile leak. Ports removed and the pneumoperitoneum was released. The infraumbilical fascia was approximated with an 0 Vicryl stitch. The subcutaneous tissue was irrigated and the skin on all incisions was approximatedwith 4.0 vicryl  stitches. Dermabond was placed. The patient tolerated procedure well without apparent complication. Final counts were reported as correct.  Wound class was class II.

## 2022-02-02 NOTE — PROGRESS NOTES
2 RN skin check complete.     Devices in place: SpO2 finger probe, PIV line, .   Skin assessed under devices     Surgical Incision - Lap sites x4 well approximated with dermabond.    Confirmed pressure ulcers found on: NA  New potential ulcers noted on: NA  Wound consult placed: NA     -Skin beneath folds checked.   - All bony prominences assessed. Skin intact.     Preventative measures in place:  - turns with use of pillows to support repositioning  -heels floated on pillows    -bony prominences floated on pillows  -mobilization  -repositioning of devices   -Offered oral care  -Cue for turning while in bed

## 2022-02-02 NOTE — CARE PLAN
The patient is Stable - Low risk of patient condition declining or worsening    Shift Goals  Clinical Goals: pain and nausea control  Patient Goals: Pain   Family Goals: NA    Progress made toward(s) clinical / shift goals:  No CO pain or nausea overnight. Had BM. LR running @ 50. On RA. Pt call light appropriate.    Problem: Pain - Standard  Goal: Alleviation of pain or a reduction in pain to the patient’s comfort goal  Outcome: Progressing     Problem: Fall Risk  Goal: Patient will remain free from falls  Outcome: Progressing     Patient is not progressing towards the following goals: None

## 2022-02-02 NOTE — PROGRESS NOTES
Assumed care of patient at 0645. Bedside report received. Assessment complete.  AA&Ox4. Denies CP/SOB.  Reporting minimal pain at this time.   Educated patient regarding pharmacologic and non pharmacologic modalities for pain management.  Skin intact.  Tolerating diet. Denies N/V.  + void. Last BM 2/1  Pt ambulates SBA.  Plan of care discussed, all questions answered. Educated on the importance of calling before getting OOB and pt verbalizes understanding. Educated regarding importance of oral care. Oral care kit at bedside. Call light is within reach, treaded slipper socks on, bed in lowest/ locked position, hourly rounding in place, all needs met at this time.

## 2022-02-02 NOTE — FACE TO FACE
Face to Face Supporting Documentation - Home Health    The encounter with this patient was in whole or in part the primary reason for home health admission.    Date of encounter:   Patient:                    MRN:                       YOB: 2021  Emigdio Jiménez  [id number]  1935     Home health to see patient for:  Skilled Nursing care for assessment, interventions & education, Physical Therapy evaluation and treatment and Occupational therapy evaluation and treatment    Skilled need for:  Surgical Aftercare Cholecystitis    Skilled nursing interventions to include:  Home IV infusion therapy    Homebound status evidenced by:  Needs the assistance of another person in order to leave the home. Leaving home requires a considerable and taxing effort. There is a normal inability to leave the home.    Community Physician to provide follow up care: DC Flores     Optional Interventions? No      I certify the face to face encounter for this home health care referral meets the CMS requirements and the encounter/clinical assessment with the patient was, in whole, or in part, for the medical condition(s) listed above, which is the primary reason for home health care. Based on my clinical findings: the service(s) are medically necessary, support the need for home health care, and the homebound criteria are met.  I certify that this patient has had a face to face encounter by myself.  Earle Morrison M.D. - NPI: [id number]

## 2022-02-03 VITALS
DIASTOLIC BLOOD PRESSURE: 59 MMHG | HEIGHT: 66 IN | WEIGHT: 165.34 LBS | SYSTOLIC BLOOD PRESSURE: 110 MMHG | HEART RATE: 83 BPM | OXYGEN SATURATION: 96 % | TEMPERATURE: 97.8 F | RESPIRATION RATE: 18 BRPM | BODY MASS INDEX: 26.57 KG/M2

## 2022-02-03 LAB
ALBUMIN SERPL BCP-MCNC: 3.3 G/DL (ref 3.2–4.9)
ALBUMIN/GLOB SERPL: 1.3 G/DL
ALP SERPL-CCNC: 77 U/L (ref 30–99)
ALT SERPL-CCNC: 29 U/L (ref 2–50)
ANION GAP SERPL CALC-SCNC: 9 MMOL/L (ref 7–16)
AST SERPL-CCNC: 48 U/L (ref 12–45)
BACTERIA UR CULT: NORMAL
BASOPHILS # BLD AUTO: 0.2 % (ref 0–1.8)
BASOPHILS # BLD: 0.02 K/UL (ref 0–0.12)
BILIRUB SERPL-MCNC: 0.3 MG/DL (ref 0.1–1.5)
BUN SERPL-MCNC: 21 MG/DL (ref 8–22)
CALCIUM SERPL-MCNC: 8.5 MG/DL (ref 8.5–10.5)
CHLORIDE SERPL-SCNC: 97 MMOL/L (ref 96–112)
CO2 SERPL-SCNC: 23 MMOL/L (ref 20–33)
CREAT SERPL-MCNC: 1.02 MG/DL (ref 0.5–1.4)
EOSINOPHIL # BLD AUTO: 0 K/UL (ref 0–0.51)
EOSINOPHIL NFR BLD: 0 % (ref 0–6.9)
ERYTHROCYTE [DISTWIDTH] IN BLOOD BY AUTOMATED COUNT: 45.1 FL (ref 35.9–50)
GLOBULIN SER CALC-MCNC: 2.5 G/DL (ref 1.9–3.5)
GLUCOSE SERPL-MCNC: 161 MG/DL (ref 65–99)
HCT VFR BLD AUTO: 28.1 % (ref 42–52)
HGB BLD-MCNC: 9.5 G/DL (ref 14–18)
IMM GRANULOCYTES # BLD AUTO: 0.08 K/UL (ref 0–0.11)
IMM GRANULOCYTES NFR BLD AUTO: 0.7 % (ref 0–0.9)
LYMPHOCYTES # BLD AUTO: 0.55 K/UL (ref 1–4.8)
LYMPHOCYTES NFR BLD: 4.9 % (ref 22–41)
MCH RBC QN AUTO: 29.6 PG (ref 27–33)
MCHC RBC AUTO-ENTMCNC: 33.8 G/DL (ref 33.7–35.3)
MCV RBC AUTO: 87.5 FL (ref 81.4–97.8)
MONOCYTES # BLD AUTO: 0.6 K/UL (ref 0–0.85)
MONOCYTES NFR BLD AUTO: 5.3 % (ref 0–13.4)
NEUTROPHILS # BLD AUTO: 10.06 K/UL (ref 1.82–7.42)
NEUTROPHILS NFR BLD: 88.9 % (ref 44–72)
NRBC # BLD AUTO: 0 K/UL
NRBC BLD-RTO: 0 /100 WBC
PLATELET # BLD AUTO: 301 K/UL (ref 164–446)
PMV BLD AUTO: 11 FL (ref 9–12.9)
POTASSIUM SERPL-SCNC: 4.6 MMOL/L (ref 3.6–5.5)
PROT SERPL-MCNC: 5.8 G/DL (ref 6–8.2)
PTH-INTACT SERPL-MCNC: 33.8 PG/ML (ref 14–72)
RBC # BLD AUTO: 3.21 M/UL (ref 4.7–6.1)
SIGNIFICANT IND 70042: NORMAL
SITE SITE: NORMAL
SODIUM SERPL-SCNC: 129 MMOL/L (ref 135–145)
SOURCE SOURCE: NORMAL
TSH SERPL DL<=0.005 MIU/L-ACNC: 0.12 UIU/ML (ref 0.38–5.33)
WBC # BLD AUTO: 11.3 K/UL (ref 4.8–10.8)

## 2022-02-03 PROCEDURE — 99217 PR OBSERVATION CARE DISCHARGE: CPT | Performed by: FAMILY MEDICINE

## 2022-02-03 PROCEDURE — 700102 HCHG RX REV CODE 250 W/ 637 OVERRIDE(OP): Performed by: NURSE PRACTITIONER

## 2022-02-03 PROCEDURE — A9270 NON-COVERED ITEM OR SERVICE: HCPCS | Performed by: FAMILY MEDICINE

## 2022-02-03 PROCEDURE — 700102 HCHG RX REV CODE 250 W/ 637 OVERRIDE(OP): Performed by: FAMILY MEDICINE

## 2022-02-03 PROCEDURE — 84443 ASSAY THYROID STIM HORMONE: CPT

## 2022-02-03 PROCEDURE — G0378 HOSPITAL OBSERVATION PER HR: HCPCS

## 2022-02-03 PROCEDURE — 99024 POSTOP FOLLOW-UP VISIT: CPT | Performed by: SURGERY

## 2022-02-03 PROCEDURE — A9270 NON-COVERED ITEM OR SERVICE: HCPCS | Performed by: NURSE PRACTITIONER

## 2022-02-03 PROCEDURE — 99215 OFFICE O/P EST HI 40 MIN: CPT | Performed by: INTERNAL MEDICINE

## 2022-02-03 PROCEDURE — 85025 COMPLETE CBC W/AUTO DIFF WBC: CPT

## 2022-02-03 PROCEDURE — 80053 COMPREHEN METABOLIC PANEL: CPT

## 2022-02-03 PROCEDURE — 83970 ASSAY OF PARATHORMONE: CPT

## 2022-02-03 RX ORDER — LEVOTHYROXINE SODIUM 0.1 MG/1
100 TABLET ORAL
Qty: 30 TABLET | Refills: 2 | Status: SHIPPED | OUTPATIENT
Start: 2022-02-03 | End: 2022-06-10

## 2022-02-03 RX ORDER — METRONIDAZOLE 500 MG/1
500 TABLET ORAL 3 TIMES DAILY
Qty: 15 TABLET | Refills: 0 | Status: SHIPPED | OUTPATIENT
Start: 2022-02-03 | End: 2022-02-08

## 2022-02-03 RX ORDER — ONDANSETRON 4 MG/1
4 TABLET, ORALLY DISINTEGRATING ORAL EVERY 6 HOURS PRN
Qty: 30 TABLET | Refills: 0 | Status: SHIPPED | OUTPATIENT
Start: 2022-02-03 | End: 2022-07-07

## 2022-02-03 RX ORDER — OXYCODONE HYDROCHLORIDE 5 MG/1
5 TABLET ORAL EVERY 6 HOURS PRN
Qty: 20 TABLET | Refills: 0 | Status: SHIPPED | OUTPATIENT
Start: 2022-02-03 | End: 2022-02-08

## 2022-02-03 RX ADMIN — LEVOTHYROXINE SODIUM 112 MCG: 0.11 TABLET ORAL at 05:05

## 2022-02-03 RX ADMIN — AMLODIPINE BESYLATE 5 MG: 10 TABLET ORAL at 05:05

## 2022-02-03 RX ADMIN — METRONIDAZOLE 500 MG: 500 TABLET ORAL at 05:05

## 2022-02-03 ASSESSMENT — ENCOUNTER SYMPTOMS
MYALGIAS: 0
NAUSEA: 1
DIZZINESS: 0
NERVOUS/ANXIOUS: 0
NEUROLOGICAL NEGATIVE: 1
EYES NEGATIVE: 1
RESPIRATORY NEGATIVE: 1
DIARRHEA: 0
ABDOMINAL PAIN: 0
COUGH: 0
CONSTIPATION: 0
FEVER: 0
CARDIOVASCULAR NEGATIVE: 1
WEAKNESS: 0
SHORTNESS OF BREATH: 0
FOCAL WEAKNESS: 0
PSYCHIATRIC NEGATIVE: 1
CHILLS: 0
VOMITING: 0
CONSTITUTIONAL NEGATIVE: 1
NAUSEA: 0
BLURRED VISION: 0
DOUBLE VISION: 0
BACK PAIN: 0
SPUTUM PRODUCTION: 0
MUSCULOSKELETAL NEGATIVE: 1

## 2022-02-03 NOTE — PROGRESS NOTES
Bedside report received.  Assessment complete.  A&O x 4. Patient calls appropriately.  Patient ambulates with standby assist. Bed alarm off.   Patient has 0/10 pain. Declined intervention at this time.  x4 lap sites, dermabond CDI  Tolerating regular diet. Denies N/V.  + void. Last BM 2/1.  Reviewed plan of care with patient. Call light and personal belongings within reach. Hourly rounding in place. All needs met at this time.

## 2022-02-03 NOTE — DISCHARGE PLANNING
"Anticipated Discharge Disposition: Home with resumption of HH For IV ABX    Action: Spoke with the pt at bedside. Asking this CM when he will be DC home. Informed him that I was not sure at this time as I have not spoken with MD yet. Pt verbalized understanding and states \"I feel really good and would like to go home as soon as possible\". Choice form obtained from the pt for resumption of care with [facility] for IV ABX. Choice faxed to DCP. [facility] accepted. Spoke with Darron from [facility] and requesting for a midline drsg to be changed prior to DC. Voalte message to pt's RN with the request and update on HH resumption.     Barriers to Discharge: pending medical stability    Plan: cont to f/u for any DC needs.     "

## 2022-02-03 NOTE — DISCHARGE SUMMARY
"Discharge Summary    CHIEF COMPLAINT ON ADMISSION  Chief Complaint   Patient presents with   • Nausea     x 1 week. denies vomiting. pt \"asking for\" gastronterologist referral   • Abdominal Pain     it feels like \"gas and pressure\" and it happens periodically. last bowel movement was last night and i had diarrhea. \"only had 1 episode\"       Reason for Admission  N/V     Admission Date  2/1/2022    CODE STATUS  Full Code    HPI & HOSPITAL COURSE  This is a 86 y.o. male here with cholelithiasis with cholecystitis.  Patient was started on IV Rocephin and Flagyl.  Surgery was consulted on the case. Patient with history of Streptococcus bacteremia and was on IV Rocephin with end date of 2/8/2022. He also has history of TAVR, his repeat blood cultures and RENU were negative.   Infectious disease was also consulted on the case.  Patient underwent laparoscopic cholecystectomy on 2/2/2022.  He tolerated procedure well, his pain is well controlled, he is tolerating a regular diet.  Surgery has cleared him for discharge.  Home health will be resumed, he will continue his IV Rocephin with end date of 2/8/2022 as previously planned.      Therefore, he is discharged in good and stable condition to home with organized home healthcare and close outpatient follow-up.    The patient recovered much more quickly than anticipated on admission.    Discharge Date  2/3/2022    FOLLOW UP ITEMS POST DISCHARGE  Follow-up with surgery    DISCHARGE DIAGNOSES  Principal Problem:    Cholelithiasis with cholecystitis POA: Yes  Active Problems:    Hypothyroidism POA: Yes    Normocytic anemia POA: Yes    Essential hypertension POA: Yes    History of transcatheter aortic valve replacement (TAVR) POA: Yes    Hyponatremia POA: Yes    DANIS (acute kidney injury) (HCC) POA: Yes    Bacteremia POA: Yes    HLD (hyperlipidemia) POA: Yes  Resolved Problems:    * No resolved hospital problems. *      FOLLOW UP  No future appointments.  Joshua Sheridan M.D.  [id number] [facility] [address]  [place] [address]  [phone]    Schedule an appointment as soon as possible for a visit in 1 week        MEDICATIONS ON DISCHARGE     Medication List      START taking these medications      Instructions   metroNIDAZOLE 500 MG Tabs  Commonly known as: FLAGYL   Take 1 Tablet by mouth 3 times a day for 5 days.  Dose: 500 mg     oxyCODONE immediate-release 5 MG Tabs  Commonly known as: ROXICODONE   Take 1 Tablet by mouth every 6 hours as needed for up to 5 days.  Dose: 5 mg        CHANGE how you take these medications      Instructions   levothyroxine 100 MCG Tabs  What changed:   · medication strength  · how much to take  Commonly known as: SYNTHROID   Take 1 Tablet by mouth every morning on an empty stomach.  Dose: 100 mcg     ondansetron 4 MG Tbdp  What changed:   · when to take this  · reasons to take this  Commonly known as: ZOFRAN ODT   Take 1 Tablet by mouth every 6 hours as needed for Nausea.  Dose: 4 mg        CONTINUE taking these medications      Instructions   albuterol 108 (90 Base) MCG/ACT Aers inhalation aerosol   Inhale 2 Puffs every four hours as needed for Shortness of Breath.  Dose: 2 Puff     amitriptyline 25 MG Tabs  Commonly known as: ELAVIL   Take 25 mg by mouth every evening.  Dose: 25 mg     amLODIPine 5 MG Tabs  Commonly known as: NORVASC   Take 1 Tablet by mouth every day.  Dose: 5 mg     atorvastatin 40 MG Tabs  Commonly known as: LIPITOR   Take 1 Tablet by mouth every evening.  Dose: 40 mg     cefTRIAXone 1 g/10 mL   Commonly known as: Rocephin   Infuse 10 mL into a venous catheter every evening for 14 days.  Dose: 1 g     lisinopril 20 MG Tabs  Commonly known as: PRINIVIL   Take 20 mg by mouth every day.  Dose: 20 mg     tamsulosin 0.4 MG capsule  Commonly known as: FLOMAX   Take 0.8 mg by mouth every day.  Dose: 0.8 mg     zolpidem 5 MG Tabs  Commonly known as: AMBIEN   Take 1 Tablet by mouth at bedtime as needed for Sleep for up to 30 days.  Dose: 5 mg         STOP taking these medications    HYDROcodone/acetaminophen  MG Tabs  Commonly known as: NORCO            Allergies  Allergies   Allergen Reactions   • Iodine Nausea     Reports having iodine iv contrast and throwing up afterwards.       DIET  Orders Placed This Encounter   Procedures   • Diet Order Diet: Regular     Standing Status:   Standing     Number of Occurrences:   1     Order Specific Question:   Diet:     Answer:   Regular [1]       ACTIVITY  As tolerated.  Weight bearing as tolerated    CONSULTATIONS  Surgery - Arvin CHAPMAN - Devi    PROCEDURES  Laparoscopic cholecystectomy 2/2/2022    LABORATORY  Lab Results   Component Value Date    SODIUM 129 (L) 02/03/2022    POTASSIUM 4.6 02/03/2022    CHLORIDE 97 02/03/2022    CO2 23 02/03/2022    GLUCOSE 161 (H) 02/03/2022    BUN 21 02/03/2022    CREATININE 1.02 02/03/2022        Lab Results   Component Value Date    WBC 11.3 (H) 02/03/2022    HEMOGLOBIN 9.5 (L) 02/03/2022    HEMATOCRIT 28.1 (L) 02/03/2022    PLATELETCT 301 02/03/2022        Total time of the discharge process exceeds 30 minutes.

## 2022-02-03 NOTE — DISCHARGE PLANNING
Received Choice form at 3632  Agency/Facility Name: [facility]  Referral sent per Choice form @ 1804

## 2022-02-03 NOTE — CONSULTS
Consults   INFECTIOUS DISEASES INPATIENT CONSULT NOTE     Date of Service: 2/3/2022    Consult Requested By: Earle Morrison M.D.    Reason for Consultation: Streptococcus bacteremia, cholecystitis    History of Present Illness:   Emigdio Jiménez is a 86 y.o.  admitted 2/1/2022. Pt has a past medical history of   TAVR as well as right upper quadrant abdominal pain.  He was admitted earlier this month and found to have Streptococcus anginosis bacteremia.  Blood cultures cleared quickly and he underwent RENU negative for endocarditis.  He also complained of right upper quadrant abdominal pain but imaging without cholecystitis.  ID saw the patient and recommended a 2-week ceftriaxone course with planned end date of 2/8/2022.   However, the patient is now readmitted complaining of worsening right upper quadrant abdominal pain, worse with eating, poor p.o. intake, nausea vomiting and diarrhea.    Hospital Course:   Mild leukocytosis on arrival.  Surgery was consulted and the patient went to the OR on 2/2 for laparoscopic cholecystectomy per op note no obvious bleeding or bile leak, no purulent fluid mention.    Review Of Systems:  Review of Systems   Constitutional: Positive for malaise/fatigue. Negative for chills and fever.   HENT: Positive for hearing loss.    Eyes: Negative for blurred vision and double vision.   Respiratory: Negative for cough, sputum production and shortness of breath.    Cardiovascular: Negative for chest pain.   Gastrointestinal: Positive for nausea. Negative for abdominal pain, constipation, diarrhea and vomiting.   Genitourinary: Negative for dysuria.   Musculoskeletal: Negative for back pain, joint pain and myalgias.   Skin: Negative for rash.   Neurological: Negative for weakness.   Psychiatric/Behavioral: The patient is not nervous/anxious.          PMH:   Past Medical History:   Diagnosis Date   • Anxiety    • Cholecystitis 01/18/2022   • Heart murmur    • Hypertension        PSH:  Past  Surgical History:   Procedure Laterality Date   • DORIAN BY LAPAROSCOPY  2/2/2022    Procedure: CHOLECYSTECTOMY, LAPAROSCOPIC;  Surgeon: Joshua Sheridan M.D.;  Location: SURGERY [facility];  Service: General   • HERNIA REPAIR         FAMILY HX:  Family History   Problem Relation Age of Onset   • Hyperlipidemia Mother    • Heart Disease Mother        SOCIAL HX:  Social History     Socioeconomic History   • Marital status:      Spouse name: Not on file   • Number of children: Not on file   • Years of education: Not on file   • Highest education level: Not on file   Occupational History   • Not on file   Tobacco Use   • Smoking status: Former Smoker     Packs/day: 0.00   • Smokeless tobacco: Never Used   Vaping Use   • Vaping Use: Never used   Substance and Sexual Activity   • Alcohol use: Not Currently   • Drug use: Never   • Sexual activity: Not on file   Other Topics Concern   • Not on file   Social History Narrative   • Not on file     Social Determinants of Health     Financial Resource Strain:    • Difficulty of Paying Living Expenses: Not on file   Food Insecurity:    • Worried About Running Out of Food in the Last Year: Not on file   • Ran Out of Food in the Last Year: Not on file   Transportation Needs:    • Lack of Transportation (Medical): Not on file   • Lack of Transportation (Non-Medical): Not on file   Physical Activity:    • Days of Exercise per Week: Not on file   • Minutes of Exercise per Session: Not on file   Stress:    • Feeling of Stress : Not on file   Social Connections:    • Frequency of Communication with Friends and Family: Not on file   • Frequency of Social Gatherings with Friends and Family: Not on file   • Attends [employer] Services: Not on file   • Active Member of Clubs or Organizations: Not on file   • Attends Club or Organization Meetings: Not on file   • Marital Status: Not on file   Intimate Partner Violence:    • Fear of Current or Ex-Partner: Not on file   • Emotionally  Abused: Not on file   • Physically Abused: Not on file   • Sexually Abused: Not on file   Housing Stability:    • Unable to Pay for Housing in the Last Year: Not on file   • Number of Places Lived in the Last Year: Not on file   • Unstable Housing in the Last Year: Not on file     Social History     Tobacco Use   Smoking Status Former Smoker   • Packs/day: 0.00   Smokeless Tobacco Never Used     Social History     Substance and Sexual Activity   Alcohol Use Not Currently       Allergies/Intolerances:  Allergies   Allergen Reactions   • Iodine Nausea     Reports having iodine iv contrast and throwing up afterwards.       History reviewed with the patient     Other Current Medications:    Current Facility-Administered Medications:   •  ondansetron (ZOFRAN ODT) dispertab 4 mg, 4 mg, Oral, Q4HRS PRN, Earle Morrison M.D., 4 mg at 02/02/22 1703  •  ondansetron (ZOFRAN) syringe/vial injection 4 mg, 4 mg, Intravenous, Q4HRS PRN, Earle Morrison M.D., 4 mg at 02/02/22 1115  •  prochlorperazine (COMPAZINE) injection 10 mg, 10 mg, Intravenous, Q6HRS PRN, Earle Morirson M.D.  •  oxyCODONE immediate-release (ROXICODONE) tablet 2.5-5 mg, 2.5-5 mg, Oral, Q4HRS PRN, Earle Morrison M.D.  •  morphine 4 MG/ML injection 1-2 mg, 1-2 mg, Intravenous, Q3HRS PRN, Earle Morrison M.D.  •  D5 NS infusion, , Intravenous, Continuous, Earle Morrison M.D., Last Rate: 50 mL/hr at 02/02/22 0746, New Bag at 02/02/22 1105  •  metroNIDAZOLE (FLAGYL) tablet 500 mg, 500 mg, Oral, Q12HRS, Earle Morrison M.D., 500 mg at 02/03/22 0505  •  amLODIPine (NORVASC) tablet 5 mg, 5 mg, Oral, DAILY, Montana Kumar, A.P.R.NInna, 5 mg at 02/03/22 0505  •  atorvastatin (LIPITOR) tablet 40 mg, 40 mg, Oral, Q EVENING, Montana Kumar, A.P.R.N., 40 mg at 02/02/22 1703  •  tamsulosin (FLOMAX) capsule 0.8 mg, 0.8 mg, Oral, Q EVENING, Montana Kumar, A.P.R.N., 0.8 mg at 02/02/22 1703  •  levothyroxine (SYNTHROID) tablet 112 mcg, 112 mcg, Oral, AM  "ES, Montana Kumar, A.P.R.N., 112 mcg at 22 0505  •  amitriptyline (ELAVIL) tablet 25 mg, 25 mg, Oral, Nightly, Montana Kumar, A.P.R.N., 25 mg at 22  •  senna-docusate (PERICOLACE or SENOKOT S) 8.6-50 MG per tablet 2 Tablet, 2 Tablet, Oral, BID, 2 Tablet at 22 170 **AND** polyethylene glycol/lytes (MIRALAX) PACKET 1 Packet, 1 Packet, Oral, QDAY PRN **AND** magnesium hydroxide (MILK OF MAGNESIA) suspension 30 mL, 30 mL, Oral, QDAY PRN **AND** bisacodyl (DULCOLAX) suppository 10 mg, 10 mg, Rectal, QDAY PRN, Montana Kumar, A.P.R.N.  •  Respiratory Therapy Consult, , Nebulization, Continuous RT, Montana Kumar, A.P.R.N.  •  acetaminophen (Tylenol) tablet 650 mg, 650 mg, Oral, Q6HRS PRN, Montana Kumar, A.P.R.N.  •  labetalol (NORMODYNE/TRANDATE) injection 10 mg, 10 mg, Intravenous, Q4HRS PRN, Montana Kumar, A.P.R.N.  •  cefTRIAXone (Rocephin) syringe 1 g, 1 g, Intravenous, Q EVENING, Montana Kumar, A.P.R.N., 1 g at 22 170  [unfilled]    Most Recent Vital Signs:  /70   Pulse 81   Temp 36.6 °C (97.9 °F) (Temporal)   Resp 18   Ht 1.676 m (5' 6\")   Wt 75 kg (165 lb 5.5 oz)   SpO2 95%   BMI 26.69 kg/m²   Temp  Av.8 °C (98.2 °F)  Min: 36.2 °C (97.2 °F)  Max: 37.3 °C (99.2 °F)    Physical Exam:  Physical Exam  Constitutional:       Appearance: Normal appearance.   HENT:      Head: Normocephalic and atraumatic.      Right Ear: External ear normal.      Left Ear: External ear normal.      Nose: Nose normal.      Mouth/Throat:      Mouth: Mucous membranes are moist.      Pharynx: Oropharynx is clear.   Eyes:      Extraocular Movements: Extraocular movements intact.      Conjunctiva/sclera: Conjunctivae normal.      Pupils: Pupils are equal, round, and reactive to light.   Cardiovascular:      Rate and Rhythm: Normal rate and regular rhythm.      Heart sounds: Murmur heard.       Pulmonary:      Effort: Pulmonary effort is normal.      Breath " sounds: Normal breath sounds.   Abdominal:      General: There is distension.      Palpations: Abdomen is soft.      Tenderness: There is no abdominal tenderness.   Musculoskeletal:         General: Normal range of motion.      Cervical back: Neck supple.      Right lower leg: No edema.      Left lower leg: No edema.   Skin:     General: Skin is warm and dry.   Neurological:      General: No focal deficit present.      Mental Status: He is alert and oriented to person, place, and time.   Psychiatric:         Mood and Affect: Mood normal.         Behavior: Behavior normal.             Pertinent Lab Results:  Recent Labs     02/01/22  0743 02/02/22  0410   WBC 12.2* 9.1      Recent Labs     02/01/22  0743 02/02/22  0410   HEMOGLOBIN 10.2* 8.5*   HEMATOCRIT 31.2* 25.7*   MCV 88.9 88.0   MCH 29.1 29.1   PLATELETCT 279 224         Recent Labs     02/01/22  0743 02/02/22  0410   SODIUM 127* 128*   POTASSIUM 4.6 4.7   CHLORIDE 96 99   CO2 21 20   CREATININE 1.76* 1.17        Recent Labs     02/01/22  0743 02/02/22  0410   ALBUMIN 3.8 2.7*        Pertinent Micro:  Results     Procedure Component Value Units Date/Time    Group A Strep by PCR [[id number]] Collected: 02/02/22 1756    Order Status: Completed Specimen: Throat Updated: 02/02/22 1847     Group A Strep by PCR Not Detected    Narrative:      Collected By: [id number] DENITA LEÓN    URINE CULTURE(NEW) [[id number]] Collected: 02/01/22 0908    Order Status: Completed Specimen: Urine Updated: 02/02/22 1612     Significant Indicator NEG     Source UR     Site -     Culture Result No growth at 24 hours.    Narrative:      Indication for culture:->Patient WITHOUT an indwelling Harrison  catheter in place with new onset of Dysuria, Frequency,  Urgency, and/or Suprapubic pain  Indication for culture:->Patient WITHOUT an indwelling Harrison    BLOOD CULTURE x2 [[id number]] Collected: 02/01/22 0840    Order Status: Completed Specimen: Blood from Peripheral Updated: 02/02/22 0718      "Significant Indicator NEG     Source BLD     Site PICC Line     Culture Result No Growth  Note: Blood cultures are incubated for 5 days and  are monitored continuously.Positive blood cultures  are called to the RN and reported as soon as  they are identified.      Narrative:      Per Hospital Policy: Only change Specimen Src: to \"Line\" if  specified by physician order.  Per Hospital Policy: Only change Specimen Src: to \"Line\" if    BLOOD CULTURE x2 [[id number]] Collected: 02/01/22 0851    Order Status: Completed Specimen: Blood from Peripheral Updated: 02/02/22 0718     Significant Indicator NEG     Source BLD     Site PERIPHERAL     Culture Result No Growth  Note: Blood cultures are incubated for 5 days and  are monitored continuously.Positive blood cultures  are called to the RN and reported as soon as  they are identified.      Narrative:      Per Hospital Policy: Only change Specimen Src: to \"Line\" if  specified by physician order.  Right Forearm/Arm    URINALYSIS [[id number]] Collected: 02/01/22 0908    Order Status: Completed Specimen: Urine Updated: 02/01/22 0919     Color Yellow     Character Clear     Specific Gravity 1.019     Ph 6.0     Glucose Negative mg/dL      Ketones Negative mg/dL      Protein Negative mg/dL      Bilirubin Negative     Urobilinogen, Urine 0.2     Nitrite Negative     Leukocyte Esterase Negative     Occult Blood Negative     Micro Urine Req see below     Comment: Microscopic examination not performed when specimen is clear  and chemically negative for protein, blood, leukocyte esterase  and nitrite.         Narrative:      Indication for culture:->Patient WITHOUT an indwelling Harrison  catheter in place with new onset of Dysuria, Frequency,  Urgency, and/or Suprapubic pain    URINALYSIS [[id number]]     Order Status: Canceled Specimen: Urine         No results found for: BLOODCULTU, BLDCULT, BCHOLD     Studies:  CT-ABDOMEN-PELVIS W/O    Result Date: 1/23/2022 1/23/2022 2:38 PM " HISTORY/REASON FOR EXAM:  Abdominal pain, acute, nonlocalized. abdominal pain TECHNIQUE/EXAM DESCRIPTION: CT scan of the abdomen and pelvis without contrast. Noncontrast helical scanning was obtained from the diaphragmatic domes through the pubic symphysis. Low dose optimization technique was utilized for this CT exam including automated exposure control and adjustment of the mA and/or kV according to patient size. COMPARISON: Ultrasound 1/23/2022. FINDINGS: Lower Chest: Small hiatal hernia Liver: Normal. Spleen: Unremarkable. Pancreas: Unremarkable. Gallbladder: Distended. Biliary: No biliary dilatation.. Adrenal glands: Nonenlarged. Kidneys: No renal calculus. No hydronephrosis.. Bowel: No bowel wall thickening or bowel dilatation. Lymph nodes: No adenopathy. Vasculature: The abdominal aorta is normal in caliber. Moderate atherosclerotic disease of the abdominal aorta and its branches. Peritoneum: Unremarkable without ascites. Musculoskeletal: Mild anterolisthesis of L4-5. Moderate degenerative change of the lumbar spine, most at L2-3. Pelvis: Small fat-containing left inguinal hernia. Unremarkable urinary bladder. Prostate calcification..     1. No acute inflammatory change in the abdomen or pelvis on this noncontrast CT. 2. Small hiatal hernia. 3. No hydronephrosis. No renal calculus.    DX-CHEST-PORTABLE (1 VIEW)    Result Date: 2/1/2022 2/1/2022 8:35 AM HISTORY/REASON FOR EXAM:  Fever. TECHNIQUE/EXAM DESCRIPTION AND NUMBER OF VIEWS: Single portable view of the chest. COMPARISON: 1/21/2022 FINDINGS: Cardiomediastinal silhouette is stable. Aortic calcified atherosclerotic plaque. There is a valve prosthesis. There are calcified hilar lymph nodes with prior granulomatous infection. No focal consolidation, pleural effusion, pulmonary edema or pneumothorax. Generalized osteopenia.     No acute cardiopulmonary abnormality.    DX-CHEST-PORTABLE (1 VIEW)    Result Date: 1/21/2022 1/21/2022 5:17 PM HISTORY/REASON FOR  EXAM:  Chest Pain. TECHNIQUE/EXAM DESCRIPTION AND NUMBER OF VIEWS: Single portable view of the chest. COMPARISON: January 18, 2022 FINDINGS: Heart size is within normal limits. Patient status post ureteric valve replacement. There are calcified right hilar lymph nodes. No pulmonary infiltrates or consolidations are noted. No pleural abnormalities are noted.     No acute cardiac or pulmonary abnormalities are identified.    DX-CHEST-PORTABLE (1 VIEW)    Result Date: 1/18/2022 1/18/2022 7:32 AM HISTORY/REASON FOR EXAM:  Chest Pain. TECHNIQUE/EXAM DESCRIPTION AND NUMBER OF VIEWS: Single portable view of the chest. COMPARISON: 3/12/2021 FINDINGS: Cardiac mediastinal contour is unchanged.  Prosthetic heart valve present. Atherosclerotic calcifications of the aortic arch. Lungs show hypoinflation. Calcified mediastinal lymph nodes again seen. No focal consolidation. No pleural fluid collection or pneumothorax. Degenerative change of both shoulders.     No acute cardiopulmonary abnormality    MR-CERVICAL SPINE-W/O    Result Date: 1/24/2022 1/23/2022 10:25 PM HISTORY/REASON FOR EXAM:  Cervical radiculopathy, infection suspected. Positive blood cultures for Gram-positive cocci, possible Streptococcus. Possible endocarditis. Neck pain and upper back pain. TECHNIQUE/EXAM DESCRIPTION: MRI of the cervical spine without contrast. The study was performed on a [employer] 1.5 Lachelle MRI scanner. T1 sagittal, T2 fast spin-echo sagittal, and gradient echo axial images were obtained of the cervical spine. T2 fat suppressed sagittal images were also obtained. Optional T2 axial images may also be included. COMPARISON: There are no previous cervical spine films for comparison FINDINGS: Alignment in the cervical spine is within normal limits. There is advanced degenerative disc space narrowing at C3-4 through C6/C7 and moderate disc space narrowing at C7-T1. There is marginal spurring at C3-4 through C6-7. Marrow signal shows mild Modic  type I discogenic endplate marrow changes at C5 to C6. No evidence of discitis or osteomyelitis. The prevertebral and paraspinous soft tissues are unremarkable. There are no anomalies at the craniovertebral junction.  The cervical spinal cord is normal in caliber and signal throughout its course. At C2-3, no central or foraminal stenosis. At C3-4, there is disc/osteophyte change in the left with effacement of left paramedian ventral subarachnoid space. There is mild left lateral recess stenosis. There is mild right foraminal stenosis and moderate left foraminal stenosis with uncinate hypertrophy. At C4-5, there is a small broad-based disc/osteophyte complex. There is buckling or hypertrophy of the ligamentum flavum. There is overall moderate central stenosis. Marked bilateral foraminal stenosis due to spondylotic change. At C5-6, there is broad-based disc/osteophyte change and ligamentum flavum buckling or hypertrophy. Mild central stenosis. Moderate left lateral recess stenosis. Mild right foraminal stenosis. Marked left foraminal stenosis due to spondylotic change. At C6-7, minor disc/osteophyte change. Partial effacement of ventral subarachnoid space. No central stenosis. Mild right foraminal stenosis. The left neural foramen is intact. At C7-T1, no significant disc bulge or protrusion. No central or foraminal stenosis.     1.  Multilevel degenerative disc disease and spondylotic changes most notable for C4-5 moderate central stenosis at C5-6 mild central stenosis. Foraminal stenoses due to spondylotic change as described above. 2.  No evidence of discitis, osteomyelitis, or epidural collection. 3.  No myelopathic cord signal abnormality.    MR-THORACIC SPINE-W/O    Result Date: 1/24/2022 1/23/2022 10:25 PM HISTORY/REASON FOR EXAM: Suspected Epidural abscess. Cervical radiculopathy, infection suspected. Positive blood cultures for Gram-positive cocci, possible Streptococcus. Possible endocarditis. Neck pain and  upper back pain. TECHNIQUE/EXAM DESCRIPTION: MRI of the thoracic spine without contrast. The study was performed on a [employer] 1.5 Lachelle MRI scanner. T1 sagittal, T2 fast spin-echo sagittal, T2 fat-suppressed sagittal and T2 axial images were obtained of the thoracic spine. COMPARISON: CTA of the chest 1/21/2022 FINDINGS: Alignment in the thoracic spine is normal. There are minor Schmorl's node endplate irregularities at T5-T6, T6-T7, T7-T8, T8-T9. Minor anterior marginal endplate spurring at T11-T12. Marrow signal in the vertebral bodies is unremarkable. No evidence of osteomyelitis or discitis. There are no epidural collections. No evidence of epidural abscess. The prevertebral and paraspinous soft tissues are unremarkable except for an incidental discoid shaped lipoma in the right posterior paraspinous soft tissues. This measures about 59 mm x 15 mm (T2 axial image 1, series 8) and correlation with CT indicates that the lesion measures about 47 mm in craniocaudad dimension (sagittal reconstruction CT image 2, series 301 from 1/21/2022). There are some minimal dependent subsegmental atelectatic changes at the posterior lung bases. The thoracic spinal cord is normal in caliber and signal throughout its course. There is some longitudinal pulsation artifact seen on the T2 sagittal and T2 fat-suppressed sagittal sequences. There is no corroborating signal abnormality on the T2 axial images. There is no significant disc bulge or protrusion at any thoracic level. There is no central stenosis at any thoracic level. The thoracic neural foramina are intact.     1.  Minor degenerative changes including a few Schmorl's node endplate irregularities and anterior marginal spurring at T11-T12. 2.  Incidental superficial soft tissue lipoma in the right posterior paraspinous soft tissues. No clinical significance. 3.  No significant disc bulge or protrusion, central stenosis, or foraminal stenosis at any thoracic level. 4.  No  evidence of discitis, osteomyelitis, or epidural phlegmon/epidural abscess.    US-RUQ    Result Date: 2/1/2022 2/1/2022 9:33 AM HISTORY/REASON FOR EXAM:  Epigastric pain. Indigestion. Hiccups for one week. TECHNIQUE/EXAM DESCRIPTION AND NUMBER OF VIEWS:  Real-time sonography of the liver and biliary tree. COMPARISON: None FINDINGS: The liver is normal in contour. There is no evidence of solid mass lesion. The liver measures 14.41 cm. There is a single gallstone at the fundus. The gallbladder wall thickness measures 3.40 mm. There is no pericholecystic fluid. Patient complains of mild discomfort over the gallbladder during the exam. The common duct measures 2.80 mm. The visualized pancreas is unremarkable. The visualized aorta is normal in caliber with atherosclerosis. Intrahepatic IVC is patent. The portal vein is patent with hepatopetal flow. The MPV measures 1.05 cm cm. The right kidney measures 9.32 cm. There is no ascites.     1.  There is a single gallstone with mild gallbladder wall thickening but no biliary dilatation.    US-RUQ    Result Date: 1/22/2022 1/22/2022 1:44 PM HISTORY/REASON FOR EXAM: Right upper quadrant abdominal pain. TECHNIQUE/EXAM DESCRIPTION: Ultrasound of the gallbladder, liver and biliary tree. COMPARISON: None FINDINGS: The liver echogenicity is normal. There is no evidence of hepatic mass. There are gallstones within the gallbladder.. The gallbladder wall thickness is measured at 3 mm. No evidence of pericholecystic fluid. The common bile duct is measured at 4 mm. The visualized portions of the portal vein and inferior vena cava are normal. The pancreas is normal. The right kidney is normal.     Gallstones within the gallbladder. No evidence of biliary ductal dilatation.    NM-CARDIAC STRESS TEST    Result Date: 1/24/2022   Myocardial Perfusion  Report  NUCLEAR IMAGING INTERPRETATION  No evidence of significant jeopardized viable myocardium or prior myocardial  infarction.  Normal left  ventricular size, ejection fraction, and wall motion.  ECG INTERPRETATION  Negative stress ECG for ischemia.  OLAMIDE DAI  MRN:    [id number]         Gender:    M  Exam Date: 2022 13:55  Exam Location:      Inpatient  Ordering Phys:     ELEANOR LOPEZ  NucMed Tech:       Bridgett Celeste RT  Age:    86    :    1935        Ht (in):     64  Wt (lb):     165    BMI:    28.23       Radiologist  Risk Factors:             Smoking  Indications:              Chest pain, unspecified  ICD Codes:                R07.9  Cardiac History:          Chest Pain  Cardiac Meds:  Meds Past 24 hrs:  Pretest Chest Pain:       No symptoms  STRESS TEST      Pharmacologi                   terence Mckinley   Lexiscan       Dose: 0.4 mg  ol:  Post-Injection Exercise:        No exercise followed the intravenous injection  Resting HR (bpm):      90  Peak HR (bpm):         103  Resting BP (mmHg):       152    /   67  Peak BP (mmHg):       140   /   74  MaxPHR:     135     Target HR (bpm):       115  % MaxPHR:     76  Double Product:       04418  BP Response:  Stress Termination:       Completion of Protocol  Stress Symptoms:  DYSPNEA YES,NORMAL SINUS RHYTHM,no chest pain  ECG  Resting ECG:     Sinus rhythm.  Stress ECG:      No ischemic changes with Regadenoson.   Occasional PAC's.  IMAGE PROTOCOL      Rest/Stress 1                      Day          RadiopharmaceuticalDose (mCi)   Imaging  Date      Imaging  Time         Inj to Img Time (min)  Rest:   Tc-99m             7.6          2022        15:05                 30  Stress: Tc-99m             21           2022        16:10                 30  Rest:  Administration Site:       Right antecubital                             fossa  Administered by:      Bridgett Celeste RT  Stress:  Administration Site:       Right antecubital                             fossa  Administered by:      Bridgett Celeste RT  % Percent HR Achieved:  SPECT RESULTS  Technical Quality:       Good  Raw Data Analysis:   Summed Stress Score:    0  Summed Rest Score:    4  Summed Difference Score:        0  PERFUSION:  Normal myocardial perfusion with no ischemia.  FUNCTIONAL RESULTS (calculated via Gated SPECT)  Stress Image LV EF:        76     %  Upper Normal Limit  Stress EDV:      46     ml   EDVI:    25      ml/m2  Stress ESV:      11     ml   ESVI:    6       ml/m2  TID:    1.26   TID - 1.19      TID (ed) - 1.23  LV Function:  Normal left ventricular wall motion.  LV ejection fraction = 76%.  Kamaljit Smith MD  Edited by: Sadiq Bustamante MD  (Electronically Signed)  Final Date:      22 January 2022                   16:37  Amended:         24 January 2022 14:03    CT-CTA CHEST PULMONARY ARTERY W/ RECONS    Result Date: 1/21/2022 1/21/2022 6:50 PM HISTORY/REASON FOR EXAM:  PE suspected, high prob; Right-sided chest pain for 1 week, nonradiating, worse with deep inspiration, concern for PE TECHNIQUE/EXAM DESCRIPTION: CT angiogram scan for pulmonary embolism with contrast, with reconstructions. 1.25 mm helical sections were obtained from the lung apices through the lung bases following the rapid bolus administration of 65 mL of Omnipaque 350 nonionic contrast. Thin-section overlapping reconstruction interval was utilized. Coronal reconstructions were generated from the axial data. MIP post processing was performed and utilized for the interpretation. Low dose optimization technique was utilized for this CT exam including automated exposure control and adjustment of the mA and/or kV according to patient size. COMPARISON: None FINDINGS: Pulmonary Embolism: No. Main Pulmonary Arteries: No. Segmental branches: No. Subsegmental branches: No. Only if positive for PE: RV diameter: cm. LV diameter: cm. RV/LV ratio: . (Greater than 0.9 is abnormal.) Additional Comments: Patient status post aortic valve replacement. There are coronary artery calcifications. Lungs: No suspicious nodules or air space process. Pleura: No pleural  effusion. Nodes: No enlarged lymph nodes. Additional findings: .     1.  No evidence of pulmonary embolus. 2.  Coronary artery calcifications.     EC-ECHOCARDIOGRAM COMPLETE W/O CONT    Result Date: 2022  Transthoracic Echo Report Echocardiography Laboratory CONCLUSIONS Hyperdynamic left ventricular systolic function. The left ventricular ejection fraction is visually estimated to be 75%. Normal right ventricular size and systolic function. Known TAVR aortic valve that is functioning normally with normal transvalvular gradients.  OLAMIDE DAI Exam Date:         2022                    09:23 Exam Location:     Inpatient Priority:          Routine Ordering Physician:        ELEANOR LOPEZ Referring Physician:       [id number] Alicea Sonographer:               Elizabeth Chanel [facility] Age:    86     Gender:    M MRN:    [id number] :    [id number] BSA:    1.87   Ht (in):    67     Wt (lb):    166 Exam Type:     Complete Indications:     Chest Pain ICD Codes:       786.5 CPT Codes:       55536 BP:   143    /   64     HR:   86 Technical Quality:       Fair MEASUREMENTS  (Male / Female) Normal Values 2D ECHO LV Diastolic Diameter PLAX        3.9 cm                4.2 - 5.9 / 3.9 - 5.3 cm LV Systolic Diameter PLAX         1.7 cm                2.1 - 4.0 cm IVS Diastolic Thickness           1.1 cm                LVPW Diastolic Thickness          0.96 cm               LVOT Diameter                     1.9 cm                Estimated LV Ejection Fraction    75 %                  LV Ejection Fraction MOD BP       63.6 %                >= 55  % LV Ejection Fraction MOD 4C       57.7 %                LV Ejection Fraction MOD 2C       80.2 %                IVC Diameter                      1.7 cm                DOPPLER AV Peak Velocity                  2.5 m/s               AV Peak Gradient                  25.1 mmHg             AV Mean Gradient                  16.3 mmHg             LVOT Peak Velocity                1.1  m/s               AV Area Cont Eq vti               1.4 cm2               Mitral E Point Velocity           1 m/s                 Mitral E to A Ratio               0.72                  MV Pressure Half Time             71.2 ms               MV Area PHT                       3.1 cm2               MV Deceleration Time              245 ms                * Indicates values subject to auto-interpretation LV EF:  75    % FINDINGS Left Ventricle Hyperdynamic left ventricular systolic function. The left ventricular ejection fraction is visually estimated to be 75%. Peak velocity is 2.6 m/sec. Grade I diastolic dysfunction.  Mild concentric left ventricular hypertrophy. Right Ventricle Normal right ventricular size and systolic function. Right Atrium Normal right atrial size. Normal inferior vena cava size and inspiratory collapse. Left Atrium Mildly dilated left atrium. Left atrial volume index is 37 mL/sq m. Mitral Valve Thickened mitral valve leaflets. Mild mitral regurgitation. No mitral stenosis. Aortic Valve Known TAVR aortic valve that is functioning normally with normal transvalvular gradients. Vmax is  2.4 m/s. Transvalvular gradients are - Peak: 23 mmHg,  Mean: 14  mmHg. No evidence of paravalvular leak. Tricuspid Valve Structurally normal tricuspid valve. Trace tricuspid regurgitation. Right atrial pressure is estimated to be 3 mmHg. Unable to estimate pulmonary artery pressure due to an inadequate tricuspid regurgitant jet. Pulmonic Valve Structurally normal pulmonic valve without significant stenosis or regurgitation. Pericardium Normal pericardium without effusion. Aorta Normal aortic root for body surface area. The ascending aorta diameter is 2.2  cm. Joel Ceran MD (Electronically Signed) Final Date:     22 January 2022                 10:52    EC-RENU W/O CONT    Result Date: 1/24/2022  Transesophageal Echo Report Echocardiography Laboratory CONCLUSIONS The left ventricular ejection fraction is visually  estimated to be 70%. Known TAVR, well seated, mild aortic insufficiency. Borderline increased gradients across the TAVR, Vmax 3 m/s, PG 38 mmHg, MG 18 mmHg. No evidence of endocarditis. OLAMIDE DAI Exam Date:          2022                     12:24 Exam Location:      Inpatient Priority:            Routine Ordering Physician:        GENEVA MCKEON Referring Physician:       [id number] Arguello Sonographer:               Samantha Moffett                            [facility] Age:    86     Gender:    M MRN:    [id number] :    [id number] BSA:           Ht (in):           Wt (lb): Report Type:      Complete Indications:     Acute/Subacute Bacterial Endocarditis ICD Codes:       421 CPT Codes:       68718 BP:          /          HR: Technical Quality:       Fair MEASUREMENTS  (Male / Female) Normal Values 2D ECHO Estimated LV Ejection Fraction    70 %                  * Indicates values subject to auto-interpretation LV EF:  70    % Medications Medications determined by anesthesiologist. Limitations None. Complications None. Proc. Components The probe was inserted and manipulated by Dr. Walker . Epiq probe # 2 was used for this procedure. =2D, color Doppler, spectral Doppler, and 3D imaging were used as part of the evaluation as clinically indicated. FINDINGS Left Ventricle The left ventricle is normal in size and thickness.  The left ventricular ejection fraction is visually estimated to be 70%. Normal wall motion. Right Ventricle The right ventricle is normal in size and systolic function. Right Atrium The right atrium is normal in size. Normal inferior vena cava size and inspiratory collapse. Left Atrium The left atrium is normal in size. Left atrial volume index is  mL/sq m. LA Appendage Normal left atrial appendage. IA Septum The interatrial septum is normal. IV Septum The interventricular septum is normal. Mitral Valve Structurally normal mitral valve without significant stenosis. Mild mitral regurgitation.  Aortic Valve Known TAVR, well seated, mild aortic insufficiency. Borderline increased gradients across the TAVR, Vmax 3 m/s, PG 38 mmHg, MG 18 mmHg. Tricuspid Valve Structurally normal tricuspid valve without significant stenosis or regurgitation. Pulmonic Valve Structurally normal pulmonic valve without significant stenosis or regurgitation. Pericardium Normal pericardium without effusion. Aorta Normal aortic root for body surface area. The ascending aorta diameter is 3.2 cm. Debbi Walker MD (Electronically Signed) Final Date:     24 January 2022                 13:56    IR-MIDLINE CATHETER INSERTION WO GUIDANCE > AGE 4    Result Date: 1/27/2022  HISTORY/REASON FOR EXAM:  Midline Placement   TECHNIQUE/EXAM DESCRIPTION AND NUMBER OF VIEWS: Midline insertion with ultrasound guidance.  FINDINGS: Midline insertion with Ultrasound Guidance was performed by qualified nursing staff without the assistance of a Radiologist. Midline positioning as measured by RN or as appropriate length of catheter selected.              Ultrasound-guided midline placement performed by qualified nursing staff as above.       ASSESSMENT/PLAN:     86 y.o.  admitted 2/1/2022. Pt has a past medical history of   TAVR as well as right upper quadrant abdominal pain.  He was admitted earlier this month and found to have Streptococcus anginosis bacteremia.  Blood cultures cleared quickly and he underwent RENU negative for endocarditis.  He also complained of right upper quadrant abdominal pain but imaging without cholecystitis.  ID saw the patient and recommended a 2-week ceftriaxone course with planned end date of 2/8/2022.   Now readmitted complaining of worsening right upper quadrant abdominal pain.    Hospital Course:   Mild leukocytosis on arrival.  Surgery was consulted and the patient went to the OR on 2/2 for laparoscopic cholecystectomy per op note no obvious bleeding or bile leak, no purulent fluid mention.    Problem List      Leukocytosis, resolved  Right upper quadrant abdominal pain, nausea vomiting  Cholecystitis, status post per scopic cholecystectomy  DANIS, improved  Bacteremia, prior diagnosis and on antibiotics  History of TAVR    Plan     --- Continue Ceftriaxone 2 grams daily to complete previously planned course - end on 2/8/22 (patient was receiving home infusion antibiotics prior to this admit)  --- Continue flagyl 500 mg BID for same - end 2/8/22   --- Monitor labs   --- Renally dose medications and avoid nephrotoxins  --- If new orders are needed for home infusion then notify ID      Plan of care discussed with KRISTEL Morrison M.D.. ID will sign off.     Patricia Devi M.D.

## 2022-02-03 NOTE — PROGRESS NOTES
Patient discharged to home with daughter and staff escort. IV discontinued. Prescriptions sent to patient's pharmacy, verbalized understanding, consent signed and in chart. Discharge instructions given regarding diet, activity limitations, incision and dressing care, follow up appointments, and when to seek medical attention.  Education provided, patient asked questions and verbalized understanding. Discharge paperwork signed and in chart. Patient is tolerating diet, stable when ambulating, and pain is well controlled. All belongings collected. No further questions or concerns at this time.

## 2022-02-03 NOTE — CARE PLAN
Problem: Pain - Standard  Goal: Alleviation of pain or a reduction in pain to the patient’s comfort goal  Outcome: Progressing     Problem: Knowledge Deficit - Standard  Goal: Patient and family/care givers will demonstrate understanding of plan of care, disease process/condition, diagnostic tests and medications  Outcome: Progressing     The patient is Stable - Low risk of patient condition declining or worsening    Shift Goals  Clinical Goals: pain control, maintain NPO for possible surgery,   Patient Goals: diet  Family Goals: NA    Progress made toward(s) clinical / shift goals:  pain per MAR. Surgery today. Diet reinstated post op    Patient is not progressing towards the following goals:

## 2022-02-03 NOTE — CARE PLAN
The patient is Stable - Low risk of patient condition declining or worsening    Shift Goals  Clinical Goals: tolerate diet, normal labs  Patient Goals: discharge  Family Goals: NA    Progress made toward(s) clinical / shift goals: patient tolerating regular diet without any nausea/vomiting/abdominal pain; morning labs WDL    Patient is not progressing towards the following goals:      Problem: Knowledge Deficit - Standard  Goal: Patient and family/care givers will demonstrate understanding of plan of care, disease process/condition, diagnostic tests and medications  Outcome: Progressing     Problem: Pain - Standard  Goal: Alleviation of pain or a reduction in pain to the patient’s comfort goal  Outcome: Progressing

## 2022-02-03 NOTE — DISCHARGE INSTRUCTIONS
Discharge Instructions    Discharged to home by car with relative. Discharged via wheelchair, hospital escort: Yes.  Special equipment needed: Not Applicable    Be sure to schedule a follow-up appointment with your primary care doctor or any specialists as instructed.     Discharge Plan:   Influenza Vaccine Indication: Not indicated: Previously immunized this influenza season and > 8 years of age    I understand that a diet low in cholesterol, fat, and sodium is recommended for good health. Unless I have been given specific instructions below for another diet, I accept this instruction as my diet prescription.       Special Instructions: None    · Is patient discharged on Warfarin / Coumadin?   No       Laparoscopic Cholecystectomy, Care After  This sheet gives you information about how to care for yourself after your procedure. Your health care provider may also give you more specific instructions. If you have problems or questions, contact your health care provider.  What can I expect after the procedure?  After the procedure, it is common to have:  · Pain at your incision sites. You will be given medicines to control this pain.  · Mild nausea or vomiting.  · Bloating and possible shoulder pain from the air-like gas that was used during the procedure.  Follow these instructions at home:  Incision care    · Follow instructions from your health care provider about how to take care of your incisions. Make sure you:  ? Wash your hands with soap and water before you change your bandage (dressing). If soap and water are not available, use hand .  ? Change your dressing as told by your health care provider.  ? Leave stitches (sutures), skin glue, or adhesive strips in place. These skin closures may need to be in place for 2 weeks or longer. If adhesive strip edges start to loosen and curl up, you may trim the loose edges. Do not remove adhesive strips completely unless your health care provider tells you to do  that.  · Do not take baths, swim, or use a hot tub until your health care provider approves. Ask your health care provider if you can take showers. You may only be allowed to take sponge baths for bathing.  · Check your incision area every day for signs of infection. Check for:  ? More redness, swelling, or pain.  ? More fluid or blood.  ? Warmth.  ? Pus or a bad smell.  Activity  · Do not drive or use heavy machinery while taking prescription pain medicine.  · Do not lift anything that is heavier than 10 lb (4.5 kg) until your health care provider approves.  · Do not play contact sports until your health care provider approves.  · Do not drive for 24 hours if you were given a medicine to help you relax (sedative).  · Rest as needed. Do not return to work or school until your health care provider approves.  General instructions  · Take over-the-counter and prescription medicines only as told by your health care provider.  · To prevent or treat constipation while you are taking prescription pain medicine, your health care provider may recommend that you:  ? Drink enough fluid to keep your urine clear or pale yellow.  ? Take over-the-counter or prescription medicines.  ? Eat foods that are high in fiber, such as fresh fruits and vegetables, whole grains, and beans.  ? Limit foods that are high in fat and processed sugars, such as fried and sweet foods.  Contact a health care provider if:  · You develop a rash.  · You have more redness, swelling, or pain around your incisions.  · You have more fluid or blood coming from your incisions.  · Your incisions feel warm to the touch.  · You have pus or a bad smell coming from your incisions.  · You have a fever.  · One or more of your incisions breaks open.  Get help right away if:  · You have trouble breathing.  · You have chest pain.  · You have increasing pain in your shoulders.  · You faint or feel dizzy when you stand.  · You have severe pain in your abdomen.  · You have  nausea or vomiting that lasts for more than one day.  · You have leg pain.  This information is not intended to replace advice given to you by your health care provider. Make sure you discuss any questions you have with your health care provider.  Document Released: 12/18/2006 Document Revised: 11/30/2018 Document Reviewed: 06/05/2017  [employer] Patient Education © 2020 [employer]      Depression / Suicide Risk    As you are discharged from this [facility] facility, it is important to learn how to keep safe from harming yourself.    Recognize the warning signs:  · Abrupt changes in personality, positive or negative- including increase in energy   · Giving away possessions  · Change in eating patterns- significant weight changes-  positive or negative  · Change in sleeping patterns- unable to sleep or sleeping all the time   · Unwillingness or inability to communicate  · Depression  · Unusual sadness, discouragement and loneliness  · Talk of wanting to die  · Neglect of personal appearance   · Rebelliousness- reckless behavior  · Withdrawal from people/activities they love  · Confusion- inability to concentrate     If you or a loved one observes any of these behaviors or has concerns about self-harm, here's what you can do:  · Talk about it- your feelings and reasons for harming yourself  · Remove any means that you might use to hurt yourself (examples: pills, rope, extension cords, firearm)  · Get professional help from the community (Mental Health, Substance Abuse, psychological counseling)  · Do not be alone:Call your Safe Contact- someone whom you trust who will be there for you.  · Call your local CRISIS HOTLINE 766-8935 or 877-424-8861  · Call your local Children's Mobile Crisis Response Team [place] (725) 725-3428 or www.Bubok  · Call the toll free National Suicide Prevention Hotlines   · National Suicide Prevention Lifeline 554-671-WWUT (8176)  · National Hope Line Network 800-SUICIDE  (241-1616)

## 2022-02-03 NOTE — CARE PLAN
Problem: Knowledge Deficit - Standard  Goal: Patient and family/care givers will demonstrate understanding of plan of care, disease process/condition, diagnostic tests and medications  Outcome: Progressing     Problem: Respiratory  Goal: Patient will achieve/maintain optimum respiratory ventilation and gas exchange  Outcome: Progressing     Problem: Pain - Standard  Goal: Alleviation of pain or a reduction in pain to the patient’s comfort goal  Outcome: Progressing     Problem: Fall Risk  Goal: Patient will remain free from falls  Outcome: Progressing       The patient is Stable - Low risk of patient condition declining or worsening    Shift Goals  Clinical Goals: pain control, ambulation  Patient Goals: diet  Family Goals: NA    Progress made toward(s) clinical / shift goals: POC discussed with pt. Pt reporting minimal pain/ PRN medication available for pain. Pt oxygen saturation >90% on room air. Pt has call light within reach and calls appropriately for needs. Pt has nonslip socks at bedside. Pt steady when ambulating.

## 2022-02-03 NOTE — PROGRESS NOTES
Bedside report received.  Assessment complete.  A&O x 4. Patient calls appropriately.  Patient ambulates with SB assist.    Patient has 0/10 pain. Pain managed with prescribed medications.  Denies N&V. Tolerating regular diet.  X 4 lap sites with dermabond, approximated, FRANCISCO  + void, - flatus, + BM.  Patient denies SOB.  SCD's off.    Review plan with of care with patient. Call light and personal belongings within reach. Hourly rounding in place. All needs met at this time.

## 2022-02-03 NOTE — PROGRESS NOTES
Author: DC Manzo Date & Time created: 2022   8:16 AM     POD # 1 Laparoscopic cholecystectomy    Interval Events:  Tolerating diet   Minimal pain  Bowel Movement 2/3    - Regular diet  -Ok to DC home from surgery standpoint. Will sign off.     Review of Systems   Constitutional: Negative.    Eyes: Negative.  Negative for blurred vision and double vision.   Respiratory: Negative.    Cardiovascular: Negative.    Gastrointestinal: Negative for abdominal pain and nausea.   Genitourinary: Negative.    Musculoskeletal: Negative.    Neurological: Negative.  Negative for dizziness and focal weakness.   Psychiatric/Behavioral: Negative.    All other systems reviewed and are negative.    Hemodynamics:  Temp (24hrs), Av.6 °C (97.9 °F), Min:36.2 °C (97.2 °F), Max:37 °C (98.6 °F)  Temperature: 36.6 °C (97.9 °F)  Pulse  Av.6  Min: 73  Max: 111   Blood Pressure : 117/70            Intake/Output Summary (Last 24 hours) at 2022 0816  Last data filed at 2022 0600  Gross per 24 hour   Intake 0 ml   Output 580 ml   Net -580 ml        Current Diet Order   Procedures   • Diet Order Diet: Regular     Physical Exam  Vitals and nursing note reviewed.   Constitutional:       General: He is not in acute distress.     Appearance: Normal appearance.   HENT:      Head: Normocephalic and atraumatic.      Nose: Nose normal.      Mouth/Throat:      Mouth: Mucous membranes are moist.      Pharynx: Oropharynx is clear.   Eyes:      Extraocular Movements: Extraocular movements intact.      Conjunctiva/sclera: Conjunctivae normal.      Pupils: Pupils are equal, round, and reactive to light.   Cardiovascular:      Rate and Rhythm: Normal rate and regular rhythm.      Pulses: Normal pulses.      Heart sounds: Murmur heard.       Pulmonary:      Effort: Pulmonary effort is normal. No respiratory distress.      Breath sounds: Normal breath sounds.   Abdominal:      General: Abdomen is flat. Bowel sounds are  normal.      Palpations: Abdomen is soft.      Tenderness: There is generalized abdominal tenderness.      Comments: Lap stabs x 4. Open to air. Well approximated with skin glue. No drainage.    Musculoskeletal:         General: Normal range of motion.      Cervical back: Normal range of motion.      Right lower leg: No edema.      Left lower leg: No edema.   Skin:     General: Skin is warm and dry.      Capillary Refill: Capillary refill takes less than 2 seconds.   Neurological:      General: No focal deficit present.      Mental Status: He is alert and oriented to person, place, and time. Mental status is at baseline.   Psychiatric:         Mood and Affect: Mood normal.       Labs:  Recent Results (from the past 24 hour(s))   Histology Request    Collection Time: 02/02/22  1:01 PM   Result Value Ref Range    Pathology Request Sent to Histo    Group A Strep by PCR    Collection Time: 02/02/22  5:56 PM    Specimen: Throat   Result Value Ref Range    Group A Strep by PCR Not Detected Not Detected     Medical Decision Making, by Problem:  Active Hospital Problems    Diagnosis    • Hyponatremia [E87.1]      Priority: Medium   • History of transcatheter aortic valve replacement (TAVR) [Z95.2]      Priority: Medium   • Essential hypertension [I10]      Priority: Low   • Hypothyroidism [E03.9]      Priority: Low   • Cholelithiasis with cholecystitis [K80.10]    • Bacteremia [R78.81]    • DANIS (acute kidney injury) (HCC) [N17.9]    • HLD (hyperlipidemia) [E78.5]    • Normocytic anemia [D64.9]      Plan:  Plan for operating room for laparoscopic cholecystectomy in the next 1-2 days.    Quality Measures:  Quality-Core Measures   Harrison catheter::  No Harrison  DVT prophylaxis pharmacological::  Contraindicated - High bleeding risk  DVT prophylaxis - mechanical:  SCDs  Assessed for rehabilitation services:  Patient returned to prior level of function, rehabilitation not indicated at this time      Discussed patient condition with  RN, Patient and general surgery Dr. Sheridan.

## 2022-04-13 ENCOUNTER — HOSPITAL ENCOUNTER (EMERGENCY)
Facility: MEDICAL CENTER | Age: 87
End: 2022-04-13
Attending: EMERGENCY MEDICINE
Payer: MEDICARE

## 2022-04-13 VITALS
OXYGEN SATURATION: 98 % | TEMPERATURE: 97.5 F | HEART RATE: 72 BPM | DIASTOLIC BLOOD PRESSURE: 75 MMHG | BODY MASS INDEX: 25.95 KG/M2 | WEIGHT: 165.34 LBS | RESPIRATION RATE: 16 BRPM | SYSTOLIC BLOOD PRESSURE: 166 MMHG | HEIGHT: 67 IN

## 2022-04-13 DIAGNOSIS — M25.551 RIGHT HIP PAIN: ICD-10-CM

## 2022-04-13 PROCEDURE — 700102 HCHG RX REV CODE 250 W/ 637 OVERRIDE(OP): Performed by: EMERGENCY MEDICINE

## 2022-04-13 PROCEDURE — 97161 PT EVAL LOW COMPLEX 20 MIN: CPT

## 2022-04-13 PROCEDURE — 99284 EMERGENCY DEPT VISIT MOD MDM: CPT

## 2022-04-13 PROCEDURE — A9270 NON-COVERED ITEM OR SERVICE: HCPCS | Performed by: EMERGENCY MEDICINE

## 2022-04-13 PROCEDURE — 97165 OT EVAL LOW COMPLEX 30 MIN: CPT

## 2022-04-13 RX ORDER — OXYCODONE HYDROCHLORIDE 5 MG/1
5 TABLET ORAL EVERY 4 HOURS PRN
Qty: 30 TABLET | Refills: 0 | Status: SHIPPED | OUTPATIENT
Start: 2022-04-13 | End: 2022-04-13 | Stop reason: SDUPTHER

## 2022-04-13 RX ORDER — OXYCODONE HYDROCHLORIDE 5 MG/1
5 TABLET ORAL EVERY 4 HOURS PRN
Qty: 30 TABLET | Refills: 0 | Status: SHIPPED | OUTPATIENT
Start: 2022-04-13 | End: 2022-04-18

## 2022-04-13 RX ORDER — HYDROCODONE BITARTRATE AND ACETAMINOPHEN 5; 325 MG/1; MG/1
1 TABLET ORAL ONCE
Status: COMPLETED | OUTPATIENT
Start: 2022-04-13 | End: 2022-04-13

## 2022-04-13 RX ORDER — HYDROCODONE BITARTRATE AND ACETAMINOPHEN 5; 325 MG/1; MG/1
1 TABLET ORAL EVERY 4 HOURS PRN
Status: DISCONTINUED | OUTPATIENT
Start: 2022-04-13 | End: 2022-04-13 | Stop reason: HOSPADM

## 2022-04-13 RX ORDER — LORAZEPAM 1 MG/1
0.5 TABLET ORAL ONCE
Status: COMPLETED | OUTPATIENT
Start: 2022-04-13 | End: 2022-04-13

## 2022-04-13 RX ADMIN — HYDROCODONE BITARTRATE AND ACETAMINOPHEN 1 TABLET: 5; 325 TABLET ORAL at 14:02

## 2022-04-13 RX ADMIN — LORAZEPAM 0.5 MG: 1 TABLET ORAL at 15:03

## 2022-04-13 RX ADMIN — HYDROCODONE BITARTRATE AND ACETAMINOPHEN 1 TABLET: 5; 325 TABLET ORAL at 12:13

## 2022-04-13 ASSESSMENT — COGNITIVE AND FUNCTIONAL STATUS - GENERAL
SUGGESTED CMS G CODE MODIFIER DAILY ACTIVITY: CH
CLIMB 3 TO 5 STEPS WITH RAILING: A LITTLE
SUGGESTED CMS G CODE MODIFIER MOBILITY: CI
MOBILITY SCORE: 23
DAILY ACTIVITIY SCORE: 24

## 2022-04-13 ASSESSMENT — FIBROSIS 4 INDEX: FIB4 SCORE: 2.52

## 2022-04-13 ASSESSMENT — ACTIVITIES OF DAILY LIVING (ADL): TOILETING: INDEPENDENT

## 2022-04-13 ASSESSMENT — GAIT ASSESSMENTS
ASSISTIVE DEVICE: 4 WHEEL WALKER
GAIT LEVEL OF ASSIST: SUPERVISED
DISTANCE (FEET): 100

## 2022-04-13 ASSESSMENT — LIFESTYLE VARIABLES: DO YOU DRINK ALCOHOL: NO

## 2022-04-13 NOTE — FACE TO FACE
Face to Face Supporting Documentation - Home Health    The encounter with this patient was in whole or in part the primary reason for home health admission.    Date of encounter:   Patient:                    MRN:                       YOB: 2021  Emigdio Jiménez  [id number]  1935     Home health to see patient for:  Physical Therapy evaluation and treatment    Skilled need for:  Surgical Aftercare  post hip fracture    Skilled nursing interventions to include:  Comment: PT    Homebound status evidenced by:  Need the aid of supportive devices such as crutches, canes, wheelchairs or walkers. Leaving home requires a considerable and taxing effort. There is a normal inability to leave the home.    Community Physician to provide follow up care: DC Flores     Optional Interventions? No      I certify the face to face encounter for this home health care referral meets the CMS requirements and the encounter/clinical assessment with the patient was, in whole, or in part, for the medical condition(s) listed above, which is the primary reason for home health care. Based on my clinical findings: the service(s) are medically necessary, support the need for home health care, and the homebound criteria are met.  I certify that this patient has had a face to face encounter by myself.  Rajendra Bass M.D. - NPI: [id number]

## 2022-04-13 NOTE — ED NOTES
Pt attempting to leave the ED, stating he cannot wait any longer. ERP at bedside advising pt he cannot leave until he speaks with SW and PT. Pt provided soda and will be medicated for agitation
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
## 2022-04-13 NOTE — DISCHARGE PLANNING
Anticipated Discharge Disposition: Home with HH    Action: Met with pt for Choice ([facility]; [facility] or whomever takes his insurance) Choice sent to [facility]     Barriers to Discharge: Pt's daughter with whom he lives would prefer he go to SNF. PT/OT saw pt and recommends Home with PT/OT    Plan:  CM spoke with daughter (Azeb) and granddaughter (Dafne, who is an Ultrasound Tech here at [facility]). Pt lives with Azeb and his ex-wife in a one bedroom apt. Azeb states she is disabled and unable to care for him. Azeb is upset that the recommendation is home with HH and would like it to changed to SNF; CM explained this cannot be done. Azeb is not happy with this determination and does not want to take him back home. She stated she is 'reporting [facility] to '    CM spoke with granddaughter regarding situation and provided her with a resource packet of [facility], Caregivers, [facility] and [facility] with general pricing. She states she will take her Grandfather to his PCP next week to discuss competency eval. SW will file an [facility]PS report in hopes they will be able to assist with more community resources or perhaps help apply for a GH waiver.    Azeb is not able to pick pt up as she is across town babysitting her grandchildren, Dafne is working and unable to take him home. She is concerned that he cannot get into the house alone (Grandma is home to let him in) so GMT was set up.

## 2022-04-13 NOTE — DISCHARGE PLANNING
Received Choice form at 1611  Agency/Facility Name: [facility]  Referral sent per Choice form @ Unable to fax referral.  Currently no HH order.

## 2022-04-14 NOTE — DISCHARGE PLANNING
SW has faxed a referral to [facility] to assist Pt and family with increased medical needs in the home and financial services.

## 2022-04-14 NOTE — DISCHARGE PLANNING
ANA M received from [place] with [facility] ([phone]) requesting notes to process referral. NICOLE asked Bee GRANGER to send over.    [place] updated.

## 2022-04-14 NOTE — DISCHARGE PLANNING
Received Choice form at 8413  Agency/Facility Name: Home Health  Referral sent per Choice form @ 7145

## 2022-05-03 ENCOUNTER — PHARMACY VISIT (OUTPATIENT)
Dept: PHARMACY | Facility: MEDICAL CENTER | Age: 87
End: 2022-05-03
Payer: COMMERCIAL

## 2022-05-03 ENCOUNTER — APPOINTMENT (OUTPATIENT)
Dept: RADIOLOGY | Facility: MEDICAL CENTER | Age: 87
End: 2022-05-03
Attending: EMERGENCY MEDICINE
Payer: MEDICARE

## 2022-05-03 ENCOUNTER — HOSPITAL ENCOUNTER (EMERGENCY)
Facility: MEDICAL CENTER | Age: 87
End: 2022-05-03
Attending: EMERGENCY MEDICINE
Payer: MEDICARE

## 2022-05-03 VITALS
OXYGEN SATURATION: 99 % | BODY MASS INDEX: 26.5 KG/M2 | WEIGHT: 164.9 LBS | HEIGHT: 66 IN | DIASTOLIC BLOOD PRESSURE: 80 MMHG | HEART RATE: 84 BPM | TEMPERATURE: 97.9 F | RESPIRATION RATE: 20 BRPM | SYSTOLIC BLOOD PRESSURE: 178 MMHG

## 2022-05-03 DIAGNOSIS — R07.9 CHEST PAIN, UNSPECIFIED TYPE: ICD-10-CM

## 2022-05-03 DIAGNOSIS — K22.2 ESOPHAGEAL STRICTURE: ICD-10-CM

## 2022-05-03 LAB
ALBUMIN SERPL BCP-MCNC: 4.5 G/DL (ref 3.2–4.9)
ALBUMIN/GLOB SERPL: 1.7 G/DL
ALP SERPL-CCNC: 99 U/L (ref 30–99)
ALT SERPL-CCNC: 10 U/L (ref 2–50)
ANION GAP SERPL CALC-SCNC: 11 MMOL/L (ref 7–16)
AST SERPL-CCNC: 14 U/L (ref 12–45)
BASOPHILS # BLD AUTO: 0.5 % (ref 0–1.8)
BASOPHILS # BLD: 0.04 K/UL (ref 0–0.12)
BILIRUB SERPL-MCNC: 0.7 MG/DL (ref 0.1–1.5)
BUN SERPL-MCNC: 18 MG/DL (ref 8–22)
CALCIUM SERPL-MCNC: 9.8 MG/DL (ref 8.5–10.5)
CHLORIDE SERPL-SCNC: 93 MMOL/L (ref 96–112)
CO2 SERPL-SCNC: 22 MMOL/L (ref 20–33)
CREAT SERPL-MCNC: 0.86 MG/DL (ref 0.5–1.4)
EKG IMPRESSION: NORMAL
EOSINOPHIL # BLD AUTO: 0.05 K/UL (ref 0–0.51)
EOSINOPHIL NFR BLD: 0.7 % (ref 0–6.9)
ERYTHROCYTE [DISTWIDTH] IN BLOOD BY AUTOMATED COUNT: 44.9 FL (ref 35.9–50)
GFR SERPLBLD CREATININE-BSD FMLA CKD-EPI: 85 ML/MIN/1.73 M 2
GLOBULIN SER CALC-MCNC: 2.6 G/DL (ref 1.9–3.5)
GLUCOSE SERPL-MCNC: 98 MG/DL (ref 65–99)
HCT VFR BLD AUTO: 36.3 % (ref 42–52)
HGB BLD-MCNC: 12.3 G/DL (ref 14–18)
IMM GRANULOCYTES # BLD AUTO: 0.04 K/UL (ref 0–0.11)
IMM GRANULOCYTES NFR BLD AUTO: 0.5 % (ref 0–0.9)
LYMPHOCYTES # BLD AUTO: 0.93 K/UL (ref 1–4.8)
LYMPHOCYTES NFR BLD: 12.6 % (ref 22–41)
MCH RBC QN AUTO: 29.6 PG (ref 27–33)
MCHC RBC AUTO-ENTMCNC: 33.9 G/DL (ref 33.7–35.3)
MCV RBC AUTO: 87.3 FL (ref 81.4–97.8)
MONOCYTES # BLD AUTO: 0.76 K/UL (ref 0–0.85)
MONOCYTES NFR BLD AUTO: 10.3 % (ref 0–13.4)
NEUTROPHILS # BLD AUTO: 5.57 K/UL (ref 1.82–7.42)
NEUTROPHILS NFR BLD: 75.4 % (ref 44–72)
NRBC # BLD AUTO: 0 K/UL
NRBC BLD-RTO: 0 /100 WBC
PLATELET # BLD AUTO: 228 K/UL (ref 164–446)
PMV BLD AUTO: 10.9 FL (ref 9–12.9)
POTASSIUM SERPL-SCNC: 4.5 MMOL/L (ref 3.6–5.5)
PROT SERPL-MCNC: 7.1 G/DL (ref 6–8.2)
RBC # BLD AUTO: 4.16 M/UL (ref 4.7–6.1)
SODIUM SERPL-SCNC: 126 MMOL/L (ref 135–145)
TROPONIN T SERPL-MCNC: 28 NG/L (ref 6–19)
TROPONIN T SERPL-MCNC: 34 NG/L (ref 6–19)
WBC # BLD AUTO: 7.4 K/UL (ref 4.8–10.8)

## 2022-05-03 PROCEDURE — 700111 HCHG RX REV CODE 636 W/ 250 OVERRIDE (IP): Performed by: EMERGENCY MEDICINE

## 2022-05-03 PROCEDURE — 96375 TX/PRO/DX INJ NEW DRUG ADDON: CPT

## 2022-05-03 PROCEDURE — 80053 COMPREHEN METABOLIC PANEL: CPT

## 2022-05-03 PROCEDURE — 700102 HCHG RX REV CODE 250 W/ 637 OVERRIDE(OP): Performed by: EMERGENCY MEDICINE

## 2022-05-03 PROCEDURE — 93005 ELECTROCARDIOGRAM TRACING: CPT | Performed by: EMERGENCY MEDICINE

## 2022-05-03 PROCEDURE — 36415 COLL VENOUS BLD VENIPUNCTURE: CPT

## 2022-05-03 PROCEDURE — 71045 X-RAY EXAM CHEST 1 VIEW: CPT

## 2022-05-03 PROCEDURE — A9270 NON-COVERED ITEM OR SERVICE: HCPCS | Performed by: EMERGENCY MEDICINE

## 2022-05-03 PROCEDURE — 96374 THER/PROPH/DIAG INJ IV PUSH: CPT

## 2022-05-03 PROCEDURE — RXMED WILLOW AMBULATORY MEDICATION CHARGE: Performed by: EMERGENCY MEDICINE

## 2022-05-03 PROCEDURE — 99285 EMERGENCY DEPT VISIT HI MDM: CPT

## 2022-05-03 PROCEDURE — 85025 COMPLETE CBC W/AUTO DIFF WBC: CPT

## 2022-05-03 PROCEDURE — 93005 ELECTROCARDIOGRAM TRACING: CPT

## 2022-05-03 PROCEDURE — C9113 INJ PANTOPRAZOLE SODIUM, VIA: HCPCS | Performed by: EMERGENCY MEDICINE

## 2022-05-03 PROCEDURE — 84484 ASSAY OF TROPONIN QUANT: CPT | Mod: 91

## 2022-05-03 RX ORDER — DIAZEPAM 5 MG/ML
5 INJECTION, SOLUTION INTRAMUSCULAR; INTRAVENOUS ONCE
Status: COMPLETED | OUTPATIENT
Start: 2022-05-03 | End: 2022-05-03

## 2022-05-03 RX ORDER — OMEPRAZOLE 40 MG/1
40 CAPSULE, DELAYED RELEASE ORAL 2 TIMES DAILY
Qty: 60 CAPSULE | Refills: 0 | Status: SHIPPED | OUTPATIENT
Start: 2022-05-03 | End: 2022-06-02

## 2022-05-03 RX ORDER — SUCRALFATE ORAL 1 G/10ML
1 SUSPENSION ORAL 4 TIMES DAILY
Qty: 400 ML | Refills: 0 | Status: SHIPPED | OUTPATIENT
Start: 2022-05-03 | End: 2022-05-13

## 2022-05-03 RX ORDER — PANTOPRAZOLE SODIUM 40 MG/10ML
40 INJECTION, POWDER, LYOPHILIZED, FOR SOLUTION INTRAVENOUS ONCE
Status: COMPLETED | OUTPATIENT
Start: 2022-05-03 | End: 2022-05-03

## 2022-05-03 RX ORDER — NITROGLYCERIN 0.4 MG/1
0.4 TABLET SUBLINGUAL ONCE
Status: COMPLETED | OUTPATIENT
Start: 2022-05-03 | End: 2022-05-03

## 2022-05-03 RX ORDER — SUCRALFATE ORAL 1 G/10ML
1 SUSPENSION ORAL ONCE
Status: COMPLETED | OUTPATIENT
Start: 2022-05-03 | End: 2022-05-03

## 2022-05-03 RX ADMIN — DIAZEPAM 5 MG: 5 INJECTION, SOLUTION INTRAMUSCULAR; INTRAVENOUS at 14:59

## 2022-05-03 RX ADMIN — NITROGLYCERIN 0.4 MG: 0.4 TABLET, ORALLY DISINTEGRATING SUBLINGUAL at 14:06

## 2022-05-03 RX ADMIN — LIDOCAINE HYDROCHLORIDE 30 ML: 20 SOLUTION OROPHARYNGEAL at 14:06

## 2022-05-03 RX ADMIN — GLUCAGON 1 MG: 1 INJECTION, POWDER, LYOPHILIZED, FOR SOLUTION INTRAMUSCULAR; INTRAVENOUS at 14:07

## 2022-05-03 RX ADMIN — PANTOPRAZOLE SODIUM 40 MG: 40 INJECTION, POWDER, LYOPHILIZED, FOR SOLUTION INTRAVENOUS at 15:45

## 2022-05-03 RX ADMIN — SUCRALFATE 1 G: 1 SUSPENSION ORAL at 15:45

## 2022-05-03 ASSESSMENT — HEART SCORE
TROPONIN: LESS THAN OR EQUAL TO NORMAL LIMIT
HISTORY: SLIGHTLY SUSPICIOUS
RISK FACTORS: 1-2 RISK FACTORS
ECG: NORMAL
AGE: 65+
HEART SCORE: 3

## 2022-05-03 ASSESSMENT — FIBROSIS 4 INDEX: FIB4 SCORE: 2.52

## 2022-05-03 NOTE — ED NOTES
Pt eager to d/c home, encouraged to stay approx 20 min until repeat troponin results. Pt reluctantly agreeable to POC.

## 2022-05-03 NOTE — ED TRIAGE NOTES
Ambulatory to triage with walker with   Chief Complaint   Patient presents with   • Shortness of Breath   • Other     Food bolus stuck x 1.5 days   • Chest Pain   States above symptoms since eating on Sunday. Drank 6 coca leonor with no relief. C/o increasing work of breathing, CP, SOB, and N/V. History of same.     Protocol ordered. Back for EKG.

## 2022-05-03 NOTE — ED NOTES
Discharge instructions and follow up care discussed with patient. Patient given time to ask questions and verbalized understanding. PIV removed. Patient given 2 new prescriptions. Patient AOx4 at discharge and ambulatory to lobby with steady, shuffled gait to meet daughter who will take him home.
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
## 2022-05-03 NOTE — ED NOTES
Relief RN: Pt became anxious stating he wanted to leave MD JACKELIN notified and pt medicated per MAR for anxiety

## 2022-05-08 ENCOUNTER — PHARMACY VISIT (OUTPATIENT)
Dept: PHARMACY | Facility: MEDICAL CENTER | Age: 87
End: 2022-05-08
Payer: COMMERCIAL

## 2022-05-08 ENCOUNTER — HOSPITAL ENCOUNTER (EMERGENCY)
Facility: MEDICAL CENTER | Age: 87
End: 2022-05-08
Attending: EMERGENCY MEDICINE
Payer: MEDICARE

## 2022-05-08 VITALS
TEMPERATURE: 98.3 F | HEART RATE: 74 BPM | BODY MASS INDEX: 26.03 KG/M2 | HEIGHT: 66 IN | RESPIRATION RATE: 15 BRPM | WEIGHT: 162 LBS | SYSTOLIC BLOOD PRESSURE: 112 MMHG | DIASTOLIC BLOOD PRESSURE: 82 MMHG | OXYGEN SATURATION: 97 %

## 2022-05-08 DIAGNOSIS — S41.101A WOUND OF RIGHT UPPER EXTREMITY, INITIAL ENCOUNTER: ICD-10-CM

## 2022-05-08 DIAGNOSIS — S71.109A OPEN WOUND OF THIGH, UNSPECIFIED LATERALITY, INITIAL ENCOUNTER: ICD-10-CM

## 2022-05-08 PROCEDURE — 99283 EMERGENCY DEPT VISIT LOW MDM: CPT

## 2022-05-08 PROCEDURE — 700101 HCHG RX REV CODE 250: Performed by: EMERGENCY MEDICINE

## 2022-05-08 PROCEDURE — RXMED WILLOW AMBULATORY MEDICATION CHARGE: Performed by: EMERGENCY MEDICINE

## 2022-05-08 PROCEDURE — 700102 HCHG RX REV CODE 250 W/ 637 OVERRIDE(OP): Performed by: EMERGENCY MEDICINE

## 2022-05-08 PROCEDURE — A9270 NON-COVERED ITEM OR SERVICE: HCPCS | Performed by: EMERGENCY MEDICINE

## 2022-05-08 RX ORDER — CEPHALEXIN 500 MG/1
500 CAPSULE ORAL ONCE
Status: COMPLETED | OUTPATIENT
Start: 2022-05-08 | End: 2022-05-08

## 2022-05-08 RX ORDER — CEPHALEXIN 500 MG/1
500 CAPSULE ORAL 4 TIMES DAILY
Qty: 28 CAPSULE | Refills: 0 | Status: SHIPPED | OUTPATIENT
Start: 2022-05-08 | End: 2022-05-15

## 2022-05-08 RX ADMIN — MUPIROCIN 2 %: 20 OINTMENT TOPICAL at 14:00

## 2022-05-08 RX ADMIN — CEPHALEXIN 500 MG: 500 CAPSULE ORAL at 13:24

## 2022-05-08 ASSESSMENT — ENCOUNTER SYMPTOMS
NAUSEA: 0
FEVER: 0
SENSORY CHANGE: 0
CHILLS: 0
FOCAL WEAKNESS: 0
VOMITING: 0

## 2022-05-08 ASSESSMENT — FIBROSIS 4 INDEX: FIB4 SCORE: 1.65
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
## 2022-06-09 VITALS
DIASTOLIC BLOOD PRESSURE: 108 MMHG | WEIGHT: 158.29 LBS | SYSTOLIC BLOOD PRESSURE: 196 MMHG | BODY MASS INDEX: 25.44 KG/M2 | HEART RATE: 92 BPM | RESPIRATION RATE: 17 BRPM | TEMPERATURE: 98.1 F | OXYGEN SATURATION: 98 % | HEIGHT: 66 IN

## 2022-06-09 PROCEDURE — 302449 STATCHG TRIAGE ONLY (STATISTIC)

## 2022-06-09 ASSESSMENT — FIBROSIS 4 INDEX: FIB4 SCORE: 1.65

## 2022-06-10 ENCOUNTER — APPOINTMENT (OUTPATIENT)
Dept: RADIOLOGY | Facility: MEDICAL CENTER | Age: 87
End: 2022-06-10
Attending: EMERGENCY MEDICINE
Payer: MEDICARE

## 2022-06-10 ENCOUNTER — HOSPITAL ENCOUNTER (EMERGENCY)
Facility: MEDICAL CENTER | Age: 87
End: 2022-06-10
Attending: EMERGENCY MEDICINE
Payer: MEDICARE

## 2022-06-10 ENCOUNTER — HOSPITAL ENCOUNTER (EMERGENCY)
Facility: MEDICAL CENTER | Age: 87
End: 2022-06-10
Payer: MEDICARE

## 2022-06-10 VITALS
SYSTOLIC BLOOD PRESSURE: 152 MMHG | OXYGEN SATURATION: 99 % | HEART RATE: 113 BPM | BODY MASS INDEX: 25.39 KG/M2 | WEIGHT: 158 LBS | DIASTOLIC BLOOD PRESSURE: 57 MMHG | HEIGHT: 66 IN | RESPIRATION RATE: 16 BRPM | TEMPERATURE: 96.8 F

## 2022-06-10 DIAGNOSIS — R11.2 NAUSEA VOMITING AND DIARRHEA: ICD-10-CM

## 2022-06-10 DIAGNOSIS — R03.0 ELEVATED BLOOD PRESSURE READING: ICD-10-CM

## 2022-06-10 DIAGNOSIS — R10.9 ABDOMINAL PAIN, UNSPECIFIED ABDOMINAL LOCATION: ICD-10-CM

## 2022-06-10 DIAGNOSIS — R19.7 NAUSEA VOMITING AND DIARRHEA: ICD-10-CM

## 2022-06-10 DIAGNOSIS — E87.1 HYPONATREMIA: ICD-10-CM

## 2022-06-10 LAB
ALBUMIN SERPL BCP-MCNC: 4.3 G/DL (ref 3.2–4.9)
ALBUMIN/GLOB SERPL: 1.6 G/DL
ALP SERPL-CCNC: 83 U/L (ref 30–99)
ALT SERPL-CCNC: 9 U/L (ref 2–50)
ANION GAP SERPL CALC-SCNC: 12 MMOL/L (ref 7–16)
AST SERPL-CCNC: 17 U/L (ref 12–45)
BASOPHILS # BLD AUTO: 0.8 % (ref 0–1.8)
BASOPHILS # BLD: 0.07 K/UL (ref 0–0.12)
BILIRUB SERPL-MCNC: 0.7 MG/DL (ref 0.1–1.5)
BUN SERPL-MCNC: 8 MG/DL (ref 8–22)
CALCIUM SERPL-MCNC: 9.2 MG/DL (ref 8.5–10.5)
CHLORIDE SERPL-SCNC: 88 MMOL/L (ref 96–112)
CO2 SERPL-SCNC: 19 MMOL/L (ref 20–33)
CREAT SERPL-MCNC: 0.89 MG/DL (ref 0.5–1.4)
EOSINOPHIL # BLD AUTO: 0.18 K/UL (ref 0–0.51)
EOSINOPHIL NFR BLD: 2.1 % (ref 0–6.9)
ERYTHROCYTE [DISTWIDTH] IN BLOOD BY AUTOMATED COUNT: 40.1 FL (ref 35.9–50)
GFR SERPLBLD CREATININE-BSD FMLA CKD-EPI: 84 ML/MIN/1.73 M 2
GLOBULIN SER CALC-MCNC: 2.7 G/DL (ref 1.9–3.5)
GLUCOSE SERPL-MCNC: 99 MG/DL (ref 65–99)
HCT VFR BLD AUTO: 36 % (ref 42–52)
HGB BLD-MCNC: 12.6 G/DL (ref 14–18)
IMM GRANULOCYTES # BLD AUTO: 0.03 K/UL (ref 0–0.11)
IMM GRANULOCYTES NFR BLD AUTO: 0.3 % (ref 0–0.9)
LIPASE SERPL-CCNC: 23 U/L (ref 11–82)
LYMPHOCYTES # BLD AUTO: 1.31 K/UL (ref 1–4.8)
LYMPHOCYTES NFR BLD: 15 % (ref 22–41)
MAGNESIUM SERPL-MCNC: 1.9 MG/DL (ref 1.5–2.5)
MCH RBC QN AUTO: 29.4 PG (ref 27–33)
MCHC RBC AUTO-ENTMCNC: 35 G/DL (ref 33.7–35.3)
MCV RBC AUTO: 83.9 FL (ref 81.4–97.8)
MONOCYTES # BLD AUTO: 0.71 K/UL (ref 0–0.85)
MONOCYTES NFR BLD AUTO: 8.1 % (ref 0–13.4)
NEUTROPHILS # BLD AUTO: 6.44 K/UL (ref 1.82–7.42)
NEUTROPHILS NFR BLD: 73.7 % (ref 44–72)
NRBC # BLD AUTO: 0 K/UL
NRBC BLD-RTO: 0 /100 WBC
PLATELET # BLD AUTO: 195 K/UL (ref 164–446)
PMV BLD AUTO: 10.7 FL (ref 9–12.9)
POTASSIUM SERPL-SCNC: 4.2 MMOL/L (ref 3.6–5.5)
PROT SERPL-MCNC: 7 G/DL (ref 6–8.2)
RBC # BLD AUTO: 4.29 M/UL (ref 4.7–6.1)
SODIUM SERPL-SCNC: 119 MMOL/L (ref 135–145)
TSH SERPL DL<=0.005 MIU/L-ACNC: 3.51 UIU/ML (ref 0.38–5.33)
WBC # BLD AUTO: 8.7 K/UL (ref 4.8–10.8)

## 2022-06-10 PROCEDURE — 74176 CT ABD & PELVIS W/O CONTRAST: CPT | Mod: ME

## 2022-06-10 PROCEDURE — 700111 HCHG RX REV CODE 636 W/ 250 OVERRIDE (IP): Performed by: EMERGENCY MEDICINE

## 2022-06-10 PROCEDURE — A9270 NON-COVERED ITEM OR SERVICE: HCPCS | Performed by: EMERGENCY MEDICINE

## 2022-06-10 PROCEDURE — 80053 COMPREHEN METABOLIC PANEL: CPT

## 2022-06-10 PROCEDURE — 83735 ASSAY OF MAGNESIUM: CPT

## 2022-06-10 PROCEDURE — 94760 N-INVAS EAR/PLS OXIMETRY 1: CPT

## 2022-06-10 PROCEDURE — 99285 EMERGENCY DEPT VISIT HI MDM: CPT

## 2022-06-10 PROCEDURE — 85025 COMPLETE CBC W/AUTO DIFF WBC: CPT

## 2022-06-10 PROCEDURE — 99285 EMERGENCY DEPT VISIT HI MDM: CPT | Mod: GC | Performed by: HOSPITALIST

## 2022-06-10 PROCEDURE — 83690 ASSAY OF LIPASE: CPT

## 2022-06-10 PROCEDURE — 700111 HCHG RX REV CODE 636 W/ 250 OVERRIDE (IP): Performed by: HOSPITALIST

## 2022-06-10 PROCEDURE — 700102 HCHG RX REV CODE 250 W/ 637 OVERRIDE(OP): Performed by: EMERGENCY MEDICINE

## 2022-06-10 PROCEDURE — 96375 TX/PRO/DX INJ NEW DRUG ADDON: CPT

## 2022-06-10 PROCEDURE — 36415 COLL VENOUS BLD VENIPUNCTURE: CPT

## 2022-06-10 PROCEDURE — 84443 ASSAY THYROID STIM HORMONE: CPT

## 2022-06-10 PROCEDURE — 96374 THER/PROPH/DIAG INJ IV PUSH: CPT

## 2022-06-10 PROCEDURE — 700105 HCHG RX REV CODE 258: Performed by: STUDENT IN AN ORGANIZED HEALTH CARE EDUCATION/TRAINING PROGRAM

## 2022-06-10 RX ORDER — HYDROMORPHONE HYDROCHLORIDE 1 MG/ML
0.5 INJECTION, SOLUTION INTRAMUSCULAR; INTRAVENOUS; SUBCUTANEOUS ONCE
Status: COMPLETED | OUTPATIENT
Start: 2022-06-10 | End: 2022-06-10

## 2022-06-10 RX ORDER — LIDOCAINE AND PRILOCAINE 25; 25 MG/G; MG/G
1 CREAM TOPICAL DAILY
COMMUNITY

## 2022-06-10 RX ORDER — OXYCODONE AND ACETAMINOPHEN 10; 325 MG/1; MG/1
1 TABLET ORAL
COMMUNITY

## 2022-06-10 RX ORDER — MIRTAZAPINE 15 MG/1
15 TABLET, FILM COATED ORAL
COMMUNITY

## 2022-06-10 RX ORDER — VENLAFAXINE HYDROCHLORIDE 150 MG/1
150 TABLET, EXTENDED RELEASE ORAL DAILY
COMMUNITY

## 2022-06-10 RX ORDER — HYDRALAZINE HYDROCHLORIDE 20 MG/ML
20 INJECTION INTRAMUSCULAR; INTRAVENOUS EVERY 4 HOURS PRN
Status: DISCONTINUED | OUTPATIENT
Start: 2022-06-10 | End: 2022-06-10 | Stop reason: HOSPADM

## 2022-06-10 RX ORDER — TRAMADOL HYDROCHLORIDE 50 MG/1
50 TABLET ORAL EVERY 6 HOURS PRN
COMMUNITY

## 2022-06-10 RX ORDER — AMLODIPINE BESYLATE 5 MG/1
5 TABLET ORAL DAILY
Status: DISCONTINUED | OUTPATIENT
Start: 2022-06-10 | End: 2022-06-10 | Stop reason: HOSPADM

## 2022-06-10 RX ORDER — ONDANSETRON 2 MG/ML
4 INJECTION INTRAMUSCULAR; INTRAVENOUS EVERY 4 HOURS PRN
Status: DISCONTINUED | OUTPATIENT
Start: 2022-06-10 | End: 2022-06-10 | Stop reason: HOSPADM

## 2022-06-10 RX ORDER — HYDRALAZINE HYDROCHLORIDE 20 MG/ML
10 INJECTION INTRAMUSCULAR; INTRAVENOUS EVERY 4 HOURS PRN
Status: DISCONTINUED | OUTPATIENT
Start: 2022-06-10 | End: 2022-06-10

## 2022-06-10 RX ORDER — TRAZODONE HYDROCHLORIDE 50 MG/1
100 TABLET ORAL
Status: DISCONTINUED | OUTPATIENT
Start: 2022-06-10 | End: 2022-06-10 | Stop reason: HOSPADM

## 2022-06-10 RX ORDER — ATORVASTATIN CALCIUM 40 MG/1
40 TABLET, FILM COATED ORAL EVERY EVENING
Status: DISCONTINUED | OUTPATIENT
Start: 2022-06-10 | End: 2022-06-10 | Stop reason: HOSPADM

## 2022-06-10 RX ORDER — ONDANSETRON 2 MG/ML
4 INJECTION INTRAMUSCULAR; INTRAVENOUS ONCE
Status: COMPLETED | OUTPATIENT
Start: 2022-06-10 | End: 2022-06-10

## 2022-06-10 RX ORDER — SODIUM CHLORIDE 9 MG/ML
INJECTION, SOLUTION INTRAVENOUS CONTINUOUS
Status: DISCONTINUED | OUTPATIENT
Start: 2022-06-10 | End: 2022-06-10 | Stop reason: HOSPADM

## 2022-06-10 RX ORDER — AMOXICILLIN 250 MG
2 CAPSULE ORAL 2 TIMES DAILY
Status: DISCONTINUED | OUTPATIENT
Start: 2022-06-10 | End: 2022-06-10 | Stop reason: HOSPADM

## 2022-06-10 RX ORDER — ALBUTEROL SULFATE 90 UG/1
2 AEROSOL, METERED RESPIRATORY (INHALATION) EVERY 4 HOURS PRN
Status: DISCONTINUED | OUTPATIENT
Start: 2022-06-10 | End: 2022-06-10 | Stop reason: HOSPADM

## 2022-06-10 RX ORDER — POLYETHYLENE GLYCOL 3350 17 G/17G
1 POWDER, FOR SOLUTION ORAL
Status: DISCONTINUED | OUTPATIENT
Start: 2022-06-10 | End: 2022-06-10 | Stop reason: HOSPADM

## 2022-06-10 RX ORDER — LEVOTHYROXINE SODIUM 112 UG/1
112 TABLET ORAL
Status: DISCONTINUED | OUTPATIENT
Start: 2022-06-10 | End: 2022-06-10 | Stop reason: HOSPADM

## 2022-06-10 RX ORDER — CEPHALEXIN 500 MG/1
500 CAPSULE ORAL 3 TIMES DAILY
COMMUNITY
Start: 2022-05-22

## 2022-06-10 RX ORDER — TAMSULOSIN HYDROCHLORIDE 0.4 MG/1
0.8 CAPSULE ORAL DAILY
Status: DISCONTINUED | OUTPATIENT
Start: 2022-06-10 | End: 2022-06-10 | Stop reason: HOSPADM

## 2022-06-10 RX ORDER — BISACODYL 10 MG
10 SUPPOSITORY, RECTAL RECTAL
Status: DISCONTINUED | OUTPATIENT
Start: 2022-06-10 | End: 2022-06-10 | Stop reason: HOSPADM

## 2022-06-10 RX ADMIN — HYDRALAZINE HYDROCHLORIDE 20 MG: 20 INJECTION INTRAMUSCULAR; INTRAVENOUS at 09:06

## 2022-06-10 RX ADMIN — LIDOCAINE HYDROCHLORIDE 30 ML: 20 SOLUTION OROPHARYNGEAL at 05:38

## 2022-06-10 RX ADMIN — HYDROMORPHONE HYDROCHLORIDE 0.5 MG: 1 INJECTION, SOLUTION INTRAMUSCULAR; INTRAVENOUS; SUBCUTANEOUS at 05:38

## 2022-06-10 RX ADMIN — SODIUM CHLORIDE: 9 INJECTION, SOLUTION INTRAVENOUS at 09:07

## 2022-06-10 RX ADMIN — ONDANSETRON 4 MG: 2 INJECTION INTRAMUSCULAR; INTRAVENOUS at 05:39

## 2022-06-10 ASSESSMENT — FIBROSIS 4 INDEX: FIB4 SCORE: 2.47

## 2022-06-10 ASSESSMENT — PAIN DESCRIPTION - PAIN TYPE: TYPE: ACUTE PAIN

## 2022-06-10 NOTE — H&P
Date of Admission: 6/10/2022  Admission Status: Emergency  [facility] Team: UNSOM  Attending: No att. providers found   Senior Resident: Dr. PAPO Blevins MD  Intern:   Contact Number: [phone]    Chief Complaint:   Nausea and vomiting for 3 days    History of Present Illness (HPI):   Emigdio is a 86 y.o. male with PMH r/f cholecystitis secondary to cholelithiasis status post cholecystectomy 2/2/2022, remote history of strep bacteremia, remote history of TAVR, hypertension, hypothyroidism, asthma and chronic hyponatremia secondary to laxatives vs ACE inhibitor administration who presented to the ED with complaint of 3 days of nausea and vomiting and 2 bouts of diarrhea.  Patient denied any recent travel or unusual dietary intake.  Denies fevers, chills.  Upon encounter with the patient, he reports he feels much better.    Review of Systems:   Patient denies fevers, chills, night sweats, weight loss, weight gain, vision changes, double vision, ear pain, runny nose, sore throat, sinus pain, trouble swallowing, chest pain, palpitation, orthopnea, PND, swelling, shortness of breath, wheezing, cough, mucus production, hemoptysis, abdominal pain, constipation, melena, reduction of urinary stream, dysuria, hematuria, muscle pain, joint pain, rash, itching, lymphadenopathy, dizziness, headache, weakness, numbness, polyuria, polydipsia, heat intolerance, cold intolerance, depression, anxiety, suicidal ideation.      Past Medical History:   Past Medical History was reviewed with patient.   has a past medical history of Anxiety, Cholecystitis (01/18/2022), Heart murmur, Hypertension, and Hypothyroid.    He has no past medical history of COPD (chronic obstructive pulmonary disease) (HCC).    Past Surgical History: Past Surgical History was reviewed with patient.   has a past surgical history that includes hernia repair and rito by laparoscopy (2/2/2022).    Medications: Medications have been reviewed with patient.  Prior to Admission  Medications   Prescriptions Last Dose Informant Patient Reported? Taking?   Venlafaxine HCl 150 MG TABLET SR 24 HR UNK at [facility] Patient's Home Pharmacy Yes Yes   Sig: Take 150 mg by mouth every day.   albuterol 108 (90 Base) MCG/ACT Aero Soln inhalation aerosol UNK at [facility] Patient's Home Pharmacy Yes No   Sig: Inhale 2 Puffs every four hours as needed for Shortness of Breath.   cephALEXin (KEFLEX) 500 MG Cap UNK at [facility] Patient's Home Pharmacy Yes Yes   Sig: Take 500 mg by mouth in the morning, at noon, and at bedtime. 10 days   ibuprofen (MOTRIN) 800 MG Tab UNK at [facility] Patient's Home Pharmacy No No   Sig: Take 1 Tablet by mouth every 8 hours as needed for Moderate Pain.   levothyroxine (SYNTHROID) 112 MCG Tab UNK at [facility] Patient's Home Pharmacy Yes No   Sig: Take 1 Tablet by mouth every morning on an empty stomach.   lidocaine-prilocaine (EMLA) 2.5-2.5 % Cream UNK at [facility] Patient's Home Pharmacy Yes Yes   Sig: Apply 1 Application topically every day. Apply to affected area   lisinopril (PRINIVIL) 20 MG Tab UNK at [facility] Patient's Home Pharmacy Yes No   Sig: Take 20 mg by mouth every day.   mirtazapine (REMERON) 15 MG Tab UNK at [facility] Patient's Home Pharmacy Yes Yes   Sig: Take 15 mg by mouth at bedtime.   ondansetron (ZOFRAN ODT) 4 MG TABLET DISPERSIBLE UNK at [facility] Patient's Home Pharmacy No No   Sig: Take 1 Tablet by mouth every 6 hours as needed for Nausea.   oxyCODONE-acetaminophen (PERCOCET-10)  MG Tab UNK at [facility] Patient's Home Pharmacy Yes Yes   Sig: Take 1 Tablet by mouth 5 Times a Day.   silver sulfADIAZINE (SILVADENE) 1 % Cream UNK at [facility] Patient's Home Pharmacy Yes Yes   Sig: Apply  topically every day.   tamsulosin (FLOMAX) 0.4 MG capsule UNK at [facility] Patient's Home Pharmacy Yes No   Sig: Take 0.8 mg by mouth every day.   traMADol (ULTRAM) 50 MG Tab UNK at [facility] Patient's Home Pharmacy Yes Yes   Sig: Take 50 mg by mouth every 6 hours as needed for Severe Pain.   traZODone (DESYREL) 100 MG Tab UNK at [facility] Patient's  Home Pharmacy Yes No   Si tablet at bedtime      Facility-Administered Medications: None        Allergies: Allergies have been reviewed with patient.  Allergies   Allergen Reactions   • Iodine Nausea     Reports having iodine iv contrast and throwing up afterwards.       Family History:   family history includes Heart Disease in his mother; Hyperlipidemia in his mother.     Social History:   Home: Lives in an apartment with his daughter and 2 cats  Education/occupation: Retired   Activity: Independent, not driving  Drugs/tobacco/alcohol: Denies, significant secondaries smoke exposure at home  Sex: Denies  Spiritulaity: Denies  Suicidality/homocidality: Denies  Recent travel: Denies    Primary Care Provider:  DC Flores    Physical Exam:     Vitals:  Temp:  [36 °C (96.8 °F)-36.7 °C (98.1 °F)] 36 °C (96.8 °F)  Pulse:  [] 113  Resp:  [16-17] 16  BP: (152-221)/() 152/57  SpO2:  [93 %-99 %] 99 %    General: Elderly man laying in bed in no acute distress  HEENT: NC/AT.  PERRLA, EOMI.  External ear normal.  Nares patent, nonedematous nonerythematous.  Moist mucous membranes. Good dentition.  Trachea midline.   Neck: No JVP.  Carotid bruit could not be appreciated.  Nontender, nonnodular thyroid.  No anterior or posterior cervical, occipital, supraclavicular lymphadenopathy  Cardiovascular: PMI<2 cm at fifth costal space at midclavicular line.  No heaves appreciated.  No thrills.  RRR W/O M, R, G.  Pulmonary: Normal work of breathing.  Resonant to percussion.  CTAB, no wheezes, crackles, rhonchi heard in 10 lung fields  Abdomen: Flat, soft, nondistended.  Normoactive bowel sounds.  Nontender to percussion or palpation.  No hepatosplenomegaly noted.  No fluid wave  Musculoskeletal: Normal tone and bulk.  Full ROM.  Skin: Warm and dry.  No rashes, bruises or lacerations noted  Neuro: Alert and oriented X4.  CN II-XII checked and intact.  5 out of 5 strength throughout.  DTR 2+  throughout.  FTN, HTS intact.  Sensation intact . negative Romberg.  Lymphatic: No edema or lymphadenopathy noted.  Psychiatric: Good mood congruent affect.  Thought form linear, content appropriate      Labs:   Recent Labs     06/10/22  0005   WBC 8.7   RBC 4.29*   HEMOGLOBIN 12.6*   HEMATOCRIT 36.0*   MCV 83.9   MCH 29.4   RDW 40.1   PLATELETCT 195   MPV 10.7   NEUTSPOLYS 73.70*   LYMPHOCYTES 15.00*   MONOCYTES 8.10   EOSINOPHILS 2.10   BASOPHILS 0.80     Recent Labs     06/10/22  0005   SODIUM 119*   POTASSIUM 4.2   CHLORIDE 88*   CO2 19*   GLUCOSE 99   BUN 8       Imaging:   CT abdomen unremarkable with exception of a small hiatal hernia and arthrosclerosis.    Previous Data Review: reviewed    Assessment and Plan  Emigdio is a 86 y.o. male with PMH r/f cholecystitis secondary to cholelithiasis status post cholecystectomy 2/2/2022, remote history of strep bacteremia, remote history of TAVR, hypertension, hypothyroidism, asthma and chronic hyponatremia secondary to laxatives vs ACE inhibitor administration who presented to the ED with complaint of 3 days of nausea and vomiting and 2 bouts of diarrhea, admitted for severe hyponatremia secondary to GI losses    * Hyponatremia- (present on admission)  Assessment & Plan  Acute on chronic hyponatremia.  Chronic aspect likely secondary to lisinopril usage, acute aspect likely secondary to GI losses.  -Blood osmolality  -Urine osmolality  -Chem-7 every 4 hours; goal of correction no more than 12 points per 24 hours  -NS at 100 mL an hour    Elevated troponin- (present on admission)  Assessment & Plan  At baseline  -Repeat troponins  -Serial EKGs    Essential hypertension- (present on admission)  Assessment & Plan  Discontinue lisinopril due to concern of iatrogenic hyponatremia  Start amlodipine    Normocytic anemia- (present on admission)  Assessment & Plan  Iron panel studies  B12 levels  Methylmalonic acid levels    Hypothyroidism- (present on admission)  Assessment &  Plan  Continue home levothyroxine

## 2022-06-10 NOTE — ED TRIAGE NOTES
"Chief Complaint   Patient presents with   • Abdominal Pain     X3 days     Pt states he's been experiencing abdominal pain for the last three days that has been so severe he's been unable to sleep. Pt seen earlier today for seem complaint, AMA before being roomed. Sodium = 119, charge notified.     Pt is alert and oriented, speaking in full sentences, follows commands and responds appropriately to questions.      Pt placed in lobby. Pt educated on triage process and apologized for wait time. Pt encouraged to alert staff for any changes.     Patient and staff wearing appropriate PPE      BP (!) 185/105   Pulse 92   Temp 36 °C (96.8 °F) (Temporal)   Resp 17   Ht 1.676 m (5' 6\")   Wt 71.7 kg (158 lb)   SpO2 99%       "

## 2022-06-10 NOTE — ED PROVIDER NOTES
"ED Provider Note    Scribed for Luis Benton M.D. by Caesar Flores. 6/10/2022  5:28 AM    Primary care provider: DC Flores  Means of arrival: Walk in   History obtained from: Patient   History limited by: None     CHIEF COMPLAINT  Chief Complaint   Patient presents with   • Abdominal Pain     X3 days       HPI  Emigdio Jiménez is a 86 y.o. male who presents to the Emergency Department for evaluation of intermittent epigastric pain onset 3 days ago. Patient reports that his pain feels as though this throat is \"clogged.\" Patient adds that he has been suffering from sunburn on his extremities. Chart review shows that patient was seen earlier today but left before being seen. He presents associated symptoms of emesis, and diarrhea. Denies fever. No alleviating factors noted at this time. Patient is vaccinated for COVID x2. Patient has additional history of cholecystectomy and Hypertension .       REVIEW OF SYSTEMS  Pertinent positives include epigastric pain, emesis, diarrhea.   Pertinent negatives include no fever.    All other systems reviewed and negative. See HPI for further details.       PAST MEDICAL HISTORY   has a past medical history of Anxiety, Cholecystitis (01/18/2022), Heart murmur, Hypertension, and Hypothyroid.    SURGICAL HISTORY   has a past surgical history that includes hernia repair and rito by laparoscopy (2/2/2022).    SOCIAL HISTORY  Social History     Tobacco Use   • Smoking status: Former Smoker     Packs/day: 0.00   • Smokeless tobacco: Never Used   Vaping Use   • Vaping Use: Never used   Substance Use Topics   • Alcohol use: Not Currently   • Drug use: Yes     Types: Inhaled     Comment: Marijuana      Social History     Substance and Sexual Activity   Drug Use Yes   • Types: Inhaled    Comment: Marijuana       FAMILY HISTORY  Family History   Problem Relation Age of Onset   • Hyperlipidemia Mother    • Heart Disease Mother        CURRENT MEDICATIONS  Current Outpatient " "Medications   Medication Instructions   • albuterol 108 (90 Base) MCG/ACT Aero Soln inhalation aerosol 2 Puffs, Inhalation, EVERY 4 HOURS PRN   • amitriptyline (ELAVIL) 25 mg, Oral, NIGHTLY   • amLODIPine (NORVASC) 5 mg, Oral, DAILY   • atorvastatin (LIPITOR) 40 mg, Oral, EVERY EVENING   • ibuprofen (MOTRIN) 800 mg, Oral, EVERY 8 HOURS PRN   • levothyroxine (SYNTHROID) 112 MCG Tab No dose, route, or frequency recorded.   • levothyroxine (SYNTHROID) 112 MCG Tab 1 Tablet, Oral, EACH MORNING ON EMPTY STOMACH   • levothyroxine (SYNTHROID) 100 mcg, Oral, EACH MORNING ON EMPTY STOMACH   • lisinopril (PRINIVIL) 20 MG Tab No dose, route, or frequency recorded.   • lisinopril (PRINIVIL) 20 mg, Oral, DAILY   • loratadine (CLARITIN) 10 MG Tab No dose, route, or frequency recorded.   • meloxicam (MOBIC) 7.5 MG Tab No dose, route, or frequency recorded.   • mupirocin (BACTROBAN) 2 % Ointment 1 Application, Topical, 2 TIMES DAILY   • ondansetron (ZOFRAN ODT) 4 mg, Oral, EVERY 6 HOURS PRN   • ondansetron (ZOFRAN) 4 MG Tab tablet No dose, route, or frequency recorded.   • ondansetron (ZOFRAN) 4 MG Tab tablet 1 tablet   • polyethylene glycol/lytes (MIRALAX) 17 g Pack No dose, route, or frequency recorded.   • tamsulosin (FLOMAX) 0.8 mg, Oral, DAILY   • traZODone (DESYREL) 100 MG Tab No dose, route, or frequency recorded.   • traZODone (DESYREL) 100 MG Tab 1 tablet at bedtime        ALLERGIES  Allergies   Allergen Reactions   • Iodine Nausea     Reports having iodine iv contrast and throwing up afterwards.       PHYSICAL EXAM  VITAL SIGNS: BP (!) 215/99   Pulse 74   Temp 36 °C (96.8 °F) (Temporal)   Resp 16   Ht 1.676 m (5' 6\")   Wt 71.7 kg (158 lb)   SpO2 96%   BMI 25.50 kg/m²     Nursing note and vitals reviewed.  Constitutional: Well-developed and well-nourished. No distress.   HENT: Head is normocephalic and atraumatic. Oropharynx is clear and moist without exudate or erythema.   Eyes: Pupils are equal, round, and reactive " to light. Conjunctiva are normal.   Cardiovascular: Normal rate and regular rhythm. No murmur heard. Normal radial pulses.  Pulmonary/Chest: Breath sounds normal. No wheezes or rales.   Abdominal: Soft and non-tender. No distention    Musculoskeletal: Extremities exhibit normal range of motion without edema or tenderness.   Neurological: Awake, alert and oriented to person, place, and time. No focal deficits noted.  Skin: Skin is warm and dry. No rash.   Psychiatric: Normal mood and affect. Appropriate for clinical situation.    DIAGNOSTIC STUDIES / PROCEDURES    EKG Interpretation      LABS  Results for orders placed or performed during the hospital encounter of 06/10/22   CBC WITH DIFFERENTIAL   Result Value Ref Range    WBC 8.7 4.8 - 10.8 K/uL    RBC 4.29 (L) 4.70 - 6.10 M/uL    Hemoglobin 12.6 (L) 14.0 - 18.0 g/dL    Hematocrit 36.0 (L) 42.0 - 52.0 %    MCV 83.9 81.4 - 97.8 fL    MCH 29.4 27.0 - 33.0 pg    MCHC 35.0 33.7 - 35.3 g/dL    RDW 40.1 35.9 - 50.0 fL    Platelet Count 195 164 - 446 K/uL    MPV 10.7 9.0 - 12.9 fL    Neutrophils-Polys 73.70 (H) 44.00 - 72.00 %    Lymphocytes 15.00 (L) 22.00 - 41.00 %    Monocytes 8.10 0.00 - 13.40 %    Eosinophils 2.10 0.00 - 6.90 %    Basophils 0.80 0.00 - 1.80 %    Immature Granulocytes 0.30 0.00 - 0.90 %    Nucleated RBC 0.00 /100 WBC    Neutrophils (Absolute) 6.44 1.82 - 7.42 K/uL    Lymphs (Absolute) 1.31 1.00 - 4.80 K/uL    Monos (Absolute) 0.71 0.00 - 0.85 K/uL    Eos (Absolute) 0.18 0.00 - 0.51 K/uL    Baso (Absolute) 0.07 0.00 - 0.12 K/uL    Immature Granulocytes (abs) 0.03 0.00 - 0.11 K/uL    NRBC (Absolute) 0.00 K/uL   COMP METABOLIC PANEL   Result Value Ref Range    Sodium 119 (LL) 135 - 145 mmol/L    Potassium 4.2 3.6 - 5.5 mmol/L    Chloride 88 (L) 96 - 112 mmol/L    Co2 19 (L) 20 - 33 mmol/L    Anion Gap 12.0 7.0 - 16.0    Glucose 99 65 - 99 mg/dL    Bun 8 8 - 22 mg/dL    Creatinine 0.89 0.50 - 1.40 mg/dL    Calcium 9.2 8.5 - 10.5 mg/dL    AST(SGOT) 17 12 -  45 U/L    ALT(SGPT) 9 2 - 50 U/L    Alkaline Phosphatase 83 30 - 99 U/L    Total Bilirubin 0.7 0.1 - 1.5 mg/dL    Albumin 4.3 3.2 - 4.9 g/dL    Total Protein 7.0 6.0 - 8.2 g/dL    Globulin 2.7 1.9 - 3.5 g/dL    A-G Ratio 1.6 g/dL   LIPASE   Result Value Ref Range    Lipase 23 11 - 82 U/L   ESTIMATED GFR   Result Value Ref Range    GFR (CKD-EPI) 84 >60 mL/min/1.73 m 2     All labs reviewed by me.    RADIOLOGY  CT-ABDOMEN-PELVIS W/O   Final Result      1.  No acute intra-abdominal findings.      2.  Small hiatal hernia.      3.  Atherosclerosis        The radiologist's interpretation of all radiological studies have been reviewed by me.    COURSE & MEDICAL DECISION MAKING  Nursing notes, VS, PMSFHx reviewed in chart.     Review of past medical records shows the patient was seen earlier today and lab work is remarkable for a sodium of 119. Lipase and LFT are normal. Patient was last seen in the ED for a wound infection to the right upper extremity.     5:28 AM - Patient evaluated at bedside. Ordered CT-abdomen for further evaluation. Patient will be treated with GI cocktail 30 mL, Zofran 4 mg, and Dilaudid 0.5 mg. Differential diagnoses include but not limited to: gastritis, pancreatitis, bowel obstruction, colitis, viral syndrome, or electrolyte abnormality. Discussed with patient about administering basic labs and imaging for further evaluation. Informed patient about medication administration for pain control and nausea. Patient verbalizes understanding and agreement to this plan of care.     7:10 AM Paged Hospitalist    7:41 AM I discussed the patient's case and the above findings with Dr. Blevins (Hospitalist) who agreed to hospitalize the patient. Patient's care was transferred at this time.     Laboratory studies obtained when patient was here in the room earlier in the night show low sodium level of 119.  Patient had some vomiting and diarrhea.  Likely dehydrated.  He will be admitted to the hospital for  further evaluation and treatment.    DISPOSITION:  Patient will be hospitalized by Dr. Blevins in guarded condition.       FINAL IMPRESSION  1. Abdominal pain, unspecified abdominal location    2. Hyponatremia    3. Elevated blood pressure reading    4. Nausea vomiting and diarrhea          Caesar DISLA (Dustyibamisha), am scribing for, and in the presence of, Luis Benton M.D..    Electronically signed by: Caesar Flores (Irina), 6/10/2022    ILuis M.D. personally performed the services described in this documentation, as scribed by Caesar Flores in my presence, and it is both accurate and complete.    C    The note accurately reflects work and decisions made by me.  Luis Benton M.D.  6/10/2022  11:08 AM

## 2022-06-10 NOTE — ED NOTES
Pharmacy Medication Reconciliation    ~Medication Reconciliation partially completed per patient at bedside  ~Allergies reviewed and updated  ~Patient home pharmacy:[facility]    ~Med rec to follow up with patient home pharmacy when they reopen to verify current medications

## 2022-06-10 NOTE — DISCHARGE SUMMARY
Discharge Summary    CHIEF COMPLAINT ON ADMISSION  Chief Complaint   Patient presents with   • Abdominal Pain     X3 days       Reason for Admission  Abdominal Pain     Admission Date  6/10/2022    CODE STATUS  Prior    HPI & HOSPITAL COURSE  This is a 86 y.o. male here with acute on chronic hyponatremia secondary to lisinopril use and acute GI losses secondary to gastroenteritis.  Unfortunately the patient was adamant regarding leaving AMA within an hour and a half after initial encounter.  Patient was  educated regarding the associated risk of leaving before he was treated for his severe hyponatremia and able to teach back those risks.  He was therefore discharged AMA  No notes on file    Therefore, he is discharged in guarded and stable condition against medcial advice.    General: Elderly man laying in bed in no acute distress  HEENT: NC/AT.  PERRLA, EOMI.  External ear normal.  Nares patent, nonedematous nonerythematous.  Moist mucous membranes. Good dentition.  Trachea midline.   Neck: No JVP.  Carotid bruit could not be appreciated.  Nontender, nonnodular thyroid.  No anterior or posterior cervical, occipital, supraclavicular lymphadenopathy  Cardiovascular: PMI<2 cm at fifth costal space at midclavicular line.  No heaves appreciated.  No thrills.  RRR W/O M, R, G.  Pulmonary: Normal work of breathing.  Resonant to percussion.  CTAB, no wheezes, crackles, rhonchi heard in 10 lung fields  Abdomen: Flat, soft, nondistended.  Normoactive bowel sounds.  Nontender to percussion or palpation.  No hepatosplenomegaly noted.  No fluid wave  Musculoskeletal: Normal tone and bulk.  Full ROM.  Skin: Warm and dry.  No rashes, bruises or lacerations noted  Neuro: Alert and oriented X4.  CN II-XII checked and intact.  5 out of 5 strength throughout.  DTR 2+ throughout.  FTN, HTS intact.  Sensation intact . negative Romberg.  Lymphatic: No edema or lymphadenopathy noted.  Psychiatric: Good mood congruent affect.  Thought  form linear, content appropriate      Discharge Date  6/10/2022    FOLLOW UP ITEMS POST DISCHARGE  Follow-up hyponatremia      DISCHARGE DIAGNOSES  Principal Problem:    Hyponatremia POA: Yes  Active Problems:    Hypothyroidism POA: Yes    Normocytic anemia POA: Yes    Essential hypertension POA: Yes    Elevated troponin POA: Yes  Resolved Problems:    * No resolved hospital problems. *      FOLLOW UP  Future Appointments   Date Time Provider Department Center   7/7/2022  9:30 AM [facility]   7/7/2022 10:00 AM Jose Alfredo Lizarraga M.D. [facility]     No follow-up provider specified.    MEDICATIONS ON DISCHARGE     Medication List      ASK your doctor about these medications      Instructions   albuterol 108 (90 Base) MCG/ACT Aers inhalation aerosol   Inhale 2 Puffs every four hours as needed for Shortness of Breath.  Dose: 2 Puff     cephALEXin 500 MG Caps  Commonly known as: KEFLEX   Take 500 mg by mouth in the morning, at noon, and at bedtime. 10 days  Dose: 500 mg     ibuprofen 800 MG Tabs  Commonly known as: MOTRIN   Take 1 Tablet by mouth every 8 hours as needed for Moderate Pain.  Dose: 800 mg     levothyroxine 112 MCG Tabs  Commonly known as: SYNTHROID  Ask about: Which instructions should I use?   Take 1 Tablet by mouth every morning on an empty stomach.  Dose: 1 Tablet     lidocaine-prilocaine 2.5-2.5 % Crea  Commonly known as: EMLA   Apply 1 Application topically every day. Apply to affected area  Dose: 1 Application     lisinopril 20 MG Tabs  Commonly known as: PRINIVIL  Ask about: Which instructions should I use?   Take 20 mg by mouth every day.  Dose: 20 mg     mirtazapine 15 MG Tabs  Commonly known as: Remeron   Take 15 mg by mouth at bedtime.  Dose: 15 mg     ondansetron 4 MG Tbdp  Commonly known as: ZOFRAN ODT   Take 1 Tablet by mouth every 6 hours as needed for Nausea.  Dose: 4 mg     oxyCODONE-acetaminophen  MG Tabs  Commonly known as: PERCOCET-10   Take 1 Tablet  by mouth 5 Times a Day.  Dose: 1 Tablet     silver sulfADIAZINE 1 % Crea  Commonly known as: SILVADENE   Apply  topically every day.     tamsulosin 0.4 MG capsule  Commonly known as: FLOMAX   Take 0.8 mg by mouth every day.  Dose: 0.8 mg     traMADol 50 MG Tabs  Commonly known as: ULTRAM   Take 50 mg by mouth every 6 hours as needed for Severe Pain.  Dose: 50 mg     traZODone 100 MG Tabs  Commonly known as: DESYREL  Ask about: Which instructions should I use?   1 tablet at bedtime     Venlafaxine HCl 150 MG Tb24   Take 150 mg by mouth every day.  Dose: 150 mg            Allergies  Allergies   Allergen Reactions   • Iodine Nausea     Reports having iodine iv contrast and throwing up afterwards.       DIET  No orders of the defined types were placed in this encounter.      ACTIVITY  As tolerated.  Weight bearing as tolerated    CONSULTATIONS  None    PROCEDURES  None    LABORATORY  Lab Results   Component Value Date    SODIUM 119 (LL) 06/10/2022    POTASSIUM 4.2 06/10/2022    CHLORIDE 88 (L) 06/10/2022    CO2 19 (L) 06/10/2022    GLUCOSE 99 06/10/2022    BUN 8 06/10/2022    CREATININE 0.89 06/10/2022        Lab Results   Component Value Date    WBC 8.7 06/10/2022    HEMOGLOBIN 12.6 (L) 06/10/2022    HEMATOCRIT 36.0 (L) 06/10/2022    PLATELETCT 195 06/10/2022        Total time of the discharge process exceeds 34 minutes.

## 2022-06-10 NOTE — ASSESSMENT & PLAN NOTE
Acute on chronic hyponatremia.  Chronic aspect likely secondary to lisinopril usage, acute aspect likely secondary to GI losses.  -Blood osmolality  -Urine osmolality  -Chem-7 every 4 hours; goal of correction no more than 12 points per 24 hours  -NS at 100 mL an hour

## 2022-06-10 NOTE — ED NOTES
Pt unable to be located. Checked waiting room, senior lounge, bathrooms, family bathrooms, hallways, and outside AMA form signed and pt will be dismissed

## 2022-06-10 NOTE — ED NOTES
Pt ambulated to restroom with steady gait with assistance of walker from home. Pt provided with urine cup for urinalysis

## 2022-06-10 NOTE — ED NOTES
Gen explained risks of leaving pt communicated understanding and verbalized risks back to resident physician. AMA paperwork signed by pt, iv removed.

## 2022-06-10 NOTE — ED TRIAGE NOTES
"  Chief Complaint   Patient presents with   • N/V     X 3 days   • Constipation     Pt to triage for above complaint. Pt reports N/V and constipation x 3 days, denies pain but states he is unable to sleep due to vomiting. Denies urinary symptoms. Pt states he is recovering from recent hip surgery but is not currently taking any pain medication.     Pt is alert/oriented, following commands, and answering questions appropriately. No acute distress noted in triage and respirations are even and unlabored.   Pt placed in lobby and educated on triage process. Pt encouraged to alert staff for any changes in condition.    BP (!) 196/108   Pulse 92   Temp 36.7 °C (98.1 °F) (Temporal)   Resp 17   Ht 1.676 m (5' 6\")   Wt 71.8 kg (158 lb 4.6 oz)   SpO2 98% Comment: RA  BMI 25.55 kg/m²     "

## 2022-06-10 NOTE — ED NOTES
Report given to Sailaja RN. Safety measures in place. Call light within reach. Care relinquished.

## 2022-06-10 NOTE — ED NOTES
Pt ambulated with walker back to room with ED tech. Pt reports he hasn't slept in 3 days and has stomach irritation. Pt states he does have a history of this however he says this is the worst its ever been. PIV placed, labs drawn. Pt changed into gown, placed on vitals machine. Pt hypertensive. Chart up for ERP.

## 2022-06-10 NOTE — ED NOTES
call from Lab called with critical result of Na+ 119 at 0100. Critical lab result read back to lab. Primary nurse notified

## 2022-07-07 ENCOUNTER — HOSPITAL ENCOUNTER (EMERGENCY)
Facility: MEDICAL CENTER | Age: 87
End: 2022-07-07
Attending: EMERGENCY MEDICINE
Payer: MEDICARE

## 2022-07-07 VITALS
OXYGEN SATURATION: 98 % | WEIGHT: 152.34 LBS | RESPIRATION RATE: 18 BRPM | TEMPERATURE: 97 F | DIASTOLIC BLOOD PRESSURE: 75 MMHG | SYSTOLIC BLOOD PRESSURE: 163 MMHG | HEART RATE: 75 BPM | HEIGHT: 66 IN | BODY MASS INDEX: 24.48 KG/M2

## 2022-07-07 DIAGNOSIS — K44.9 HIATAL HERNIA: ICD-10-CM

## 2022-07-07 DIAGNOSIS — R11.0 NAUSEA: ICD-10-CM

## 2022-07-07 LAB
ALBUMIN SERPL BCP-MCNC: 3.9 G/DL (ref 3.2–4.9)
ALBUMIN/GLOB SERPL: 1.6 G/DL
ALP SERPL-CCNC: 69 U/L (ref 30–99)
ALT SERPL-CCNC: 7 U/L (ref 2–50)
ANION GAP SERPL CALC-SCNC: 8 MMOL/L (ref 7–16)
AST SERPL-CCNC: 11 U/L (ref 12–45)
BASOPHILS # BLD AUTO: 0.3 % (ref 0–1.8)
BASOPHILS # BLD: 0.01 K/UL (ref 0–0.12)
BILIRUB SERPL-MCNC: 0.3 MG/DL (ref 0.1–1.5)
BUN SERPL-MCNC: 15 MG/DL (ref 8–22)
CALCIUM SERPL-MCNC: 9 MG/DL (ref 8.5–10.5)
CHLORIDE SERPL-SCNC: 97 MMOL/L (ref 96–112)
CO2 SERPL-SCNC: 20 MMOL/L (ref 20–33)
CREAT SERPL-MCNC: 1.08 MG/DL (ref 0.5–1.4)
EKG IMPRESSION: NORMAL
EOSINOPHIL # BLD AUTO: 0.07 K/UL (ref 0–0.51)
EOSINOPHIL NFR BLD: 1.9 % (ref 0–6.9)
ERYTHROCYTE [DISTWIDTH] IN BLOOD BY AUTOMATED COUNT: 42.9 FL (ref 35.9–50)
GFR SERPLBLD CREATININE-BSD FMLA CKD-EPI: 67 ML/MIN/1.73 M 2
GLOBULIN SER CALC-MCNC: 2.4 G/DL (ref 1.9–3.5)
GLUCOSE SERPL-MCNC: 90 MG/DL (ref 65–99)
HCT VFR BLD AUTO: 34.1 % (ref 42–52)
HGB BLD-MCNC: 11.4 G/DL (ref 14–18)
IMM GRANULOCYTES # BLD AUTO: 0.01 K/UL (ref 0–0.11)
IMM GRANULOCYTES NFR BLD AUTO: 0.3 % (ref 0–0.9)
LIPASE SERPL-CCNC: 37 U/L (ref 11–82)
LYMPHOCYTES # BLD AUTO: 1 K/UL (ref 1–4.8)
LYMPHOCYTES NFR BLD: 27.4 % (ref 22–41)
MCH RBC QN AUTO: 28.8 PG (ref 27–33)
MCHC RBC AUTO-ENTMCNC: 33.4 G/DL (ref 33.7–35.3)
MCV RBC AUTO: 86.1 FL (ref 81.4–97.8)
MONOCYTES # BLD AUTO: 0.42 K/UL (ref 0–0.85)
MONOCYTES NFR BLD AUTO: 11.5 % (ref 0–13.4)
NEUTROPHILS # BLD AUTO: 2.14 K/UL (ref 1.82–7.42)
NEUTROPHILS NFR BLD: 58.6 % (ref 44–72)
NRBC # BLD AUTO: 0 K/UL
NRBC BLD-RTO: 0 /100 WBC
PLATELET # BLD AUTO: 157 K/UL (ref 164–446)
PMV BLD AUTO: 10.4 FL (ref 9–12.9)
POTASSIUM SERPL-SCNC: 4.7 MMOL/L (ref 3.6–5.5)
PROT SERPL-MCNC: 6.3 G/DL (ref 6–8.2)
RBC # BLD AUTO: 3.96 M/UL (ref 4.7–6.1)
SODIUM SERPL-SCNC: 125 MMOL/L (ref 135–145)
TROPONIN T SERPL-MCNC: 27 NG/L (ref 6–19)
WBC # BLD AUTO: 3.7 K/UL (ref 4.8–10.8)

## 2022-07-07 PROCEDURE — 80053 COMPREHEN METABOLIC PANEL: CPT

## 2022-07-07 PROCEDURE — 83690 ASSAY OF LIPASE: CPT

## 2022-07-07 PROCEDURE — 93005 ELECTROCARDIOGRAM TRACING: CPT | Performed by: EMERGENCY MEDICINE

## 2022-07-07 PROCEDURE — 96375 TX/PRO/DX INJ NEW DRUG ADDON: CPT

## 2022-07-07 PROCEDURE — A9270 NON-COVERED ITEM OR SERVICE: HCPCS | Performed by: EMERGENCY MEDICINE

## 2022-07-07 PROCEDURE — 700111 HCHG RX REV CODE 636 W/ 250 OVERRIDE (IP): Performed by: EMERGENCY MEDICINE

## 2022-07-07 PROCEDURE — 96374 THER/PROPH/DIAG INJ IV PUSH: CPT

## 2022-07-07 PROCEDURE — 99284 EMERGENCY DEPT VISIT MOD MDM: CPT

## 2022-07-07 PROCEDURE — 84484 ASSAY OF TROPONIN QUANT: CPT

## 2022-07-07 PROCEDURE — 36415 COLL VENOUS BLD VENIPUNCTURE: CPT

## 2022-07-07 PROCEDURE — 85025 COMPLETE CBC W/AUTO DIFF WBC: CPT

## 2022-07-07 PROCEDURE — 700102 HCHG RX REV CODE 250 W/ 637 OVERRIDE(OP): Performed by: EMERGENCY MEDICINE

## 2022-07-07 RX ORDER — ONDANSETRON 2 MG/ML
4 INJECTION INTRAMUSCULAR; INTRAVENOUS ONCE
Status: COMPLETED | OUTPATIENT
Start: 2022-07-07 | End: 2022-07-07

## 2022-07-07 RX ORDER — OMEPRAZOLE 20 MG/1
20 CAPSULE, DELAYED RELEASE ORAL DAILY
Qty: 30 CAPSULE | Refills: 11 | Status: SHIPPED | OUTPATIENT
Start: 2022-07-07

## 2022-07-07 RX ORDER — SUCRALFATE 1 G/1
1 TABLET ORAL
Qty: 56 TABLET | Refills: 0 | Status: SHIPPED | OUTPATIENT
Start: 2022-07-07 | End: 2022-07-21

## 2022-07-07 RX ORDER — ONDANSETRON 4 MG/1
4 TABLET, ORALLY DISINTEGRATING ORAL EVERY 8 HOURS PRN
Qty: 20 TABLET | Refills: 0 | Status: SHIPPED | OUTPATIENT
Start: 2022-07-07

## 2022-07-07 RX ADMIN — FAMOTIDINE 20 MG: 10 INJECTION, SOLUTION INTRAVENOUS at 06:42

## 2022-07-07 RX ADMIN — LIDOCAINE HYDROCHLORIDE 30 ML: 20 SOLUTION OROPHARYNGEAL at 06:41

## 2022-07-07 RX ADMIN — ONDANSETRON 4 MG: 2 INJECTION INTRAMUSCULAR; INTRAVENOUS at 06:42

## 2022-07-07 ASSESSMENT — FIBROSIS 4 INDEX: FIB4 SCORE: 2.47

## 2022-07-07 NOTE — ED NOTES
DC orders received. AVS given and explained to pt. Pt understood DC instructions and educated to come back to the ER if symptoms become worse.   Line removed. Pt alert and oriented with a steady gate. Patient discharged to self w/o incident.

## 2022-07-07 NOTE — ED TRIAGE NOTES
Emigdio Jiménez  86 y.o. male  Chief Complaint   Patient presents with   • N/V     Pt reports he cannot throw up but has been constantly nauseous x3 days. Pt states he does have medication for this but it is ineffective. Pt states he ate a hamburger and onion rings today with relief however tonight he is nauseous and cannot sleep. Denies pain, just nausea. Currently refusing Zofran ODT. + marijuana use, states it helps the nausea.   • Insomnia     X3 days     Vitals:    07/07/22 0328   BP: (!) 147/84   Pulse: 90   Resp: 18   Temp: 36.7 °C (98 °F)   SpO2: 98%     Triage process explained to patient, apologized for wait time, and returned to lobby. Pt informed to notify staff of any change in condition.
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
## 2022-07-28 VITALS
OXYGEN SATURATION: 98 % | RESPIRATION RATE: 16 BRPM | DIASTOLIC BLOOD PRESSURE: 86 MMHG | TEMPERATURE: 97.8 F | HEART RATE: 76 BPM | SYSTOLIC BLOOD PRESSURE: 142 MMHG | WEIGHT: 155.42 LBS | BODY MASS INDEX: 24.98 KG/M2 | HEIGHT: 66 IN

## 2022-07-28 PROCEDURE — 85025 COMPLETE CBC W/AUTO DIFF WBC: CPT

## 2022-07-28 PROCEDURE — 83690 ASSAY OF LIPASE: CPT

## 2022-07-28 PROCEDURE — 80053 COMPREHEN METABOLIC PANEL: CPT

## 2022-07-28 PROCEDURE — 302449 STATCHG TRIAGE ONLY (STATISTIC)

## 2022-07-28 ASSESSMENT — FIBROSIS 4 INDEX: FIB4 SCORE: 2.25

## 2022-07-29 ENCOUNTER — HOSPITAL ENCOUNTER (EMERGENCY)
Facility: MEDICAL CENTER | Age: 87
End: 2022-07-29
Payer: MEDICARE

## 2022-07-29 LAB
ALBUMIN SERPL BCP-MCNC: 4.3 G/DL (ref 3.2–4.9)
ALBUMIN/GLOB SERPL: 1.8 G/DL
ALP SERPL-CCNC: 79 U/L (ref 30–99)
ALT SERPL-CCNC: 16 U/L (ref 2–50)
ANION GAP SERPL CALC-SCNC: 9 MMOL/L (ref 7–16)
AST SERPL-CCNC: 23 U/L (ref 12–45)
BASOPHILS # BLD AUTO: 0.6 % (ref 0–1.8)
BASOPHILS # BLD: 0.03 K/UL (ref 0–0.12)
BILIRUB SERPL-MCNC: 0.4 MG/DL (ref 0.1–1.5)
BUN SERPL-MCNC: 14 MG/DL (ref 8–22)
CALCIUM SERPL-MCNC: 8.7 MG/DL (ref 8.5–10.5)
CHLORIDE SERPL-SCNC: 89 MMOL/L (ref 96–112)
CO2 SERPL-SCNC: 22 MMOL/L (ref 20–33)
CREAT SERPL-MCNC: 1.23 MG/DL (ref 0.5–1.4)
EOSINOPHIL # BLD AUTO: 0.11 K/UL (ref 0–0.51)
EOSINOPHIL NFR BLD: 2.1 % (ref 0–6.9)
ERYTHROCYTE [DISTWIDTH] IN BLOOD BY AUTOMATED COUNT: 44.5 FL (ref 35.9–50)
GFR SERPLBLD CREATININE-BSD FMLA CKD-EPI: 57 ML/MIN/1.73 M 2
GLOBULIN SER CALC-MCNC: 2.4 G/DL (ref 1.9–3.5)
GLUCOSE SERPL-MCNC: 89 MG/DL (ref 65–99)
HCT VFR BLD AUTO: 33.4 % (ref 42–52)
HGB BLD-MCNC: 11.5 G/DL (ref 14–18)
IMM GRANULOCYTES # BLD AUTO: 0.02 K/UL (ref 0–0.11)
IMM GRANULOCYTES NFR BLD AUTO: 0.4 % (ref 0–0.9)
LIPASE SERPL-CCNC: 38 U/L (ref 11–82)
LYMPHOCYTES # BLD AUTO: 1.07 K/UL (ref 1–4.8)
LYMPHOCYTES NFR BLD: 20.3 % (ref 22–41)
MCH RBC QN AUTO: 29.6 PG (ref 27–33)
MCHC RBC AUTO-ENTMCNC: 34.4 G/DL (ref 33.7–35.3)
MCV RBC AUTO: 85.9 FL (ref 81.4–97.8)
MONOCYTES # BLD AUTO: 0.67 K/UL (ref 0–0.85)
MONOCYTES NFR BLD AUTO: 12.7 % (ref 0–13.4)
NEUTROPHILS # BLD AUTO: 3.37 K/UL (ref 1.82–7.42)
NEUTROPHILS NFR BLD: 63.9 % (ref 44–72)
NRBC # BLD AUTO: 0 K/UL
NRBC BLD-RTO: 0 /100 WBC
PLATELET # BLD AUTO: 175 K/UL (ref 164–446)
PMV BLD AUTO: 10.8 FL (ref 9–12.9)
POTASSIUM SERPL-SCNC: 5.1 MMOL/L (ref 3.6–5.5)
PROT SERPL-MCNC: 6.7 G/DL (ref 6–8.2)
RBC # BLD AUTO: 3.89 M/UL (ref 4.7–6.1)
SODIUM SERPL-SCNC: 120 MMOL/L (ref 135–145)
WBC # BLD AUTO: 5.3 K/UL (ref 4.8–10.8)

## 2022-07-29 NOTE — ED TRIAGE NOTES
"Chief Complaint   Patient presents with   • Abdominal Pain     Lower abd pain X 2 weeks. +N/V.   • Insomnia     Pt unable to sleep x 3 days. Requesting sleeping pills.     Pt Pauma. Pt ambulatory. Protocol ordered.     BP (!) 142/86   Pulse 76   Temp 36.6 °C (97.8 °F) (Temporal)   Resp 16   Ht 1.676 m (5' 6\")   Wt 70.5 kg (155 lb 6.8 oz)   SpO2 98%   BMI 25.09 kg/m²     "

## 2022-07-29 NOTE — ED NOTES
Pt left without informing staff. Pt was called for in the lobby, bathroom, hallway and outside, and there was no response. Pt was discharged out of the system.

## 2022-09-04 NOTE — PROGRESS NOTES
[facility] Medicine Daily Progress Note    Date of Service  1/26/2022    Chief Complaint  Emigdio Jiménez is a 86 y.o. male admitted 1/21/2022 with right-sided chest pain/right upper quadrant pain.    Hospital Course  This is 86 years old male who has past medical history of hypertension, chronic kidney disease stage III comes in with right-sided chest pain/right upper quadrant pain.  Pain is provoked with taking deep breath and when he coughs.  Recently presented to ER with similar presentation but left AMA.  Denies any fever chills.  Admits to nausea but no vomiting.  No diarrhea.  In ER initially looked hemodynamically stable.  Afebrile.  Was saturating well on room air.  EKG was negative for ACS.  Troponin was elevated.  TSH was low and free T4 was elevated.  UDS positive for opiates.  D-dimer slightly elevated.  Patient had a CTA of the chest which was negative for PE or any acute lung pathology.  It showed coronary artery calcifications however.  There was slight leukocytosis on CBC.  UA was negative for UTI.  Patient was admitted for ACS work-up.  Echocardiogram and nuclear stress test ordered.  Later on patient spiked a fever of 102.7 °F.  He was slightly tachycardic and hypotensive.  Kidney functions worsen and became acidotic.  On exam noted to have right upper quadrant tenderness.  Abdominal ultrasound ordered.  No distress    Interval Problem Update   1/25: Repeat cultures will be drawn today.  We will continue with IV Unasyn.  MRI and RENU is negative.  Gallbladder does have cholelithiasis but no evidence of acute cholecystitis.  UA did not show any evidence of infection.  CT scan of the chest and abdomen are also negative for source of infection.  1/26: Patient's cultures from yesterday have remained negative.  We will wait 48 hours before placing a PICC line.  Infectious disease have recommended 2 weeks of IV antibiotics after cultures are negative.  He is on ceftriaxone.  He will be discharged home with  home health as he has help with his daughter.  Patient was complaining of nausea and right upper quadrant abdominal pain.  I have personally seen and examined the patient at bedside. I discussed the plan of care with patient and bedside RN.    Consultants/Specialty  None    Code Status  Full Code    Disposition  Patient is not medically cleared for discharge.   Anticipate discharge to to home with close outpatient follow-up.  I have placed the appropriate orders for post-discharge needs.    Review of Systems  Review of Systems   Constitutional: Negative for chills and fever.   HENT: Negative for hearing loss and tinnitus.    Eyes: Negative for blurred vision, double vision and photophobia.   Respiratory: Negative for cough and hemoptysis.    Cardiovascular: Negative for chest pain and palpitations.   Gastrointestinal: Positive for abdominal pain and nausea. Negative for vomiting.   Genitourinary: Negative for dysuria, frequency and urgency.   Musculoskeletal: Negative for myalgias and neck pain.   Neurological: Negative for dizziness and headaches.   Psychiatric/Behavioral: Negative for depression, substance abuse and suicidal ideas.        Physical Exam  Temp:  [36.1 °C (96.9 °F)-37.7 °C (99.9 °F)] 36.7 °C (98 °F)  Pulse:  [75-98] 98  Resp:  [16-18] 18  BP: (116-157)/(67-88) 116/88  SpO2:  [92 %-96 %] 92 %    Physical Exam  Constitutional:       General: He is in acute distress.      Appearance: He is ill-appearing.   HENT:      Head: Atraumatic.      Mouth/Throat:      Mouth: Mucous membranes are dry.   Eyes:      Extraocular Movements: Extraocular movements intact.      Pupils: Pupils are equal, round, and reactive to light.   Cardiovascular:      Rate and Rhythm: Normal rate and regular rhythm.      Heart sounds: No friction rub. No gallop.    Pulmonary:      Effort: Pulmonary effort is normal. No respiratory distress.   Abdominal:      General: There is distension.      Tenderness: There is abdominal tenderness  in the right lower quadrant and periumbilical area. There is guarding and rebound. Positive signs include McBurney's sign.   Musculoskeletal:      Right lower leg: No edema.      Left lower leg: No edema.   Skin:     General: Skin is dry.   Neurological:      General: No focal deficit present.      Mental Status: He is oriented to person, place, and time.   Psychiatric:         Mood and Affect: Mood normal.         Cognition and Memory: Cognition is impaired.         Judgment: Judgment is impulsive.      Comments: Poor insight and judgment         Fluids    Intake/Output Summary (Last 24 hours) at 1/26/2022 1345  Last data filed at 1/26/2022 0800  Gross per 24 hour   Intake 360 ml   Output --   Net 360 ml       Laboratory  Recent Labs     01/24/22  0644 01/25/22 0225 01/26/22  0235   WBC 9.2 9.4 8.8   RBC 3.62* 3.73* 3.68*   HEMOGLOBIN 10.7* 10.7* 10.9*   HEMATOCRIT 32.2* 32.2* 31.3*   MCV 89.0 86.3 85.1   MCH 29.6 28.7 29.6   MCHC 33.2* 33.2* 34.8   RDW 46.6 45.1 43.7   PLATELETCT 155* 199 228   MPV 11.1 11.0 10.6     Recent Labs     01/24/22 0644 01/25/22 0225 01/26/22  0235   SODIUM 128* 129* 128*   POTASSIUM 4.5 4.6 4.5   CHLORIDE 97 94* 94*   CO2 19* 23 21   GLUCOSE 87 101* 93   BUN 17 12 10   CREATININE 0.91 0.92 0.91   CALCIUM 8.2* 8.3* 8.5     Recent Labs     01/24/22  0651   INR 1.07               Imaging  EC-RENU W/O CONT   Final Result      MR-THORACIC SPINE-W/O   Final Result      1.  Minor degenerative changes including a few Schmorl's node endplate irregularities and anterior marginal spurring at T11-T12.   2.  Incidental superficial soft tissue lipoma in the right posterior paraspinous soft tissues. No clinical significance.   3.  No significant disc bulge or protrusion, central stenosis, or foraminal stenosis at any thoracic level.   4.  No evidence of discitis, osteomyelitis, or epidural phlegmon/epidural abscess.      MR-CERVICAL SPINE-W/O   Final Result      1.  Multilevel degenerative disc  disease and spondylotic changes most notable for C4-5 moderate central stenosis at C5-6 mild central stenosis. Foraminal stenoses due to spondylotic change as described above.   2.  No evidence of discitis, osteomyelitis, or epidural collection.   3.  No myelopathic cord signal abnormality.      CT-ABDOMEN-PELVIS W/O   Final Result         1. No acute inflammatory change in the abdomen or pelvis on this noncontrast CT.   2. Small hiatal hernia.   3. No hydronephrosis. No renal calculus.      NM-CARDIAC STRESS TEST   Final Result      US-RUQ   Final Result      Gallstones within the gallbladder. No evidence of biliary ductal dilatation.      EC-ECHOCARDIOGRAM COMPLETE W/O CONT   Final Result      CT-CTA CHEST PULMONARY ARTERY W/ RECONS   Final Result      1.  No evidence of pulmonary embolus.      2.  Coronary artery calcifications.            DX-CHEST-PORTABLE (1 VIEW)   Final Result      No acute cardiac or pulmonary abnormalities are identified.           Assessment/Plan  * Bacteremia- (present on admission)  Assessment & Plan  Blood cultures growing Streptococcus species 2 out of 2 bottles.  Repeat cultures ordered  No source of infection has been identified yet.  CT abdomen and pelvis without contrast ordered given continuous abdominal pain.  Started on Unasyn.  TTE with no evidence of endocarditis or vegetations  Might need RENU if no source of infection has been identified in the light of TAVR.  ID consulted.  1/24: Repeat blood cultures from 1/23/2022 still positive.  Will repeat in 48 hours.  Continue Unasyn for now.  ID following.  1/25: We will repeat blood cultures today.  1/26: Does have remained negative infectious disease recommending IV antibiotics after 2 weeks of negative cultures.    Right upper quadrant abdominal tenderness with rebound tenderness  Assessment & Plan  Would benefit from CT abdomen and pelvis with contrast but due to worsening kidney functions will do fair for now.  Will check  abdominal ultrasound.  1/22: Ultrasound of the abdomen negative for acute findings rather than showing gallstones with no evidence of cholecystitis.  Given continued tenderness of the abdomen, positive blood cultures, and fever, CT scan of the abdomen pelvis without contrast ordered.  Case discussed with surgery.  No surgical intervention recommended at this point.    Sepsis (HCC)- (present on admission)  Assessment & Plan  SIRS criteria identified on my evaluation include:  Fever, with temperature greater than 101 deg F and Tachycardia, with heart rate greater than 90 BPM  SIRS is non-infectious, the patient does not have sepsis  1/23: Blood cultures positive for Streptococcus species 2 out of 2 bottles.  No source of infection has been identified yet.  CT scan abdominal pelvis given right upper quadrant tenderness with negative ultrasound ordered and pending.  1/24: CT abdomen and pelvis and ultrasound both negative for any acute findings.  MRI of the cervical and thoracic spine with no evidence of discitis or osteomyelitis.  No epidural abscess.  RENU was done which was negative for any endocarditis. No source has been identified yet.  Repeat blood cultures from 1/23/2022 still positive.  Repeat blood cultures in 48 hours.  May be consider reimaging abdomen and pelvis with enhanced contrast study given continued abdominal pain!?      Metabolic acidosis- (present on admission)  Assessment & Plan  Could be due to worsening kidney functions.  Started on sodium bicarb  Fluids resuscitation.  Monitor.  1/22: Improving.    DANIS (acute kidney injury) ([facility])- (present on admission)  Assessment & Plan  CKD 3 based on retrospective lab review.  Continue to monitor  1/22: Worsening.  Likely combination of prerenal component and possibly ATN/AIN from contrast exposure.  Gentle fluid resuscitation.  Avoid nephrotoxins.  Renally dose all medications.  1/23: Improving.    CHF (congestive heart failure) ([facility])- (present on  admission)  Assessment & Plan  Echo and stress test ordered  BNP 2591  Consider cardiology consult  No previous dx of CHF    Elevated troponin- (present on admission)  Assessment & Plan  EKG negative for ACS.  Echocardiogram and stress test ordered.    HLD (hyperlipidemia)- (present on admission)  Assessment & Plan  Lipid panel ordered to assess  Continue home meds    Hyponatremia- (present on admission)  Assessment & Plan  Chronic  Worsened likely due to worsening kidney functions plus dehydration  On IV fluids for now.  Monitor    Essential hypertension- (present on admission)  Assessment & Plan  Continue home meds  Admitted with telemetry      Hypothyroidism  Assessment & Plan  Low TSH and elevated free T4.  Will decrease Synthroid dose to 112 mcg  Repeat TSH/free T4 in 4 to 6 weeks and adjust dose accordingly    Chest pain- (present on admission)  Assessment & Plan  Rule out ACS.  Echocardiogram and stress test ordered.  1/23: Negative work-up with unremarkable echocardiogram and nuclear stress test.       VTE prophylaxis: heparin ppx    I have performed a physical exam and reviewed and updated ROS and Plan today (1/26/2022). In review of yesterday's note (1/25/2022), there are no changes except as documented above.       None

## 2022-11-04 ENCOUNTER — PATIENT MESSAGE (OUTPATIENT)
Dept: HEALTH INFORMATION MANAGEMENT | Facility: OTHER | Age: 87
End: 2022-11-04

## 2024-04-23 NOTE — ANESTHESIA POSTPROCEDURE EVALUATION
Patient: Emigdio Jiménez    Procedure Summary     Date: 01/24/22 Room / Location: [facility] - ECHOCARDIOLOGY [facility]    Anesthesia Start: 1237 Anesthesia Stop: 1305    Procedure: EC-RENU W/O CONT Diagnosis:       Elevated troponin      Sepsis (HCC)      Elevated troponin      Sepsis (HCC)    Scheduled Providers: Debbi Walker M.D.; Emigdio Ortega M.D. Responsible Provider: Emigdio Ortega M.D.    Anesthesia Type: general ASA Status: 3          Final Anesthesia Type: general  Last vitals  BP   Blood Pressure : (!) 168/80    Temp   36.5 °C (97.7 °F)    Pulse   85   Resp   18    SpO2   97 %      Anesthesia Post Evaluation    Patient location during evaluation: PACU  Patient participation: complete - patient participated  Level of consciousness: awake and alert    Airway patency: patent  Anesthetic complications: no  Cardiovascular status: hemodynamically stable  Respiratory status: acceptable  Hydration status: euvolemic    PONV: none          No complications documented.     Nurse Pain Score: 0 (NPRS)        
98.6

## (undated) DEVICE — GLOVE BIOGEL SZ 8 SURGICAL PF LTX - (50PR/BX 4BX/CA)

## (undated) DEVICE — PROTECTOR ULNA NERVE - (36PR/CA)

## (undated) DEVICE — SLEEVE, VASO, THIGH, MED

## (undated) DEVICE — PACK LAP CHOLE OR - (2EA/CA)

## (undated) DEVICE — SET TUBING PNEUMOCLEAR HIGH FLOW SMOKE EVACUATION (10EA/BX)

## (undated) DEVICE — GLOVE BIOGEL INDICATOR SZ 7.5 SURGICAL PF LTX - (50PR/BX 4BX/CA)

## (undated) DEVICE — TUBE CONNECT SUCTION CLEAR 120 X 1/4" (50EA/CA)"

## (undated) DEVICE — STOPCOCK 3-WAY W/SWIVEL LEVER LOCK (50EA/CA)

## (undated) DEVICE — SUTURE GENERAL

## (undated) DEVICE — SENSOR SPO2 NEO LNCS ADHESIVE (20/BX) SEE USER NOTES

## (undated) DEVICE — HEAD HOLDER JUNIOR/ADULT

## (undated) DEVICE — SUTURE 0 COATED VICRYL 6-18IN - (12PK/BX)

## (undated) DEVICE — TUBING INSUFFLATION PNEUMOCLEAR HIGH-FLOW (10EA/BX)

## (undated) DEVICE — GLOVE BIOGEL SZ 7 SURGICAL PF LTX - (50PR/BX 4BX/CA)

## (undated) DEVICE — SUTURE 0 VICRYL PLUS CT-2 - 27 INCH (36/BX)

## (undated) DEVICE — GOWN WARMING STANDARD FLEX - (30/CA)

## (undated) DEVICE — CANISTER SUCTION 3000ML MECHANICAL FILTER AUTO SHUTOFF MEDI-VAC NONSTERILE LF DISP  (40EA/CA)

## (undated) DEVICE — SUTURE 4-0 MONOCRYL PLUS PS-2 - 27 INCH (36/BX)

## (undated) DEVICE — SET LEADWIRE 5 LEAD BEDSIDE DISPOSABLE ECG (1SET OF 5/EA)

## (undated) DEVICE — DERMABOND ADVANCED - (12EA/BX)

## (undated) DEVICE — CHLORAPREP 26 ML APPLICATOR - ORANGE TINT(25/CA)

## (undated) DEVICE — LACTATED RINGERS INJ 1000 ML - (14EA/CA 60CA/PF)

## (undated) DEVICE — TOWEL STOP TIMEOUT SAFETY FLAG (40EA/CA)

## (undated) DEVICE — TUBING CLEARLINK DUO-VENT - C-FLO (48EA/CA)

## (undated) DEVICE — SUCTION INSTRUMENT YANKAUER BULBOUS TIP W/O VENT (50EA/CA)

## (undated) DEVICE — TROCAR Z THREAD 12 X 100 - BLADED (6/BX)

## (undated) DEVICE — NEPTUNE 4 PORT MANIFOLD - (20/PK)

## (undated) DEVICE — SCISSORS 5MM CVD (6EA/BX)

## (undated) DEVICE — CLIP MED LG INTNL HRZN TI ESCP - (20/BX)

## (undated) DEVICE — GLOVE BIOGEL SZ 7.5 SURGICAL PF LTX - (50PR/BX 4BX/CA)

## (undated) DEVICE — KIT ANESTHESIA W/CIRCUIT & 3/LT BAG W/FILTER (20EA/CA)

## (undated) DEVICE — TROCAR 5X100 BLADED Z-THREAD - KII (6/BX)

## (undated) DEVICE — ELECTRODE 850 FOAM ADHESIVE - HYDROGEL RADIOTRNSPRNT (50/PK)

## (undated) DEVICE — ELECTRODE DUAL RETURN W/ CORD - (50/PK)

## (undated) DEVICE — GLOVE BIOGEL PI INDICATOR SZ 6.5 SURGICAL PF LF - (50/BX 4BX/CA)

## (undated) DEVICE — GLOVE BIOGEL SZ 6.5 SURGICAL PF LTX (50PR/BX 4BX/CA)

## (undated) DEVICE — TROCAR Z THREAD12MM OPTICAL - NON BLADED (6/BX)

## (undated) DEVICE — TROCAR LAPSCP 100MM 12MM NTHRD - (6/BX)

## (undated) DEVICE — BAG RETRIEVAL 10ML (10EA/BX)

## (undated) DEVICE — SET EXTENSION WITH 2 PORTS (48EA/CA) ***PART #2C8610 IS A SUBSTITUTE*****

## (undated) DEVICE — CANNULA W/SEAL 5X100 Z-THRE - ADED KII (12/BX)

## (undated) DEVICE — SODIUM CHL IRRIGATION 0.9% 1000ML (12EA/CA)

## (undated) DEVICE — MASK ANESTHESIA ADULT  - (100/CA)

## (undated) DEVICE — GLOVE BIOGEL PI INDICATOR SZ 7.0 SURGICAL PF LF - (50/BX 4BX/CA)